# Patient Record
Sex: FEMALE | Race: WHITE | NOT HISPANIC OR LATINO | Employment: OTHER | URBAN - METROPOLITAN AREA
[De-identification: names, ages, dates, MRNs, and addresses within clinical notes are randomized per-mention and may not be internally consistent; named-entity substitution may affect disease eponyms.]

---

## 2017-01-09 ENCOUNTER — TELEPHONE (OUTPATIENT)
Dept: CARDIOLOGY | Facility: CLINIC | Age: 67
End: 2017-01-09

## 2017-01-09 NOTE — TELEPHONE ENCOUNTER
I called Mrs Whitehead to see what she was feeling when she pressed the button for symptoms on her loop recorder. She said she felt like she was in an irregular tachycardia. She also had a burning sensation in her chest, but had just eaten 2 sausage dogs. She took a 50 mg Metoprolol pill and laid down and her heart gradually slowed down. Yesterday was the 1 yr anniversary of her husbands death. She states when she has this it is when she if very stressed or very tired. The Metoprolol was an old RX she had around the house, she is not on it routinely anymore.

## 2017-01-09 NOTE — TELEPHONE ENCOUNTER
Dr Garcia was shown the strip of rapid Atrial Fib and informed that the patient had taken a Metoprolol 50 mg by  Mouth for this and it slowly subsided. He said she could try the Metoprolol prn in the future for increased heart rate. Also call us when she has this symptom again.

## 2017-01-10 RX ORDER — METOPROLOL TARTRATE 50 MG/1
50 TABLET ORAL
Qty: 180 TABLET | Refills: 11 | Status: SHIPPED | OUTPATIENT
Start: 2017-01-10 | End: 2017-11-07 | Stop reason: SDUPTHER

## 2017-01-17 ENCOUNTER — CLINICAL SUPPORT (OUTPATIENT)
Dept: CARDIOLOGY | Facility: CLINIC | Age: 67
End: 2017-01-17
Payer: MEDICARE

## 2017-01-17 DIAGNOSIS — R00.2 PALPITATIONS: ICD-10-CM

## 2017-01-17 DIAGNOSIS — Z95.818 STATUS POST PLACEMENT OF IMPLANTABLE LOOP RECORDER: ICD-10-CM

## 2017-01-17 PROCEDURE — 93297 REM INTERROG DEV EVAL ICPMS: CPT | Mod: ,,, | Performed by: INTERNAL MEDICINE

## 2017-01-17 PROCEDURE — 93299 LOOP RECORDER REMOTE: CPT | Mod: PBBFAC,PO | Performed by: INTERNAL MEDICINE

## 2017-03-03 ENCOUNTER — DOCUMENTATION ONLY (OUTPATIENT)
Dept: CARDIOLOGY | Facility: CLINIC | Age: 67
End: 2017-03-03

## 2017-03-03 NOTE — PROGRESS NOTES
"Received symptom, AF alert via [employer] Loop recorder home monitor.  AF episode detected on 3/2/17 19:17 with duration of 2 hours 16 mins.  Median V rate of 167 bpm.    Contacted patient, states she was out eating dinner with a friend and started feeling "funky", they took food to go, went home to lye down then started to feel better.  Pt also states she has been exhausted from the events of Sebastien Gras and has noticed the trend of the AF events occurring when she is in a state of exhaustion.  She did not take the PRN Metoprolol for this event.  Dr. Hernandez notified, shown EGM. No new orders.  Pt to keep appt with Dr. Garcia on 3/7/2017.  Pt verbalized understanding.  "

## 2017-03-07 ENCOUNTER — OFFICE VISIT (OUTPATIENT)
Dept: CARDIOLOGY | Facility: CLINIC | Age: 67
End: 2017-03-07
Payer: MEDICARE

## 2017-03-07 ENCOUNTER — CLINICAL SUPPORT (OUTPATIENT)
Dept: CARDIOLOGY | Facility: CLINIC | Age: 67
End: 2017-03-07
Payer: MEDICARE

## 2017-03-07 VITALS
HEART RATE: 50 BPM | DIASTOLIC BLOOD PRESSURE: 85 MMHG | HEIGHT: 65 IN | BODY MASS INDEX: 41.48 KG/M2 | WEIGHT: 249 LBS | SYSTOLIC BLOOD PRESSURE: 174 MMHG

## 2017-03-07 DIAGNOSIS — I48.91 ATRIAL FIBRILLATION, UNSPECIFIED TYPE: ICD-10-CM

## 2017-03-07 DIAGNOSIS — I48.0 PAF (PAROXYSMAL ATRIAL FIBRILLATION): Primary | Chronic | ICD-10-CM

## 2017-03-07 DIAGNOSIS — Z95.818 STATUS POST PLACEMENT OF IMPLANTABLE LOOP RECORDER: ICD-10-CM

## 2017-03-07 DIAGNOSIS — I48.3 TYPICAL ATRIAL FLUTTER: ICD-10-CM

## 2017-03-07 DIAGNOSIS — I48.0 PAF (PAROXYSMAL ATRIAL FIBRILLATION): ICD-10-CM

## 2017-03-07 DIAGNOSIS — R00.2 PALPITATIONS: Primary | ICD-10-CM

## 2017-03-07 DIAGNOSIS — R00.0 TACHYCARDIA: ICD-10-CM

## 2017-03-07 DIAGNOSIS — I45.10 RBBB: ICD-10-CM

## 2017-03-07 DIAGNOSIS — Q24.4 SUBAORTIC MEMBRANE: ICD-10-CM

## 2017-03-07 DIAGNOSIS — G47.33 OSA ON CPAP: Chronic | ICD-10-CM

## 2017-03-07 PROCEDURE — 99999 PR PBB SHADOW E&M-EST. PATIENT-LVL III: CPT | Mod: PBBFAC,,, | Performed by: INTERNAL MEDICINE

## 2017-03-07 PROCEDURE — 99213 OFFICE O/P EST LOW 20 MIN: CPT | Mod: PBBFAC,PO | Performed by: INTERNAL MEDICINE

## 2017-03-07 PROCEDURE — 93291 INTERROG DEV EVAL SCRMS IP: CPT | Mod: PBBFAC,PO | Performed by: INTERNAL MEDICINE

## 2017-03-07 PROCEDURE — 99213 OFFICE O/P EST LOW 20 MIN: CPT | Mod: S$PBB,,, | Performed by: INTERNAL MEDICINE

## 2017-03-07 RX ORDER — SOTALOL HYDROCHLORIDE 120 MG/1
120 TABLET ORAL 2 TIMES DAILY
Qty: 180 TABLET | Refills: 6 | Status: SHIPPED | OUTPATIENT
Start: 2017-03-07 | End: 2017-03-10 | Stop reason: SDUPTHER

## 2017-03-07 NOTE — MR AVS SNAPSHOT
[facility]  [address]  [facility] [address]  Phone: [phone]                  Yara Whitehead   3/7/2017 2:00 PM   Office Visit    Description:  Female : 1950   Provider:  Silvano Garcia MD   Department:  [facility]           Reason for Visit     Atrial Fibrillation     Hyperlipidemia     Hypertension           Diagnoses this Visit        Comments    PAF (paroxysmal atrial fibrillation)    -  Primary     Subaortic membrane         Typical atrial flutter         RBBB         FABIANA on CPAP                To Do List           Future Appointments        Provider Department Dept Phone    2017 8:40 AM [facility] [phone]      Goals (5 Years of Data)     None      Follow-Up and Disposition     Return in about 6 months (around 2017).       These Medications        Disp Refills Start End    rivaroxaban (XARELTO) 20 mg Tab 30 tablet 12 3/7/2017     Take 1 tablet (20 mg total) by mouth daily with dinner or evening meal. - Oral    Pharmacy: [facility] [address] [place], [place] [id number] [place] [address] AT [facility] [address] Ph #: [phone]       sotalol (BETAPACE) 120 MG Tab 180 tablet 6 3/7/2017 3/7/2018    Take 1 tablet (120 mg total) by mouth 2 (two) times daily. - Oral    Pharmacy: [facility] [id number] [place] [address] AT [facility] [address] Ph #: [phone]         [facility] On Call     [facility] On Call Nurse Care Line -  Assistance  Registered nurses in the [facility] provide clinical advisement, health education, appointment booking, and other advisory services.  Call for this free service at [phone].             Medications           Message regarding Medications     Verify the changes and/or additions to your medication regime listed below are the same as discussed with your clinician today.  If any of these changes or additions are  "incorrect, please notify your healthcare provider.             Verify that the below list of medications is an accurate representation of the medications you are currently taking.  If none reported, the list may be blank. If incorrect, please contact your healthcare provider. Carry this list with you in case of emergency.           Current Medications     albuterol 90 mcg/actuation inhaler Inhale 1-2 puffs into the lungs every 6 (six) hours as needed for Wheezing.    b complex vitamins tablet Take 1 tablet by mouth once daily.    cholecalciferol, vitamin D3, 10,000 unit Tab Take 1 tablet by mouth once daily.    cyanocobalamin 1,000 mcg/mL injection Inject 1 mL (1,000 mcg total) into the muscle every 30 days.    ferrous sulfate 325 (65 FE) MG EC tablet Take 325 mg by mouth 3 (three) times daily with meals.    furosemide (LASIX) 20 MG tablet Take 1 tablet (20 mg total) by mouth daily as needed (edema).    levothyroxine (SYNTHROID) 75 MCG tablet Take 1 tablet (75 mcg total) by mouth once daily.    lutein 20 mg Tab Take 20 mg by mouth once daily.    metoprolol tartrate (LOPRESSOR) 50 MG tablet Take 1 tablet (50 mg total) by mouth as needed.    MULTIVITAMIN-MINERALS NO.55 ORAL Take by mouth.    paroxetine (PAXIL) 20 MG tablet Take 2 tablets (40 mg total) by mouth every morning.    potassium chloride SA (K-DUR,KLOR-CON) 20 MEQ tablet Take 1 tablet (20 mEq total) by mouth daily as needed (edema).    rivaroxaban (XARELTO) 20 mg Tab Take 1 tablet (20 mg total) by mouth daily with dinner or evening meal.    sotalol (BETAPACE) 120 MG Tab Take 1 tablet (120 mg total) by mouth 2 (two) times daily.           Clinical Reference Information           Your Vitals Were     BP Pulse Height Weight Last Period BMI    174/85 (BP Location: Right arm, Patient Position: Sitting, BP Method: Automatic) 50 5' 5" (1.651 m) 112.9 kg (249 lb) (LMP Unknown) 41.44 kg/m2      Blood Pressure          Most Recent Value    BP  (!)  174/85    "   Allergies as of 3/7/2017     No Known Allergies      Immunizations Administered on Date of Encounter - 3/7/2017     None      Language Assistance Services     ATTENTION: Language assistance services are available, free of charge. Please call [phone].      ATENCIÓN: Si alejandra rabago, tiene a mendez disposición servicios gratuitos de asistencia lingüística. Llame al [phone].     CHÚ Ý: N?u b?n nói Ti?ng Vi?t, có các d?ch v? h? tr? ngôn ng? mi?n phí dành cho b?n. G?i s? [phone].         [facility] complies with applicable Federal civil rights laws and does not discriminate on the basis of race, color, national origin, age, disability, or sex.

## 2017-03-07 NOTE — PROGRESS NOTES
Subjective:    Patient ID:  Yara Whitehead is a 66 y.o. female who presents for follow-up of Atrial Fibrillation; Hyperlipidemia; and Hypertension      HPI   Here for f/u ILR/PAF-DCCV on sotalol. Was doing well but begin having PAF-RVR 3/1-3 admits to off diet and ETOH use due to Sebastien-Gras. No syncope.     Review of Systems   Constitution: Negative for malaise/fatigue.   Eyes: Negative for blurred vision.   Cardiovascular: Negative for chest pain, claudication, cyanosis, dyspnea on exertion, irregular heartbeat, leg swelling, near-syncope, orthopnea, palpitations, paroxysmal nocturnal dyspnea and syncope.   Respiratory: Negative for cough and shortness of breath.    Hematologic/Lymphatic: Does not bruise/bleed easily.   Musculoskeletal: Negative for back pain, falls, joint pain, muscle cramps, muscle weakness and myalgias.   Gastrointestinal: Negative for abdominal pain, change in bowel habit, nausea and vomiting.   Genitourinary: Negative for urgency.   Neurological: Negative for dizziness, focal weakness and light-headedness.        Objective:    Physical Exam   Constitutional: She is oriented to person, place, and time. She appears well-developed and well-nourished.   Neck: Normal range of motion. No JVD present.   Cardiovascular: Normal rate, regular rhythm, normal heart sounds and intact distal pulses.    Pulmonary/Chest: Effort normal and breath sounds normal.   Neurological: She is alert and oriented to person, place, and time.   Skin: Skin is warm and dry.   Psychiatric: She has a normal mood and affect.   Nursing note and vitals reviewed.            ..    Chemistry        Component Value Date/Time     11/01/2016 0855    K 4.7 11/01/2016 0855     11/01/2016 0855    CO2 26 11/01/2016 0855    BUN 16 11/01/2016 0855    CREATININE 0.8 11/01/2016 0855    GLU 99 11/01/2016 0855        Component Value Date/Time    CALCIUM 9.5 11/01/2016 0855    ALKPHOS 95 11/01/2016 0855    AST 28 11/01/2016  0855    ALT 22 11/01/2016 0855    BILITOT 0.6 11/01/2016 0855            ..  Lab Results   Component Value Date    CHOL 202 (H) 02/17/2016     Lab Results   Component Value Date    HDL 66 02/17/2016     Lab Results   Component Value Date    LDLCALC 115.4 02/17/2016     Lab Results   Component Value Date    TRIG 103 02/17/2016     Lab Results   Component Value Date    CHOLHDL 32.7 02/17/2016     ..  Lab Results   Component Value Date    WBC 7.11 08/24/2016    HGB 15.0 08/24/2016    HCT 45.6 08/24/2016    MCV 97 08/24/2016     08/24/2016       Test(s) Reviewed  I have reviewed the following in detail:  [] Stress test   [] Angiography   [x] Echocardiogram   [x] Labs   [x] Other:       Assessment:         ICD-10-CM ICD-9-CM   1. PAF (paroxysmal atrial fibrillation) I48.0 427.31   2. Subaortic membrane Q24.4 746.81   3. Typical atrial flutter I48.3 427.32   4. RBBB I45.10 426.4   5. FABIANA on CPAP G47.33 327.23     Problem List Items Addressed This Visit     FABIANA on CPAP (Chronic)    PAF (paroxysmal atrial fibrillation) - Primary (Chronic)    Overview     New dx 2/16/16         RBBB    Subaortic membrane    Typical atrial flutter           Plan:           Return to clinic 6 months   Aerobic exercise 5x's/wk. Heart healthy diet and risk factor modification.    See labs and med orders.  Refer back to EP for re-occurring PAF

## 2017-03-09 ENCOUNTER — DOCUMENTATION ONLY (OUTPATIENT)
Dept: ADMINISTRATIVE | Facility: HOSPITAL | Age: 67
End: 2017-03-09

## 2017-03-09 NOTE — PROGRESS NOTES
PRE-VISIT CHART REVIEW    Appointment Scheduled on 3/10/17    Department stratifications & guidelines reviewed:yes    Target Chronic Diagnosis: HTN, obesity    Chronic Diagnosis Intervention Due: yes    Goals Updated:No    Health Maintenance Due   Topic Date Due    Colonoscopy  05/31/2001    Pneumococcal (65+) (2 of 2 - PPSV23) 02/16/2017    Lipid Panel  02/17/2017    Mammogram  04/20/2017       Advanced Directives:   65 years of age or older?  Yes  Directive on file?  no                                      Pre-visit patient communication:  In Person     Studies or screenings scheduled pre-visit: no    Educate patient on HTN (174/85), obesity (41.44)

## 2017-03-10 ENCOUNTER — OFFICE VISIT (OUTPATIENT)
Dept: FAMILY MEDICINE | Facility: CLINIC | Age: 67
End: 2017-03-10
Payer: MEDICARE

## 2017-03-10 VITALS
RESPIRATION RATE: 18 BRPM | DIASTOLIC BLOOD PRESSURE: 76 MMHG | SYSTOLIC BLOOD PRESSURE: 142 MMHG | HEART RATE: 55 BPM | TEMPERATURE: 99 F | HEIGHT: 65 IN | BODY MASS INDEX: 39.67 KG/M2 | WEIGHT: 238.13 LBS | OXYGEN SATURATION: 98 %

## 2017-03-10 DIAGNOSIS — J45.901 RAD (REACTIVE AIRWAY DISEASE), UNSPECIFIED ASTHMA SEVERITY, WITH ACUTE EXACERBATION: Primary | ICD-10-CM

## 2017-03-10 DIAGNOSIS — J01.00 ACUTE MAXILLARY SINUSITIS, RECURRENCE NOT SPECIFIED: ICD-10-CM

## 2017-03-10 PROCEDURE — 99214 OFFICE O/P EST MOD 30 MIN: CPT | Mod: S$GLB,,, | Performed by: INTERNAL MEDICINE

## 2017-03-10 RX ORDER — ZOLPIDEM TARTRATE 12.5 MG/1
12.5 TABLET, FILM COATED, EXTENDED RELEASE ORAL NIGHTLY PRN
COMMUNITY
End: 2018-02-02

## 2017-03-10 RX ORDER — DEXTROMETHORPHAN POLISTIREX 30 MG/5ML
60 SUSPENSION ORAL NIGHTLY PRN
COMMUNITY
End: 2017-04-04

## 2017-03-10 RX ORDER — CETIRIZINE HYDROCHLORIDE 10 MG/1
10 TABLET ORAL DAILY PRN
COMMUNITY

## 2017-03-10 RX ORDER — METHYLPREDNISOLONE 4 MG/1
TABLET ORAL
Qty: 1 PACKAGE | Refills: 0 | Status: SHIPPED | OUTPATIENT
Start: 2017-03-10 | End: 2017-03-31

## 2017-03-10 RX ORDER — AMOXICILLIN AND CLAVULANATE POTASSIUM 875; 125 MG/1; MG/1
1 TABLET, FILM COATED ORAL 2 TIMES DAILY
Qty: 20 TABLET | Refills: 0 | Status: SHIPPED | OUTPATIENT
Start: 2017-03-10 | End: 2017-03-20

## 2017-03-10 NOTE — PROGRESS NOTES
Subjective:       Patient ID: Yara Whitehead is a 66 y.o. female.    Current Outpatient Prescriptions   Medication Sig Dispense Refill    albuterol 90 mcg/actuation inhaler Inhale 1-2 puffs into the lungs every 6 (six) hours as needed for Wheezing. 1 Inhaler 0    cetirizine (ZYRTEC) 10 MG tablet Take 10 mg by mouth once daily.      cyanocobalamin 1,000 mcg/mL injection Inject 1 mL (1,000 mcg total) into the muscle every 30 days. 1 mL 11    dextromethorphan (DELSYM) 30 mg/5 mL liquid Take 60 mg by mouth nightly as needed for Cough.      dextromethorphan-guaifenesin  mg (MUCINEX DM)  mg per 12 hr tablet Take 1 tablet by mouth 2 (two) times daily.      HYDROCODONE/ACETAMINOPHEN (LORTAB ORAL) Take 1 tablet by mouth.      levothyroxine (SYNTHROID) 75 MCG tablet Take 1 tablet (75 mcg total) by mouth once daily. 90 tablet 3    paroxetine (PAXIL) 20 MG tablet Take 2 tablets (40 mg total) by mouth every morning. 60 tablet 5    rivaroxaban (XARELTO) 20 mg Tab Take 1 tablet (20 mg total) by mouth daily with dinner or evening meal. 30 tablet 12    sotalol (BETAPACE) 120 MG Tab Take 1 tablet (120 mg total) by mouth 2 (two) times daily. 180 tablet 6    zolpidem (AMBIEN CR) 12.5 MG CR tablet Take 12.5 mg by mouth nightly as needed for Insomnia.      amoxicillin-clavulanate 875-125mg (AUGMENTIN) 875-125 mg per tablet Take 1 tablet by mouth 2 (two) times daily. 20 tablet 0    b complex vitamins tablet Take 1 tablet by mouth once daily.      cholecalciferol, vitamin D3, 10,000 unit Tab Take 1 tablet by mouth once daily.      ferrous sulfate 325 (65 FE) MG EC tablet Take 325 mg by mouth 3 (three) times daily with meals.      furosemide (LASIX) 20 MG tablet Take 1 tablet (20 mg total) by mouth daily as needed (edema). 90 tablet 3    lutein 20 mg Tab Take 20 mg by mouth once daily.      methylPREDNISolone (MEDROL DOSEPACK) 4 mg tablet use as directed 1 Package 0    metoprolol tartrate (LOPRESSOR) 50  "MG tablet Take 1 tablet (50 mg total) by mouth as needed. 180 tablet 11    MULTIVITAMIN-MINERALS NO.55 ORAL Take by mouth.      potassium chloride SA (K-DUR,KLOR-CON) 20 MEQ tablet Take 1 tablet (20 mEq total) by mouth daily as needed (edema). 90 tablet 6     No current facility-administered medications for this visit.      Chief Complaint: Sinus Problem; Fever (low grade); and Cough (yellowish/blood tinged sputum)  she presents with 10 days of worsening symptoms. She started with allergy like symptoms and sore throat which she treated with antihistamines.  She got worse on day 5 with increasing drainage that changed to yellow with occasional blood streaks. Sinus pressure and PND. She has a new fever for the last 2 days.  She has a mild cough with wheezing and is using albuterol tid to qid.  She has mild shortness of breath.  She is very fatigued with no energy. No diarrhea/vomiting/nausea. No abdominal pain or rashes.      Review of Systems   Constitutional: Positive for fever. Negative for chills.   HENT: Positive for congestion, ear pain, postnasal drip, rhinorrhea, sinus pressure and sore throat.    Respiratory: Positive for cough, shortness of breath and wheezing.    Cardiovascular: Negative for chest pain.   Musculoskeletal: Negative for myalgias.   Skin: Negative for rash.   Allergic/Immunologic: Positive for environmental allergies.   Neurological: Positive for headaches.       Objective:      Vitals:    03/10/17 0853   BP: (!) 142/76   Pulse: (!) 55   Resp: 18   Temp: 98.5 °F (36.9 °C)   SpO2: 98%   Weight: 108 kg (238 lb 1.6 oz)   Height: 5' 5" (1.651 m)     Body mass index is 39.62 kg/(m^2).  Physical Exam    General appearance: alert, no acute distress  Head: atraumatic  Eyes: PERRL, EMOI, normal conjunctiva, no drainage  Ears: tm normal with good visualization of landmarks, no erythema or pus, canals normal, external ear normal  Nose: boggy erythematous mucosa, no polyps or sores, no " rhinorrhea  Throat: no erythema, no exudates, tonsils appear normal  Mouth: no sores or lesion, moist mucous membranes  Neck: supple, FROM, no masses, no tenderness  Lymph: no posterior or cervical adenopathy  Lungs: no distress, no retractions, diffuse scattered wheezing in the bases, no rhonchi or rales  Heart:: Regular rate and rhythm, no murmur  Abdomen: soft, non-tender, no guarding, no rebound, no peritoneal signs, bowel sounds normal, no hepatosplenomegaly, no masses  Skin: no rashes or lesion  Perfusion: good capillary refill, normal pulses      Assessment:       1. RAD (reactive airway disease), unspecified asthma severity, with acute exacerbation    2. Acute maxillary sinusitis, recurrence not specified        Plan:       RAD (reactive airway disease), unspecified asthma severity, with acute exacerbation  Acute flare and will treat with a course of oral steroids.  Will continue albuterol tid x 2-3 days and then only as needed.  If no improved to return to clinic on Monday.   -     methylPREDNISolone (MEDROL DOSEPACK) 4 mg tablet; use as directed  Dispense: 1 Package; Refill: 0    Acute maxillary sinusitis, recurrence not specified  10 days of symptoms with a new fever for last 2 days and will treat with abx.   -     amoxicillin-clavulanate 875-125mg (AUGMENTIN) 875-125 mg per tablet; Take 1 tablet by mouth 2 (two) times daily.  Dispense: 20 tablet; Refill: 0    Return if symptoms worsen or fail to improve.

## 2017-03-10 NOTE — TELEPHONE ENCOUNTER
----- Message from Tona Lan sent at 3/9/2017  1:12 PM CST -----  Patient was seen by doctor yesterday. Office sent in her medications Xrelto and Sotolol into the pharmacy. But, office sent these in as a 30 day prescription for each. Patient asks if office can re-submit it as 90 day prescription, as you usually do. It would save patient a lot of money. Please send to:      [facility] [address] [place] [address] [id number] [place] [address] AT [facility] [address]  [id number] [place] [address]  Phone: [phone] Fax: [phone]    Please call patient when this is completed at [phone]

## 2017-03-10 NOTE — MR AVS SNAPSHOT
[facility]  [address]  [facility] [address]  Phone: [phone]  Fax: [phone]                  Yara Whitehead   3/10/2017 9:20 AM   Office Visit    Description:  Female : 1950   Provider:  Ann Richards DO   Department:  [facility]           Reason for Visit     Sinus Problem     Fever     Cough           Diagnoses this Visit        Comments    RAD (reactive airway disease), unspecified asthma severity, with acute exacerbation    -  Primary     Acute maxillary sinusitis, recurrence not specified                To Do List           Future Appointments        Provider Department Dept Phone    2017 8:20 AM Ann Richards DO [facility] [phone]    4/10/2017 10:00 AM Edmund Shah MD [place] [facility] [phone]      Goals (5 Years of Data)     None       These Medications        Disp Refills Start End    methylPREDNISolone (MEDROL DOSEPACK) 4 mg tablet 1 Package 0 3/10/2017 3/31/2017    use as directed    Pharmacy: [facility] [address] [place] [id number] [address] Ph #: [phone]       amoxicillin-clavulanate 875-125mg (AUGMENTIN) 875-125 mg per tablet 20 tablet 0 3/10/2017 3/20/2017    Take 1 tablet by mouth 2 (two) times daily. - Oral    Pharmacy: [facility] [id number] [place] [address] [address] Ph #: [phone]         [facility]     [facility] On Call Nurse Care Line -  Assistance  Registered nurses in the [facility] On Call Center provide clinical advisement, health education, appointment booking, and other advisory services.  Call for this free service at [phone].             Medications           Message regarding Medications     Verify the changes and/or additions to your medication regime listed below are the same as discussed with your clinician today.  If any of these changes or  additions are incorrect, please notify your healthcare provider.        START taking these NEW medications        Refills    methylPREDNISolone (MEDROL DOSEPACK) 4 mg tablet 0    Sig: use as directed    Class: Normal    amoxicillin-clavulanate 875-125mg (AUGMENTIN) 875-125 mg per tablet 0    Sig: Take 1 tablet by mouth 2 (two) times daily.    Class: Normal    Route: Oral           Verify that the below list of medications is an accurate representation of the medications you are currently taking.  If none reported, the list may be blank. If incorrect, please contact your healthcare provider. Carry this list with you in case of emergency.           Current Medications     albuterol 90 mcg/actuation inhaler Inhale 1-2 puffs into the lungs every 6 (six) hours as needed for Wheezing.    cetirizine (ZYRTEC) 10 MG tablet Take 10 mg by mouth once daily.    cyanocobalamin 1,000 mcg/mL injection Inject 1 mL (1,000 mcg total) into the muscle every 30 days.    dextromethorphan (DELSYM) 30 mg/5 mL liquid Take 60 mg by mouth nightly as needed for Cough.    dextromethorphan-guaifenesin  mg (MUCINEX DM)  mg per 12 hr tablet Take 1 tablet by mouth 2 (two) times daily.    HYDROCODONE/ACETAMINOPHEN (LORTAB ORAL) Take 1 tablet by mouth.    levothyroxine (SYNTHROID) 75 MCG tablet Take 1 tablet (75 mcg total) by mouth once daily.    paroxetine (PAXIL) 20 MG tablet Take 2 tablets (40 mg total) by mouth every morning.    rivaroxaban (XARELTO) 20 mg Tab Take 1 tablet (20 mg total) by mouth daily with dinner or evening meal.    sotalol (BETAPACE) 120 MG Tab Take 1 tablet (120 mg total) by mouth 2 (two) times daily.    zolpidem (AMBIEN CR) 12.5 MG CR tablet Take 12.5 mg by mouth nightly as needed for Insomnia.    amoxicillin-clavulanate 875-125mg (AUGMENTIN) 875-125 mg per tablet Take 1 tablet by mouth 2 (two) times daily.    b complex vitamins tablet Take 1 tablet by mouth once daily.    cholecalciferol, vitamin D3, 10,000 unit  "Tab Take 1 tablet by mouth once daily.    ferrous sulfate 325 (65 FE) MG EC tablet Take 325 mg by mouth 3 (three) times daily with meals.    furosemide (LASIX) 20 MG tablet Take 1 tablet (20 mg total) by mouth daily as needed (edema).    lutein 20 mg Tab Take 20 mg by mouth once daily.    methylPREDNISolone (MEDROL DOSEPACK) 4 mg tablet use as directed    metoprolol tartrate (LOPRESSOR) 50 MG tablet Take 1 tablet (50 mg total) by mouth as needed.    MULTIVITAMIN-MINERALS NO.55 ORAL Take by mouth.    potassium chloride SA (K-DUR,KLOR-CON) 20 MEQ tablet Take 1 tablet (20 mEq total) by mouth daily as needed (edema).           Clinical Reference Information           Your Vitals Were     BP Pulse Temp Resp Height Weight    142/76 55 98.5 °F (36.9 °C) 18 5' 5" (1.651 m) 108 kg (238 lb 1.6 oz)    Last Period SpO2 BMI          (LMP Unknown) 98% 39.62 kg/m2        Blood Pressure          Most Recent Value    BP  (!)  142/76      Allergies as of 3/10/2017     No Known Allergies      Immunizations Administered on Date of Encounter - 3/10/2017     None      Language Assistance Services     ATTENTION: Language assistance services are available, free of charge. Please call [phone].      ATENCIÓN: Si alejandra rabago, tiene a mendez disposición servicios gratuitos de asistencia lingüística. Llame al [phone].     JAMEEL Ý: N?u b?n nói Ti?ng Vi?t, có các d?ch v? h? tr? ngôn ng? mi?n phí dành cho b?n. G?i s? [phone].         [facility] complies with applicable Federal civil rights laws and does not discriminate on the basis of race, color, national origin, age, disability, or sex.        "

## 2017-03-14 RX ORDER — SOTALOL HYDROCHLORIDE 120 MG/1
120 TABLET ORAL 2 TIMES DAILY
Qty: 180 TABLET | Refills: 3 | Status: SHIPPED | OUTPATIENT
Start: 2017-03-14 | End: 2017-11-07 | Stop reason: SDUPTHER

## 2017-03-21 ENCOUNTER — PATIENT OUTREACH (OUTPATIENT)
Dept: ADMINISTRATIVE | Facility: HOSPITAL | Age: 67
End: 2017-03-21
Payer: MEDICARE

## 2017-03-21 DIAGNOSIS — R00.1 BRADYCARDIA: ICD-10-CM

## 2017-03-21 DIAGNOSIS — E03.9 ACQUIRED HYPOTHYROIDISM: ICD-10-CM

## 2017-03-21 DIAGNOSIS — F32.89 OTHER DEPRESSION: ICD-10-CM

## 2017-03-21 DIAGNOSIS — E66.01 MORBID OBESITY, UNSPECIFIED OBESITY TYPE: ICD-10-CM

## 2017-03-21 DIAGNOSIS — I10 ESSENTIAL HYPERTENSION: ICD-10-CM

## 2017-03-21 DIAGNOSIS — I48.0 PAF (PAROXYSMAL ATRIAL FIBRILLATION): Primary | ICD-10-CM

## 2017-03-21 DIAGNOSIS — M85.80 OSTEOPENIA: ICD-10-CM

## 2017-03-21 DIAGNOSIS — E78.00 PURE HYPERCHOLESTEROLEMIA: ICD-10-CM

## 2017-03-21 NOTE — LETTER
March 27, 2017    Yara Lin Whitehead  [address] [facility] [address]             [facility]  [address]  [facility] [address]  Phone: [phone] Dear Mrs. Whitehead:    We have tried to reach you by mychart unsuccessfully.    GREAT NEWS!  You are qualified for this entirely FREE service!     Should you decide you are interested in this free program; the Care Management Team will work with you and Dr. Richards to help develop a personalized care plan for your healthcare goals.  Please feel free to contact your Primary Care Physician to discuss this opportunity.  They will be happy to discuss any questions or concerns that you may have regarding this exciting program.     [facility] is committed to your overall health.  To help you get the most out of each of your visits, we will review your information to make sure you are up to date on all of your recommended tests and/or procedures.       Dr. Richards has found that you may be due for your fasting cholesterol labs, mammogram, colon cancer screening, and a pneumonia immunization.     If you have had any of the above done at another facility, please bring the records or information with you so that your record at [facility] will be complete.     If you are currently taking medication, please bring it with you to your appointment for review.     Also, if you have any type of Advanced Directives, please bring them with you to your office visit so we may scan them into your chart.     You can also contact us at [phone] to be connected to the Care Management Team or call me directly at [phone].       If you have any questions or concerns, please don't hesitate to call.    Thank you for letting us care for you,  Katarina Hensley LPN Clinical Care Coordinator  [facility]  [phone]

## 2017-03-21 NOTE — PROGRESS NOTES
PRE-VISIT CHART REVIEW    Appointment Scheduled on 4/4/17    Department stratifications & guidelines reviewed:yes    Target Chronic Diagnosis: HTN, obesity    Chronic Diagnosis Intervention Due: yes    Goals Updated:No    Health Maintenance Due   Topic Date Due    Colonoscopy  05/31/2001    Pneumococcal (65+) (2 of 2 - PPSV23) 02/16/2017    Lipid Panel  02/17/2017    Mammogram  04/20/2017       Advanced Directives:   65 years of age or older?  Yes  Directive on file?  no                                      Pre-visit patient communication: 03/21/2017 MyChart/Letter    Studies or screenings scheduled pre-visit: no    Educate patient on HTN (142/76), obesity (39.62)

## 2017-03-22 ENCOUNTER — TELEPHONE (OUTPATIENT)
Dept: ADMINISTRATIVE | Facility: HOSPITAL | Age: 67
End: 2017-03-22

## 2017-03-22 ENCOUNTER — OUTPATIENT CASE MANAGEMENT (OUTPATIENT)
Dept: ADMINISTRATIVE | Facility: OTHER | Age: 67
End: 2017-03-22

## 2017-03-22 NOTE — TELEPHONE ENCOUNTER
----- Message from Luci Harris sent at 3/22/2017 12:25 PM CDT -----  Please note the following patient has been assigned to Ama Mustafa RN with Outpatient Complex Care Mgmt for screening.    PAF (paroxysmal atrial fibrillation) [I48.0]  Osteopenia [M85.80]  Morbid obesity, unspecified obesity type [E66.01]  Acquired hypothyroidism [E03.9]  Pure hypercholesterolemia [E78.00]  Essential hypertension [I10]  Other depression [F32.89]  Bradycardia [R00.1]      Please contact [facility] at ext [phone] with questions.    Thank you    Luci Harris, SSC

## 2017-03-22 NOTE — PROGRESS NOTES
Please note the following patient has been assigned to Ama Mustafa RN with Outpatient Complex Care Mgmt for screening.    Please contact [facility] at ext [phone] with questions.    Thank you    Luci Harris, SSC

## 2017-03-24 ENCOUNTER — OUTPATIENT CASE MANAGEMENT (OUTPATIENT)
Dept: ADMINISTRATIVE | Facility: OTHER | Age: 67
End: 2017-03-24

## 2017-03-27 ENCOUNTER — OUTPATIENT CASE MANAGEMENT (OUTPATIENT)
Dept: ADMINISTRATIVE | Facility: OTHER | Age: 67
End: 2017-03-27

## 2017-03-29 ENCOUNTER — TELEPHONE (OUTPATIENT)
Dept: FAMILY MEDICINE | Facility: CLINIC | Age: 67
End: 2017-03-29

## 2017-03-29 NOTE — TELEPHONE ENCOUNTER
----- Message from Guillaume Zuleta sent at 3/29/2017 12:13 PM CDT -----  Contact: Yara Mata refill request for fever blister.    Please call back when it is sent [phone] (home)     [facility] [address] [place] [address] [id number] [place] [address] [place] [facility] [place] [address]  [id number] [place] [address]  Phone: [phone] Fax: [phone]  Thank you!

## 2017-03-30 ENCOUNTER — OUTPATIENT CASE MANAGEMENT (OUTPATIENT)
Dept: ADMINISTRATIVE | Facility: OTHER | Age: 67
End: 2017-03-30

## 2017-03-30 ENCOUNTER — TELEPHONE (OUTPATIENT)
Dept: FAMILY MEDICINE | Facility: CLINIC | Age: 67
End: 2017-03-30

## 2017-03-30 RX ORDER — VALACYCLOVIR HYDROCHLORIDE 1 G/1
1000 TABLET, FILM COATED ORAL 2 TIMES DAILY
Qty: 14 TABLET | Refills: 3 | Status: ON HOLD | OUTPATIENT
Start: 2017-03-30 | End: 2019-01-03 | Stop reason: HOSPADM

## 2017-03-30 NOTE — PROGRESS NOTES
03/30/17-Called and left messages for patient. 3'rd attempt to contact patient for Initial Screen. Letter sent thru [facility] portal to patient. Will dis enroll patient from [facility] and close case at this time.

## 2017-03-30 NOTE — LETTER
March 30, 2017    Yara Whitehead  [address] [facility] [address]             [facility] [address] [place] [address] Dear MS Whitehead:    I work with [facility]s outpatient case management department. We received a referral to call you to discuss your medical history. I attempted to reach you by telephone but I was unsuccessful. Please call our department that we may go over some questions with you regarding your health.     The outpatient case management department can be reached at [phone] from 8:00 am to 4:30 PM on Monday thru Friday. [facility] also has a program where a nurse is available 24/7 to answer questions or provide medical advice their number is [phone].    If you have any questions or concerns, please dont hesitate to call.     Sincerely,   Ama Mustafa RN   [facility]  Outpatient Complex Care Management  [phone]

## 2017-03-30 NOTE — TELEPHONE ENCOUNTER
----- Message from Nikia Polo sent at 3/30/2017  8:57 AM CDT -----  Contact: patient  Patient calling in regards to speaking with a Nurse about her medication refill. She can't remember the name of the medication. Please advise. Call to pod. Call connected to pod. Warm transferred.  Thanks!

## 2017-03-31 ENCOUNTER — OUTPATIENT CASE MANAGEMENT (OUTPATIENT)
Dept: ADMINISTRATIVE | Facility: OTHER | Age: 67
End: 2017-03-31

## 2017-04-04 ENCOUNTER — OFFICE VISIT (OUTPATIENT)
Dept: FAMILY MEDICINE | Facility: CLINIC | Age: 67
End: 2017-04-04
Payer: MEDICARE

## 2017-04-04 VITALS
BODY MASS INDEX: 39.52 KG/M2 | OXYGEN SATURATION: 97 % | TEMPERATURE: 99 F | DIASTOLIC BLOOD PRESSURE: 76 MMHG | SYSTOLIC BLOOD PRESSURE: 132 MMHG | WEIGHT: 237.19 LBS | RESPIRATION RATE: 18 BRPM | HEIGHT: 65 IN | HEART RATE: 53 BPM

## 2017-04-04 DIAGNOSIS — I48.0 PAF (PAROXYSMAL ATRIAL FIBRILLATION): Chronic | ICD-10-CM

## 2017-04-04 DIAGNOSIS — Z12.11 SCREEN FOR COLON CANCER: ICD-10-CM

## 2017-04-04 DIAGNOSIS — F51.01 PRIMARY INSOMNIA: ICD-10-CM

## 2017-04-04 DIAGNOSIS — I10 ESSENTIAL HYPERTENSION: Primary | Chronic | ICD-10-CM

## 2017-04-04 DIAGNOSIS — E55.9 VITAMIN D DEFICIENCY: ICD-10-CM

## 2017-04-04 DIAGNOSIS — Z12.31 ENCOUNTER FOR SCREENING MAMMOGRAM FOR MALIGNANT NEOPLASM OF BREAST: ICD-10-CM

## 2017-04-04 DIAGNOSIS — Z90.3 INTESTINAL MALABSORPTION FOLLOWING GASTRECTOMY: ICD-10-CM

## 2017-04-04 DIAGNOSIS — M25.561 ACUTE PAIN OF RIGHT KNEE: ICD-10-CM

## 2017-04-04 DIAGNOSIS — E53.8 VITAMIN B 12 DEFICIENCY: ICD-10-CM

## 2017-04-04 DIAGNOSIS — K91.2 INTESTINAL MALABSORPTION FOLLOWING GASTRECTOMY: ICD-10-CM

## 2017-04-04 DIAGNOSIS — F33.0 MAJOR DEPRESSIVE DISORDER, RECURRENT, MILD: ICD-10-CM

## 2017-04-04 DIAGNOSIS — Z23 NEED FOR PNEUMOCOCCAL VACCINATION: ICD-10-CM

## 2017-04-04 DIAGNOSIS — Z20.9 EXPOSURE TO COMMUNICABLE DISEASE: ICD-10-CM

## 2017-04-04 DIAGNOSIS — D50.9 IRON DEFICIENCY ANEMIA, UNSPECIFIED IRON DEFICIENCY ANEMIA TYPE: ICD-10-CM

## 2017-04-04 DIAGNOSIS — E03.9 ACQUIRED HYPOTHYROIDISM: ICD-10-CM

## 2017-04-04 DIAGNOSIS — G47.33 OSA ON CPAP: Chronic | ICD-10-CM

## 2017-04-04 DIAGNOSIS — E78.2 MIXED HYPERLIPIDEMIA: ICD-10-CM

## 2017-04-04 PROCEDURE — G0009 ADMIN PNEUMOCOCCAL VACCINE: HCPCS | Mod: S$GLB,,, | Performed by: INTERNAL MEDICINE

## 2017-04-04 PROCEDURE — 90732 PPSV23 VACC 2 YRS+ SUBQ/IM: CPT | Mod: S$GLB,,, | Performed by: INTERNAL MEDICINE

## 2017-04-04 PROCEDURE — 99214 OFFICE O/P EST MOD 30 MIN: CPT | Mod: S$GLB,,, | Performed by: INTERNAL MEDICINE

## 2017-04-04 RX ORDER — PAROXETINE HYDROCHLORIDE 40 MG/1
40 TABLET, FILM COATED ORAL EVERY MORNING
Qty: 90 TABLET | Refills: 2 | Status: SHIPPED | OUTPATIENT
Start: 2017-04-04 | End: 2018-01-30 | Stop reason: SDUPTHER

## 2017-04-04 NOTE — PROGRESS NOTES
Subjective:       Patient ID: Yara Whitehead is a 66 y.o. female.    Medication List with Changes/Refills   New Medications    PAROXETINE (PAXIL) 40 MG TABLET    Take 1 tablet (40 mg total) by mouth every morning.   Current Medications    ALBUTEROL 90 MCG/ACTUATION INHALER    Inhale 1-2 puffs into the lungs every 6 (six) hours as needed for Wheezing.    B COMPLEX VITAMINS TABLET    Take 1 tablet by mouth once daily.    CETIRIZINE (ZYRTEC) 10 MG TABLET    Take 10 mg by mouth once daily.    CYANOCOBALAMIN 1,000 MCG/ML INJECTION    Inject 1 mL (1,000 mcg total) into the muscle every 30 days.    FUROSEMIDE (LASIX) 20 MG TABLET    Take 1 tablet (20 mg total) by mouth daily as needed (edema).    LEVOTHYROXINE (SYNTHROID) 75 MCG TABLET    Take 1 tablet (75 mcg total) by mouth once daily.    LUTEIN 20 MG TAB    Take 20 mg by mouth once daily.    METOPROLOL TARTRATE (LOPRESSOR) 50 MG TABLET    Take 1 tablet (50 mg total) by mouth as needed.    POTASSIUM CHLORIDE SA (K-DUR,KLOR-CON) 20 MEQ TABLET    Take 1 tablet (20 mEq total) by mouth daily as needed (edema).    RIVAROXABAN (XARELTO) 20 MG TAB    Take 1 tablet (20 mg total) by mouth daily with dinner or evening meal.    SOTALOL (BETAPACE) 120 MG TAB    Take 1 tablet (120 mg total) by mouth 2 (two) times daily.    VALACYCLOVIR (VALTREX) 1000 MG TABLET    Take 1 tablet (1,000 mg total) by mouth 2 (two) times daily.    ZOLPIDEM (AMBIEN CR) 12.5 MG CR TABLET    Take 12.5 mg by mouth nightly as needed for Insomnia.   Discontinued Medications    CHOLECALCIFEROL, VITAMIN D3, 10,000 UNIT TAB    Take 1 tablet by mouth once daily.    DEXTROMETHORPHAN (DELSYM) 30 MG/5 ML LIQUID    Take 60 mg by mouth nightly as needed for Cough.    DEXTROMETHORPHAN-GUAIFENESIN  MG (MUCINEX DM)  MG PER 12 HR TABLET    Take 1 tablet by mouth 2 (two) times daily.    FERROUS SULFATE 325 (65 FE) MG EC TABLET    Take 325 mg by mouth 3 (three) times daily with meals.     HYDROCODONE/ACETAMINOPHEN (LORTAB ORAL)    Take 1 tablet by mouth.    MULTIVITAMIN-MINERALS NO.55 ORAL    Take by mouth.    PAROXETINE (PAXIL) 20 MG TABLET    Take 2 tablets (40 mg total) by mouth every morning.       Chief Complaint: Follow-up; Knee Pain, right knee; and Hepatitis C screening  She is here today to f/u on chronic medical issues.     She has afib and was  hospitalized on 8/2016 for Afib with RVR. She had cardioversion that was successful and started on sotalol twice daily (stopped metoprolol and stopped flecainide). She has continued on xarelto but her beta blocker was stopped because it dropped her BP too low. She is doing well on sotaolol. Her BP is good at home and her HR stays between 50-60s.  She had a stress test on 3/2016 that was negative for ischemia. She had an echocardiogram on 2/2016 that showed EF 55%, diastolic dysfunction, biatrial enlargement. She has a known subaortic membrane that is being followed by cardiology.  In the last 2 months she has had 3 episodes of Afib lasting from 2 hrs to all day.  These resolved spontaneously. During the episodes she is lightheaded but no chest pain or shortness of breath.   She has an appt with Dr. Shah in EP to discuss next week.      She has hypothyroid that is controlled on levothyroxine 75 mcg qday. Her last TSH was checked in 11/1/16 was normal.      She has hyperlipidemia but is diet controlled. Her last lipids were 202/103/66/115 on 2/2016. She is due to get checked.       She had reflux symptoms in the past and was taking pepcid. Since her Afib is rate controlled her reflux is gone.       She has malabsorption since bypass surgery and in the past was on vitamin B12. She is taking month vitamin B12 injections and last level was 422 on 11/1/16.  She does report that she is fatigued and feels she would do better on twice a month injections.  She would like to check her vitamins today. She has stopped all supplementation for last month because  she got sick of taking so many pills.       She has on going anxiety and depression. In 2015 she lost her  suddenly to a brain tumor. She had been doing well on paxil and staying very active. She feels the paxil is helping and has no complaints. She is sleeping well and has started taking passion flower prior to bedtime. She has ambien but does not use.      She has FABIANA and is using CPAP nightly.     She is dating a man who is positive for Hep C and is in active treatment. She would like to be screened today.     She dose complain of right knee pain after a fall 2 months ago. It is painful to touch and swollen. No problem with weight bearing.  She denies any redness or limited ROM.     Colonoscopy---2013 repeat in 5 years  Mammogram----4/2016 neg  DEXA-----4/2016 nl  Pap----- KEIRY  Tdap--11/2016  Influenza vaccine---11/2016  Pneumovax 23----yes but > 5 years ago  Prevnar 13---- 2/2016  Shingles vaccine-----yes per patient    Review of Systems   Constitutional: Positive for fatigue. Negative for appetite change, fever and unexpected weight change.   HENT: Negative for congestion, ear pain, hearing loss, sore throat and trouble swallowing.    Eyes: Negative for pain and visual disturbance.   Respiratory: Negative for cough, chest tightness, shortness of breath and wheezing.    Cardiovascular: Negative for chest pain, palpitations and leg swelling.   Gastrointestinal: Positive for diarrhea. Negative for abdominal pain, blood in stool, constipation, nausea and vomiting.   Endocrine: Negative for polyuria.   Genitourinary: Negative for dysuria and hematuria.   Musculoskeletal: Negative for arthralgias, back pain and myalgias.   Skin: Negative for rash.   Neurological: Negative for dizziness, weakness, numbness and headaches.   Hematological: Does not bruise/bleed easily.   Psychiatric/Behavioral: Negative for dysphoric mood, sleep disturbance and suicidal ideas. The patient is not nervous/anxious.        Objective:  "     Vitals:    04/04/17 0823   BP: 132/76   BP Location: Left arm   Patient Position: Sitting   BP Method: Manual   Pulse: (!) 53   Resp: 18   Temp: 99.1 °F (37.3 °C)   TempSrc: Oral   SpO2: 97%   Weight: 107.6 kg (237 lb 3.4 oz)   Height: 5' 5" (1.651 m)     Body mass index is 39.47 kg/(m^2).  Physical Exam    General appearance: No acute distress, cooperative  Eyes: PERRL, EOMI, conjunctiva clear  Ears: normal external ear and pinna, tm clear without drainage, canals clear  Nose: Normal mucosa without drainage  Throat: no exudates or erythema, tonsils not enlarged  Mouth: no sores or lesions, moist mucous membranes, good dentition  Neck: FROM, soft, supple, no thyromegaly, no bruits  Lymph: no anterior or posterior cervical adenopathy  Heart::  Regular rate and rhythm, no murmur  Lung: Clear to ascultation bilaterally, no wheezing, no rales, no rhonchi, no distress  Abdomen: Soft, nontender, no distention, no hepatosplenomegaly, bowel sounds normal, no guarding, no rebound, no peritoneal signs  Skin: no rashes, no lesions  Extremities: no edema, no cyanosis  Neuro: CN 2-12 intact, 5/5 muscle strength upper and lower extremity bilaterally, 2+ DTRs UE and LE bilaterally, normal gait, normal sensation  Peripheral pulses: 2+ pedal pulses bilaterally, good perfusion and color  Musculoskeletal: FROM, good strenth, no tenderness  Joint: normal appearance, mild swelling at medial right knee, FROM and tender to touch, no warmth, no deformity in all joints    Assessment:       1. Essential hypertension    2. PAF (paroxysmal atrial fibrillation)    3. Mixed hyperlipidemia    4. FABIANA on CPAP    5. Acquired hypothyroidism    6. Intestinal malabsorption following gastrectomy    7. Vitamin B 12 deficiency    8. Vitamin D deficiency    9. Iron deficiency anemia, unspecified iron deficiency anemia type    10. Major depressive disorder, recurrent, mild    11. Primary insomnia    12. Acute pain of right knee    13. Exposure to " communicable disease    14. Screen for colon cancer    15. Encounter for screening mammogram for malignant neoplasm of breast    16. Need for pneumococcal vaccination        Plan:       Essential hypertension  Good control on this regimen. She does not use metoprolol unless she is in Afib with RVR.      PAF (paroxysmal atrial fibrillation)  NSR today in the office but has had 3 symptomatic episodes in the last 2 months. She has appt next with EP specialist to discuss ablation.  Continue anticoagulation with xaralto.    Mixed hyperlipidemia  Uncontrolled and will recheck today. If LDL is still elevated will start statin. She is agreeable.   -     Lipid panel; Future; Expected date: 4/4/17    FABIANA on CPAP  Controlled and compliant with treatment.     Acquired hypothyroidism  Good control on this regimen.   -     TSH; Future; Expected date: 4/4/17    Intestinal malabsorption following gastrectomy with vitamin B12 deficiency and vitamin D deficiency and Iron deficiency anemia  Her last labs look good but she has stopped treatment and will recheck today.  She wants to change her vitamin B12 to twice a week because in the past she felt better on this dose but her vitamin B12 were supratherapeutic on this dose.  Will check labs today and call her to discuss.   -     Vitamin B12; Future; Expected date: 4/4/17  -     Vitamin D; Future; Expected date: 4/4/17  -     Iron and TIBC; Future; Expected date: 4/4/17    Major depressive disorder, recurrent, mild  Well controlled on paxil.  -     paroxetine (PAXIL) 40 MG tablet; Take 1 tablet (40 mg total) by mouth every morning.  Dispense: 90 tablet; Refill: 2    Primary insomnia  Controlled and not needing ambien.     Acute pain of right knee  Due to a fall. REassurance given. Okay to do conservative treatment. If still with pain or worsening over the next month will refer to orthopedics.      Exposure to communicable disease  Will recheck Hep C level today.    -     Hepatitis C  antibody; Future; Expected date: 4/4/17    Screen for colon cancer  -     Case request GI: COLONOSCOPY    Encounter for screening mammogram for malignant neoplasm of breast  -     Mammo Digital Screening Bilat with CAD; Future; Expected date: 4/4/17    Need for pneumococcal vaccination  -     Pneumococcal Polysaccharide Vaccine (23 Valent) (SQ/IM)    Return in about 4 months (around 8/4/2017) for chronic medical issues.

## 2017-04-05 ENCOUNTER — LAB VISIT (OUTPATIENT)
Dept: LAB | Facility: HOSPITAL | Age: 67
End: 2017-04-05
Attending: INTERNAL MEDICINE
Payer: MEDICARE

## 2017-04-05 DIAGNOSIS — E03.9 ACQUIRED HYPOTHYROIDISM: ICD-10-CM

## 2017-04-05 DIAGNOSIS — R00.2 PALPITATIONS: ICD-10-CM

## 2017-04-05 DIAGNOSIS — R00.1 BRADYCARDIA: ICD-10-CM

## 2017-04-05 DIAGNOSIS — Z90.3 INTESTINAL MALABSORPTION FOLLOWING GASTRECTOMY: ICD-10-CM

## 2017-04-05 DIAGNOSIS — E78.2 MIXED HYPERLIPIDEMIA: ICD-10-CM

## 2017-04-05 DIAGNOSIS — E55.9 VITAMIN D DEFICIENCY: ICD-10-CM

## 2017-04-05 DIAGNOSIS — Z95.818 STATUS POST PLACEMENT OF IMPLANTABLE LOOP RECORDER: ICD-10-CM

## 2017-04-05 DIAGNOSIS — D50.9 IRON DEFICIENCY ANEMIA, UNSPECIFIED IRON DEFICIENCY ANEMIA TYPE: ICD-10-CM

## 2017-04-05 DIAGNOSIS — K91.2 INTESTINAL MALABSORPTION FOLLOWING GASTRECTOMY: ICD-10-CM

## 2017-04-05 DIAGNOSIS — I45.10 RBBB: ICD-10-CM

## 2017-04-05 DIAGNOSIS — I48.0 PAF (PAROXYSMAL ATRIAL FIBRILLATION): Chronic | ICD-10-CM

## 2017-04-05 DIAGNOSIS — E53.8 VITAMIN B 12 DEFICIENCY: ICD-10-CM

## 2017-04-05 DIAGNOSIS — Z20.9 EXPOSURE TO COMMUNICABLE DISEASE: ICD-10-CM

## 2017-04-05 DIAGNOSIS — G47.33 OSA ON CPAP: Chronic | ICD-10-CM

## 2017-04-05 LAB
25(OH)D3+25(OH)D2 SERPL-MCNC: 35 NG/ML
BASOPHILS # BLD AUTO: 0.01 K/UL
BASOPHILS NFR BLD: 0.2 %
CHOLEST/HDLC SERPL: 2.8 {RATIO}
CHOLEST/HDLC SERPL: 2.8 {RATIO}
DIFFERENTIAL METHOD: ABNORMAL
EOSINOPHIL # BLD AUTO: 0.1 K/UL
EOSINOPHIL NFR BLD: 2 %
ERYTHROCYTE [DISTWIDTH] IN BLOOD BY AUTOMATED COUNT: 14.1 %
HCT VFR BLD AUTO: 43.6 %
HDL/CHOLESTEROL RATIO: 35.9 %
HDL/CHOLESTEROL RATIO: 35.9 %
HDLC SERPL-MCNC: 206 MG/DL
HDLC SERPL-MCNC: 206 MG/DL
HDLC SERPL-MCNC: 74 MG/DL
HDLC SERPL-MCNC: 74 MG/DL
HGB BLD-MCNC: 14.1 G/DL
IRON SERPL-MCNC: 123 UG/DL
LDLC SERPL CALC-MCNC: 119.6 MG/DL
LDLC SERPL CALC-MCNC: 119.6 MG/DL
LYMPHOCYTES # BLD AUTO: 1.2 K/UL
LYMPHOCYTES NFR BLD: 22.1 %
MCH RBC QN AUTO: 32.5 PG
MCHC RBC AUTO-ENTMCNC: 32.3 %
MCV RBC AUTO: 101 FL
MONOCYTES # BLD AUTO: 0.3 K/UL
MONOCYTES NFR BLD: 5.6 %
NEUTROPHILS # BLD AUTO: 3.9 K/UL
NEUTROPHILS NFR BLD: 69.9 %
NONHDLC SERPL-MCNC: 132 MG/DL
NONHDLC SERPL-MCNC: 132 MG/DL
PLATELET # BLD AUTO: 259 K/UL
PMV BLD AUTO: 11.1 FL
RBC # BLD AUTO: 4.34 M/UL
SATURATED IRON: 24 %
T4 FREE SERPL-MCNC: 0.96 NG/DL
TOTAL IRON BINDING CAPACITY: 505 UG/DL
TRANSFERRIN SERPL-MCNC: 341 MG/DL
TRIGL SERPL-MCNC: 62 MG/DL
TRIGL SERPL-MCNC: 62 MG/DL
TSH SERPL DL<=0.005 MIU/L-ACNC: 1.18 UIU/ML
TSH SERPL DL<=0.005 MIU/L-ACNC: 1.18 UIU/ML
VIT B12 SERPL-MCNC: 343 PG/ML
VIT B12 SERPL-MCNC: 343 PG/ML
WBC # BLD AUTO: 5.53 K/UL

## 2017-04-05 PROCEDURE — 84439 ASSAY OF FREE THYROXINE: CPT

## 2017-04-05 PROCEDURE — 80061 LIPID PANEL: CPT

## 2017-04-05 PROCEDURE — 84443 ASSAY THYROID STIM HORMONE: CPT

## 2017-04-05 PROCEDURE — 82306 VITAMIN D 25 HYDROXY: CPT

## 2017-04-05 PROCEDURE — 85025 COMPLETE CBC W/AUTO DIFF WBC: CPT

## 2017-04-05 PROCEDURE — 36415 COLL VENOUS BLD VENIPUNCTURE: CPT | Mod: PO

## 2017-04-05 PROCEDURE — 82607 VITAMIN B-12: CPT

## 2017-04-05 PROCEDURE — 86803 HEPATITIS C AB TEST: CPT

## 2017-04-05 PROCEDURE — 83540 ASSAY OF IRON: CPT

## 2017-04-06 LAB — HCV AB SERPL QL IA: NEGATIVE

## 2017-04-10 ENCOUNTER — INITIAL CONSULT (OUTPATIENT)
Dept: CARDIOLOGY | Facility: CLINIC | Age: 67
End: 2017-04-10
Payer: MEDICARE

## 2017-04-10 VITALS
SYSTOLIC BLOOD PRESSURE: 168 MMHG | BODY MASS INDEX: 40.4 KG/M2 | HEIGHT: 65 IN | WEIGHT: 242.5 LBS | DIASTOLIC BLOOD PRESSURE: 76 MMHG | HEART RATE: 55 BPM

## 2017-04-10 DIAGNOSIS — R00.1 BRADYCARDIA: ICD-10-CM

## 2017-04-10 DIAGNOSIS — I48.3 TYPICAL ATRIAL FLUTTER: ICD-10-CM

## 2017-04-10 DIAGNOSIS — E66.01 MORBID OBESITY DUE TO EXCESS CALORIES: ICD-10-CM

## 2017-04-10 DIAGNOSIS — I45.10 RBBB: ICD-10-CM

## 2017-04-10 DIAGNOSIS — R00.2 PALPITATIONS: ICD-10-CM

## 2017-04-10 DIAGNOSIS — I10 ESSENTIAL HYPERTENSION: Chronic | ICD-10-CM

## 2017-04-10 DIAGNOSIS — I48.0 PAF (PAROXYSMAL ATRIAL FIBRILLATION): Primary | Chronic | ICD-10-CM

## 2017-04-10 DIAGNOSIS — E03.4 HYPOTHYROIDISM DUE TO ACQUIRED ATROPHY OF THYROID: ICD-10-CM

## 2017-04-10 DIAGNOSIS — G47.33 OSA ON CPAP: Chronic | ICD-10-CM

## 2017-04-10 PROCEDURE — 99999 PR PBB SHADOW E&M-EST. PATIENT-LVL III: CPT | Mod: PBBFAC,,, | Performed by: INTERNAL MEDICINE

## 2017-04-10 PROCEDURE — 99213 OFFICE O/P EST LOW 20 MIN: CPT | Mod: PBBFAC,PO | Performed by: INTERNAL MEDICINE

## 2017-04-10 PROCEDURE — 93005 ELECTROCARDIOGRAM TRACING: CPT | Mod: PBBFAC,PO | Performed by: INTERNAL MEDICINE

## 2017-04-10 PROCEDURE — 93010 ELECTROCARDIOGRAM REPORT: CPT | Mod: S$PBB,,, | Performed by: INTERNAL MEDICINE

## 2017-04-10 PROCEDURE — 99214 OFFICE O/P EST MOD 30 MIN: CPT | Mod: S$PBB,,, | Performed by: INTERNAL MEDICINE

## 2017-04-10 RX ORDER — LISINOPRIL 10 MG/1
10 TABLET ORAL DAILY
Qty: 90 TABLET | Refills: 3 | Status: SHIPPED | OUTPATIENT
Start: 2017-04-10 | End: 2017-11-07 | Stop reason: SDUPTHER

## 2017-04-10 NOTE — MR AVS SNAPSHOT
[facility]  [address] [facility] [address]  [facility] [address]  Phone: [phone]                  Yara Whitehead   4/10/2017 10:00 AM   Initial consult    Description:  Female : 1950   Provider:  Edmund Shah MD   Department:  [facility]           Reason for Visit     Atrial Fibrillation           Diagnoses this Visit        Comments    PAF (paroxysmal atrial fibrillation)    -  Primary     RBBB         FABIANA on CPAP         Essential hypertension         Hypothyroidism due to acquired atrophy of thyroid         Morbid obesity due to excess calories         Palpitations         Bradycardia         Typical atrial flutter                To Do List           Future Appointments        Provider Department Dept Phone    2017 10:45 AM LAB, [place] [facility] [phone]    2017 11:00 AM [facility] MAMMO1 [facility] [phone]    2017 8:00 AM Ann Richards DO [facility] [phone]      Goals (5 Years of Data)     None      Follow-Up and Disposition     Return in about 6 months (around 10/10/2017).    Follow-up and Disposition History       These Medications        Disp Refills Start End    lisinopril 10 MG tablet 90 tablet 3 4/10/2017 4/10/2018    Take 1 tablet (10 mg total) by mouth once daily. - Oral    Pharmacy: [facility] [address] [place], [place] [id number] AT [facility] [address] Ph #: [phone]         [facility]     [facility] Nurse Care Line -  Assistance  Unless otherwise directed by your provider, please contact [facility], our nurse care line that is available for  assistance.     Registered nurses in the [facility] Center provide: appointment scheduling, clinical advisement, health education, and other advisory services.  Call: [phone] (toll free)               Medications           Message regarding Medications     Verify the changes  and/or additions to your medication regime listed below are the same as discussed with your clinician today.  If any of these changes or additions are incorrect, please notify your healthcare provider.        START taking these NEW medications        Refills    lisinopril 10 MG tablet 3    Sig: Take 1 tablet (10 mg total) by mouth once daily.    Class: Normal    Route: Oral           Verify that the below list of medications is an accurate representation of the medications you are currently taking.  If none reported, the list may be blank. If incorrect, please contact your healthcare provider. Carry this list with you in case of emergency.           Current Medications     albuterol 90 mcg/actuation inhaler Inhale 1-2 puffs into the lungs every 6 (six) hours as needed for Wheezing.    b complex vitamins tablet Take 1 tablet by mouth once daily.    cetirizine (ZYRTEC) 10 MG tablet Take 10 mg by mouth once daily.    cyanocobalamin 1,000 mcg/mL injection Inject 1 mL (1,000 mcg total) into the muscle every 30 days.    furosemide (LASIX) 20 MG tablet Take 1 tablet (20 mg total) by mouth daily as needed (edema).    levothyroxine (SYNTHROID) 75 MCG tablet Take 1 tablet (75 mcg total) by mouth once daily.    lutein 20 mg Tab Take 20 mg by mouth once daily.    metoprolol tartrate (LOPRESSOR) 50 MG tablet Take 1 tablet (50 mg total) by mouth as needed.    paroxetine (PAXIL) 40 MG tablet Take 1 tablet (40 mg total) by mouth every morning.    potassium chloride SA (K-DUR,KLOR-CON) 20 MEQ tablet Take 1 tablet (20 mEq total) by mouth daily as needed (edema).    rivaroxaban (XARELTO) 20 mg Tab Take 1 tablet (20 mg total) by mouth daily with dinner or evening meal.    sotalol (BETAPACE) 120 MG Tab Take 1 tablet (120 mg total) by mouth 2 (two) times daily.    valacyclovir (VALTREX) 1000 MG tablet Take 1 tablet (1,000 mg total) by mouth 2 (two) times daily.    zolpidem (AMBIEN CR) 12.5 MG CR tablet Take 12.5 mg by mouth nightly as  "needed for Insomnia.    lisinopril 10 MG tablet Take 1 tablet (10 mg total) by mouth once daily.           Clinical Reference Information           Your Vitals Were     BP Pulse Height Weight Last Period BMI    168/76 55 5' 5" (1.651 m) 110 kg (242 lb 8.1 oz) (LMP Unknown) 40.36 kg/m2      Blood Pressure          Most Recent Value    BP  (!)  168/76      Allergies as of 4/10/2017     No Known Allergies      Immunizations Administered on Date of Encounter - 4/10/2017     None      Orders Placed During Today's Visit     Future Labs/Procedures Expected by Expires    Basic metabolic panel  4/24/2017 (Approximate) 4/10/2018      Language Assistance Services     ATTENTION: Language assistance services are available, free of charge. Please call [phone].      ATENCIÓN: Si alejandra gem, tiene a mendez disposición servicios gratuitos de asistencia lingüística. Llame al [phone].     [facility] Ý: N?u b?n nói Ti?ng Vi?t, có các d?ch v? h? tr? ngôn ng? mi?n phí dành cho b?n. G?i s? [phone].         [facility] complies with applicable Federal civil rights laws and does not discriminate on the basis of race, color, national origin, age, disability, or sex.        "

## 2017-04-10 NOTE — LETTER
April 10, 2017      Silvano Garcia MD  [address] [facility] [address] [place] [address]           [facility]  [address] [facility] [address] [place] [address]  Phone: [phone]          Patient: Yara Whitehead   MR Number: [id number]   YOB: 1950   Date of Visit: 4/10/2017       Dear Dr. Silvano Garcia:    Thank you for referring Yara Whitehead to me for evaluation. Attached you will find relevant portions of my assessment and plan of care.    If you have questions, please do not hesitate to call me. I look forward to following Yara Whitehead along with you.    Sincerely,    Edmund Shah MD    Enclosure  CC:  No Recipients    If you would like to receive this communication electronically, please contact [email or web address] or [phone] to request more information on [employer] access.    For providers and/or their staff who would like to refer a patient to [facility], please contact us through our one-stop-shop provider referral line, [facility] [place], at [phone].    If you feel you have received this communication in error or would no longer like to receive these types of communications, please e-mail [email or web address]

## 2017-04-10 NOTE — PROGRESS NOTES
Subjective:    Patient ID:  Yara Whitehead is a 66 y.o. female who presents for evaluation of Atrial Fibrillation      HPI 67 yo female with Htn, morbid obesity, Sleep apnea (on CPAP), atrial fibrillation, RBBB, atrial flutter, subaortic membrane.  She has a son who works for [employer] in [place].  Has paroxysmal symptomatic atrial fibrillation, starting 1/16. Had been on Flecainide. Noted to have recurrence while in hospital with arm fracture. Flecainide increased to 150 mg BID. Underwent DCCV 7/1/16.  Continued to have breakthrough.  We scheduled her for PVI + RFA for atrial flutter, and in interim initiated Sotalol.  Cancelled procedure as she felt great on Sotalol.  ILR reveals paroxysmal AF, overall burden 5.7%.  Longest episode was 9 hours 12 minutes.  In the past burden was up to 20%.  2 episodes overall, Mardi Gras and mid-March.  Both events were associated with increasing amount of etoh intake.  ECG reveals nsr with RBBB and QTc 440 msec.  Monitors bp at home, generally running 130's/70's.  Just took her bp medications and drank extra caffeine this morning.      Review of Systems   Constitution: Negative. Negative for weakness and malaise/fatigue.   Cardiovascular: Positive for palpitations. Negative for chest pain, dyspnea on exertion, irregular heartbeat, leg swelling, near-syncope, orthopnea, paroxysmal nocturnal dyspnea and syncope.   Respiratory: Negative for cough and shortness of breath.    Neurological: Negative for dizziness and light-headedness.   All other systems reviewed and are negative.       Objective:    Physical Exam   Constitutional: She is oriented to person, place, and time. She appears well-developed and well-nourished.   Eyes: Conjunctivae are normal. No scleral icterus.   Neck: No JVD present. No tracheal deviation present.   Cardiovascular: Normal rate and regular rhythm.  PMI is not displaced.    Pulmonary/Chest: Effort normal and breath sounds normal. No respiratory distress.    Abdominal: Soft. There is no hepatosplenomegaly. There is no tenderness.   Musculoskeletal: She exhibits no edema or tenderness.   Neurological: She is alert and oriented to person, place, and time.   Skin: Skin is warm and dry. No rash noted.   Psychiatric: She has a normal mood and affect. Her behavior is normal.         Assessment:       1. PAF (paroxysmal atrial fibrillation)    2. RBBB    3. FABIANA on CPAP    4. Essential hypertension    5. Hypothyroidism due to acquired atrophy of thyroid    6. Morbid obesity due to excess calories    7. Palpitations    8. Bradycardia         Plan:       Doing well overall since initiation of Sotalol.  Had 2 episodes of AF, likely related to Etoh consumption.  BP elevated.  She indicates it generally is under control at home.  Add lisinopril 10 mg daily.  BMP in 2 weeks.  Counseled on weight reduction and reducing etoh intake.  F/u in 6 months.

## 2017-04-13 ENCOUNTER — TELEPHONE (OUTPATIENT)
Dept: FAMILY MEDICINE | Facility: CLINIC | Age: 67
End: 2017-04-13

## 2017-04-13 DIAGNOSIS — E53.8 VITAMIN B12 DEFICIENCY: ICD-10-CM

## 2017-04-13 DIAGNOSIS — E78.5 HYPERLIPIDEMIA, UNSPECIFIED HYPERLIPIDEMIA TYPE: Primary | ICD-10-CM

## 2017-04-13 NOTE — TELEPHONE ENCOUNTER
Pt phoned stating that Ann Richards phoned her Friday and she is wondering if it was about her labs. Please review and advise. Thank you. CLC

## 2017-04-17 DIAGNOSIS — I48.0 PAF (PAROXYSMAL ATRIAL FIBRILLATION): Primary | ICD-10-CM

## 2017-04-18 ENCOUNTER — TELEPHONE (OUTPATIENT)
Dept: GASTROENTEROLOGY | Facility: CLINIC | Age: 67
End: 2017-04-18

## 2017-04-21 NOTE — TELEPHONE ENCOUNTER
Just following up on this phone call. Is there something that I can do to help in this matter? Thank you. CLC

## 2017-04-25 ENCOUNTER — LAB VISIT (OUTPATIENT)
Dept: LAB | Facility: HOSPITAL | Age: 67
End: 2017-04-25
Attending: INTERNAL MEDICINE
Payer: MEDICARE

## 2017-04-25 DIAGNOSIS — I48.0 PAF (PAROXYSMAL ATRIAL FIBRILLATION): Chronic | ICD-10-CM

## 2017-04-25 LAB
ANION GAP SERPL CALC-SCNC: 9 MMOL/L
BUN SERPL-MCNC: 16 MG/DL
CALCIUM SERPL-MCNC: 9.4 MG/DL
CHLORIDE SERPL-SCNC: 108 MMOL/L
CO2 SERPL-SCNC: 24 MMOL/L
CREAT SERPL-MCNC: 0.7 MG/DL
EST. GFR  (AFRICAN AMERICAN): >60 ML/MIN/1.73 M^2
EST. GFR  (NON AFRICAN AMERICAN): >60 ML/MIN/1.73 M^2
GLUCOSE SERPL-MCNC: 94 MG/DL
POTASSIUM SERPL-SCNC: 4.5 MMOL/L
SODIUM SERPL-SCNC: 141 MMOL/L

## 2017-04-25 PROCEDURE — 80048 BASIC METABOLIC PNL TOTAL CA: CPT

## 2017-04-25 PROCEDURE — 36415 COLL VENOUS BLD VENIPUNCTURE: CPT | Mod: PO

## 2017-05-04 RX ORDER — ATORVASTATIN CALCIUM 10 MG/1
10 TABLET, FILM COATED ORAL DAILY
Qty: 90 TABLET | Refills: 3 | Status: SHIPPED | OUTPATIENT
Start: 2017-05-04 | End: 2017-11-07 | Stop reason: SDUPTHER

## 2017-05-08 ENCOUNTER — CLINICAL SUPPORT (OUTPATIENT)
Dept: CARDIOLOGY | Facility: CLINIC | Age: 67
End: 2017-05-08
Payer: MEDICARE

## 2017-05-08 DIAGNOSIS — R00.2 PALPITATIONS: ICD-10-CM

## 2017-05-08 DIAGNOSIS — Z95.818 STATUS POST PLACEMENT OF IMPLANTABLE LOOP RECORDER: ICD-10-CM

## 2017-05-08 PROCEDURE — 93297 REM INTERROG DEV EVAL ICPMS: CPT | Mod: ,,, | Performed by: INTERNAL MEDICINE

## 2017-05-08 PROCEDURE — 93299 LOOP RECORDER REMOTE: CPT | Mod: PBBFAC,PO | Performed by: INTERNAL MEDICINE

## 2017-06-05 ENCOUNTER — HOSPITAL ENCOUNTER (OUTPATIENT)
Dept: RADIOLOGY | Facility: HOSPITAL | Age: 67
Discharge: HOME | End: 2017-06-05
Attending: INTERNAL MEDICINE
Payer: MEDICARE

## 2017-06-05 DIAGNOSIS — Z12.31 ENCOUNTER FOR SCREENING MAMMOGRAM FOR MALIGNANT NEOPLASM OF BREAST: ICD-10-CM

## 2017-06-05 PROCEDURE — 77063 BREAST TOMOSYNTHESIS BI: CPT | Mod: 26,,, | Performed by: RADIOLOGY

## 2017-06-05 PROCEDURE — 77067 SCR MAMMO BI INCL CAD: CPT | Mod: TC

## 2017-06-05 PROCEDURE — 77067 SCR MAMMO BI INCL CAD: CPT | Mod: 26,,, | Performed by: RADIOLOGY

## 2017-06-06 ENCOUNTER — PATIENT MESSAGE (OUTPATIENT)
Dept: FAMILY MEDICINE | Facility: CLINIC | Age: 67
End: 2017-06-06

## 2017-06-09 ENCOUNTER — TELEPHONE (OUTPATIENT)
Dept: CARDIOLOGY | Facility: CLINIC | Age: 67
End: 2017-06-09

## 2017-06-09 NOTE — TELEPHONE ENCOUNTER
Placed call to pt re: PA from [facility] on Xarelto.  Pt's preferred pharmacy is [facility] (formerly [facility]).  Is pt wanting to use [facility] for mail order instead?  If so, is generic Xarelto Ok as not to need a PA. Please discuss with pt when she calls back.

## 2017-06-16 ENCOUNTER — TELEPHONE (OUTPATIENT)
Dept: CARDIOLOGY | Facility: CLINIC | Age: 67
End: 2017-06-16

## 2017-06-16 NOTE — TELEPHONE ENCOUNTER
----- Message from Cassandra Allen sent at 6/16/2017  9:47 AM CDT -----  Contact: Yara  Patient is calling again and asking to be called today regarding Prior Authorization, [phone], on Rx Xarelto as out of medication. Please call today [phone] and can leave message. Thanks!

## 2017-07-13 ENCOUNTER — CLINICAL SUPPORT (OUTPATIENT)
Dept: CARDIOLOGY | Facility: CLINIC | Age: 67
End: 2017-07-13
Payer: MEDICARE

## 2017-07-13 DIAGNOSIS — Z95.818 STATUS POST PLACEMENT OF IMPLANTABLE LOOP RECORDER: ICD-10-CM

## 2017-07-13 DIAGNOSIS — R00.2 PALPITATIONS: ICD-10-CM

## 2017-07-13 DIAGNOSIS — Z95.818 STATUS POST PLACEMENT OF IMPLANTABLE LOOP RECORDER: Primary | ICD-10-CM

## 2017-07-13 PROCEDURE — 93299 LOOP RECORDER REMOTE: CPT | Mod: PBBFAC,PO | Performed by: INTERNAL MEDICINE

## 2017-07-13 PROCEDURE — 93297 REM INTERROG DEV EVAL ICPMS: CPT | Mod: ,,, | Performed by: INTERNAL MEDICINE

## 2017-07-24 ENCOUNTER — PATIENT OUTREACH (OUTPATIENT)
Dept: ADMINISTRATIVE | Facility: HOSPITAL | Age: 67
End: 2017-07-24

## 2017-07-24 DIAGNOSIS — E03.9 HYPOTHYROIDISM: ICD-10-CM

## 2017-07-24 NOTE — LETTER
July 28, 2017    Yara Lin Whitehead  [address] [address]             [facility]  [address]  [facility] [address]  Phone: [phone] Dear Mrs. Whitehead:    We have tried to reach you by mychart unsuccessfully.    [facility] is committed to your overall health.  To help you get the most out of each of your visits, we will review your information to make sure you are up to date on all of your recommended tests and/or procedures.       Dr. Ann Richards has found that you may be due for colon cancer screening.     If you have had any of the above done at another facility, please bring the records or information with you so that your record at [facility] will be complete.  If you would like to schedule any of these, please contact me.     If you are currently taking medication, please bring it with you to your appointment for review.     Also, if you have any type of Advanced Directives, please bring them with you to your office visit so we may scan them into your chart.      If you have any questions or concerns, please don't hesitate to call.    Thank you for letting us care for you,  Katarina Hensley LPN Clinical Care Coordinator  [facility]  [phone]

## 2017-07-25 RX ORDER — LEVOTHYROXINE SODIUM 75 UG/1
TABLET ORAL
Qty: 90 TABLET | Refills: 3 | Status: SHIPPED | OUTPATIENT
Start: 2017-07-25 | End: 2018-07-31 | Stop reason: SDUPTHER

## 2017-09-22 ENCOUNTER — CLINICAL SUPPORT (OUTPATIENT)
Dept: CARDIOLOGY | Facility: CLINIC | Age: 67
End: 2017-09-22
Payer: MEDICARE

## 2017-09-22 DIAGNOSIS — R00.2 PALPITATIONS: ICD-10-CM

## 2017-09-22 DIAGNOSIS — Z95.818 STATUS POST PLACEMENT OF IMPLANTABLE LOOP RECORDER: ICD-10-CM

## 2017-09-22 PROCEDURE — 93297 REM INTERROG DEV EVAL ICPMS: CPT | Mod: ,,, | Performed by: INTERNAL MEDICINE

## 2017-09-22 PROCEDURE — 93299 LOOP RECORDER REMOTE: CPT | Mod: PBBFAC,PO | Performed by: INTERNAL MEDICINE

## 2017-11-02 ENCOUNTER — TELEPHONE (OUTPATIENT)
Dept: CARDIOLOGY | Facility: CLINIC | Age: 67
End: 2017-11-02

## 2017-11-02 NOTE — TELEPHONE ENCOUNTER
Spoke to patient, informed her Dr. Sawyer [facility] and she needs to keep upcoming appointment for a 90 day prescription. Patient verbalized understanding.

## 2017-11-02 NOTE — TELEPHONE ENCOUNTER
----- Message from Esther Mena sent at 11/2/2017  9:11 AM CDT -----  Contact: Patient  Patient is requesting a refill on sotalol (BETAPACE) 120 MG Tab, 90 day prescription.  Call Back#[phone]  Thanks      [facility] [address] [place] [address] [id number] [place] [address] AT [facility] [address]  [address] [place] [id number]  Phone: [phone] Fax: [phone]

## 2017-11-07 ENCOUNTER — OFFICE VISIT (OUTPATIENT)
Dept: CARDIOLOGY | Facility: CLINIC | Age: 67
End: 2017-11-07
Payer: MEDICARE

## 2017-11-07 VITALS
DIASTOLIC BLOOD PRESSURE: 73 MMHG | BODY MASS INDEX: 33.79 KG/M2 | HEART RATE: 49 BPM | SYSTOLIC BLOOD PRESSURE: 136 MMHG | HEIGHT: 65 IN | WEIGHT: 202.81 LBS

## 2017-11-07 DIAGNOSIS — Z95.818 STATUS POST PLACEMENT OF IMPLANTABLE LOOP RECORDER: ICD-10-CM

## 2017-11-07 DIAGNOSIS — I45.10 RBBB: ICD-10-CM

## 2017-11-07 DIAGNOSIS — R00.1 BRADYCARDIA: ICD-10-CM

## 2017-11-07 DIAGNOSIS — I48.3 TYPICAL ATRIAL FLUTTER: ICD-10-CM

## 2017-11-07 DIAGNOSIS — E78.5 HYPERLIPIDEMIA, UNSPECIFIED HYPERLIPIDEMIA TYPE: ICD-10-CM

## 2017-11-07 DIAGNOSIS — I48.0 PAF (PAROXYSMAL ATRIAL FIBRILLATION): Primary | Chronic | ICD-10-CM

## 2017-11-07 DIAGNOSIS — I10 ESSENTIAL HYPERTENSION: Chronic | ICD-10-CM

## 2017-11-07 PROCEDURE — 99213 OFFICE O/P EST LOW 20 MIN: CPT | Mod: PBBFAC,PO | Performed by: INTERNAL MEDICINE

## 2017-11-07 PROCEDURE — 99214 OFFICE O/P EST MOD 30 MIN: CPT | Mod: S$PBB,,, | Performed by: INTERNAL MEDICINE

## 2017-11-07 PROCEDURE — 99999 PR PBB SHADOW E&M-EST. PATIENT-LVL III: CPT | Mod: PBBFAC,,, | Performed by: INTERNAL MEDICINE

## 2017-11-07 RX ORDER — METOPROLOL TARTRATE 50 MG/1
50 TABLET ORAL
Qty: 90 TABLET | Refills: 5 | Status: ON HOLD | OUTPATIENT
Start: 2017-11-07 | End: 2019-01-03 | Stop reason: HOSPADM

## 2017-11-07 RX ORDER — FUROSEMIDE 20 MG/1
20 TABLET ORAL DAILY PRN
Qty: 90 TABLET | Refills: 3 | Status: SHIPPED | OUTPATIENT
Start: 2017-11-07 | End: 2018-07-27

## 2017-11-07 RX ORDER — ATORVASTATIN CALCIUM 10 MG/1
10 TABLET, FILM COATED ORAL DAILY
Qty: 90 TABLET | Refills: 3 | Status: SHIPPED | OUTPATIENT
Start: 2017-11-07 | End: 2018-11-12 | Stop reason: SDUPTHER

## 2017-11-07 RX ORDER — SOTALOL HYDROCHLORIDE 120 MG/1
120 TABLET ORAL 2 TIMES DAILY
Qty: 180 TABLET | Refills: 3 | Status: SHIPPED | OUTPATIENT
Start: 2017-11-07 | End: 2018-12-06 | Stop reason: SDUPTHER

## 2017-11-07 RX ORDER — LISINOPRIL 10 MG/1
10 TABLET ORAL DAILY
Qty: 90 TABLET | Refills: 3 | Status: SHIPPED | OUTPATIENT
Start: 2017-11-07 | End: 2018-07-27

## 2017-11-07 NOTE — PROGRESS NOTES
Subjective:    Patient ID:  Yara Whitehead is a 67 y.o. female who presents for follow-up of No chief complaint on file.      HPI  Here for f/u ILR/PAF-DCCV on sotalol. Most episodes are due to ETOH. Has lost 40 lbs. No recent AF. No CP. Patients states is doing well no chest pain, SOB or change in exertional tolerence. Patient is exercising regularly with out symptoms.    Review of Systems   Constitution: Negative for malaise/fatigue.   Eyes: Negative for blurred vision.   Cardiovascular: Negative for chest pain, claudication, cyanosis, dyspnea on exertion, irregular heartbeat, leg swelling, near-syncope, orthopnea, palpitations, paroxysmal nocturnal dyspnea and syncope.   Respiratory: Negative for cough and shortness of breath.    Hematologic/Lymphatic: Does not bruise/bleed easily.   Musculoskeletal: Negative for back pain, falls, joint pain, muscle cramps, muscle weakness and myalgias.   Gastrointestinal: Negative for abdominal pain, change in bowel habit, nausea and vomiting.   Genitourinary: Negative for urgency.   Neurological: Negative for dizziness, focal weakness and light-headedness.        Objective:    Physical Exam   Constitutional: She is oriented to person, place, and time. She appears well-developed and well-nourished.   HENT:   Head: Normocephalic.   Eyes: Conjunctivae are normal.   Neck: Normal range of motion. Neck supple. No JVD present.   Cardiovascular: Normal rate, regular rhythm, normal heart sounds and intact distal pulses.    Pulses:       Carotid pulses are 2+ on the right side, and 2+ on the left side.       Radial pulses are 2+ on the right side, and 2+ on the left side.        Dorsalis pedis pulses are 2+ on the right side, and 2+ on the left side.        Posterior tibial pulses are 2+ on the right side, and 2+ on the left side.   Pulmonary/Chest: Effort normal and breath sounds normal.   Abdominal: Soft. Bowel sounds are normal.   Musculoskeletal: She exhibits no edema or  tenderness.   Neurological: She is alert and oriented to person, place, and time. Gait normal.   Skin: Skin is warm, dry and intact. No cyanosis. Nails show no clubbing.   Psychiatric: She has a normal mood and affect. Her speech is normal and behavior is normal. Thought content normal.   Nursing note and vitals reviewed.            ..    Chemistry        Component Value Date/Time     04/25/2017 0916    K 4.5 04/25/2017 0916     04/25/2017 0916    CO2 24 04/25/2017 0916    BUN 16 04/25/2017 0916    CREATININE 0.7 04/25/2017 0916    GLU 94 04/25/2017 0916        Component Value Date/Time    CALCIUM 9.4 04/25/2017 0916    ALKPHOS 95 11/01/2016 0855    AST 28 11/01/2016 0855    ALT 22 11/01/2016 0855    BILITOT 0.6 11/01/2016 0855    ESTGFRAFRICA >60.0 04/25/2017 0916    EGFRNONAA >60.0 04/25/2017 0916            ..  Lab Results   Component Value Date    CHOL 206 (H) 04/05/2017    CHOL 206 (H) 04/05/2017    CHOL 202 (H) 02/17/2016     Lab Results   Component Value Date    HDL 74 04/05/2017    HDL 74 04/05/2017    HDL 66 02/17/2016     Lab Results   Component Value Date    LDLCALC 119.6 04/05/2017    LDLCALC 119.6 04/05/2017    LDLCALC 115.4 02/17/2016     Lab Results   Component Value Date    TRIG 62 04/05/2017    TRIG 62 04/05/2017    TRIG 103 02/17/2016     Lab Results   Component Value Date    CHOLHDL 35.9 04/05/2017    CHOLHDL 35.9 04/05/2017    CHOLHDL 32.7 02/17/2016     ..  Lab Results   Component Value Date    WBC 5.53 04/05/2017    HGB 14.1 04/05/2017    HCT 43.6 04/05/2017     (H) 04/05/2017     04/05/2017       Test(s) Reviewed  I have reviewed the following in detail:  [] Stress test   [] Angiography   [x] Echocardiogram   [] Labs   [] Other:       Assessment:         ICD-10-CM ICD-9-CM   1. PAF (paroxysmal atrial fibrillation) I48.0 427.31   2. Essential hypertension I10 401.9   3. Status post placement of implantable loop recorder Z95.818 V45.09   4. Typical atrial flutter I48.3  427.32   5. Bradycardia R00.1 427.89   6. RBBB I45.10 426.4     Problem List Items Addressed This Visit     Bradycardia    Essential hypertension (Chronic)    PAF (paroxysmal atrial fibrillation) - Primary (Chronic)    Overview     New dx 2/16/16         RBBB    Status post placement of implantable loop recorder    Typical atrial flutter           Plan:       Return to clinic 1 year   Aerobic exercise 5x's/wk. Heart healthy diet and risk factor modification.    See labs and med orders.  Discussed avoidence of caffiene, nicotine, MSG, nasal decongestants, ETOH, dark chocolate etc and stimulant use.

## 2017-11-27 ENCOUNTER — CLINICAL SUPPORT (OUTPATIENT)
Dept: CARDIOLOGY | Facility: CLINIC | Age: 67
End: 2017-11-27
Attending: INTERNAL MEDICINE
Payer: MEDICARE

## 2017-11-27 DIAGNOSIS — R00.2 PALPITATIONS: ICD-10-CM

## 2017-11-27 DIAGNOSIS — Z95.818 STATUS POST PLACEMENT OF IMPLANTABLE LOOP RECORDER: ICD-10-CM

## 2017-11-27 PROCEDURE — 93299 LOOP RECORDER REMOTE: CPT | Mod: PBBFAC,PO | Performed by: INTERNAL MEDICINE

## 2017-11-27 PROCEDURE — 93298 REM INTERROG DEV EVAL SCRMS: CPT | Mod: ,,, | Performed by: INTERNAL MEDICINE

## 2018-01-26 ENCOUNTER — LAB VISIT (OUTPATIENT)
Dept: LAB | Facility: HOSPITAL | Age: 68
End: 2018-01-26
Attending: INTERNAL MEDICINE
Payer: MEDICARE

## 2018-01-26 DIAGNOSIS — I48.0 PAF (PAROXYSMAL ATRIAL FIBRILLATION): Chronic | ICD-10-CM

## 2018-01-26 DIAGNOSIS — E78.5 HYPERLIPIDEMIA, UNSPECIFIED HYPERLIPIDEMIA TYPE: ICD-10-CM

## 2018-01-26 DIAGNOSIS — I10 ESSENTIAL HYPERTENSION: Chronic | ICD-10-CM

## 2018-01-26 LAB
ALBUMIN SERPL BCP-MCNC: 3.5 G/DL
ALP SERPL-CCNC: 84 U/L
ALT SERPL W/O P-5'-P-CCNC: 15 U/L
ANION GAP SERPL CALC-SCNC: 7 MMOL/L
AST SERPL-CCNC: 17 U/L
BASOPHILS # BLD AUTO: 0.01 K/UL
BASOPHILS NFR BLD: 0.2 %
BILIRUB SERPL-MCNC: 0.4 MG/DL
BUN SERPL-MCNC: 19 MG/DL
CALCIUM SERPL-MCNC: 9.3 MG/DL
CHLORIDE SERPL-SCNC: 105 MMOL/L
CHOLEST SERPL-MCNC: 145 MG/DL
CHOLEST/HDLC SERPL: 2.3 {RATIO}
CO2 SERPL-SCNC: 28 MMOL/L
CREAT SERPL-MCNC: 0.6 MG/DL
DIFFERENTIAL METHOD: ABNORMAL
EOSINOPHIL # BLD AUTO: 0.1 K/UL
EOSINOPHIL NFR BLD: 1.4 %
ERYTHROCYTE [DISTWIDTH] IN BLOOD BY AUTOMATED COUNT: 14.5 %
EST. GFR  (AFRICAN AMERICAN): >60 ML/MIN/1.73 M^2
EST. GFR  (NON AFRICAN AMERICAN): >60 ML/MIN/1.73 M^2
GLUCOSE SERPL-MCNC: 103 MG/DL
HCT VFR BLD AUTO: 39.2 %
HDLC SERPL-MCNC: 62 MG/DL
HDLC SERPL: 42.8 %
HGB BLD-MCNC: 13 G/DL
LDLC SERPL CALC-MCNC: 71.8 MG/DL
LYMPHOCYTES # BLD AUTO: 1.4 K/UL
LYMPHOCYTES NFR BLD: 27.3 %
MCH RBC QN AUTO: 32.6 PG
MCHC RBC AUTO-ENTMCNC: 33.2 G/DL
MCV RBC AUTO: 98 FL
MONOCYTES # BLD AUTO: 0.3 K/UL
MONOCYTES NFR BLD: 6.8 %
NEUTROPHILS # BLD AUTO: 3.2 K/UL
NEUTROPHILS NFR BLD: 64.3 %
NONHDLC SERPL-MCNC: 83 MG/DL
PLATELET # BLD AUTO: 266 K/UL
PMV BLD AUTO: 10.7 FL
POTASSIUM SERPL-SCNC: 4.6 MMOL/L
PROT SERPL-MCNC: 6.8 G/DL
RBC # BLD AUTO: 3.99 M/UL
SODIUM SERPL-SCNC: 140 MMOL/L
TRIGL SERPL-MCNC: 56 MG/DL
WBC # BLD AUTO: 4.99 K/UL

## 2018-01-26 PROCEDURE — 36415 COLL VENOUS BLD VENIPUNCTURE: CPT | Mod: PO

## 2018-01-26 PROCEDURE — 85025 COMPLETE CBC W/AUTO DIFF WBC: CPT | Mod: PO

## 2018-01-26 PROCEDURE — 80061 LIPID PANEL: CPT

## 2018-01-26 PROCEDURE — 80053 COMPREHEN METABOLIC PANEL: CPT | Mod: PO

## 2018-01-30 DIAGNOSIS — F33.0 MAJOR DEPRESSIVE DISORDER, RECURRENT, MILD: ICD-10-CM

## 2018-01-30 RX ORDER — PAROXETINE HYDROCHLORIDE 40 MG/1
TABLET, FILM COATED ORAL
Qty: 90 TABLET | Refills: 3 | Status: SHIPPED | OUTPATIENT
Start: 2018-01-30 | End: 2018-02-02 | Stop reason: SDUPTHER

## 2018-02-02 ENCOUNTER — LAB VISIT (OUTPATIENT)
Dept: LAB | Facility: HOSPITAL | Age: 68
End: 2018-02-02
Attending: INTERNAL MEDICINE
Payer: MEDICARE

## 2018-02-02 ENCOUNTER — OFFICE VISIT (OUTPATIENT)
Dept: FAMILY MEDICINE | Facility: CLINIC | Age: 68
End: 2018-02-02
Payer: MEDICARE

## 2018-02-02 VITALS
DIASTOLIC BLOOD PRESSURE: 72 MMHG | BODY MASS INDEX: 32.65 KG/M2 | HEART RATE: 57 BPM | OXYGEN SATURATION: 99 % | RESPIRATION RATE: 18 BRPM | SYSTOLIC BLOOD PRESSURE: 120 MMHG | TEMPERATURE: 98 F | HEIGHT: 65 IN | WEIGHT: 196 LBS

## 2018-02-02 DIAGNOSIS — Z78.0 ASYMPTOMATIC MENOPAUSAL STATE: ICD-10-CM

## 2018-02-02 DIAGNOSIS — E03.9 ACQUIRED HYPOTHYROIDISM: ICD-10-CM

## 2018-02-02 DIAGNOSIS — E55.9 VITAMIN D DEFICIENCY: ICD-10-CM

## 2018-02-02 DIAGNOSIS — I10 ESSENTIAL HYPERTENSION: ICD-10-CM

## 2018-02-02 DIAGNOSIS — I48.0 PAF (PAROXYSMAL ATRIAL FIBRILLATION): Primary | ICD-10-CM

## 2018-02-02 DIAGNOSIS — R01.1 MURMUR: ICD-10-CM

## 2018-02-02 DIAGNOSIS — K91.2 INTESTINAL MALABSORPTION FOLLOWING GASTRECTOMY: ICD-10-CM

## 2018-02-02 DIAGNOSIS — Z23 NEED FOR INFLUENZA VACCINATION: ICD-10-CM

## 2018-02-02 DIAGNOSIS — Z90.3 INTESTINAL MALABSORPTION FOLLOWING GASTRECTOMY: ICD-10-CM

## 2018-02-02 DIAGNOSIS — F33.0 MAJOR DEPRESSIVE DISORDER, RECURRENT, MILD: ICD-10-CM

## 2018-02-02 DIAGNOSIS — E53.8 VITAMIN B 12 DEFICIENCY: ICD-10-CM

## 2018-02-02 DIAGNOSIS — E66.09 CLASS 1 OBESITY DUE TO EXCESS CALORIES WITH SERIOUS COMORBIDITY AND BODY MASS INDEX (BMI) OF 32.0 TO 32.9 IN ADULT: ICD-10-CM

## 2018-02-02 DIAGNOSIS — J30.89 CHRONIC NON-SEASONAL ALLERGIC RHINITIS, UNSPECIFIED TRIGGER: ICD-10-CM

## 2018-02-02 DIAGNOSIS — G47.33 OSA ON CPAP: ICD-10-CM

## 2018-02-02 DIAGNOSIS — E78.2 MIXED HYPERLIPIDEMIA: ICD-10-CM

## 2018-02-02 DIAGNOSIS — Z12.11 SCREEN FOR COLON CANCER: ICD-10-CM

## 2018-02-02 LAB
25(OH)D3+25(OH)D2 SERPL-MCNC: 33 NG/ML
TSH SERPL DL<=0.005 MIU/L-ACNC: 1.17 UIU/ML
VIT B12 SERPL-MCNC: 204 PG/ML

## 2018-02-02 PROCEDURE — 1159F MED LIST DOCD IN RCRD: CPT | Mod: S$GLB,,, | Performed by: INTERNAL MEDICINE

## 2018-02-02 PROCEDURE — 1126F AMNT PAIN NOTED NONE PRSNT: CPT | Mod: S$GLB,,, | Performed by: INTERNAL MEDICINE

## 2018-02-02 PROCEDURE — 84443 ASSAY THYROID STIM HORMONE: CPT

## 2018-02-02 PROCEDURE — 3008F BODY MASS INDEX DOCD: CPT | Mod: S$GLB,,, | Performed by: INTERNAL MEDICINE

## 2018-02-02 PROCEDURE — 82607 VITAMIN B-12: CPT

## 2018-02-02 PROCEDURE — 99214 OFFICE O/P EST MOD 30 MIN: CPT | Mod: S$GLB,,, | Performed by: INTERNAL MEDICINE

## 2018-02-02 PROCEDURE — 36415 COLL VENOUS BLD VENIPUNCTURE: CPT | Mod: PO

## 2018-02-02 PROCEDURE — G0008 ADMIN INFLUENZA VIRUS VAC: HCPCS | Mod: S$GLB,,, | Performed by: INTERNAL MEDICINE

## 2018-02-02 PROCEDURE — 90662 IIV NO PRSV INCREASED AG IM: CPT | Mod: S$GLB,,, | Performed by: INTERNAL MEDICINE

## 2018-02-02 PROCEDURE — 82306 VITAMIN D 25 HYDROXY: CPT

## 2018-02-02 RX ORDER — PAROXETINE HYDROCHLORIDE 40 MG/1
40 TABLET, FILM COATED ORAL EVERY MORNING
Qty: 90 TABLET | Refills: 3 | Status: CANCELLED | OUTPATIENT
Start: 2018-02-02

## 2018-02-02 RX ORDER — FLUTICASONE PROPIONATE 50 MCG
2 SPRAY, SUSPENSION (ML) NASAL DAILY
Qty: 1 BOTTLE | Refills: 6 | Status: SHIPPED | OUTPATIENT
Start: 2018-02-02 | End: 2021-09-09 | Stop reason: DRUGHIGH

## 2018-02-02 RX ORDER — CYANOCOBALAMIN 1000 UG/ML
1000 INJECTION, SOLUTION INTRAMUSCULAR; SUBCUTANEOUS
Qty: 10 ML | Refills: 3 | Status: SHIPPED | OUTPATIENT
Start: 2018-02-02 | End: 2019-02-06 | Stop reason: SDUPTHER

## 2018-02-02 RX ORDER — CYANOCOBALAMIN 1000 UG/ML
1000 INJECTION, SOLUTION INTRAMUSCULAR; SUBCUTANEOUS
Qty: 1 ML | Refills: 11 | OUTPATIENT
Start: 2018-02-02

## 2018-02-02 RX ORDER — PAROXETINE HYDROCHLORIDE 40 MG/1
40 TABLET, FILM COATED ORAL EVERY MORNING
Qty: 90 TABLET | Refills: 3 | Status: SHIPPED | OUTPATIENT
Start: 2018-02-02 | End: 2019-02-07 | Stop reason: SDUPTHER

## 2018-02-02 NOTE — PROGRESS NOTES
Subjective:       Patient ID: Yara Whitehead is a 67 y.o. female.    Medication List with Changes/Refills   New Medications    CYANOCOBALAMIN 1,000 MCG/ML INJECTION    Inject 1 mL (1,000 mcg total) into the muscle every 14 (fourteen) days.    FLUTICASONE (FLONASE) 50 MCG/ACTUATION NASAL SPRAY    2 sprays (100 mcg total) by Each Nare route once daily.   Current Medications    ALBUTEROL 90 MCG/ACTUATION INHALER    Inhale 1-2 puffs into the lungs every 6 (six) hours as needed for Wheezing.    ATORVASTATIN (LIPITOR) 10 MG TABLET    Take 1 tablet (10 mg total) by mouth once daily.    B COMPLEX VITAMINS TABLET    Take 1 tablet by mouth once daily.    CETIRIZINE (ZYRTEC) 10 MG TABLET    Take 10 mg by mouth once daily.    CYANOCOBALAMIN 1,000 MCG/ML INJECTION    Inject 1 mL (1,000 mcg total) into the muscle every 30 days.    FUROSEMIDE (LASIX) 20 MG TABLET    Take 1 tablet (20 mg total) by mouth daily as needed (edema).    LEVOTHYROXINE (SYNTHROID) 75 MCG TABLET    TAKE 1 TABLET BY MOUTH EVERY DAY    LISINOPRIL 10 MG TABLET    Take 1 tablet (10 mg total) by mouth once daily.    LUTEIN 20 MG TAB    Take 20 mg by mouth once daily.    METOPROLOL TARTRATE (LOPRESSOR) 50 MG TABLET    Take 1 tablet (50 mg total) by mouth as needed.    RIVAROXABAN (XARELTO) 20 MG TAB    Take 1 tablet (20 mg total) by mouth daily with dinner or evening meal.    SOTALOL (BETAPACE) 120 MG TAB    Take 1 tablet (120 mg total) by mouth 2 (two) times daily.    VALACYCLOVIR (VALTREX) 1000 MG TABLET    Take 1 tablet (1,000 mg total) by mouth 2 (two) times daily.   Changed and/or Refilled Medications    Modified Medication Previous Medication    PAROXETINE (PAXIL) 40 MG TABLET paroxetine (PAXIL) 40 MG tablet       Take 1 tablet (40 mg total) by mouth every morning.    TAKE 1 TABLET BY MOUTH EVERY MORNING.   Discontinued Medications    ZOLPIDEM (AMBIEN CR) 12.5 MG CR TABLET    Take 12.5 mg by mouth nightly as needed for Insomnia.       Chief  Complaint: Medication Refill, B12, Paroxetine; Memory issues; and Allergies, nasal drainage  She is here today to discuss chronic medical issues.      She has afib and was  hospitalized on 8/2016 for Afib with RVR. She had cardioversion that was successful and started on sotalol twice daily (stopped metoprolol and stopped flecainide). She has continued on xarelto but her beta blocker was stopped because it dropped her BP too low. She is doing well on sotaolol. Her BP is good at home and her HR stays between 50-60s.  She had a stress test on 3/2016 that was negative for ischemia. She had an echocardiogram on 2/2016 that showed EF 55%, diastolic dysfunction, biatrial enlargement. She has a known subaortic membrane that is being followed by cardiology. In the last 6 months her loop recorder showed one episode that lasted 100 minutes and she was asymptomatic.  She continues on xarelto.      She has hypothyroid that is controlled on levothyroxine 75 mcg qday. Her last TSH was checked in 4/2017 was normal.     She has hypertension that is controlled on lisinopril 10 mg qday. She denies chest pain or shortness of breath. She has lasix at home which she uses for LE edema but has not used in many months. She has metoprolol at home to use if increased HR but again she has not used in months.      She has hyperlipidemia controlled on atorvastatin l10 mg qday.  Her last lipids were 145/56/62/71 on 1/2018.       She had reflux symptoms in the past and was taking pepcid. Since her Afib is rate controlled her reflux is gone.       She has malabsorption since bypass surgery and in the past was on vitamin B12. She was taking vitamin B12 monthly but has run out of medication for over 3 months.  She does report that she is fatigued and notices increased forgetfulness and difficulty concentrating. She would like to check her vitamins today.      She has on going anxiety and depression. In 2015 she lost her  suddenly to a brain  "tumor. She had been doing well on paxil 40 mg qday and staying very active. She feels the paxil is helping and has no complaints. She has run out of paxil for 3 days and really wants to restart.  She has ambien at home to use as needed but has not used in over 6 months.      She has FABIANA and is using CPAP nightly.     She has worked very hard to lose weight. She has lost over 50 lbs in one year with dietary changes.        Colonoscopy---2013 repeat in 5 years  Mammogram----6/2017 neg  DEXA-----4/2016 nl  Pap----- KEIRY  Tdap--11/2016  Influenza vaccine---11/2016  Pneumovax 23----4/2017  Prevnar 13---- 2/2016  Shingles vaccine-----yes per patient    Review of Systems   Constitutional: Negative for appetite change, fatigue, fever and unexpected weight change.   HENT: Negative for congestion, ear pain, hearing loss, sore throat and trouble swallowing.    Eyes: Negative for pain and visual disturbance.   Respiratory: Negative for cough, chest tightness, shortness of breath and wheezing.    Cardiovascular: Negative for chest pain, palpitations and leg swelling.   Gastrointestinal: Positive for diarrhea. Negative for abdominal pain, blood in stool, constipation, nausea and vomiting.   Endocrine: Negative for polyuria.   Genitourinary: Negative for dysuria and hematuria.   Musculoskeletal: Negative for arthralgias, back pain and myalgias.   Skin: Negative for rash.   Neurological: Negative for dizziness, weakness, numbness and headaches.   Hematological: Does not bruise/bleed easily.   Psychiatric/Behavioral: Negative for dysphoric mood, sleep disturbance and suicidal ideas. The patient is not nervous/anxious.        Objective:      Vitals:    02/02/18 1239   BP: 120/72   BP Location: Right arm   Patient Position: Sitting   BP Method: Large (Manual)   Pulse: (!) 57   Resp: 18   Temp: 98.4 °F (36.9 °C)   TempSrc: Oral   SpO2: 99%   Weight: 88.9 kg (196 lb)   Height: 5' 5" (1.651 m)     Body mass index is 32.62 kg/m².  Physical " Exam    General appearance: No acute distress, cooperative  Eyes: PERRL, EOMI, conjunctiva clear  Ears: normal external ear and pinna, tm clear without drainage, canals clear  Nose: Normal mucosa without drainage  Throat: no exudates or erythema, tonsils not enlarged  Mouth: no sores or lesions, moist mucous membranes, good dentition  Neck: FROM, soft, supple, no thyromegaly, no bruits  Lymph: no anterior or posterior cervical adenopathy  Heart::  Regular rate and rhythm, 3/6 systolic murmur radiates to her carotids  Lung: Clear to ascultation bilaterally, no wheezing, no rales, no rhonchi, no distress  Abdomen: Soft, nontender, no distention, no hepatosplenomegaly, bowel sounds normal, no guarding, no rebound, no peritoneal signs  Skin: no rashes, no lesions  Extremities: no edema, no cyanosis  Neuro: CN 2-12 intact, 5/5 muscle strength upper and lower extremity bilaterally, 2+ DTRs UE and LE bilaterally, normal gait, normal sensation  Peripheral pulses: 2+ pedal pulses bilaterally, good perfusion and color  Musculoskeletal: FROM, good strenth, no tenderness  Joint: normal appearance, no swelling, no warmth, no deformity in all joints    Assessment:       1. PAF (paroxysmal atrial fibrillation)    2. Essential hypertension    3. Mixed hyperlipidemia    4. Murmur    5. Major depressive disorder, recurrent, mild    6. Acquired hypothyroidism    7. Intestinal malabsorption following gastrectomy    8. Vitamin D deficiency    9. Vitamin B 12 deficiency    10. FABIANA on CPAP    11. Class 1 obesity due to excess calories with serious comorbidity and body mass index (BMI) of 32.0 to 32.9 in adult    12. Chronic non-seasonal allergic rhinitis, unspecified trigger    13. Asymptomatic menopausal state    14. Screen for colon cancer    15. Need for influenza vaccination        Plan:       PAF (paroxysmal atrial fibrillation)  NSR today one exam. She is doing well on sotalol and anticoagulated with xarelto.     Essential  hypertension  Good control on lisinopril.    Mixed hyperlipidemia  Good control.    Murmur  Her murmur is consistent with AS. Will check an echocardiogram.   -     2D echo with color flow doppler; Future    Major depressive disorder, recurrent, mild  Controlled on paxil and will refill today.   -     paroxetine (PAXIL) 40 MG tablet; Take 1 tablet (40 mg total) by mouth every morning.  Dispense: 90 tablet; Refill: 3    Acquired hypothyroidism  Good control on this dose of levothyroxine. Will recheck TSH.   -     TSH; Future; Expected date: 02/02/2018    Intestinal malabsorption following gastrectomy    Vitamin D deficiency  She is off supplementation and will check a level.   -     Vitamin D; Future; Expected date: 02/02/2018    Vitamin B 12 deficiency  She is off supplementation and having memory and concentration issues. I am concern due to low levels. Will check a level and start treatment depending on the results.   -     Vitamin B12; Future; Expected date: 02/02/2018  -     cyanocobalamin 1,000 mcg/mL injection; Inject 1 mL (1,000 mcg total) into the muscle every 14 (fourteen) days.  Dispense: 10 mL; Refill: 3    FABIANA on CPAP  She is compliant with CPAP.    -     CPAP/BIPAP SUPPLIES    Class 1 obesity due to excess calories with serious comorbidity and body mass index (BMI) of 32.0 to 32.9 in adult  She has done great with her weight loss. I encouraged healthy eating and daily exercise.     Chronic non-seasonal allergic rhinitis, unspecified trigger  Okay to add nasal steroid to her regimen.   -     fluticasone (FLONASE) 50 mcg/actuation nasal spray; 2 sprays (100 mcg total) by Each Nare route once daily.  Dispense: 1 Bottle; Refill: 6    Asymptomatic menopausal state  -     DXA Bone Density Spine And Hip; Future; Expected date: 02/02/2018    Screen for colon cancer  -     Case request GI: COLONOSCOPY    Need for influenza vaccination  -     Influenza - High Dose (65+) (PF) (IM)    Follow-up in about 6 months  (around 8/2/2018) for chronic medical issues.

## 2018-02-03 ENCOUNTER — PATIENT MESSAGE (OUTPATIENT)
Dept: FAMILY MEDICINE | Facility: CLINIC | Age: 68
End: 2018-02-03

## 2018-02-05 ENCOUNTER — TELEPHONE (OUTPATIENT)
Dept: FAMILY MEDICINE | Facility: CLINIC | Age: 68
End: 2018-02-05

## 2018-02-05 NOTE — TELEPHONE ENCOUNTER
Pt stated she is feeling worse than last week.  Complains it is going into her chest, bad productive cough, clear phlegm and no fever reported.    Using Mucinex, zyrtec and Flonase.  Pt thinking it could be allergies from the dogs, or mold in wet leaves in yard.  She said this feels worse than normal with coughing spells.  Tessalon pearls dont work for her.   Questioning if she needs to do something different?     Pt verbalized understanding of B12 result.   She questioned if another prescription should be called in to last her?

## 2018-02-05 NOTE — TELEPHONE ENCOUNTER
She should have enough vitamin B12 and I put refills on her vial. If not then I will send more then she is out.     There is not much over the counter medication that she can take. Okay to use guaifenesin or dextromethorphan for coughing.     Thanks

## 2018-02-06 ENCOUNTER — CLINICAL SUPPORT (OUTPATIENT)
Dept: CARDIOLOGY | Facility: CLINIC | Age: 68
End: 2018-02-06
Attending: INTERNAL MEDICINE
Payer: MEDICARE

## 2018-02-06 DIAGNOSIS — R00.2 PALPITATIONS: ICD-10-CM

## 2018-02-06 DIAGNOSIS — Z95.818 STATUS POST PLACEMENT OF IMPLANTABLE LOOP RECORDER: ICD-10-CM

## 2018-02-06 DIAGNOSIS — Z95.818 STATUS POST PLACEMENT OF IMPLANTABLE LOOP RECORDER: Primary | ICD-10-CM

## 2018-02-08 NOTE — TELEPHONE ENCOUNTER
----- Message from Esther Mena sent at 2/8/2018  3:30 PM CST -----  Contact: Patient  Patient is returning phone call to Jessy.  Call placed to pod, no answer.  Call Back#[phone]  Thanks

## 2018-02-08 NOTE — TELEPHONE ENCOUNTER
Spoke to pt and inform recommendations from Dr Richards.  She stated she started feeling better and will have company this weekend.

## 2018-04-12 ENCOUNTER — HOSPITAL ENCOUNTER (OUTPATIENT)
Dept: RADIOLOGY | Facility: HOSPITAL | Age: 68
Discharge: HOME | End: 2018-04-12
Attending: INTERNAL MEDICINE
Payer: MEDICARE

## 2018-04-12 DIAGNOSIS — Z78.0 ASYMPTOMATIC MENOPAUSAL STATE: ICD-10-CM

## 2018-04-12 PROCEDURE — 77080 DXA BONE DENSITY AXIAL: CPT | Mod: TC,PO

## 2018-04-12 PROCEDURE — 77080 DXA BONE DENSITY AXIAL: CPT | Mod: 26,,, | Performed by: RADIOLOGY

## 2018-04-13 ENCOUNTER — PATIENT MESSAGE (OUTPATIENT)
Dept: FAMILY MEDICINE | Facility: CLINIC | Age: 68
End: 2018-04-13

## 2018-04-30 ENCOUNTER — PATIENT MESSAGE (OUTPATIENT)
Dept: FAMILY MEDICINE | Facility: CLINIC | Age: 68
End: 2018-04-30

## 2018-04-30 DIAGNOSIS — R01.1 MURMUR: Primary | ICD-10-CM

## 2018-04-30 NOTE — TELEPHONE ENCOUNTER
1)She has had a hysterectomy. Dark discharge, Some new blood. Fells like her other yeast infections.     2) She does have a known murmur. Would you like the echo and she would like an explanation why(ie to monitor her valves)

## 2018-05-01 RX ORDER — FLUCONAZOLE 150 MG/1
150 TABLET ORAL DAILY
Qty: 3 TABLET | Refills: 0 | Status: SHIPPED | OUTPATIENT
Start: 2018-05-01 | End: 2018-05-02

## 2018-05-01 NOTE — TELEPHONE ENCOUNTER
Please schedule echo.     I have sent a rx for diflucan to take once a day for 3 days to pharm. If d/c continues then she needs to be seen.     thanks

## 2018-05-07 ENCOUNTER — CLINICAL SUPPORT (OUTPATIENT)
Dept: CARDIOLOGY | Facility: CLINIC | Age: 68
End: 2018-05-07
Attending: INTERNAL MEDICINE
Payer: MEDICARE

## 2018-05-07 DIAGNOSIS — R00.2 PALPITATIONS: ICD-10-CM

## 2018-05-07 DIAGNOSIS — Z95.818 STATUS POST PLACEMENT OF IMPLANTABLE LOOP RECORDER: ICD-10-CM

## 2018-05-07 PROCEDURE — 93299 LOOP RECORDER REMOTE: CPT | Mod: S$GLB,,, | Performed by: INTERNAL MEDICINE

## 2018-05-07 PROCEDURE — 93298 REM INTERROG DEV EVAL SCRMS: CPT | Mod: S$GLB,,, | Performed by: INTERNAL MEDICINE

## 2018-07-10 ENCOUNTER — CLINICAL SUPPORT (OUTPATIENT)
Dept: CARDIOLOGY | Facility: CLINIC | Age: 68
End: 2018-07-10
Attending: INTERNAL MEDICINE
Payer: MEDICARE

## 2018-07-10 DIAGNOSIS — I48.0 PAF (PAROXYSMAL ATRIAL FIBRILLATION): ICD-10-CM

## 2018-07-10 DIAGNOSIS — Z95.818 STATUS POST PLACEMENT OF IMPLANTABLE LOOP RECORDER: ICD-10-CM

## 2018-07-10 DIAGNOSIS — Z95.818 STATUS POST PLACEMENT OF IMPLANTABLE LOOP RECORDER: Primary | ICD-10-CM

## 2018-07-10 PROCEDURE — 93298 REM INTERROG DEV EVAL SCRMS: CPT | Mod: S$GLB,,, | Performed by: INTERNAL MEDICINE

## 2018-07-10 PROCEDURE — 93299 CARDIAC DEVICE CHECK CHECK - REMOTE: CPT | Mod: S$GLB,,, | Performed by: INTERNAL MEDICINE

## 2018-07-20 ENCOUNTER — PATIENT OUTREACH (OUTPATIENT)
Dept: ADMINISTRATIVE | Facility: HOSPITAL | Age: 68
End: 2018-07-20

## 2018-07-20 NOTE — PROGRESS NOTES
Health Maintenance Due   Topic Date Due    Colonoscopy  05/31/2001    Mammogram  06/06/2018

## 2018-07-27 ENCOUNTER — OFFICE VISIT (OUTPATIENT)
Dept: FAMILY MEDICINE | Facility: CLINIC | Age: 68
End: 2018-07-27
Payer: MEDICARE

## 2018-07-27 VITALS
WEIGHT: 178.63 LBS | HEART RATE: 48 BPM | SYSTOLIC BLOOD PRESSURE: 130 MMHG | OXYGEN SATURATION: 98 % | DIASTOLIC BLOOD PRESSURE: 74 MMHG | BODY MASS INDEX: 29.76 KG/M2 | HEIGHT: 65 IN | RESPIRATION RATE: 16 BRPM | TEMPERATURE: 99 F

## 2018-07-27 DIAGNOSIS — R41.3 MEMORY PROBLEM: ICD-10-CM

## 2018-07-27 DIAGNOSIS — E03.9 ACQUIRED HYPOTHYROIDISM: ICD-10-CM

## 2018-07-27 DIAGNOSIS — R39.9 SYMPTOMS OF URINARY TRACT INFECTION: Primary | ICD-10-CM

## 2018-07-27 DIAGNOSIS — E78.2 MIXED HYPERLIPIDEMIA: ICD-10-CM

## 2018-07-27 DIAGNOSIS — K91.2 INTESTINAL MALABSORPTION FOLLOWING GASTRECTOMY: ICD-10-CM

## 2018-07-27 DIAGNOSIS — I10 ESSENTIAL HYPERTENSION: ICD-10-CM

## 2018-07-27 DIAGNOSIS — E53.8 VITAMIN B 12 DEFICIENCY: ICD-10-CM

## 2018-07-27 DIAGNOSIS — I48.0 PAF (PAROXYSMAL ATRIAL FIBRILLATION): ICD-10-CM

## 2018-07-27 DIAGNOSIS — E55.9 VITAMIN D DEFICIENCY: ICD-10-CM

## 2018-07-27 DIAGNOSIS — Z90.3 INTESTINAL MALABSORPTION FOLLOWING GASTRECTOMY: ICD-10-CM

## 2018-07-27 DIAGNOSIS — F33.0 MAJOR DEPRESSIVE DISORDER, RECURRENT, MILD: ICD-10-CM

## 2018-07-27 DIAGNOSIS — Z12.31 ENCOUNTER FOR SCREENING MAMMOGRAM FOR MALIGNANT NEOPLASM OF BREAST: ICD-10-CM

## 2018-07-27 DIAGNOSIS — K52.9 CHRONIC DIARRHEA: ICD-10-CM

## 2018-07-27 DIAGNOSIS — Z12.11 SCREEN FOR COLON CANCER: ICD-10-CM

## 2018-07-27 DIAGNOSIS — Z23 NEED FOR SHINGLES VACCINE: ICD-10-CM

## 2018-07-27 LAB
BILIRUB SERPL-MCNC: ABNORMAL MG/DL
BLOOD URINE, POC: 250
COLOR, POC UA: ABNORMAL
CREAT SERPL-MCNC: 0.7 MG/DL
EST. GFR  (AFRICAN AMERICAN): >60 ML/MIN/1.73 M^2
EST. GFR  (NON AFRICAN AMERICAN): >60 ML/MIN/1.73 M^2
GLUCOSE UR QL STRIP: ABNORMAL
KETONES UR QL STRIP: ABNORMAL
LEUKOCYTE ESTERASE URINE, POC: ABNORMAL
NITRITE, POC UA: NEGATIVE
PH, POC UA: 5
PROTEIN, POC: NEGATIVE
SPECIFIC GRAVITY, POC UA: 1.02
UROBILINOGEN, POC UA: 4
VIT B12 SERPL-MCNC: 506 PG/ML

## 2018-07-27 PROCEDURE — 87086 URINE CULTURE/COLONY COUNT: CPT

## 2018-07-27 PROCEDURE — 82607 VITAMIN B-12: CPT

## 2018-07-27 PROCEDURE — 81002 URINALYSIS NONAUTO W/O SCOPE: CPT | Mod: S$GLB,,, | Performed by: INTERNAL MEDICINE

## 2018-07-27 PROCEDURE — 36415 COLL VENOUS BLD VENIPUNCTURE: CPT | Mod: S$GLB,,, | Performed by: INTERNAL MEDICINE

## 2018-07-27 PROCEDURE — 99214 OFFICE O/P EST MOD 30 MIN: CPT | Mod: 25,S$GLB,, | Performed by: INTERNAL MEDICINE

## 2018-07-27 PROCEDURE — 82565 ASSAY OF CREATININE: CPT

## 2018-07-27 PROCEDURE — 86592 SYPHILIS TEST NON-TREP QUAL: CPT

## 2018-07-27 RX ORDER — CHOLESTYRAMINE 4 G/9G
4 POWDER, FOR SUSPENSION ORAL DAILY
Qty: 90 PACKET | Refills: 3 | Status: SHIPPED | OUTPATIENT
Start: 2018-07-27 | End: 2019-01-07

## 2018-07-27 NOTE — PROGRESS NOTES
Subjective:       Patient ID: Yara Whitehead is a 68 y.o. female.    Medication List with Changes/Refills   New Medications    CHOLESTYRAMINE (QUESTRAN) 4 GRAM PACKET    Take 1 packet (4 g total) by mouth once daily.    VARICELLA-ZOSTER GE-AS01B, PF, (SHINGRIX, PF,) 50 MCG/0.5 ML INJECTION    Inject 0.5 mLs into the muscle once. for 1 dose   Current Medications    ALBUTEROL 90 MCG/ACTUATION INHALER    Inhale 1-2 puffs into the lungs every 6 (six) hours as needed for Wheezing.    ATORVASTATIN (LIPITOR) 10 MG TABLET    Take 1 tablet (10 mg total) by mouth once daily.    B COMPLEX VITAMINS TABLET    Take 1 tablet by mouth once daily.    CETIRIZINE (ZYRTEC) 10 MG TABLET    Take 10 mg by mouth once daily.    CYANOCOBALAMIN 1,000 MCG/ML INJECTION    Inject 1 mL (1,000 mcg total) into the muscle every 30 days.    CYANOCOBALAMIN 1,000 MCG/ML INJECTION    Inject 1 mL (1,000 mcg total) into the muscle every 14 (fourteen) days.    FLUTICASONE (FLONASE) 50 MCG/ACTUATION NASAL SPRAY    2 sprays (100 mcg total) by Each Nare route once daily.    LEVOTHYROXINE (SYNTHROID) 75 MCG TABLET    TAKE 1 TABLET BY MOUTH EVERY DAY    LUTEIN 20 MG TAB    Take 20 mg by mouth once daily.    METOPROLOL TARTRATE (LOPRESSOR) 50 MG TABLET    Take 1 tablet (50 mg total) by mouth as needed.    PAROXETINE (PAXIL) 40 MG TABLET    Take 1 tablet (40 mg total) by mouth every morning.    RIVAROXABAN (XARELTO) 20 MG TAB    Take 1 tablet (20 mg total) by mouth daily with dinner or evening meal.    SOTALOL (BETAPACE) 120 MG TAB    Take 1 tablet (120 mg total) by mouth 2 (two) times daily.    VALACYCLOVIR (VALTREX) 1000 MG TABLET    Take 1 tablet (1,000 mg total) by mouth 2 (two) times daily.   Discontinued Medications    FUROSEMIDE (LASIX) 20 MG TABLET    Take 1 tablet (20 mg total) by mouth daily as needed (edema).    LISINOPRIL 10 MG TABLET    Take 1 tablet (10 mg total) by mouth once daily.       Chief Complaint: Hypotension and Urinary Tract  Infection (Possible; Pt reports dark urine, leaking urine, blood in urine. x 1 month)  She is here today to discuss chronic medical issues.      She does complain today of changes to the color of her urine. No pain with urination. Increased incontinence.  She denies fevers or frequency.  She noticed blood in her urine..      She has afib and was hospitalized on 8/2016 for Afib with RVR. She had cardioversion that was successful and started on sotalol twice daily (stopped metoprolol and stopped flecainide). She has continued on xarelto but her beta blocker was stopped because it dropped her BP too low. She is doing well on sotaolol 120 mg bid. She had a stress test on 3/2016 that was negative for ischemia. She had an echocardiogram on 2/2016 that showed EF 55%, diastolic dysfunction, biatrial enlargement. She has a known subaortic membrane that is being followed by cardiology. In the last 6 months her loop recorder showed multiple episode of Afib with RVR and she is aware of these episodes. Triggered by lack of sleep and alcohol.      She has hypertension and was taking lisinopril 10 mg qday. She stopped taking one week ago because her BP dropped too low.  Off the medication her home readings run -130.  She has no known CAD. She denies chest pain or shortness of breath.     She has hyperlipidemia controlled on atorvastatin l10 mg qday.  Her last lipids were 145/56/62/71 on 1/2018.      She has hypothyroid that is controlled on levothyroxine 75 mcg qday. Her last TSH was checked in 2/2018 was normal.      She had reflux symptoms in the past and was taking pepcid. Since her Afib is rate controlled her reflux is gone.       She has malabsorption since bypass surgery and in the past was on vitamin B12. She was noted to have a low Vitamin B12 level on 2/2018 to 204 (when she stopped taking supplementation). She was loaded with IM qweek x 4 and now continues on IM every 2 weeks.  She continues to feel fatigued and  have significant memory issues.  She has trouble concentration. She is very forgetful and loses things. She feels this is getting worse.  She is due to take her injection tomorrow so would like her vitamin B12 level checked today. She also complains of chronic diarrhea since her gastric bypass surgery.      She has on going anxiety and depression. In 2015 she lost her  suddenly to a brain tumor. She had been doing well on paxil 40 mg qday and staying very active. She feels the paxil is helping and has no complaints.      She has FABIANA but stopped using CPAP.  She does not have the sleep issues like she did in the past when her weight was up.      She has worked very hard to lose weight. She has lost over 50 lbs in one year with dietary changes.  She has lost an additional 20 lbs in the last 3 months with dietary changes.  She does stay active.          Colonoscopy---2013 repeat in 5 years---refuses colonoscopy  Mammogram----6/2017 neg  DEXA-----4/2018 nl  Pap----- KEIRY  Tdap--11/2016  Influenza vaccine---2/2018  Pneumovax 23----4/2017  Prevnar 13---- 2/2016  Shingles vaccine-----yes per patient    Review of Systems   Constitutional: Positive for activity change. Negative for appetite change, fatigue, fever and unexpected weight change.   HENT: Negative for congestion, ear pain, hearing loss, rhinorrhea, sore throat and trouble swallowing.    Eyes: Negative for pain, discharge and visual disturbance.   Respiratory: Negative for cough, chest tightness, shortness of breath and wheezing.    Cardiovascular: Positive for palpitations. Negative for chest pain and leg swelling.   Gastrointestinal: Negative for abdominal pain, blood in stool, constipation, diarrhea, nausea and vomiting.   Endocrine: Negative for polydipsia and polyuria.   Genitourinary: Positive for hematuria. Negative for difficulty urinating, dysuria and menstrual problem.   Musculoskeletal: Negative for arthralgias, back pain, joint swelling, myalgias  "and neck pain.   Skin: Negative for rash.   Neurological: Positive for weakness. Negative for dizziness, numbness and headaches.   Hematological: Does not bruise/bleed easily.   Psychiatric/Behavioral: Positive for confusion and dysphoric mood. Negative for sleep disturbance and suicidal ideas. The patient is not nervous/anxious.        Objective:      Vitals:    07/27/18 1024   BP: 130/74   BP Location: Left arm   Patient Position: Sitting   BP Method: Medium (Manual)   Pulse: (!) 48   Resp: 16   Temp: 98.6 °F (37 °C)   TempSrc: Oral   SpO2: 98%   Weight: 81 kg (178 lb 9.6 oz)   Height: 5' 5" (1.651 m)     Body mass index is 29.72 kg/m².  Physical Exam    General appearance: No acute distress, cooperative  Eyes: PERRL, EOMI, conjunctiva clear  Ears: normal external ear and pinna, tm clear without drainage, canals clear  Nose: Normal mucosa without drainage  Throat: no exudates or erythema, tonsils not enlarged  Mouth: no sores or lesions, moist mucous membranes, good dentition  Neck: FROM, soft, supple, no thyromegaly, no bruits  Lymph: no anterior or posterior cervical adenopathy  Heart::  Regular rate and rhythm, no murmur  Lung: Clear to ascultation bilaterally, no wheezing, no rales, no rhonchi, no distress  Abdomen: Soft, nontender, no distention, no hepatosplenomegaly, bowel sounds normal, no guarding, no rebound, no peritoneal signs  Skin: no rashes, no lesions  Extremities: no edema, no cyanosis  Neuro: CN 2-12 intact, 5/5 muscle strength upper and lower extremity bilaterally, 2+ DTRs UE and LE bilaterally, normal gait, normal sensation  Peripheral pulses: 2+ pedal pulses bilaterally, good perfusion and color  Musculoskeletal: FROM, good strenth, no tenderness  Joint: normal appearance, no swelling, no warmth, no deformity in all joints    MMSE: 28/30    Assessment:       1. Symptoms of urinary tract infection    2. PAF (paroxysmal atrial fibrillation)    3. Essential hypertension    4. Mixed hyperlipidemia "    5. Acquired hypothyroidism    6. Memory problem    7. Major depressive disorder, recurrent, mild    8. Intestinal malabsorption following gastrectomy    9. Vitamin D deficiency    10. Vitamin B 12 deficiency    11. Chronic diarrhea    12. Screen for colon cancer    13. Encounter for screening mammogram for malignant neoplasm of breast    14. Need for shingles vaccine        Plan:       Symptoms of urinary tract infection  U/A with some WBC and blood. Will send for culture. Abx not given today. If urine culture neg then consider urology workup.   -     POCT URINE DIPSTICK WITHOUT MICROSCOPE  -     Urine culture    PAF (paroxysmal atrial fibrillation)  NSR today one exam. She continues on sotalol and xarelto. She only uses metoprolol for episodes of sustained Afib.      Essential hypertension  Good control off all medication. Okay to stay off lisinopril. I have asked her to check her BP daily for 2-3 weeks. If above goal (SBP > 135) then will restart lisinopril at a lower dose.     Mixed hyperlipidemia  Good control on atorvastatin.     Acquired hypothyroidism  Good control on this dose of levothyroxine.     Memory problem  Will check vitamin B12 level and MRI. Referral made to neurology for evaluation. I suspect due to MCI with vitamin B12 deficiency.   -     Vitamin B12; Future; Expected date: 07/27/2018  -     RPR; Future; Expected date: 07/27/2018  -     MRI Brain W WO Contrast; Future; Expected date: 07/27/2018  -     Ambulatory referral to Neurology  -     Creatinine, serum; Future; Expected date: 07/27/2018    Major depressive disorder, recurrent, mild  Controlled on paxil.     Intestinal malabsorption following gastrectomy     Vitamin D deficiency  Continue supplementation.     Vitamin B 12 deficiency  Concerning that she continues to be low. Will recheck vitamin B12 today (the day before her dose is due) to determine if we need to change the frequency of injections.      Chronic diarrhea  Due to  malabsorption from gastric bypass. Will start questran daily.   -     cholestyramine (QUESTRAN) 4 gram packet; Take 1 packet (4 g total) by mouth once daily.  Dispense: 90 packet; Refill: 3    Screen for colon cancer  -     Fecal Immunochemical Test (iFOBT); Future; Expected date: 07/27/2018    Encounter for screening mammogram for malignant neoplasm of breast  -     Mammo Digital Screening Bilat with CAD; Future; Expected date: 07/27/2018    Need for shingles vaccine  -     varicella-zoster gE-AS01B, PF, (SHINGRIX, PF,) 50 mcg/0.5 mL injection; Inject 0.5 mLs into the muscle once. for 1 dose  Dispense: 0.5 mL; Refill: 1    Follow-up in about 4 months (around 11/27/2018) for chronic medical issues.

## 2018-07-27 NOTE — PROGRESS NOTES
Venipuncture performed with 21 gauge butterfly, x's 2 attempt,  to R Basilic/R hand vein.  Specimens collected per orders.      Pressure dressing applied to site, instructed patient to remove dressing in 10-15 minutes, OK to re-adjust dressing if pressure causing any discomfort, to observe closely for numbness and/or discoloration to hand or fingers, and to notify provider if bleeding persists after applying constant pressure lasting 30 minutes.

## 2018-07-28 LAB — RPR SER QL: NORMAL

## 2018-07-29 LAB — BACTERIA UR CULT: NO GROWTH

## 2018-07-30 ENCOUNTER — PATIENT MESSAGE (OUTPATIENT)
Dept: FAMILY MEDICINE | Facility: CLINIC | Age: 68
End: 2018-07-30

## 2018-07-30 DIAGNOSIS — R31.9 HEMATURIA, UNSPECIFIED TYPE: Primary | ICD-10-CM

## 2018-07-30 NOTE — TELEPHONE ENCOUNTER
----- Message from Varsha Hennessy sent at 7/30/2018  9:43 AM CDT -----  Type: Needs Medical Advice    Who Called:  Patient   Best Call Back Number: [phone]   Additional Information: Patient stated that the colon kit she was given is defective/needs new kit/please call patient back to advise.

## 2018-07-30 NOTE — TELEPHONE ENCOUNTER
----- Message from Lay Bullock sent at 7/30/2018 11:44 AM CDT -----  Please call [phone] / returning call for Jessy

## 2018-07-31 DIAGNOSIS — E03.9 HYPOTHYROIDISM: ICD-10-CM

## 2018-07-31 RX ORDER — LEVOTHYROXINE SODIUM 75 UG/1
TABLET ORAL
Qty: 90 TABLET | Refills: 3 | Status: ON HOLD | OUTPATIENT
Start: 2018-07-31 | End: 2019-04-12 | Stop reason: HOSPADM

## 2018-08-10 ENCOUNTER — TELEPHONE (OUTPATIENT)
Dept: FAMILY MEDICINE | Facility: CLINIC | Age: 68
End: 2018-08-10

## 2018-08-10 NOTE — TELEPHONE ENCOUNTER
----- Message from Bonnie Mckinley sent at 8/10/2018  1:57 PM CDT -----  Contact: Patient  Yara, patient [phone] calling because insurance does not cover cholestyramine (QUESTRAN) 4 gram packet, she also does not know why it was prescribed to her. Also, she was given an at home FIT kit and the brush in there is broken so she would like to know if she can come  a new one. Please advise. Thanks.

## 2018-08-16 ENCOUNTER — HOSPITAL ENCOUNTER (OUTPATIENT)
Dept: RADIOLOGY | Facility: HOSPITAL | Age: 68
Discharge: HOME | End: 2018-08-16
Attending: INTERNAL MEDICINE
Payer: MEDICARE

## 2018-08-16 ENCOUNTER — OFFICE VISIT (OUTPATIENT)
Dept: UROLOGY | Facility: CLINIC | Age: 68
End: 2018-08-16
Payer: MEDICARE

## 2018-08-16 VITALS
DIASTOLIC BLOOD PRESSURE: 83 MMHG | WEIGHT: 171.63 LBS | HEIGHT: 65 IN | BODY MASS INDEX: 28.6 KG/M2 | RESPIRATION RATE: 18 BRPM | SYSTOLIC BLOOD PRESSURE: 146 MMHG | HEART RATE: 48 BPM

## 2018-08-16 DIAGNOSIS — Z12.31 ENCOUNTER FOR SCREENING MAMMOGRAM FOR MALIGNANT NEOPLASM OF BREAST: ICD-10-CM

## 2018-08-16 DIAGNOSIS — N32.81 OAB (OVERACTIVE BLADDER): ICD-10-CM

## 2018-08-16 DIAGNOSIS — R31.29 MICROSCOPIC HEMATURIA: Primary | ICD-10-CM

## 2018-08-16 DIAGNOSIS — N39.41 URGE INCONTINENCE: ICD-10-CM

## 2018-08-16 DIAGNOSIS — R41.3 MEMORY PROBLEM: ICD-10-CM

## 2018-08-16 LAB
BILIRUB SERPL-MCNC: NORMAL MG/DL
BLOOD URINE, POC: NORMAL
COLOR, POC UA: YELLOW
GLUCOSE UR QL STRIP: NEGATIVE
KETONES UR QL STRIP: 15
LEUKOCYTE ESTERASE URINE, POC: NEGATIVE
NITRITE, POC UA: NEGATIVE
PH, POC UA: 6
PROTEIN, POC: NEGATIVE
SPECIFIC GRAVITY, POC UA: 1.02
UROBILINOGEN, POC UA: 0.2

## 2018-08-16 PROCEDURE — 25500020 PHARM REV CODE 255: Mod: PO | Performed by: INTERNAL MEDICINE

## 2018-08-16 PROCEDURE — A9585 GADOBUTROL INJECTION: HCPCS | Mod: PO | Performed by: INTERNAL MEDICINE

## 2018-08-16 PROCEDURE — 99204 OFFICE O/P NEW MOD 45 MIN: CPT | Mod: 25,S$GLB,, | Performed by: UROLOGY

## 2018-08-16 PROCEDURE — 77067 SCR MAMMO BI INCL CAD: CPT | Mod: 26,,, | Performed by: RADIOLOGY

## 2018-08-16 PROCEDURE — 77063 BREAST TOMOSYNTHESIS BI: CPT | Mod: 26,,, | Performed by: RADIOLOGY

## 2018-08-16 PROCEDURE — 99999 PR PBB SHADOW E&M-EST. PATIENT-LVL III: CPT | Mod: PBBFAC,,, | Performed by: UROLOGY

## 2018-08-16 PROCEDURE — 70553 MRI BRAIN STEM W/O & W/DYE: CPT | Mod: 26,,, | Performed by: RADIOLOGY

## 2018-08-16 PROCEDURE — 70553 MRI BRAIN STEM W/O & W/DYE: CPT | Mod: TC,PO

## 2018-08-16 PROCEDURE — 81002 URINALYSIS NONAUTO W/O SCOPE: CPT | Mod: S$GLB,,, | Performed by: UROLOGY

## 2018-08-16 PROCEDURE — 77067 SCR MAMMO BI INCL CAD: CPT | Mod: TC,PO

## 2018-08-16 RX ORDER — OXYBUTYNIN CHLORIDE 10 MG/1
10 TABLET, EXTENDED RELEASE ORAL DAILY
Qty: 30 TABLET | Refills: 11 | Status: SHIPPED | OUTPATIENT
Start: 2018-08-16 | End: 2019-06-13 | Stop reason: SDUPTHER

## 2018-08-16 RX ORDER — GADOBUTROL 604.72 MG/ML
8 INJECTION INTRAVENOUS
Status: COMPLETED | OUTPATIENT
Start: 2018-08-16 | End: 2018-08-16

## 2018-08-16 RX ADMIN — GADOBUTROL 8 ML: 604.72 INJECTION INTRAVENOUS at 11:08

## 2018-08-16 NOTE — PROGRESS NOTES
UROLOGY Maz[facility]  8 16 18    Cc urinary incontinence    Age 68. Has been having some urgency and urge incontinence for a couple of years, is on no medication for bladder. Denies urinary frequency, has nocturia x 1, no nocturnal incontinence. Her incontinence occurs every day, but not on every voiding. The loss of urine is a small amount, wears a pad (same pad all day on a good day, and three pads in one day on a bad one). Denies loss of urine without sensory awareness.     No pains or burning when voiding. Does not need to strain to void.     Lately she looked in the toilet and one time she had tree drops of blood in the toilet bowl on the side. No recurrences. Not mixed with the urine, may have bled from the outside (scratches very hard sometimes, she says, from itching)    PMH    Surgical:  has a past surgical history that includes Hysterectomy (2013); Gallbladder surgery (1995); Gastric bypass (1997); Hernia repair (1998); Colonoscopy (2012); Tympanoplasty (Left); abdomenalplasty; Fracture surgery (Left); CARDIOVERSION (N/A, 8/28/2016); CARDIOVERSION (N/A, 7/1/2016); OPEN REDUCTION INTERNAL FIXATION-  PROXIMAL HUMERUS (Left, 5/30/2016); and loop recording (N/A, 3/18/2016).    Medical:  has a past medical history of Allergy, GERD (gastroesophageal reflux disease), Hypertension, Hypothyroidism, ALESSANDRA (iron deficiency anemia), Insomnia, FABIANA on CPAP, PAF (paroxysmal atrial fibrillation), Subaortic membrane, Vitamin B 12 deficiency, and Vitamin D deficiency.    Familial: mother had uterine cancer, father had diabetes    Social:  x 3 years, artist, lives in [place], [place]. Lives many years in [facility]    Meds:   Current Outpatient Medications on File Prior to Visit   Medication Sig Dispense Refill    albuterol 90 mcg/actuation inhaler Inhale 1-2 puffs into the l 1 Inhaler 0    atorvastatin (LIPITOR) 10 MG tablet Take 1 tablet (10 mg total) by  90 tablet 3    cetirizine (ZYRTEC) 10 MG tablet Take 10 mg by mouth  once daily.      cholestyramine (QUESTRAN) 4 gram packet Take 1 packet (4 g total 90 packet 3    cyanocobalamin 1,000 mcg/mL injection Inject 1 mL (1,000 mcg total) 10 mL 3    fluticasone (FLONASE) 50 mcg/actuation nasal spray 2 sprays (100 mcg total. 1 Bottle 6    levothyroxine (SYNTHROID) 75 MCG tablet TAKE 1 TABLET BY MOUTH EVERY DAY 90 tablet 3    lutein 20 mg Tab Take 20 mg       metoprolol tartrate (LOPRESSOR) 50 MG tablet Take 1 tablet (50 mg total) by m 90 tablet 5    paroxetine (PAXIL) 40 MG tablet Take 1 tablet (4 90 tablet 3    rivaroxaban (XARELTO) 20 mg Tab Take 1 tablet (20 mg tot 90 tablet 3    sotalol (BETAPACE) 120 MG Tab Take 1 tablet (120 mg total) by  180 tablet 3    valacyclovir (VALTREX) 1000 MG tablet Take 1 tablet (1,000 mg total) by mo 14 tablet 3     REVIEW OF SYSTEMS  GENERAL. No headaches or dizzy spells.   HEENT: vision Wears glasses. Sinuses: has complaints.   CARDIOPULMONARY: No swelling of the legs; no chest pain. No shortness of breath, no wheezing.   GASTROINTESTINAL: No heartburn. Denies diarrhea; denies constipation, no blood or mucus in stools.   GENITOURINARY: has urgency, no dysuria, bleeding or incontinence.   MUSCULOSKELETAL: has arthritic complaints such as pain or stiffness.   PSYCHIATRIC: No history of depression or anxiety.   ENDOCRINOLOGIC: No reports of sweating, cold or heat intolerance. No polyuria or polydipsia.   NEUROLOGICAL: No dizziness, syncope, paralysis  LYMPHATICS: No enlarged nodes. No history of splenectomy.  ==    Pt alert, oriented, no distress  HEENT: wnl.  Neck: supple, no JVD, no lymphadenopathy  Chest: CV NSR  Lungs: normal chest expansion, no labored breathing  Abdomen flat, nontender, no organomegaly, no masses.  Extremities: no edema, peripheral pulses nl  Neuro: preserved      IMP    Overactive bladder, incomplete syndrome, with urinary urgency and urge incontinence only (no urinary frequency or nocturia). Will give her ditropan 10 xl  daily    The drops of blood are currently not being interpreted as hematuria. If there is a recurrence or more clearly she appears to have had hematuria, will do cystoscopy, urine cytology, bladder scan and upper tract imaging

## 2018-08-16 NOTE — LETTER
August 16, 2018      Ann Richards DO  [address] [address]  [address]  [facility] [address]           [facility]  [address] [facility] [address] [place] [address]  Phone: [phone]          Patient: Yara Whitehead   MR Number: [id number]   YOB: 1950   Date of Visit: 8/16/2018       Dear Dr. Ann Richards:    Thank you for referring Yara Whitehead to me for evaluation. Attached you will find relevant portions of my assessment and plan of care.    If you have questions, please do not hesitate to call me. I look forward to following Yara Whitehead along with you.    Sincerely,    Karina Waite, CARMELITA    Enclosure  CC:  No Recipients    If you would like to receive this communication electronically, please contact [email or web address] or [phone] to request more information on [employer] Link access.    For providers and/or their staff who would like to refer a patient to [facility], please contact us through our one-stop-shop provider referral line, Corin[facility] Bender, at [phone].    If you feel you have received this communication in error or would no longer like to receive these types of communications, please e-mail [email or web address]

## 2018-08-21 ENCOUNTER — PATIENT MESSAGE (OUTPATIENT)
Dept: FAMILY MEDICINE | Facility: CLINIC | Age: 68
End: 2018-08-21

## 2018-08-23 DIAGNOSIS — E78.5 HYPERLIPIDEMIA, UNSPECIFIED HYPERLIPIDEMIA TYPE: ICD-10-CM

## 2018-08-24 RX ORDER — ATORVASTATIN CALCIUM 10 MG/1
TABLET, FILM COATED ORAL
Qty: 90 TABLET | Refills: 3 | Status: SHIPPED | OUTPATIENT
Start: 2018-08-24 | End: 2018-11-13

## 2018-08-27 ENCOUNTER — OFFICE VISIT (OUTPATIENT)
Dept: NEUROLOGY | Facility: CLINIC | Age: 68
End: 2018-08-27
Payer: MEDICARE

## 2018-08-27 VITALS
RESPIRATION RATE: 18 BRPM | HEART RATE: 49 BPM | SYSTOLIC BLOOD PRESSURE: 134 MMHG | DIASTOLIC BLOOD PRESSURE: 74 MMHG | HEIGHT: 65 IN | WEIGHT: 171.88 LBS | BODY MASS INDEX: 28.64 KG/M2

## 2018-08-27 DIAGNOSIS — E53.8 VITAMIN B 12 DEFICIENCY: ICD-10-CM

## 2018-08-27 DIAGNOSIS — F32.A DEPRESSION, UNSPECIFIED DEPRESSION TYPE: ICD-10-CM

## 2018-08-27 DIAGNOSIS — G31.84 MCI (MILD COGNITIVE IMPAIRMENT): Primary | ICD-10-CM

## 2018-08-27 PROCEDURE — 99999 PR PBB SHADOW E&M-EST. PATIENT-LVL III: CPT | Mod: PBBFAC,,, | Performed by: NURSE PRACTITIONER

## 2018-08-27 PROCEDURE — 99204 OFFICE O/P NEW MOD 45 MIN: CPT | Mod: S$GLB,,, | Performed by: NURSE PRACTITIONER

## 2018-08-27 RX ORDER — VITAMIN B COMPLEX
1 CAPSULE ORAL DAILY
COMMUNITY
End: 2019-07-16

## 2018-08-27 NOTE — PROGRESS NOTES
Subjective:       Patient ID: Yara Whitehead is a 68 y.o. female.    Chief Complaint:  Consult (memory)    History of Present Illness  Mrs. 68 year old female with complaints of memory and word finding difficulty.  She has had these complaints for 10 years.  She had complete evaluation for cognitive impairment 10 years ago. She was found to be B12 deficient  She was started on supplementation. Her cognitive function improved. Had gastric bypass surgery with significant weight loss several years ago. She noticed a decline in cognitive function when she stopped her B12 supplementation in the last year. Vitamin B12 level was low.  Dr Richards (PCP) started Vitamin B12 injections once a week for a month.  She now takes an injection every 2 weeks.  She feels her memory has improved.    4 years ago from brain cancer.  She lives alone.  She manages her medications and finances without difficulty.  Drives without incident.  Has no problems with driving long distances on her own.  She is busy very family, friends and Religious.  She does not give any concrete examples of memory loss.        Review of Systems  Review of Systems   Constitutional: Negative for activity change and appetite change.   HENT: Negative for hearing loss.    Eyes: Negative for visual disturbance.   Respiratory: Negative for cough and shortness of breath.         Has sleep apnea but does not use CPAP   Cardiovascular: Positive for palpitations. Negative for chest pain.        Has loop recorder for arrhythmias    Genitourinary: Negative for frequency.   Musculoskeletal: Positive for joint swelling.   Neurological: Negative for dizziness and tremors.        Memory loss, word finding difficulty   Psychiatric/Behavioral: Positive for dysphoric mood.       Objective:      Neurologic Exam     Mental Status   Oriented to country, city and area.   Oriented to year, month, date, day and season.   Registration of memory: recalls 5 of 5 words. Recall  of objects at 5 minutes: recalls 5 of 5 words.   Attention: normal. Concentration: normal.   Level of consciousness: alert  Knowledge: good and consistent with education. Able to perform simple calculations.   Able to name object. Able to read. Able to repeat. Normal comprehension.   MOCA was administered-  Overall Score was 30/30  Normal>30  (see scanned test)    Mrs. Cronin was able to participate in sensible fluent conversation.  She answered all questions appropriately.  She was able to discuss her medical history and medications in detail.  She was able to discuss several current events in great detail.     Cranial Nerves   Cranial nerves II through XII intact.     Motor Exam   Muscle bulk: normal  Overall muscle tone: normal    Sensory Exam   Light touch normal.     Gait, Coordination, and Reflexes     Gait  Gait: normal    Coordination   Romberg: negative  Finger to nose coordination: normal  Heel to shin coordination: normal    Tremor   Resting tremor: absent  Intention tremor: absent      Physical Exam   Neurological: She has a normal Finger-Nose-Finger Test, a normal Heel to Quinones Test and a normal Romberg Test. Gait normal.       MELANIE brain 7/20/18  1.  There is no acute abnormality.  There is no hemorrhage, mass/mass effect, acute infarction.  There is no pathologic enhancement.  Brain volume is normal for age.  There is minimal nonspecific white matter change.    Labs  07/27/18 RPR NR Vitamin B12  506  Vitamin D 33  TSH 1/171  Assessment:     Memory Loss  Depression  Plan:         -Reviewed results of cognitive screen and answered questions.  -Discussed MCI vs normal aging Mild cognitive impairment is defined as the transitional state between the cognitive changes of normal aging and the fully developed features of dementia. There are several types of MCI. Some subtypes may have no or little disease progression and some other subtypes may show a progressive decline in memory and cognition that results in  the diagnosis of degenerative type of dementia. The clinical outcome of individuals with MCI remains unclear and unpredictable. .  -Diagnostic work up- Reviewed MRI films with patient. Reviewed labs- Vitamin B12 level, TSH, Vitamin D -All WNL  -Discussed safety issues related to MCI- medication management, cooking, managing finances, driving No safety issues at this time.  -Discussed medications for cognitive enhancement- AChEIs and Namenda CR  Will consider if progression  -Discussed NP testing  Defer  -Depression - stable on PRozac  -Follow up prn

## 2018-08-27 NOTE — PATIENT INSTRUCTIONS
Mild cognitive impairment is defined as the transitional state between the cognitive changes of normal aging and the fully developed features of dementia. There are several types of MCI. Some subtypes may have no or little disease progression and some other subtypes may show a progressive decline in memory and cognition that results in the diagnosis of degenerative type of dementia. The clinical outcome of individuals with MCI remains unclear and unpredictable. .

## 2018-08-27 NOTE — LETTER
August 27, 2018      Ann Richards DO  [address] [address]  [address]  [facility] [address]           [facility]  [address] [facility] [address] [place] [address]  Phone: [phone]  Fax: [phone]          Patient: Yara Whitehead   MR Number: [id number]   YOB: 1950   Date of Visit: 8/27/2018       Dear Dr. Ann Richards:    Thank you for referring Yara Whitehead to me for evaluation. Attached you will find relevant portions of my assessment and plan of care.    If you have questions, please do not hesitate to call me. I look forward to following Yara Whitehead along with you.    Sincerely,    Bella Mondragon, NP    Enclosure  CC:  No Recipients    If you would like to receive this communication electronically, please contact [email or web address] or [phone] to request more information on [email or web address] Link access.    For providers and/or their staff who would like to refer a patient to [facility], please contact us through our one-stop-shop provider referral line, [employer] [facility], at [phone].    If you feel you have received this communication in error or would no longer like to receive these types of communications, please e-mail [email or web address]

## 2018-09-11 DIAGNOSIS — I48.0 PAF (PAROXYSMAL ATRIAL FIBRILLATION): ICD-10-CM

## 2018-09-11 DIAGNOSIS — Z95.818 STATUS POST PLACEMENT OF IMPLANTABLE LOOP RECORDER: Primary | ICD-10-CM

## 2018-11-12 DIAGNOSIS — E78.5 HYPERLIPIDEMIA, UNSPECIFIED HYPERLIPIDEMIA TYPE: ICD-10-CM

## 2018-11-13 RX ORDER — ATORVASTATIN CALCIUM 10 MG/1
TABLET, FILM COATED ORAL
Qty: 90 TABLET | Refills: 4 | Status: SHIPPED | OUTPATIENT
Start: 2018-11-13 | End: 2020-02-07

## 2018-12-04 ENCOUNTER — TELEPHONE (OUTPATIENT)
Dept: FAMILY MEDICINE | Facility: CLINIC | Age: 68
End: 2018-12-04

## 2018-12-04 ENCOUNTER — OFFICE VISIT (OUTPATIENT)
Dept: FAMILY MEDICINE | Facility: CLINIC | Age: 68
End: 2018-12-04
Payer: MEDICARE

## 2018-12-04 VITALS
SYSTOLIC BLOOD PRESSURE: 150 MMHG | WEIGHT: 162 LBS | HEART RATE: 46 BPM | RESPIRATION RATE: 18 BRPM | BODY MASS INDEX: 26.99 KG/M2 | DIASTOLIC BLOOD PRESSURE: 70 MMHG | TEMPERATURE: 99 F | HEIGHT: 65 IN

## 2018-12-04 DIAGNOSIS — E53.8 VITAMIN B12 DEFICIENCY: ICD-10-CM

## 2018-12-04 DIAGNOSIS — E78.2 MIXED HYPERLIPIDEMIA: ICD-10-CM

## 2018-12-04 DIAGNOSIS — K91.2 INTESTINAL MALABSORPTION FOLLOWING GASTRECTOMY: ICD-10-CM

## 2018-12-04 DIAGNOSIS — E03.9 HYPOTHYROIDISM, UNSPECIFIED TYPE: ICD-10-CM

## 2018-12-04 DIAGNOSIS — N39.41 URGE INCONTINENCE OF URINE: ICD-10-CM

## 2018-12-04 DIAGNOSIS — Z23 NEED FOR IMMUNIZATION AGAINST INFLUENZA: ICD-10-CM

## 2018-12-04 DIAGNOSIS — N95.0 POSTMENOPAUSAL BLEEDING: Primary | ICD-10-CM

## 2018-12-04 DIAGNOSIS — I48.0 PAF (PAROXYSMAL ATRIAL FIBRILLATION): ICD-10-CM

## 2018-12-04 DIAGNOSIS — Z12.11 SCREEN FOR COLON CANCER: ICD-10-CM

## 2018-12-04 DIAGNOSIS — Z90.3 INTESTINAL MALABSORPTION FOLLOWING GASTRECTOMY: ICD-10-CM

## 2018-12-04 DIAGNOSIS — I10 ESSENTIAL HYPERTENSION: ICD-10-CM

## 2018-12-04 DIAGNOSIS — F33.0 MAJOR DEPRESSIVE DISORDER, RECURRENT, MILD: ICD-10-CM

## 2018-12-04 DIAGNOSIS — Z23 NEED FOR SHINGLES VACCINE: ICD-10-CM

## 2018-12-04 PROCEDURE — 99214 OFFICE O/P EST MOD 30 MIN: CPT | Mod: 25,S$GLB,, | Performed by: INTERNAL MEDICINE

## 2018-12-04 PROCEDURE — G0008 ADMIN INFLUENZA VIRUS VAC: HCPCS | Mod: S$GLB,,, | Performed by: INTERNAL MEDICINE

## 2018-12-04 PROCEDURE — 90662 IIV NO PRSV INCREASED AG IM: CPT | Mod: S$GLB,,, | Performed by: INTERNAL MEDICINE

## 2018-12-04 RX ORDER — LISINOPRIL 10 MG/1
10 TABLET ORAL DAILY
Qty: 90 TABLET | Refills: 3
Start: 2018-12-04 | End: 2018-12-18 | Stop reason: DRUGHIGH

## 2018-12-04 NOTE — PROGRESS NOTES
Subjective:       Patient ID: Yara Whitehead is a 68 y.o. female.       Medication List           Accurate as of 12/4/18  2:16 PM. If you have any questions, ask your nurse or doctor.               START taking these medications    lisinopril 10 MG tablet  Take 1 tablet (10 mg total) by mouth once daily.  Started by:  Ann Richards DO     varicella-zoster gE-AS01B (PF) 50 mcg/0.5 mL injection  Commonly known as:  SHINGRIX (PF)  Inject 0.5 mLs into the muscle once. for 1 dose  Started by:  Ann Richards DO        CONTINUE taking these medications    albuterol 90 mcg/actuation inhaler  Commonly known as:  PROVENTIL/VENTOLIN HFA  Inhale 1-2 puffs into the lungs every 6 (six) hours as needed for Wheezing.     atorvastatin 10 MG tablet  Commonly known as:  LIPITOR  TAKE 1 TABLET(10 MG) BY MOUTH EVERY DAY     b complex vitamins capsule     cetirizine 10 MG tablet  Commonly known as:  ZYRTEC     cholestyramine 4 gram packet  Commonly known as:  QUESTRAN  Take 1 packet (4 g total) by mouth once daily.     cyanocobalamin 1,000 mcg/mL injection  Inject 1 mL (1,000 mcg total) into the muscle every 14 (fourteen) days.     fluticasone 50 mcg/actuation nasal spray  Commonly known as:  FLONASE  2 sprays (100 mcg total) by Each Nare route once daily.     levothyroxine 75 MCG tablet  Commonly known as:  SYNTHROID  TAKE 1 TABLET BY MOUTH EVERY DAY     metoprolol tartrate 50 MG tablet  Commonly known as:  LOPRESSOR  Take 1 tablet (50 mg total) by mouth as needed.     multivitamin with minerals tablet     oxybutynin 10 MG 24 hr tablet  Commonly known as:  DITROPAN-XL  Take 1 tablet (10 mg total) by mouth once daily.     paroxetine 40 MG tablet  Commonly known as:  PAXIL  Take 1 tablet (40 mg total) by mouth every morning.     rivaroxaban 20 mg Tab  Commonly known as:  XARELTO  Take 1 tablet (20 mg total) by mouth daily with dinner or evening meal.     sotalol 120 MG Tab  Commonly known as:  BETAPACE  Take 1 tablet (120 mg  total) by mouth 2 (two) times daily.     valACYclovir 1000 MG tablet  Commonly known as:  VALTREX  Take 1 tablet (1,000 mg total) by mouth 2 (two) times daily.           Where to Get Your Medications      You can get these medications from any pharmacy    Bring a paper prescription for each of these medications  · varicella-zoster gE-AS01B (PF) 50 mcg/0.5 mL injection     Information about where to get these medications is not yet available    Ask your nurse or doctor about these medications  · lisinopril 10 MG tablet         Chief Complaint: Hypertension (4 month follow up: Flu shot today)  She is here today to discuss chronic medical issues.       She has afib and was hospitalized on 8/2016 for Afib with RVR. She had cardioversion that was successful and started on sotalol twice daily (stopped metoprolol and stopped flecainide). She has continued on xarelto but her beta blocker was stopped because it dropped her BP too low. She is doing well on sotaolol 120 mg bid. She had a stress test on 3/2016 that was negative for ischemia. She had an echocardiogram on 2/2016 that showed EF 55%, diastolic dysfunction, biatrial enlargement. She has a known subaortic membrane that is being followed by cardiology. In the last 6 months her loop recorder showed multiple episode of Afib with RVR and she is aware of these episodes. Triggered by lack of sleep and alcohol.  Her last episode was one week ago and was mild.      She has hypertension and was taking lisinopril 10 mg qday. She stopped taking because she felt her BP dropped too low.  Off the medication her home readings run -160.  She has no known CAD. She denies chest pain or shortness of breath.      She has hyperlipidemia controlled on atorvastatin l10 mg qday.  Her last lipids were 145/56/62/71 on 2/2018.       She has hypothyroid that is controlled on levothyroxine 75 mcg qday. Her last TSH was checked in 2/2018 was normal.      She had reflux symptoms in the past  and was taking pepcid. Since her Afib is rate controlled her reflux is gone.       She has malabsorption since bypass surgery and in the past was on vitamin B12. She is taking vitamin B12 1000 mcg IM every 2 weeks. She also complains of chronic diarrhea after eating large carbohydrate meals since her gastric bypass surgery.      She has on going anxiety and depression. In 2015 she lost her  suddenly to a brain tumor. She had been doing well on paxil 40 mg qday and staying very active. She feels the paxil is helping and has no complaints. She recently loss her boyfriend of over 2 years.      She has incontinence of urine and is taking oxybutynin 10 mg once a day. She does feel this helps her symptoms.     She has a KEIRY with BSO but complains that she is having intermittent spotting on her underwear. She put in a tampon and says her bleeding in vaginal.  She denies any pain.  No discharge.      She has FABIANA but stopped using CPAP.  She does not have the sleep issues like she did in the past when her weight was up.      She has worked very hard to lose weight. She has lost over 90 lbs in one year with dietary changes.  She does not exercise but stays active.       Colonoscopy---2013 repeat in 5 years---refuses colonoscopy  Mammogram----8/2018 neg  DEXA-----4/2018 nl  Pap----- KEIRY  Tdap--11/2016  Influenza vaccine---due  Pneumovax 23----4/2017  Prevnar 13---- 2/2016  Shingles vaccine-----yes per patient    Review of Systems   Constitutional: Negative for appetite change, fatigue, fever and unexpected weight change.   HENT: Negative for congestion, ear pain, hearing loss, sore throat and trouble swallowing.    Eyes: Negative for pain and visual disturbance.   Respiratory: Negative for cough, chest tightness, shortness of breath and wheezing.    Cardiovascular: Negative for chest pain, palpitations and leg swelling.   Gastrointestinal: Negative for abdominal pain, blood in stool, constipation, diarrhea, nausea and  "vomiting.   Endocrine: Negative for polyuria.   Genitourinary: Positive for vaginal bleeding. Negative for dysuria and hematuria.   Musculoskeletal: Negative for arthralgias, back pain and myalgias.   Skin: Negative for rash.   Neurological: Negative for dizziness, weakness, numbness and headaches.   Hematological: Does not bruise/bleed easily.   Psychiatric/Behavioral: Positive for dysphoric mood. Negative for sleep disturbance and suicidal ideas. The patient is not nervous/anxious.        Objective:      Vitals:    12/04/18 1108   BP: (!) 150/70   Pulse: (!) 46   Resp: 18   Temp: 99.1 °F (37.3 °C)   TempSrc: Oral   Weight: 73.5 kg (162 lb)   Height: 5' 5" (1.651 m)     Body mass index is 26.96 kg/m².  Physical Exam    General appearance: No acute distress, cooperative  Eyes: PERRL, EOMI, conjunctiva clear  Ears: normal external ear and pinna, tm clear without drainage, canals clear  Nose: Normal mucosa without drainage  Throat: no exudates or erythema, tonsils not enlarged  Mouth: no sores or lesions, moist mucous membranes  Neck: FROM, soft, supple, no thyromegaly, no bruits  Lymph: no anterior or posterior cervical adenopathy  Heart::  Regular rate and rhythm, no murmur  Lung: Clear to ascultation bilaterally, no wheezing, no rales, no rhonchi, no distress  Abdomen: Soft, nontender, no distention, no hepatosplenomegaly, bowel sounds normal, no guarding, no rebound, no peritoneal signs  Skin: no rashes, no lesions  Extremities: no edema, no cyanosis  Neuro: CN 2-12 intact, 5/5 muscle strength upper and lower extremity bilaterally, 2+ DTRs UE and LE bilaterally, normal gait, normal sensation  Peripheral pulses: 2+ pedal pulses bilaterally, good perfusion and color  Musculoskeletal: FROM, good strenth, no tenderness  Joint: normal appearance, no swelling, no warmth, no deformity in all joints    Assessment:       1. Postmenopausal bleeding    2. PAF (paroxysmal atrial fibrillation)    3. Essential hypertension  "   4. Mixed hyperlipidemia    5. Hypothyroidism, unspecified type    6. Intestinal malabsorption following gastrectomy    7. Vitamin B12 deficiency    8. Major depressive disorder, recurrent, mild    9. Urge incontinence of urine    10. Screen for colon cancer    11. Need for shingles vaccine    12. Need for immunization against influenza        Plan:       Postmenopausal bleeding  Concerning bleeding and will refer to gyn for evaluation.   -     Ambulatory referral to Obstetrics / Gynecology  -     CBC auto differential; Future; Expected date: 12/04/2018    PAF (paroxysmal atrial fibrillation)  NSR today on exam. She frequently goes into Afib with triggers. Continue sotalol and xarelto.     Essential hypertension  Uncontrolled. Her HR too low for beta blocker but will start lisinopril at 10 mg qday. She will recheck renal function and potasium in one week and recheck BP in 2 weeks with nurse. If still elevated then will increase lisinopril.   -     lisinopril 10 MG tablet; Take 1 tablet (10 mg total) by mouth once daily.  Dispense: 90 tablet; Refill: 3  -     Basic metabolic panel    Mixed hyperlipidemia  Good control on this dose of atorvastatin.     Hypothyroidism, unspecified type  Good control and will recheck TSH after her weight loss.   -     TSH; Future; Expected date: 12/04/2018    Intestinal malabsorption following gastrectomy  Advised to be mindful of her dietary choices and limit carbohydrates and large meals.     Vitamin B12 deficiency  She is due to check levels.   -     Vitamin B12; Future; Expected date: 12/04/2018    Major depressive disorder, recurrent, mild  Controlled on paxil. She is doing well despite the death of her boyfriend.     Urge incontinence of urine  Improved on oxybutynin.     Screen for colon cancer  -     Fecal Immunochemical Test (iFOBT); Future; Expected date: 12/04/2018    Need for shingles vaccine  -     varicella-zoster gE-AS01B, PF, (SHINGRIX, PF,) 50 mcg/0.5 mL injection;  Inject 0.5 mLs into the muscle once. for 1 dose  Dispense: 0.5 mL; Refill: 1    Need for immunization against influenza  -     Influenza - High Dose (65+) (PF) (IM)    Follow-up in about 4 months (around 4/4/2019) for chronic medical issues and 2 weeks for BP check.

## 2018-12-04 NOTE — TELEPHONE ENCOUNTER
----- Message from Mayte Fernandez sent at 12/4/2018 10:44 AM CST -----    Pt  Will  Be  10  mins  Late // info only

## 2018-12-06 NOTE — TELEPHONE ENCOUNTER
----- Message from Nikia Cuellar sent at 12/6/2018  9:13 AM CST -----  Contact: Patient  Type:  RX Refill Request    Who Called:  Patient  Refill or New Rx:  Refill  RX Name and Strength:  sotalol (BETAPACE) 120 MG Tab and rivaroxaban (XARELTO) 20 mg Tab  How is the patient currently taking it? (ex. 1XDay):  Sotalol-Take 1 tablet (120 mg total) by mouth 2 (two) times daily. - Oral  Xarelto- Take 1 tablet (20 mg total) by mouth daily with dinner or evening meal. - Oral  Is this a 30 day or 90 day RX:  Sotalol- 180 tablets------ Xarelto- 90 tablets  Preferred Pharmacy with phone number:    [facility] [id number] [facility] [id number] [place] [address] AT [facility]  [address] [place] [address]  Phone: [phone] Fax: [phone]  Local or Mail Order:  Local  Ordering Provider:  Dr Silvano Carter Call Back Number:  [phone]  Additional Information:  Calling to request an emergency refill. Please advise.

## 2018-12-07 RX ORDER — RIVAROXABAN 20 MG/1
TABLET, FILM COATED ORAL
Qty: 90 TABLET | Refills: 4 | Status: ON HOLD | OUTPATIENT
Start: 2018-12-07 | End: 2019-01-03 | Stop reason: HOSPADM

## 2018-12-07 RX ORDER — SOTALOL HYDROCHLORIDE 120 MG/1
TABLET ORAL
Qty: 180 TABLET | Refills: 4 | Status: ON HOLD | OUTPATIENT
Start: 2018-12-07 | End: 2019-04-12 | Stop reason: HOSPADM

## 2018-12-11 ENCOUNTER — LAB VISIT (OUTPATIENT)
Dept: LAB | Facility: HOSPITAL | Age: 68
End: 2018-12-11
Attending: INTERNAL MEDICINE
Payer: MEDICARE

## 2018-12-11 DIAGNOSIS — E53.8 VITAMIN B12 DEFICIENCY: ICD-10-CM

## 2018-12-11 DIAGNOSIS — N95.0 POSTMENOPAUSAL BLEEDING: ICD-10-CM

## 2018-12-11 DIAGNOSIS — E03.9 HYPOTHYROIDISM, UNSPECIFIED TYPE: ICD-10-CM

## 2018-12-11 LAB
BASOPHILS # BLD AUTO: 0.02 K/UL
BASOPHILS NFR BLD: 0.4 %
DIFFERENTIAL METHOD: ABNORMAL
EOSINOPHIL # BLD AUTO: 0.1 K/UL
EOSINOPHIL NFR BLD: 1.2 %
ERYTHROCYTE [DISTWIDTH] IN BLOOD BY AUTOMATED COUNT: 14.7 %
HCT VFR BLD AUTO: 43.9 %
HGB BLD-MCNC: 14 G/DL
IMM GRANULOCYTES # BLD AUTO: 0.01 K/UL
IMM GRANULOCYTES NFR BLD AUTO: 0.2 %
LYMPHOCYTES # BLD AUTO: 1.9 K/UL
LYMPHOCYTES NFR BLD: 34.4 %
MCH RBC QN AUTO: 32.4 PG
MCHC RBC AUTO-ENTMCNC: 31.9 G/DL
MCV RBC AUTO: 102 FL
MONOCYTES # BLD AUTO: 0.4 K/UL
MONOCYTES NFR BLD: 6.7 %
NEUTROPHILS # BLD AUTO: 3.2 K/UL
NEUTROPHILS NFR BLD: 57.1 %
NRBC BLD-RTO: 0 /100 WBC
PLATELET # BLD AUTO: 223 K/UL
PMV BLD AUTO: 11.6 FL
RBC # BLD AUTO: 4.32 M/UL
TSH SERPL DL<=0.005 MIU/L-ACNC: 0.82 UIU/ML
VIT B12 SERPL-MCNC: 499 PG/ML
WBC # BLD AUTO: 5.64 K/UL

## 2018-12-11 PROCEDURE — 85025 COMPLETE CBC W/AUTO DIFF WBC: CPT

## 2018-12-11 PROCEDURE — 84443 ASSAY THYROID STIM HORMONE: CPT

## 2018-12-11 PROCEDURE — 36415 COLL VENOUS BLD VENIPUNCTURE: CPT | Mod: PO

## 2018-12-11 PROCEDURE — 82607 VITAMIN B-12: CPT

## 2018-12-12 ENCOUNTER — PATIENT MESSAGE (OUTPATIENT)
Dept: FAMILY MEDICINE | Facility: CLINIC | Age: 68
End: 2018-12-12

## 2018-12-13 ENCOUNTER — LAB VISIT (OUTPATIENT)
Dept: LAB | Facility: HOSPITAL | Age: 68
End: 2018-12-13
Attending: INTERNAL MEDICINE
Payer: MEDICARE

## 2018-12-13 DIAGNOSIS — Z12.11 SCREEN FOR COLON CANCER: ICD-10-CM

## 2018-12-13 LAB — HEMOCCULT STL QL IA: NEGATIVE

## 2018-12-13 PROCEDURE — 82274 ASSAY TEST FOR BLOOD FECAL: CPT

## 2018-12-18 ENCOUNTER — CLINICAL SUPPORT (OUTPATIENT)
Dept: FAMILY MEDICINE | Facility: CLINIC | Age: 68
End: 2018-12-18

## 2018-12-18 VITALS
HEART RATE: 51 BPM | DIASTOLIC BLOOD PRESSURE: 76 MMHG | RESPIRATION RATE: 18 BRPM | SYSTOLIC BLOOD PRESSURE: 122 MMHG | OXYGEN SATURATION: 99 %

## 2018-12-18 DIAGNOSIS — I10 HYPERTENSION, UNSPECIFIED TYPE: Primary | ICD-10-CM

## 2018-12-18 RX ORDER — LISINOPRIL 20 MG/1
20 TABLET ORAL DAILY
Qty: 90 TABLET | Refills: 1 | Status: SHIPPED | OUTPATIENT
Start: 2018-12-18 | End: 2019-04-04

## 2018-12-18 RX ORDER — LISINOPRIL 20 MG/1
20 TABLET ORAL DAILY
COMMUNITY
End: 2018-12-18 | Stop reason: SDUPTHER

## 2018-12-18 NOTE — PROGRESS NOTES
Yara Whitehead 68 y.o. female is here today for Blood Pressure check.   History of HTN yes.    Review of patient's allergies indicates:  No Known Allergies  Creatinine   Date Value Ref Range Status   07/27/2018 0.7 0.5 - 1.4 mg/dL Final     Sodium   Date Value Ref Range Status   01/26/2018 140 136 - 145 mmol/L Final     Potassium   Date Value Ref Range Status   01/26/2018 4.6 3.5 - 5.1 mmol/L Final   ]  Patient verifies taking blood pressure medications on a regular basis at the same time of the day.     Current Outpatient Medications:     albuterol 90 mcg/actuation inhaler, Inhale 1-2 puffs into the lungs every 6 (six) hours as needed for Wheezing., Disp: 1 Inhaler, Rfl: 0    atorvastatin (LIPITOR) 10 MG tablet, TAKE 1 TABLET(10 MG) BY MOUTH EVERY DAY, Disp: 90 tablet, Rfl: 4    b complex vitamins capsule, Take 1 capsule by mouth once daily., Disp: , Rfl:     cetirizine (ZYRTEC) 10 MG tablet, Take 10 mg by mouth once daily., Disp: , Rfl:     cyanocobalamin 1,000 mcg/mL injection, Inject 1 mL (1,000 mcg total) into the muscle every 14 (fourteen) days., Disp: 10 mL, Rfl: 3    fluticasone (FLONASE) 50 mcg/actuation nasal spray, 2 sprays (100 mcg total) by Each Nare route once daily., Disp: 1 Bottle, Rfl: 6    levothyroxine (SYNTHROID) 75 MCG tablet, TAKE 1 TABLET BY MOUTH EVERY DAY, Disp: 90 tablet, Rfl: 3    lisinopril (PRINIVIL,ZESTRIL) 20 MG tablet, Take 20 mg by mouth once daily., Disp: , Rfl:     multivitamin with minerals tablet, Take 1 tablet by mouth once daily., Disp: , Rfl:     oxybutynin (DITROPAN-XL) 10 MG 24 hr tablet, Take 1 tablet (10 mg total) by mouth once daily., Disp: 30 tablet, Rfl: 11    paroxetine (PAXIL) 40 MG tablet, Take 1 tablet (40 mg total) by mouth every morning., Disp: 90 tablet, Rfl: 3    sotalol (BETAPACE) 120 MG Tab, TAKE 1 TABLET(120 MG) BY MOUTH TWICE DAILY, Disp: 180 tablet, Rfl: 4    valacyclovir (VALTREX) 1000 MG tablet, Take 1 tablet (1,000 mg total) by mouth  2 (two) times daily., Disp: 14 tablet, Rfl: 3    XARELTO 20 mg Tab, TAKE 1 TABLET(20 MG) BY MOUTH DAILY WITH EVENING MEAL OR DINNER, Disp: 90 tablet, Rfl: 4    cholestyramine (QUESTRAN) 4 gram packet, Take 1 packet (4 g total) by mouth once daily., Disp: 90 packet, Rfl: 3    metoprolol tartrate (LOPRESSOR) 50 MG tablet, Take 1 tablet (50 mg total) by mouth as needed. (Patient taking differently: Take 50 mg by mouth as needed. ), Disp: 90 tablet, Rfl: 5  Does patient have record of home blood pressure readings yes. Readings have been averaging in the 140-150s systolic.  Patient has only checked a few readings.    Last dose of blood pressure medication was taken at 0800.  Patient is asymptomatic.   Complains of nothing.  Advised that she is feeling better.    BP: 122/76 , Pulse: (!) 51 .    Patient advised to continue taking medications as prescribed.

## 2018-12-29 ENCOUNTER — PES CALL (OUTPATIENT)
Dept: ADMINISTRATIVE | Facility: CLINIC | Age: 68
End: 2018-12-29

## 2019-01-01 PROBLEM — S06.5XAA SUBDURAL HEMATOMA: Status: ACTIVE | Noted: 2019-01-01

## 2019-01-01 PROBLEM — S06.6XAA SUBARACHNOID HEMATOMA: Status: ACTIVE | Noted: 2019-01-01

## 2019-01-02 ENCOUNTER — TELEPHONE (OUTPATIENT)
Dept: NEUROSURGERY | Facility: CLINIC | Age: 69
End: 2019-01-02

## 2019-01-02 DIAGNOSIS — S06.5XAA SUBDURAL HEMATOMA: Primary | ICD-10-CM

## 2019-01-02 NOTE — TELEPHONE ENCOUNTER
Appts scheduled. Pt is currently IP.    ----- Message from Ana Laura Feldman PA-C sent at 1/2/2019 10:41 AM CST -----  Please schedule a follow up with me in 4 weeks with head CT    Thank you!

## 2019-01-07 ENCOUNTER — TELEPHONE (OUTPATIENT)
Dept: NEUROSURGERY | Facility: CLINIC | Age: 69
End: 2019-01-07

## 2019-01-07 RX ORDER — BUTALBITAL, ACETAMINOPHEN AND CAFFEINE 50; 325; 40 MG/1; MG/1; MG/1
1 TABLET ORAL EVERY 6 HOURS PRN
Qty: 30 TABLET | Refills: 0 | Status: SHIPPED | OUTPATIENT
Start: 2019-01-07 | End: 2019-01-24

## 2019-01-07 NOTE — TELEPHONE ENCOUNTER
Patient reports that Dr Cast offered her pain medication in the hospital. Patient reports at the time, tylenol was helping. She reports that is no longer working. Informed the patient that our office does not typically prescribe pain medication. Please advise.

## 2019-01-23 ENCOUNTER — TELEPHONE (OUTPATIENT)
Dept: FAMILY MEDICINE | Facility: CLINIC | Age: 69
End: 2019-01-23

## 2019-01-23 NOTE — TELEPHONE ENCOUNTER
----- Message from Jocelin Ross sent at 1/22/2019  4:01 PM CST -----  Contact: [facility] is requesting to speak with a nurse to get a PA on pt's (blood sugar diagnostic Strp)  Please call pharmacy to advise  Call back   Thanks      ..  [facility] [address] [place] [address] [id number] [place] [address] AT [facility] [address]  [id number] [place] [address]  Phone: [phone] Fax: [phone]

## 2019-01-24 ENCOUNTER — OFFICE VISIT (OUTPATIENT)
Dept: CARDIOLOGY | Facility: CLINIC | Age: 69
End: 2019-01-24
Payer: MEDICARE

## 2019-01-24 ENCOUNTER — CLINICAL SUPPORT (OUTPATIENT)
Dept: CARDIOLOGY | Facility: CLINIC | Age: 69
End: 2019-01-24
Attending: INTERNAL MEDICINE
Payer: MEDICARE

## 2019-01-24 ENCOUNTER — TELEPHONE (OUTPATIENT)
Dept: FAMILY MEDICINE | Facility: CLINIC | Age: 69
End: 2019-01-24

## 2019-01-24 VITALS
SYSTOLIC BLOOD PRESSURE: 124 MMHG | BODY MASS INDEX: 27.07 KG/M2 | HEIGHT: 65 IN | HEART RATE: 43 BPM | WEIGHT: 162.5 LBS | DIASTOLIC BLOOD PRESSURE: 67 MMHG

## 2019-01-24 DIAGNOSIS — I48.0 PAF (PAROXYSMAL ATRIAL FIBRILLATION): ICD-10-CM

## 2019-01-24 DIAGNOSIS — Q24.4 SUBAORTIC MEMBRANE: ICD-10-CM

## 2019-01-24 DIAGNOSIS — I45.10 RBBB: ICD-10-CM

## 2019-01-24 DIAGNOSIS — Z95.818 STATUS POST PLACEMENT OF IMPLANTABLE LOOP RECORDER: ICD-10-CM

## 2019-01-24 DIAGNOSIS — I10 ESSENTIAL HYPERTENSION: Chronic | ICD-10-CM

## 2019-01-24 DIAGNOSIS — I48.0 PAF (PAROXYSMAL ATRIAL FIBRILLATION): Primary | Chronic | ICD-10-CM

## 2019-01-24 DIAGNOSIS — G47.33 OSA ON CPAP: Chronic | ICD-10-CM

## 2019-01-24 DIAGNOSIS — I48.3 TYPICAL ATRIAL FLUTTER: ICD-10-CM

## 2019-01-24 PROCEDURE — 99214 OFFICE O/P EST MOD 30 MIN: CPT | Mod: S$GLB,,, | Performed by: INTERNAL MEDICINE

## 2019-01-24 PROCEDURE — 1101F PR PT FALLS ASSESS DOC 0-1 FALLS W/OUT INJ PAST YR: ICD-10-PCS | Mod: CPTII,S$GLB,, | Performed by: INTERNAL MEDICINE

## 2019-01-24 PROCEDURE — 99214 PR OFFICE/OUTPT VISIT, EST, LEVL IV, 30-39 MIN: ICD-10-PCS | Mod: S$GLB,,, | Performed by: INTERNAL MEDICINE

## 2019-01-24 PROCEDURE — 99499 RISK ADDL DX/OHS AUDIT: ICD-10-PCS | Mod: S$GLB,,, | Performed by: INTERNAL MEDICINE

## 2019-01-24 PROCEDURE — 99499 UNLISTED E&M SERVICE: CPT | Mod: S$GLB,,, | Performed by: INTERNAL MEDICINE

## 2019-01-24 PROCEDURE — 99999 PR PBB SHADOW E&M-EST. PATIENT-LVL III: ICD-10-PCS | Mod: PBBFAC,,, | Performed by: INTERNAL MEDICINE

## 2019-01-24 PROCEDURE — 99999 PR PBB SHADOW E&M-EST. PATIENT-LVL III: CPT | Mod: PBBFAC,,, | Performed by: INTERNAL MEDICINE

## 2019-01-24 PROCEDURE — 3074F PR MOST RECENT SYSTOLIC BLOOD PRESSURE < 130 MM HG: ICD-10-PCS | Mod: CPTII,S$GLB,, | Performed by: INTERNAL MEDICINE

## 2019-01-24 PROCEDURE — 3074F SYST BP LT 130 MM HG: CPT | Mod: CPTII,S$GLB,, | Performed by: INTERNAL MEDICINE

## 2019-01-24 PROCEDURE — 1101F PT FALLS ASSESS-DOCD LE1/YR: CPT | Mod: CPTII,S$GLB,, | Performed by: INTERNAL MEDICINE

## 2019-01-24 PROCEDURE — 93291 INTERROG DEV EVAL SCRMS IP: CPT | Mod: S$GLB,,, | Performed by: INTERNAL MEDICINE

## 2019-01-24 PROCEDURE — 3078F PR MOST RECENT DIASTOLIC BLOOD PRESSURE < 80 MM HG: ICD-10-PCS | Mod: CPTII,S$GLB,, | Performed by: INTERNAL MEDICINE

## 2019-01-24 PROCEDURE — 99999 PR PBB SHADOW E&M-EST. PATIENT-LVL I: CPT | Mod: PBBFAC,,,

## 2019-01-24 PROCEDURE — 3078F DIAST BP <80 MM HG: CPT | Mod: CPTII,S$GLB,, | Performed by: INTERNAL MEDICINE

## 2019-01-24 PROCEDURE — 99999 PR PBB SHADOW E&M-EST. PATIENT-LVL I: ICD-10-PCS | Mod: PBBFAC,,,

## 2019-01-24 PROCEDURE — 93291 CARDIAC DEVICE CHECK - IN CLINIC: ICD-10-PCS | Mod: S$GLB,,, | Performed by: INTERNAL MEDICINE

## 2019-01-24 NOTE — PROGRESS NOTES
Subjective:    Patient ID:  Yara Whitehead is a 68 y.o. female who presents for follow-up of Atrial Fibrillation (Annual f/u - LOV 11/2017); Hypertension; and Hyperlipidemia      HPI  Here for f/u ILR/PAF-DCCV on sotalol.Several episodes of PAF mainly oct-nov, plus freq katy episodes. Has fall 1/1/19 with head trauma/SDH.  Feeling well since then.        Review of Systems   Constitution: Negative for malaise/fatigue.   Eyes: Negative for blurred vision.   Cardiovascular: Negative for chest pain, claudication, cyanosis, dyspnea on exertion, irregular heartbeat, leg swelling, near-syncope, orthopnea, palpitations, paroxysmal nocturnal dyspnea and syncope.   Respiratory: Negative for cough and shortness of breath.    Hematologic/Lymphatic: Does not bruise/bleed easily.   Musculoskeletal: Negative for back pain, falls, joint pain, muscle cramps, muscle weakness and myalgias.   Gastrointestinal: Negative for abdominal pain, change in bowel habit, nausea and vomiting.   Genitourinary: Negative for urgency.   Neurological: Negative for dizziness, focal weakness and light-headedness.        Objective:    Physical Exam   Constitutional: She is oriented to person, place, and time. She appears well-developed and well-nourished.   HENT:   Head: Normocephalic.   Eyes: Conjunctivae are normal.   Neck: Normal range of motion. Neck supple. No JVD present.   Cardiovascular: Normal rate, regular rhythm, normal heart sounds and intact distal pulses.   Pulses:       Carotid pulses are 2+ on the right side, and 2+ on the left side.       Radial pulses are 2+ on the right side, and 2+ on the left side.        Dorsalis pedis pulses are 2+ on the right side, and 2+ on the left side.        Posterior tibial pulses are 2+ on the right side, and 2+ on the left side.   Pulmonary/Chest: Effort normal and breath sounds normal.   Abdominal: Soft. Bowel sounds are normal.   Musculoskeletal: She exhibits no edema or tenderness.    Neurological: She is alert and oriented to person, place, and time. Gait normal.   Skin: Skin is warm, dry and intact. No cyanosis. Nails show no clubbing.   Psychiatric: She has a normal mood and affect. Her speech is normal and behavior is normal. Thought content normal.   Nursing note and vitals reviewed.              ..    Chemistry        Component Value Date/Time     (L) 01/02/2019 0607    K 4.4 01/02/2019 0607    CL 98 01/02/2019 0607    CO2 27 01/02/2019 0607    BUN 17 01/02/2019 0607    CREATININE 0.43 (L) 01/02/2019 0607    GLU 77 01/02/2019 0607        Component Value Date/Time    CALCIUM 8.5 01/02/2019 0607    ALKPHOS 61 01/01/2019 1909    AST 25 01/01/2019 1909    ALT 24 01/01/2019 1909    BILITOT 0.4 01/01/2019 1909    ESTGFRAFRICA >60 01/02/2019 0607    EGFRNONAA >60 01/02/2019 0607            ..  Lab Results   Component Value Date    CHOL 145 01/26/2018    CHOL 206 (H) 04/05/2017    CHOL 206 (H) 04/05/2017     Lab Results   Component Value Date    HDL 62 01/26/2018    HDL 74 04/05/2017    HDL 74 04/05/2017     Lab Results   Component Value Date    LDLCALC 71.8 01/26/2018    LDLCALC 119.6 04/05/2017    LDLCALC 119.6 04/05/2017     Lab Results   Component Value Date    TRIG 56 01/26/2018    TRIG 62 04/05/2017    TRIG 62 04/05/2017     Lab Results   Component Value Date    CHOLHDL 42.8 01/26/2018    CHOLHDL 35.9 04/05/2017    CHOLHDL 35.9 04/05/2017     ..  Lab Results   Component Value Date    WBC 4.54 01/03/2019    HGB 12.2 01/03/2019    HCT 35.6 (L) 01/03/2019    MCV 97 01/03/2019     01/03/2019       Test(s) Reviewed  I have reviewed the following in detail:  [] Stress test   [] Angiography   [x] Echocardiogram   [x] Labs   [] Other:       Assessment:         ICD-10-CM ICD-9-CM   1. PAF (paroxysmal atrial fibrillation) I48.0 427.31   2. Subaortic membrane Q24.4 746.81   3. Essential hypertension I10 401.9   4. Status post placement of implantable loop recorder Z95.818 V45.09   5. FABIANA  on CPAP G47.33 327.23    Z99.89 V46.8     Problem List Items Addressed This Visit     Subaortic membrane    Status post placement of implantable loop recorder    PAF (paroxysmal atrial fibrillation) - Primary (Chronic)    Overview     New dx 2/16/16         FABIANA on CPAP (Chronic)    Essential hypertension (Chronic)           Plan:           Return to clinic 3 months   Low level/low impact aerobic exercise 5x's/wk. Heart healthy diet and risk factor modification.    See labs and med orders.  Refer for PPM for SSS and PAF. Remove loop at time ppm  Has head ct scheduled follow SDH   Resume NOAC when ok with NS

## 2019-01-24 NOTE — TELEPHONE ENCOUNTER
----- Message from RT Soumya sent at 1/24/2019  9:05 AM CST -----  Contact: pt    pt , requesting a referral to see a cardiologist, thanks.

## 2019-01-24 NOTE — PATIENT INSTRUCTIONS
Pacemaker     Arrive for procedure at: [facility] on Monday February 11 th 2019. Procedure will start at 9 a.m.     You will receive a phone call from [facility] Pre-Op Department with arrival time and further instructions prior to your scheduled procedure.    Notify the nurse if you are ALLERGIC TO IODINE.    FASTING: You MAY NOT have anything to eat or drink AFTER MIDNIGHT the day before your procedure.    MEDICATIONS: You may take your regular morning medications with water. If there are any medications that you should not take, you will be instructed to hold them for that morning.    ? CARDIOLOGY PRE-PROCEDURE MEDICATION ORDERS:  ** Please hold any medications that are checked below:    HOLD   # OF DAYS TO HOLD  ? Coumadin  Consult with Coumadin Clinic   ? Xarelto    DAY BEFORE & DAY OF  ? Pradaxa   DAY BEFORE & DAY OF   ? Eliquis    DAY BEFORE & DAY OF   ? Short acting insulin   Morning of procedure    CONTINUE the Following Medications     ? Aspirin  ? Plavix  ? Effient  ? Metformin    WHAT TO EXPECT:    How long will the procedure take?  The procedure will take an average of 1 - 2 hours to perform.  After the procedure, you will need to lay flat for around 4 - 6 hours to minimize bleeding from the puncture site. If the wrist is accessed you will need to keep your arm still as instructed by the nurse.    When can I go home?  You may be able to be discharged home that same afternoon if there were no complications.  If you have one of the following: balloon; stent; pacemaker or defibrillator procedures, you may spend one night for observation.  Your doctor will determine your discharge based upon your progress.  The results of your procedure will be discussed with you before you are discharged.  Any further testing or procedures will be scheduled for you either before you leave or you will be instructed to call for a future appointment.      TRANSPORTATION:  PLEASE ARRANGE TO HAVE SOMEONE DRIVE  YOU HOME FOLLOWING YOUR PROCEDURE, YOU WILL NOT BE ALLOWED TO DRIVE.

## 2019-01-26 NOTE — TELEPHONE ENCOUNTER
Spoke to pt. She saw cardiologist yesterday. Informed her that a referral was put in for her. Verbalized understanding. Denied any additional questions or concerns at this time.

## 2019-01-31 ENCOUNTER — OFFICE VISIT (OUTPATIENT)
Dept: NEUROSURGERY | Facility: CLINIC | Age: 69
End: 2019-01-31
Payer: MEDICARE

## 2019-01-31 ENCOUNTER — HOSPITAL ENCOUNTER (OUTPATIENT)
Dept: RADIOLOGY | Facility: HOSPITAL | Age: 69
Discharge: HOME | End: 2019-01-31
Attending: PHYSICIAN ASSISTANT
Payer: MEDICARE

## 2019-01-31 VITALS
BODY MASS INDEX: 27.1 KG/M2 | SYSTOLIC BLOOD PRESSURE: 131 MMHG | HEIGHT: 65 IN | TEMPERATURE: 98 F | DIASTOLIC BLOOD PRESSURE: 69 MMHG | WEIGHT: 162.69 LBS | HEART RATE: 51 BPM

## 2019-01-31 DIAGNOSIS — I60.9 SAH (SUBARACHNOID HEMORRHAGE): Primary | ICD-10-CM

## 2019-01-31 DIAGNOSIS — S02.91XD: ICD-10-CM

## 2019-01-31 DIAGNOSIS — S06.5XAA SUBDURAL HEMATOMA: ICD-10-CM

## 2019-01-31 DIAGNOSIS — S06.5XAA SDH (SUBDURAL HEMATOMA): ICD-10-CM

## 2019-01-31 PROCEDURE — 3078F PR MOST RECENT DIASTOLIC BLOOD PRESSURE < 80 MM HG: ICD-10-PCS | Mod: CPTII,S$GLB,, | Performed by: PHYSICIAN ASSISTANT

## 2019-01-31 PROCEDURE — 3078F DIAST BP <80 MM HG: CPT | Mod: CPTII,S$GLB,, | Performed by: PHYSICIAN ASSISTANT

## 2019-01-31 PROCEDURE — 1100F PR PT FALLS ASSESS DOC 2+ FALLS/FALL W/INJURY/YR: ICD-10-PCS | Mod: CPTII,S$GLB,, | Performed by: PHYSICIAN ASSISTANT

## 2019-01-31 PROCEDURE — 3288F PR FALLS RISK ASSESSMENT DOCUMENTED: ICD-10-PCS | Mod: CPTII,S$GLB,, | Performed by: PHYSICIAN ASSISTANT

## 2019-01-31 PROCEDURE — 99999 PR PBB SHADOW E&M-EST. PATIENT-LVL IV: CPT | Mod: PBBFAC,,, | Performed by: PHYSICIAN ASSISTANT

## 2019-01-31 PROCEDURE — 3288F FALL RISK ASSESSMENT DOCD: CPT | Mod: CPTII,S$GLB,, | Performed by: PHYSICIAN ASSISTANT

## 2019-01-31 PROCEDURE — 70450 CT HEAD/BRAIN W/O DYE: CPT | Mod: TC,PO

## 2019-01-31 PROCEDURE — 99999 PR PBB SHADOW E&M-EST. PATIENT-LVL IV: ICD-10-PCS | Mod: PBBFAC,,, | Performed by: PHYSICIAN ASSISTANT

## 2019-01-31 PROCEDURE — 3075F SYST BP GE 130 - 139MM HG: CPT | Mod: CPTII,S$GLB,, | Performed by: PHYSICIAN ASSISTANT

## 2019-01-31 PROCEDURE — 99213 PR OFFICE/OUTPT VISIT, EST, LEVL III, 20-29 MIN: ICD-10-PCS | Mod: S$GLB,,, | Performed by: PHYSICIAN ASSISTANT

## 2019-01-31 PROCEDURE — 3075F PR MOST RECENT SYSTOLIC BLOOD PRESS GE 130-139MM HG: ICD-10-PCS | Mod: CPTII,S$GLB,, | Performed by: PHYSICIAN ASSISTANT

## 2019-01-31 PROCEDURE — 70450 CT HEAD WITHOUT CONTRAST: ICD-10-PCS | Mod: 26,,, | Performed by: RADIOLOGY

## 2019-01-31 PROCEDURE — 99213 OFFICE O/P EST LOW 20 MIN: CPT | Mod: S$GLB,,, | Performed by: PHYSICIAN ASSISTANT

## 2019-01-31 PROCEDURE — 1100F PTFALLS ASSESS-DOCD GE2>/YR: CPT | Mod: CPTII,S$GLB,, | Performed by: PHYSICIAN ASSISTANT

## 2019-01-31 PROCEDURE — 70450 CT HEAD/BRAIN W/O DYE: CPT | Mod: 26,,, | Performed by: RADIOLOGY

## 2019-01-31 NOTE — LETTER
January 31, 2019      Ann Richards DO  [address] [address]  [address]  [place] [address]           [facility]  [address] [facility] [address] [place] [address]  Phone: [phone]  Fax: [phone]          Patient: Yara Whitehead   MR Number: [id number]   YOB: 1950   Date of Visit: 1/31/2019       Dear Dr. Ann Richards:    Thank you for referring Yara Whitehead to me for evaluation. Attached you will find relevant portions of my assessment and plan of care.    If you have questions, please do not hesitate to call me. I look forward to following Yara Whitehead along with you.    Sincerely,    Ana Laura Feldman PA-C    Enclosure  CC:  No Recipients    If you would like to receive this communication electronically, please contact [email or web address] or [phone] to request more information on [employer] access.    For providers and/or their staff who would like to refer a patient to [facility], please contact us through our one-stop-shop provider referral line, [facility] , at [phone].    If you feel you have received this communication in error or would no longer like to receive these types of communications, please e-mail [email or web address]

## 2019-01-31 NOTE — PROGRESS NOTES
[facility] Neurosurgery  Clinic Progress Note    PCP: Ann Richards DO    SUBJECTIVE:     Chief Complaint:   Chief Complaint   Patient presents with    Follow-up     patient reports waking up in the middle of the night to use the bathroom; does not remember how she hit her head; patient reports occassional dizziness; reports unable to smell, trouble with memory and vision changes; pain 0/10       History of Present Illness:  Yara Whitehead is a 68 y.o. female who presents for follow up. Patient was admitted to [facility] on 1/1 after head CT revealed a nondisplaced left occipital skull fracture, left high parietal subarachnoid hemorrhage, right frontal subarachnoid hemorrhage, right subdural hematoma frontal temporal fossa, and large left occipital scalp hematoma. Patient reported a ground level fall 2 days prior to admission with unknown loss of consciousness. She has a history of afib and was taking Xarelato at the time of her fall. She presents today for follow up with repeat head CT. She has been off the Xarelto since her hospitalization and will undergo a cardiac procedure next month requiring her to restart her Xarelto. She reports occasional headaches. She states intermittent changes in her vision that requires her to use reading glasses at times. Overall, this is improving. She reports decreased smell and states this is improving. She states the scalp hematoma has significantly improved over the last few weeks. Denies seizure activity.         Past Medical History:   Diagnosis Date    Allergy     GERD (gastroesophageal reflux disease)     Hypertension     Hypothyroidism     ALESSANDRA (iron deficiency anemia)     Insomnia     Nephrolithiasis     had [facility]    FABIANA on CPAP     PAF (paroxysmal atrial fibrillation)     Subaortic membrane     Vitamin B 12 deficiency     Vitamin D deficiency      Past Surgical History:   Procedure Laterality Date    abdomenalplasty      CARDIOVERSION N/A  2016    [facility]formed [facility] at [facility]    CARDIOVERSION N/A 2016    Performed by [facility] at [facility]    COLONOSCOPY      normal, repeat in 5 years    EXTRACORPOREAL SHOCK WAVE LITHOTRIPSY      [place]    FRACTURE SURGERY Left     has plates and screws in place.    GALLBLADDER SURGERY      GASTRIC BYPASS      HERNIA REPAIR      HYSTERECTOMY      due to vaginal prolpase with BSO - also bladder suspension at the same time - [facility]    loop recording N/A 3/18/2016    Performed by [facility]ano Garcia [facility] at [facility] CATH    OPEN REDUCTION INTERNAL FIXATION-  PROXIMAL HUMERUS Left 2016    Performed by Viet Romo MD at [facility] OR    TYMPANOPLASTY Left     replaced and then repair x2      Family History   Problem Relation Age of Onset    Aneurysm Mother     Hypertension Mother     Cancer Mother         uterine     Alzheimer's disease Father     Diabetes Father     Mental illness Sister     No Known Problems Daughter     No Known Problems Son     Stroke Maternal Grandfather     COPD Paternal Grandmother     Cancer Paternal Grandmother         colon    Alzheimer's disease Paternal Grandfather     Heart disease Paternal Grandfather     Nephrolithiasis Neg Hx      Social History     Tobacco Use    Smoking status: Former Smoker     Packs/day: 2.00     Years: 20.00     Pack years: 40.00     Last attempt to quit: 1995     Years since quittin.2    Smokeless tobacco: Never Used   Substance Use Topics    Alcohol use: Yes     Alcohol/week: 1.8 oz     Types: 3 Glasses of wine per week    Drug use: No      Review of patient's allergies indicates:  No Known Allergies    Current Outpatient Medications:     albuterol 90 mcg/actuation inhaler, Inhale 1-2 puffs into the lungs every 6 (six) hours as needed for Wheezing., Disp: 1 Inhaler, Rfl: 0    atorvastatin (LIPITOR) 10 MG tablet, TAKE 1 TABLET(10 MG) BY MOUTH EVERY DAY, Disp:  90 tablet, Rfl: 4    b complex vitamins capsule, Take 1 capsule by mouth once daily., Disp: , Rfl:     blood sugar diagnostic Strp, Check blood sugar before meals, Disp: 100 each, Rfl: 2    blood-glucose meter kit, Use as instructed, Disp: 1 each, Rfl: 0    cetirizine (ZYRTEC) 10 MG tablet, Take 10 mg by mouth once daily., Disp: , Rfl:     cyanocobalamin 1,000 mcg/mL injection, Inject 1 mL (1,000 mcg total) into the muscle every 14 (fourteen) days., Disp: 10 mL, Rfl: 3    fluticasone (FLONASE) 50 mcg/actuation nasal spray, 2 sprays (100 mcg total) by Each Nare route once daily., Disp: 1 Bottle, Rfl: 6    lancets Misc, Use with blood sugar checks, Disp: 100 each, Rfl: 2    levothyroxine (SYNTHROID) 75 MCG tablet, TAKE 1 TABLET BY MOUTH EVERY DAY, Disp: 90 tablet, Rfl: 3    lisinopril (PRINIVIL,ZESTRIL) 20 MG tablet, Take 1 tablet (20 mg total) by mouth once daily., Disp: 90 tablet, Rfl: 1    multivitamin with minerals tablet, Take 1 tablet by mouth once daily., Disp: , Rfl:     oxybutynin (DITROPAN-XL) 10 MG 24 hr tablet, Take 1 tablet (10 mg total) by mouth once daily., Disp: 30 tablet, Rfl: 11    paroxetine (PAXIL) 40 MG tablet, Take 1 tablet (40 mg total) by mouth every morning., Disp: 90 tablet, Rfl: 3    sotalol (BETAPACE) 120 MG Tab, TAKE 1 TABLET(120 MG) BY MOUTH TWICE DAILY, Disp: 180 tablet, Rfl: 4    Review of Systems:   Constitutional: no fever, chills or night sweats. No changes in weight   Eyes: requires reading glasses  ENT: no nasal congestion or sore throat +decreased smell   Respiratory: no cough or shortness of breath   Cardiovascular: no chest pain or palpitations   Gastrointestinal: no nausea or vomiting   Genitourinary: no hematuria or dysuria   Integument/Breast: no rash or pruritis +wound  Hematologic/Lymphatic: no easy bruising or lymphadenopathy   Musculoskeletal: no arthralgias or myalgias.   Neurological: no seizures or tremors +headaches  Behavioral/Psych: no auditory or  visual hallucinations   Endocrine: no heat or cold intolerance         OBJECTIVE:     Vital Signs (Most Recent):  Temp: 98 °F (36.7 °C) (01/31/19 1005)  Pulse: (!) 51 (01/31/19 1005)  BP: 131/69 (01/31/19 1005)    Physical Exam:   General: well developed, well nourished, no distress.   Neurologic: Alert and oriented. Thought content appropriate. GCS 15.   Language: No aphasia  Speech: No dysarthria  Cranial nerves: face symmetric, tongue midline, CN II-XII grossly intact.   Head: normocephalic, healing occipital wound without signs of infection, no drainage   Eyes: pupils equal, round, reactive to light with accomodation, EOMI.  Neck: trachea midline, no JVD   Cardiovascular: no LE edema  Pulmonary: normal respirations, no signs of respiratory distress  Abdomen: non-distended  Sensory: intact to light touch throughout  Skin: Skin is warm, dry and intact.  Motor Strength: Moves all extremities spontaneously with good tone. No abnormal movements seen.   Gait: normal        Diagnostic Results:  I have independently reviewed the following imaging:  CT Head: previously noted nondisplaced left occipital skull fracture stable, resolution of previously noted left high parietal subarachnoid hemorrhage, resolution of previously noted right frontal subarachnoid hemorrhage, resolution of previously noted right subdural hematoma frontal temporal fossa.       ASSESSMENT/PLAN:     SAH (subarachnoid hemorrhage)  Comments:  resolved     Skull fracture, linear, with routine healing, subsequent encounter    SDH (subdural hematoma)  Comments:  resolved        Yara Whitehead is a 68 y.o. female with history of Afib and recent traumatic SAH/SDH and skull fracture after ground level fall. Patient reports improvement in her headaches, vision changes, and decreased smell. Repeat head CT reveals complete resolution of previous left high parietal subarachnoid hemorrhage, right frontal subarachnoid hemorrhage, and right subdural  hematoma frontal temporal fossa. On exam, the patient is neurologically intact.     No follow up needed since bleed has resolved. Patient can restart Xarelto    Patient verbalized understanding of plan. Encouraged to call with any questions or concerns.

## 2019-02-05 DIAGNOSIS — R73.09 ELEVATED GLUCOSE LEVEL: Primary | ICD-10-CM

## 2019-02-05 DIAGNOSIS — E53.8 VITAMIN B 12 DEFICIENCY: ICD-10-CM

## 2019-02-05 RX ORDER — LANCETS
EACH MISCELLANEOUS
Qty: 100 EACH | Refills: 3 | Status: SHIPPED | OUTPATIENT
Start: 2019-02-05 | End: 2023-04-05 | Stop reason: ALTCHOICE

## 2019-02-05 RX ORDER — INSULIN PUMP SYRINGE, 3 ML
EACH MISCELLANEOUS
Qty: 1 EACH | Refills: 0 | Status: SHIPPED | OUTPATIENT
Start: 2019-02-05 | End: 2020-02-05

## 2019-02-06 ENCOUNTER — TELEPHONE (OUTPATIENT)
Dept: CARDIOLOGY | Facility: CLINIC | Age: 69
End: 2019-02-06

## 2019-02-06 RX ORDER — CYANOCOBALAMIN 1000 UG/ML
1000 INJECTION, SOLUTION INTRAMUSCULAR; SUBCUTANEOUS
Qty: 6 ML | Refills: 3 | Status: SHIPPED | OUTPATIENT
Start: 2019-02-06 | End: 2020-03-06

## 2019-02-06 NOTE — TELEPHONE ENCOUNTER
Spoke to pt. Informed her the device that will be used is the single lead medtronic. Pt. Verbalized understanding.

## 2019-02-06 NOTE — TELEPHONE ENCOUNTER
Pt took 1 dose of xarelto last night, not on med list. Do you want pt on this medication? If so can you send #90 day supply

## 2019-02-06 NOTE — TELEPHONE ENCOUNTER
Spoke with pt son and he  wants to know if you were going to use a Micra Single Lead Pacemaker Device? If not he would like to know what kind of device would use. Please advise

## 2019-02-06 NOTE — TELEPHONE ENCOUNTER
----- Message from Shawn Edwards sent at 2/6/2019 10:41 AM CST -----  Type:  Pharmacy Calling to Clarify an RX    Name of Caller:  Yara  Pharmacy Name:  [facility]  Prescription Name:  B12 injections  What do they need to clarify?:  90 day prescription  Best Call Back Number:  [phone]

## 2019-02-06 NOTE — TELEPHONE ENCOUNTER
----- Message from Vilma Hanna sent at 2/6/2019  9:40 AM CST -----  Contact: Self  Patients son sells medical equipment and asked her to ask for a certain pacemaker brand name.  She is having hers put in on 2/11 and wants to know what name brand do we use?  Call back to advise at [phone] (home).  Thanks

## 2019-02-06 NOTE — TELEPHONE ENCOUNTER
----- Message from Mackenzie Angulo sent at 2/6/2019  3:09 PM CST -----  Type:  RX Refill Request    Who Called: Shahla  Refill or New Rx:  New Rx  RX Name and Strength:  Xarelto 20mg  How is the patient currently taking it? (ex. 1XDay):  Ba  Is this a 30 day or 90 day RX:  90  Preferred Pharmacy with phone number:    [facility] [address] [place] [address] [id number] [place] [address] AT [facility] [address]  [id number] [place] [id number]  Phone: [phone] Fax: [phone]  Local or Mail Order:  local  Ordering Provider:  Radha Carter Call Back Number:  [phone] (home)     Additional Information:  Stating need a new prescription sent to the pharmacy for the patient, 90 day supply

## 2019-02-06 NOTE — TELEPHONE ENCOUNTER
Spoke with the patient she has been information regarding her procedure. She verbalized understanding. Pacemaker has been asked to call the patient to discuss the procedure as well. She stated they were calling while we were on the phone. She clicked over to finish up with them.

## 2019-02-06 NOTE — TELEPHONE ENCOUNTER
----- Message from Lay Bullock sent at 2/6/2019 10:32 AM CST -----   2 nd message / sent one earlier   pt states that xarelto 30 day prescription / needs to be changed to a 90 day supply  / call [phone]   [facility] [address] [place] [id number] [facility] [address] AT [facility] [address]  [id number] [place] [id number]  Phone: [phone] Fax: [phone]

## 2019-02-06 NOTE — TELEPHONE ENCOUNTER
----- Message from Robert Villavicencio sent at 2/6/2019  3:41 PM CST -----  Type:  Patient Call Back    Who Called:pt     Does the patient know what this is regarding?: pt says its urgent  Best Call Back Number: [phone]   Additional Information:  Please call pt and leave a detailed message if there is no answer.

## 2019-02-07 ENCOUNTER — DOCUMENTATION ONLY (OUTPATIENT)
Dept: CARDIOLOGY | Facility: CLINIC | Age: 69
End: 2019-02-07

## 2019-02-07 ENCOUNTER — PATIENT MESSAGE (OUTPATIENT)
Dept: CARDIOLOGY | Facility: CLINIC | Age: 69
End: 2019-02-07

## 2019-02-07 DIAGNOSIS — F33.0 MAJOR DEPRESSIVE DISORDER, RECURRENT, MILD: ICD-10-CM

## 2019-02-07 RX ORDER — PAROXETINE HYDROCHLORIDE 40 MG/1
TABLET, FILM COATED ORAL
Qty: 90 TABLET | Refills: 3 | Status: SHIPPED | OUTPATIENT
Start: 2019-02-07 | End: 2019-02-26

## 2019-02-07 NOTE — PROGRESS NOTES
Spoke with Ms. Whitehead yesterday regarding her upcoming pacemaker implant. She has questions about what type of pacemaker will be implanted, and is particularly interested in the MDT Micra.  She would like to know if it would be an option for her and if not, why.    I told her I will speak with Dr. Hernandez, the implanting Cardiologist, and get back with her today.    I spoke with Dr. Hernandez, he feels it would be in the best interest of the patient to cancel her procedure for Monday and see Dr. Shah (Electrophysiologist) regarding her options.  She has seen him in the past and he would be able to answer her questions.    I spoke with Ms. Whitehead, she agrees that this would be the best option and will be waiting to hear back from us as to when she can see Dr. Shah.

## 2019-02-12 ENCOUNTER — DOCUMENTATION ONLY (OUTPATIENT)
Dept: CARDIOLOGY | Facility: CLINIC | Age: 69
End: 2019-02-12

## 2019-02-12 NOTE — PROGRESS NOTES
Patient called regarding restarting her Xarelto.    Spoke with Dr. Garcia, Neuro has cleared patient to restart her NOAC.  Dr. Garcia would like Ms. Whitehead to restart her Xarelto 20mg each evening.    Called patient, no answer, left message that patient may restart her Xarelto and that I will call in the new prescription to [facility] in [place].  She may call back if she has any questions.    Script called to Kalyn [phone].

## 2019-02-18 ENCOUNTER — OFFICE VISIT (OUTPATIENT)
Dept: CARDIOLOGY | Facility: CLINIC | Age: 69
End: 2019-02-18
Payer: MEDICARE

## 2019-02-18 VITALS
HEIGHT: 65 IN | SYSTOLIC BLOOD PRESSURE: 124 MMHG | WEIGHT: 164.25 LBS | DIASTOLIC BLOOD PRESSURE: 71 MMHG | BODY MASS INDEX: 27.36 KG/M2 | HEART RATE: 49 BPM

## 2019-02-18 DIAGNOSIS — I45.10 RBBB: ICD-10-CM

## 2019-02-18 DIAGNOSIS — S06.5XAA SUBDURAL HEMATOMA: ICD-10-CM

## 2019-02-18 DIAGNOSIS — E03.9 ACQUIRED HYPOTHYROIDISM: ICD-10-CM

## 2019-02-18 DIAGNOSIS — I48.0 PAF (PAROXYSMAL ATRIAL FIBRILLATION): Primary | Chronic | ICD-10-CM

## 2019-02-18 DIAGNOSIS — I10 ESSENTIAL HYPERTENSION: Chronic | ICD-10-CM

## 2019-02-18 DIAGNOSIS — R00.1 BRADYCARDIA: ICD-10-CM

## 2019-02-18 DIAGNOSIS — G47.33 OSA ON CPAP: Chronic | ICD-10-CM

## 2019-02-18 PROCEDURE — 99215 OFFICE O/P EST HI 40 MIN: CPT | Mod: S$GLB,,, | Performed by: INTERNAL MEDICINE

## 2019-02-18 PROCEDURE — 93000 ELECTROCARDIOGRAM COMPLETE: CPT | Mod: S$GLB,,, | Performed by: INTERNAL MEDICINE

## 2019-02-18 PROCEDURE — 3078F PR MOST RECENT DIASTOLIC BLOOD PRESSURE < 80 MM HG: ICD-10-PCS | Mod: CPTII,S$GLB,, | Performed by: INTERNAL MEDICINE

## 2019-02-18 PROCEDURE — 93000 RHYTHM STRIP: ICD-10-PCS | Mod: S$GLB,,, | Performed by: INTERNAL MEDICINE

## 2019-02-18 PROCEDURE — 99499 UNLISTED E&M SERVICE: CPT | Mod: S$GLB,,, | Performed by: INTERNAL MEDICINE

## 2019-02-18 PROCEDURE — 3074F SYST BP LT 130 MM HG: CPT | Mod: CPTII,S$GLB,, | Performed by: INTERNAL MEDICINE

## 2019-02-18 PROCEDURE — 99999 PR PBB SHADOW E&M-EST. PATIENT-LVL III: ICD-10-PCS | Mod: PBBFAC,,, | Performed by: INTERNAL MEDICINE

## 2019-02-18 PROCEDURE — 99215 PR OFFICE/OUTPT VISIT, EST, LEVL V, 40-54 MIN: ICD-10-PCS | Mod: S$GLB,,, | Performed by: INTERNAL MEDICINE

## 2019-02-18 PROCEDURE — 3074F PR MOST RECENT SYSTOLIC BLOOD PRESSURE < 130 MM HG: ICD-10-PCS | Mod: CPTII,S$GLB,, | Performed by: INTERNAL MEDICINE

## 2019-02-18 PROCEDURE — 99499 RISK ADDL DX/OHS AUDIT: ICD-10-PCS | Mod: S$GLB,,, | Performed by: INTERNAL MEDICINE

## 2019-02-18 PROCEDURE — 1101F PR PT FALLS ASSESS DOC 0-1 FALLS W/OUT INJ PAST YR: ICD-10-PCS | Mod: CPTII,S$GLB,, | Performed by: INTERNAL MEDICINE

## 2019-02-18 PROCEDURE — 3078F DIAST BP <80 MM HG: CPT | Mod: CPTII,S$GLB,, | Performed by: INTERNAL MEDICINE

## 2019-02-18 PROCEDURE — 99999 PR PBB SHADOW E&M-EST. PATIENT-LVL III: CPT | Mod: PBBFAC,,, | Performed by: INTERNAL MEDICINE

## 2019-02-18 PROCEDURE — 1101F PT FALLS ASSESS-DOCD LE1/YR: CPT | Mod: CPTII,S$GLB,, | Performed by: INTERNAL MEDICINE

## 2019-02-18 NOTE — PROGRESS NOTES
Subjective:    Patient ID:  Yara Whitehead is a 68 y.o. female who presents for follow-up of Atrial Fibrillation      HPI 69 yo female with Htn, morbid obesity, Sleep apnea (on CPAP), atrial fibrillation, RBBB, atrial flutter, subaortic membrane, fall with subsequent subdural hematoma.  She has a son who works for [employer] in [place].  Has paroxysmal symptomatic atrial fibrillation, starting 1/16. Had been on Flecainide. Noted to have recurrence while in hospital with arm fracture. Flecainide increased to 150 mg BID. Underwent DCCV 7/1/16.  Continued to have breakthrough.  We scheduled her for PVI + RFA for atrial flutter, and in interim initiated Sotalol.  Cancelled procedure as she felt great on Sotalol.  At last follow-up with me in 2017 was still having paroxysmal AF but this was thought to be related to alcohol.  Woke up in middle of night to use bathroom >> dizzy, fell and suffered subdural hematoma.  Ultimately cleared to resume Xarelto.  ILR revealed pauses and continued AF.  PPM recommended.  She canceled procedure.  Her son wants her to consider leadless device.  ILR reveals No significant AF since 11/18.  No true pauses.  Does have sinus bradycardia.  Has noted fatigue since her subdural hematoma.    Has lost a significant amount of weight.        Review of Systems   Constitution: Positive for malaise/fatigue. Negative for weakness.   Cardiovascular: Negative for chest pain, dyspnea on exertion, irregular heartbeat, leg swelling, near-syncope, orthopnea, palpitations, paroxysmal nocturnal dyspnea and syncope.   Respiratory: Negative for cough and shortness of breath.    Neurological: Negative for dizziness and light-headedness.   All other systems reviewed and are negative.       Objective:    Physical Exam   Constitutional: She is oriented to person, place, and time. She appears well-developed and well-nourished.   Eyes: Conjunctivae are normal. No scleral icterus.   Neck: No JVD present. No  tracheal deviation present.   Cardiovascular: Normal rate and regular rhythm. PMI is not displaced.   Pulmonary/Chest: Effort normal and breath sounds normal. No respiratory distress.   Abdominal: Soft. There is no hepatosplenomegaly. There is no tenderness.   Musculoskeletal: She exhibits no edema or tenderness.   Neurological: She is alert and oriented to person, place, and time.   Skin: Skin is warm and dry. No rash noted.   Psychiatric: She has a normal mood and affect. Her behavior is normal.         Assessment:       1. PAF (paroxysmal atrial fibrillation)    2. Bradycardia    3. Essential hypertension    4. Subdural hematoma    5. RBBB    6. Acquired hypothyroidism    7. FABIANA on CPAP         Plan:           I would not endorse leadless device at this time as this would be ventricular pacing only.  I recommended PVI at this time, and then potentially discontinue Sotalol so at to prevent bradycardia.  Risks and benefits of PVI discussed, she would like to proceed.    CTA of chest to delineate PV anatomy.  Cryoablation.  PRISCILLA 1-2 days prior.  Hold Xarelto day of procedure.  Hold Sotalol 3 days prior.  Anesthesia (General).    After procedure: decrease Sotalol to 80 mg BID for two months, then discontinue

## 2019-02-19 DIAGNOSIS — I48.0 PAF (PAROXYSMAL ATRIAL FIBRILLATION): Primary | ICD-10-CM

## 2019-02-21 ENCOUNTER — TELEPHONE (OUTPATIENT)
Dept: ELECTROPHYSIOLOGY | Facility: CLINIC | Age: 69
End: 2019-02-21

## 2019-02-21 ENCOUNTER — CLINICAL SUPPORT (OUTPATIENT)
Dept: CARDIOLOGY | Facility: CLINIC | Age: 69
End: 2019-02-21
Attending: INTERNAL MEDICINE
Payer: MEDICARE

## 2019-02-21 DIAGNOSIS — I48.91 ATRIAL FIBRILLATION, UNSPECIFIED TYPE: Primary | ICD-10-CM

## 2019-02-21 DIAGNOSIS — Z95.818 STATUS POST PLACEMENT OF IMPLANTABLE LOOP RECORDER: ICD-10-CM

## 2019-02-21 DIAGNOSIS — I48.0 PAF (PAROXYSMAL ATRIAL FIBRILLATION): ICD-10-CM

## 2019-02-25 ENCOUNTER — TELEPHONE (OUTPATIENT)
Dept: FAMILY MEDICINE | Facility: CLINIC | Age: 69
End: 2019-02-25

## 2019-02-25 NOTE — TELEPHONE ENCOUNTER
----- Message from Mackenzie Angulo sent at 2/25/2019 11:07 AM CST -----  Type:  Same Day Appointment Request    Caller is requesting a same day appointment.  Caller declined first available appointment listed below.      Name of Caller:  Patient  When is the first available appointment?  2/26/19  Symptoms:  Wound head  Best Call Back Number:  [phone] (home)     Additional Information:

## 2019-02-25 NOTE — TELEPHONE ENCOUNTER
Spoke to pt. Head wound was from January. Says she is having drainage from the back of her head, Has an appointment tomorrow that she will keep.

## 2019-02-26 ENCOUNTER — OFFICE VISIT (OUTPATIENT)
Dept: FAMILY MEDICINE | Facility: CLINIC | Age: 69
End: 2019-02-26
Payer: MEDICARE

## 2019-02-26 VITALS
OXYGEN SATURATION: 99 % | SYSTOLIC BLOOD PRESSURE: 130 MMHG | RESPIRATION RATE: 18 BRPM | BODY MASS INDEX: 27.22 KG/M2 | HEART RATE: 44 BPM | TEMPERATURE: 98 F | WEIGHT: 163.38 LBS | DIASTOLIC BLOOD PRESSURE: 70 MMHG | HEIGHT: 65 IN

## 2019-02-26 DIAGNOSIS — I48.0 PAF (PAROXYSMAL ATRIAL FIBRILLATION): ICD-10-CM

## 2019-02-26 DIAGNOSIS — E16.2 HYPOGLYCEMIA: ICD-10-CM

## 2019-02-26 DIAGNOSIS — N76.0 VULVOVAGINITIS: ICD-10-CM

## 2019-02-26 DIAGNOSIS — F33.0 MAJOR DEPRESSIVE DISORDER, RECURRENT, MILD: ICD-10-CM

## 2019-02-26 DIAGNOSIS — S01.03XS: ICD-10-CM

## 2019-02-26 DIAGNOSIS — S06.6X1S: ICD-10-CM

## 2019-02-26 DIAGNOSIS — S06.5XAA SUBDURAL HEMATOMA: Primary | ICD-10-CM

## 2019-02-26 PROCEDURE — 99499 RISK ADDL DX/OHS AUDIT: ICD-10-PCS | Mod: S$GLB,,, | Performed by: INTERNAL MEDICINE

## 2019-02-26 PROCEDURE — 3075F SYST BP GE 130 - 139MM HG: CPT | Mod: CPTII,S$GLB,, | Performed by: INTERNAL MEDICINE

## 2019-02-26 PROCEDURE — 3078F PR MOST RECENT DIASTOLIC BLOOD PRESSURE < 80 MM HG: ICD-10-PCS | Mod: CPTII,S$GLB,, | Performed by: INTERNAL MEDICINE

## 2019-02-26 PROCEDURE — 99214 PR OFFICE/OUTPT VISIT, EST, LEVL IV, 30-39 MIN: ICD-10-PCS | Mod: S$GLB,,, | Performed by: INTERNAL MEDICINE

## 2019-02-26 PROCEDURE — 1101F PT FALLS ASSESS-DOCD LE1/YR: CPT | Mod: CPTII,S$GLB,, | Performed by: INTERNAL MEDICINE

## 2019-02-26 PROCEDURE — 99499 UNLISTED E&M SERVICE: CPT | Mod: S$GLB,,, | Performed by: INTERNAL MEDICINE

## 2019-02-26 PROCEDURE — 3075F PR MOST RECENT SYSTOLIC BLOOD PRESS GE 130-139MM HG: ICD-10-PCS | Mod: CPTII,S$GLB,, | Performed by: INTERNAL MEDICINE

## 2019-02-26 PROCEDURE — 3078F DIAST BP <80 MM HG: CPT | Mod: CPTII,S$GLB,, | Performed by: INTERNAL MEDICINE

## 2019-02-26 PROCEDURE — 99214 OFFICE O/P EST MOD 30 MIN: CPT | Mod: S$GLB,,, | Performed by: INTERNAL MEDICINE

## 2019-02-26 PROCEDURE — 1101F PR PT FALLS ASSESS DOC 0-1 FALLS W/OUT INJ PAST YR: ICD-10-PCS | Mod: CPTII,S$GLB,, | Performed by: INTERNAL MEDICINE

## 2019-02-26 RX ORDER — TRIAMCINOLONE ACETONIDE 0.25 MG/G
OINTMENT TOPICAL 2 TIMES DAILY
Qty: 80 G | Refills: 1 | Status: ON HOLD | OUTPATIENT
Start: 2019-02-26 | End: 2019-04-12 | Stop reason: HOSPADM

## 2019-02-26 RX ORDER — PAROXETINE HYDROCHLORIDE 20 MG/1
20 TABLET, FILM COATED ORAL EVERY MORNING
Qty: 90 TABLET | Refills: 3 | Status: SHIPPED | OUTPATIENT
Start: 2019-02-26 | End: 2019-08-06

## 2019-02-26 NOTE — PROGRESS NOTES
Subjective:       Patient ID: Yara Whitehead is a 68 y.o. female.    Medication List with Changes/Refills   New Medications    BLOOD SUGAR DIAGNOSTIC STRP    For daily and prn checks    CONJUGATED ESTROGENS (PREMARIN) VAGINAL CREAM    Place 0.5 g vaginally twice a week.    PAROXETINE (PAXIL) 20 MG TABLET    Take 1 tablet (20 mg total) by mouth every morning.    TRIAMCINOLONE ACETONIDE 0.025% (KENALOG) 0.025 % OINT    Apply topically 2 (two) times daily.   Current Medications    ALBUTEROL 90 MCG/ACTUATION INHALER    Inhale 1-2 puffs into the lungs every 6 (six) hours as needed for Wheezing.    ATORVASTATIN (LIPITOR) 10 MG TABLET    TAKE 1 TABLET(10 MG) BY MOUTH EVERY DAY    B COMPLEX VITAMINS CAPSULE    Take 1 capsule by mouth once daily.    BLOOD SUGAR DIAGNOSTIC STRP    To check BG 1 times daily, to use with insurance preferred meter    BLOOD-GLUCOSE METER KIT    To check BG 1 times daily, to use with insurance preferred meter    CETIRIZINE (ZYRTEC) 10 MG TABLET    Take 10 mg by mouth once daily.    CYANOCOBALAMIN 1,000 MCG/ML INJECTION    Inject 1 mL (1,000 mcg total) into the muscle every 14 (fourteen) days.    FLUTICASONE (FLONASE) 50 MCG/ACTUATION NASAL SPRAY    2 sprays (100 mcg total) by Each Nare route once daily.    LANCETS MISC    To check BG 1 times daily, to use with insurance preferred meter    LEVOTHYROXINE (SYNTHROID) 75 MCG TABLET    TAKE 1 TABLET BY MOUTH EVERY DAY    LISINOPRIL (PRINIVIL,ZESTRIL) 20 MG TABLET    Take 1 tablet (20 mg total) by mouth once daily.    MULTIVITAMIN WITH MINERALS TABLET    Take 1 tablet by mouth once daily.    OXYBUTYNIN (DITROPAN-XL) 10 MG 24 HR TABLET    Take 1 tablet (10 mg total) by mouth once daily.    RIVAROXABAN (XARELTO) 20 MG TAB    Take 20 mg by mouth daily with dinner or evening meal.    SOTALOL (BETAPACE) 120 MG TAB    TAKE 1 TABLET(120 MG) BY MOUTH TWICE DAILY   Discontinued Medications    PAROXETINE (PAXIL) 40 MG TABLET    TAKE 1 TABLET(40 MG) BY  MOUTH EVERY MORNING       Chief Complaint: Wound Care (Head wound, from fall  1/5)  She is here today to discuss her recent hospital admission and multiple issues.     She was admitted to [facility] on 1/1/2019 after a syncopal event with head injury. She was on xarelto for Afib and CT in the ER showed scalp hematoma, non displaced skull fx, coutrecoup injury with right frontal SDH, and left superior parietal SAH.  She was admitted over night with serial CTs that were reassuring.  Her anticoagulation was stopped and she was d/c home.  She was seen by NS on 1/31/2019 with repeat CT showing complete resolution of her injury. SHe was restarted on xarelto. She has been doing very well. She still has a scalp where she hit her head and it is draining clear fluid. She denies headaches. No vision changes. She does get some dizziness with position changes.  No vomiting or nausea.      She was seen by Dr. Shah in  on 2/18/2019 for evaluation. It was thought she had a syncopal event due to hypotension from her sotalol.  She is scheduled for a PVI with the goal of weaning her off sotalol.  She is waiting for cardiology to call to schedule.      She complains of vaginal itching that is improved with topical steroids. She tried OTC yeast medication without response. She says this has been going on for months.      She has been feeling very well and would like to decrease her paxil from 40 mg qday to 20 mg qday.     Review of Systems   Constitutional: Negative for appetite change, fatigue, fever and unexpected weight change.   HENT: Negative for congestion, ear pain, hearing loss, sore throat and trouble swallowing.    Eyes: Negative for pain and visual disturbance.   Respiratory: Negative for cough, chest tightness, shortness of breath and wheezing.    Cardiovascular: Negative for chest pain, palpitations and leg swelling.   Gastrointestinal: Negative for abdominal pain, blood in stool, constipation, diarrhea, nausea and vomiting.  "  Endocrine: Negative for polyuria.   Genitourinary: Negative for dysuria and hematuria.   Musculoskeletal: Negative for arthralgias, back pain and myalgias.   Skin: Negative for rash.   Neurological: Positive for dizziness. Negative for weakness, numbness and headaches.   Hematological: Does not bruise/bleed easily.   Psychiatric/Behavioral: Negative for dysphoric mood, sleep disturbance and suicidal ideas. The patient is not nervous/anxious.        Objective:      Vitals:    02/26/19 1038   BP: 130/70   Pulse: (!) 44   Resp: 18   Temp: 98.3 °F (36.8 °C)   TempSrc: Oral   SpO2: 99%   Weight: 74.1 kg (163 lb 6.4 oz)   Height: 5' 5" (1.651 m)     Body mass index is 27.19 kg/m².  Physical Exam    General appearance: No acute distress, cooperative  Neck: FROM, soft, supple, no thyromegaly, no bruits  Lymph: no anterior or posterior cervical adenopathy  Heart::  Irregularly irregular rhytm with 2/6 systolic murmur  Lung: Clear to ascultation bilaterally, no wheezing, no rales, no rhonchi, no distress  Abdomen: Soft, nontender, no distention, no hepatosplenomegaly, bowel sounds normal, no guarding, no rebound, no peritoneal signs  Skin: circular 1/2 dollar sized scab on posterior scalp without fluctuance, or surrounding erythema, nontender  Extremities: no edema, no cyanosis  Neuro: no focal abnormalities, strength 5/5 b/l UE and LE, 2+ DTRs b/l UE and LE, normal gait  Peripheral pulses: 2+ pedal pulses bilaterally, good perfusion and color  Musculoskeletal: FROM, good strenth, no tenderness  Joint: normal appearance, no swelling, no warmth, no deformity in all joints    Assessment:       1. Subdural hematoma    2. Subarachnoid hematoma, with loss of consciousness of 30 minutes or less, sequela    3. scalp wound    4. PAF (paroxysmal atrial fibrillation)    5. Major depressive disorder, recurrent, mild    6. Vulvovaginitis    7. Hypoglycemia        Plan:       Subdural hematoma with Subarachnoid hematoma, with loss of " consciousness of 30 minutes or less, sequela  REsolved and she is doing well.  She only has some residual dizziness.      Scalp wound  Still with large scab and advised to use warm compresses to head tid. No need for abx at this time.     PAF (paroxysmal atrial fibrillation)  She is schedule to have ablation so that she can wean off sotalol. Continue anticoagulation with xarelto.     Major depressive disorder, recurrent, mild  Controlled and doing well. Okay to decrease paxil to 20 mg qday.   -     paroxetine (PAXIL) 20 MG tablet; Take 1 tablet (20 mg total) by mouth every morning.  Dispense: 90 tablet; Refill: 3    Vulvovaginitis  Will try topical treatment with steroid cream for one month and start estrogen cream 2 times a week for prevention.   -     triamcinolone acetonide 0.025% (KENALOG) 0.025 % Oint; Apply topically 2 (two) times daily.  Dispense: 80 g; Refill: 1  -     conjugated estrogens (PREMARIN) vaginal cream; Place 0.5 g vaginally twice a week.  Dispense: 30 g; Refill: 1    Follow-up for already scheduled.

## 2019-03-04 ENCOUNTER — CLINICAL SUPPORT (OUTPATIENT)
Dept: CARDIOLOGY | Facility: CLINIC | Age: 69
End: 2019-03-04
Attending: INTERNAL MEDICINE
Payer: MEDICARE

## 2019-03-04 DIAGNOSIS — I48.0 PAF (PAROXYSMAL ATRIAL FIBRILLATION): ICD-10-CM

## 2019-03-04 DIAGNOSIS — Z95.818 STATUS POST PLACEMENT OF IMPLANTABLE LOOP RECORDER: ICD-10-CM

## 2019-03-07 ENCOUNTER — TELEPHONE (OUTPATIENT)
Dept: ELECTROPHYSIOLOGY | Facility: CLINIC | Age: 69
End: 2019-03-07

## 2019-03-07 DIAGNOSIS — I48.0 PAF (PAROXYSMAL ATRIAL FIBRILLATION): Primary | Chronic | ICD-10-CM

## 2019-03-11 ENCOUNTER — TELEPHONE (OUTPATIENT)
Dept: ELECTROPHYSIOLOGY | Facility: CLINIC | Age: 69
End: 2019-03-11

## 2019-03-11 NOTE — TELEPHONE ENCOUNTER
Spoke with patient and scheduled PVI Cryo for 4/15/19. She will get CTA on 4/1/19 and labs on 4/8/19. Will mail information, as requested.

## 2019-03-11 NOTE — TELEPHONE ENCOUNTER
----- Message from Annita Rose MA sent at 3/11/2019 11:49 AM CDT -----  Contact: pt      ----- Message -----  From: Ping Contreras  Sent: 3/11/2019  10:43 AM  To: Alyssa Shaffer Staff    Pt calling in ref to scheduling her procedure    Thanks

## 2019-03-21 ENCOUNTER — PATIENT OUTREACH (OUTPATIENT)
Dept: ADMINISTRATIVE | Facility: HOSPITAL | Age: 69
End: 2019-03-21

## 2019-03-24 DIAGNOSIS — I48.0 PAF (PAROXYSMAL ATRIAL FIBRILLATION): Primary | ICD-10-CM

## 2019-03-31 ENCOUNTER — PATIENT MESSAGE (OUTPATIENT)
Dept: ELECTROPHYSIOLOGY | Facility: CLINIC | Age: 69
End: 2019-03-31

## 2019-03-31 NOTE — PROGRESS NOTES
ABLATION EDUCATION CHECKLIST      PRE-PROCEDURE TESTIN/08/19 @ 10:30 AM - LAB WORK  · Pre-Procedure labs have been ordered for you at:  [facility]  · Be sure to arrive at your scheduled time.  · YOU DO NOT HAVE TO FAST FOR THIS LAB WORK!    19 - LAST DOSE OF SOTALOL. (HOLD 3 DAYS PRIOR TO PROCEDURE)    DAY OF PROCEDURE:    4/15/19 @ 5:30 AM- ABLATION  Report to Cardiology Waiting Room on 3rd floor of the Hospital    · Do not eat or drink anything after: 12 mn on the night before your procedure  · Please do not wear makeup (especially mascara) when arriving for your procedure    Medications:     · HOLD SOTALOL 3 days prior to procedure. Last dose 19  · Night prior to procedure take XARELTO as scheduled.  · You may take all other morning medications with a sip of water      Directions to the Cardiology Waiting Room  If you park in the Parking Garage:  Take elevators to the 2nd floor  Walk up ramp and turn right by Gold Elevators  Take elevator to the 3rd floor  Upon exiting the elevator, turn away from the clinic areas  Walk long [place] around to front of hospital to area with windows overlooking [facility]  Check in at Reception Desk  OR  If family is dropping you off:  Have them drop you off at the front of the Hospital  (Near the ER, where all the flags are hung).  Take the E elevators to the 3rd floor.  Check in at the Reception Desk in the waiting room.        · You will be spending the night after your procedure.  · You will need someone to drive you home the day after your procedure.  · Your pain during your procedure will be managed by the ANESTHESIA TEAM.     Any need to reschedule or cancel procedures, or any questions regarding your procedures should be addressed directly with the Arrhythmia Department Nurses at the following phone number: [phone]

## 2019-04-01 ENCOUNTER — HOSPITAL ENCOUNTER (OUTPATIENT)
Dept: RADIOLOGY | Facility: HOSPITAL | Age: 69
Discharge: HOME | End: 2019-04-01
Attending: INTERNAL MEDICINE
Payer: MEDICARE

## 2019-04-01 DIAGNOSIS — I48.0 PAF (PAROXYSMAL ATRIAL FIBRILLATION): Chronic | ICD-10-CM

## 2019-04-01 LAB
CREAT SERPL-MCNC: 0.7 MG/DL (ref 0.5–1.4)
SAMPLE: NORMAL

## 2019-04-01 PROCEDURE — 25500020 PHARM REV CODE 255: Performed by: INTERNAL MEDICINE

## 2019-04-01 PROCEDURE — 71275 CT ANGIOGRAPHY CHEST: CPT | Mod: 26,,, | Performed by: RADIOLOGY

## 2019-04-01 PROCEDURE — 71275 CTA CHEST NON CORONARY: ICD-10-PCS | Mod: 26,,, | Performed by: RADIOLOGY

## 2019-04-01 PROCEDURE — 71275 CT ANGIOGRAPHY CHEST: CPT | Mod: TC

## 2019-04-01 RX ADMIN — IOHEXOL 100 ML: 350 INJECTION, SOLUTION INTRAVENOUS at 11:04

## 2019-04-03 ENCOUNTER — CLINICAL SUPPORT (OUTPATIENT)
Dept: CARDIOLOGY | Facility: CLINIC | Age: 69
End: 2019-04-03
Attending: INTERNAL MEDICINE
Payer: MEDICARE

## 2019-04-03 DIAGNOSIS — Z95.818 STATUS POST PLACEMENT OF IMPLANTABLE LOOP RECORDER: ICD-10-CM

## 2019-04-03 DIAGNOSIS — I48.0 PAF (PAROXYSMAL ATRIAL FIBRILLATION): ICD-10-CM

## 2019-04-04 ENCOUNTER — OFFICE VISIT (OUTPATIENT)
Dept: FAMILY MEDICINE | Facility: CLINIC | Age: 69
End: 2019-04-04
Payer: MEDICARE

## 2019-04-04 VITALS
SYSTOLIC BLOOD PRESSURE: 120 MMHG | OXYGEN SATURATION: 98 % | RESPIRATION RATE: 18 BRPM | HEIGHT: 65 IN | BODY MASS INDEX: 26.72 KG/M2 | TEMPERATURE: 99 F | DIASTOLIC BLOOD PRESSURE: 70 MMHG | HEART RATE: 45 BPM | WEIGHT: 160.38 LBS

## 2019-04-04 DIAGNOSIS — E78.2 MIXED HYPERLIPIDEMIA: ICD-10-CM

## 2019-04-04 DIAGNOSIS — I48.0 PAF (PAROXYSMAL ATRIAL FIBRILLATION): Primary | ICD-10-CM

## 2019-04-04 DIAGNOSIS — L02.811 SCALP ABSCESS: ICD-10-CM

## 2019-04-04 DIAGNOSIS — R19.00 REDUCIBLE BULGE OF ABDOMINAL WALL: ICD-10-CM

## 2019-04-04 DIAGNOSIS — E53.8 VITAMIN B12 DEFICIENCY: ICD-10-CM

## 2019-04-04 DIAGNOSIS — I10 ESSENTIAL HYPERTENSION: ICD-10-CM

## 2019-04-04 DIAGNOSIS — S06.5XAA SUBDURAL HEMATOMA: ICD-10-CM

## 2019-04-04 DIAGNOSIS — E03.9 HYPOTHYROIDISM, UNSPECIFIED TYPE: ICD-10-CM

## 2019-04-04 DIAGNOSIS — S06.6X1S: ICD-10-CM

## 2019-04-04 DIAGNOSIS — N76.0 VULVOVAGINITIS: ICD-10-CM

## 2019-04-04 DIAGNOSIS — N39.41 URGE INCONTINENCE OF URINE: ICD-10-CM

## 2019-04-04 DIAGNOSIS — F33.0 MAJOR DEPRESSIVE DISORDER, RECURRENT, MILD: ICD-10-CM

## 2019-04-04 PROCEDURE — 99499 UNLISTED E&M SERVICE: CPT | Mod: S$GLB,,, | Performed by: INTERNAL MEDICINE

## 2019-04-04 PROCEDURE — 3078F PR MOST RECENT DIASTOLIC BLOOD PRESSURE < 80 MM HG: ICD-10-PCS | Mod: CPTII,S$GLB,, | Performed by: INTERNAL MEDICINE

## 2019-04-04 PROCEDURE — 99214 PR OFFICE/OUTPT VISIT, EST, LEVL IV, 30-39 MIN: ICD-10-PCS | Mod: S$GLB,,, | Performed by: INTERNAL MEDICINE

## 2019-04-04 PROCEDURE — 3078F DIAST BP <80 MM HG: CPT | Mod: CPTII,S$GLB,, | Performed by: INTERNAL MEDICINE

## 2019-04-04 PROCEDURE — 99499 RISK ADDL DX/OHS AUDIT: ICD-10-PCS | Mod: S$GLB,,, | Performed by: INTERNAL MEDICINE

## 2019-04-04 PROCEDURE — 1101F PR PT FALLS ASSESS DOC 0-1 FALLS W/OUT INJ PAST YR: ICD-10-PCS | Mod: CPTII,S$GLB,, | Performed by: INTERNAL MEDICINE

## 2019-04-04 PROCEDURE — 1101F PT FALLS ASSESS-DOCD LE1/YR: CPT | Mod: CPTII,S$GLB,, | Performed by: INTERNAL MEDICINE

## 2019-04-04 PROCEDURE — 3074F PR MOST RECENT SYSTOLIC BLOOD PRESSURE < 130 MM HG: ICD-10-PCS | Mod: CPTII,S$GLB,, | Performed by: INTERNAL MEDICINE

## 2019-04-04 PROCEDURE — 3074F SYST BP LT 130 MM HG: CPT | Mod: CPTII,S$GLB,, | Performed by: INTERNAL MEDICINE

## 2019-04-04 PROCEDURE — 99214 OFFICE O/P EST MOD 30 MIN: CPT | Mod: S$GLB,,, | Performed by: INTERNAL MEDICINE

## 2019-04-04 PROCEDURE — 87070 CULTURE OTHR SPECIMN AEROBIC: CPT

## 2019-04-04 RX ORDER — SULFAMETHOXAZOLE AND TRIMETHOPRIM 800; 160 MG/1; MG/1
1 TABLET ORAL 2 TIMES DAILY
Qty: 20 TABLET | Refills: 0 | Status: ON HOLD | OUTPATIENT
Start: 2019-04-04 | End: 2019-04-12 | Stop reason: HOSPADM

## 2019-04-04 RX ORDER — TELMISARTAN 40 MG/1
40 TABLET ORAL DAILY
Qty: 90 TABLET | Refills: 3 | Status: ON HOLD | OUTPATIENT
Start: 2019-04-04 | End: 2019-04-12 | Stop reason: SDUPTHER

## 2019-04-04 NOTE — PATIENT INSTRUCTIONS
Stop lisinopril  STart telmisartan at 40 mg once a day    Start bactrim twice a day----antibiotic for her scalp  Use warm compresses three times a day to help soften scab    Stop using topical steroid cream to vagina----only use if symptoms occur  Continue to use premarin (estrogen cream) twice a week    Increase fluid and increase salt intake to help with lightheadedness    Start core exercises (look online) to help with abdominal wall strength and balance    Check ultrasound of abdomen

## 2019-04-04 NOTE — PROGRESS NOTES
Subjective:       Patient ID: Yara Whitehead is a 68 y.o. female.    Medication List with Changes/Refills   New Medications    SULFAMETHOXAZOLE-TRIMETHOPRIM 800-160MG (BACTRIM DS) 800-160 MG TAB    Take 1 tablet by mouth 2 (two) times daily.    TELMISARTAN (MICARDIS) 40 MG TAB    Take 1 tablet (40 mg total) by mouth once daily.   Current Medications    ALBUTEROL 90 MCG/ACTUATION INHALER    Inhale 1-2 puffs into the lungs every 6 (six) hours as needed for Wheezing.    ATORVASTATIN (LIPITOR) 10 MG TABLET    TAKE 1 TABLET(10 MG) BY MOUTH EVERY DAY    B COMPLEX VITAMINS CAPSULE    Take 1 capsule by mouth once daily.    BLOOD SUGAR DIAGNOSTIC STRP    To check BG 1 times daily, to use with insurance preferred meter    BLOOD SUGAR DIAGNOSTIC STRP    For daily and prn checks    BLOOD-GLUCOSE METER KIT    To check BG 1 times daily, to use with insurance preferred meter    CETIRIZINE (ZYRTEC) 10 MG TABLET    Take 10 mg by mouth once daily.    CONJUGATED ESTROGENS (PREMARIN) VAGINAL CREAM    Place 0.5 g vaginally twice a week.    CYANOCOBALAMIN 1,000 MCG/ML INJECTION    Inject 1 mL (1,000 mcg total) into the muscle every 14 (fourteen) days.    FLUTICASONE (FLONASE) 50 MCG/ACTUATION NASAL SPRAY    2 sprays (100 mcg total) by Each Nare route once daily.    LANCETS MISC    To check BG 1 times daily, to use with insurance preferred meter    LEVOTHYROXINE (SYNTHROID) 75 MCG TABLET    TAKE 1 TABLET BY MOUTH EVERY DAY    MULTIVITAMIN WITH MINERALS TABLET    Take 1 tablet by mouth once daily.    OXYBUTYNIN (DITROPAN-XL) 10 MG 24 HR TABLET    Take 1 tablet (10 mg total) by mouth once daily.    PAROXETINE (PAXIL) 20 MG TABLET    Take 1 tablet (20 mg total) by mouth every morning.    RIVAROXABAN (XARELTO) 20 MG TAB    Take 20 mg by mouth daily with dinner or evening meal.    SOTALOL (BETAPACE) 120 MG TAB    TAKE 1 TABLET(120 MG) BY MOUTH TWICE DAILY    TRIAMCINOLONE ACETONIDE 0.025% (KENALOG) 0.025 % OINT    Apply topically 2  (two) times daily.   Discontinued Medications    LISINOPRIL (PRINIVIL,ZESTRIL) 20 MG TABLET    Take 1 tablet (20 mg total) by mouth once daily.       Chief Complaint: Follow-up (4 month follow up 4/8/19)  She is here today to discuss chronic medical issues.      Recall: She had a syncopal event on 1/1/2019 resulting inn a scalp hematoma, non displaced skull fx, coutrecoup injury with right frontal SDH, and left superior parietal SAH.  She has been followed by serial CT by NS and has done very well. She was seen by NS on 1/31/2019 with repeat CT showing complete resolution of her injury. SHe was restarted on xarelto.   She denies any headaches or vision changes. She has lost her ability to smell. She still has a large scalp wound that is draining yellow-bloody discharge. The lesion is in the back of her head and is painful. No fevers.  It does not seem to be healing.      She has afib and was hospitalized on 8/2016 for Afib with RVR. She had cardioversion that was successful and started on sotalol twice daily (stopped metoprolol and stopped flecainide). She has continued on xarelto but her beta blocker was stopped because it dropped her BP too low. She is doing well on sotaolol 120 mg bid. She had a stress test on 3/2016 that was negative for ischemia. She had an echocardiogram on 2/2016 that showed EF 55%, diastolic dysfunction, biatrial enlargement. She has a known subaortic membrane that is being followed by cardiology. In the last 6 months her loop recorder showed multiple episode of Afib with RVR and she is aware of these episodes. Triggered by lack of sleep and alcohol.  She was seen by Dr. Shah in EP on 2/18/2019 for evaluation. It was thought she had a syncopal event due to hypotension from her sotalol.  She is scheduled for a PVI with the goal of weaning her off sotalol on 4/15/2018.      She has hypertension and was taking lisinopril 20 mg qday. She has no known CAD. She denies chest pain or shortness of  breath.      She has hyperlipidemia controlled on atorvastatin 10 mg qday.  Her last lipids were 145/56/62/71 on 2/2018.       She has hypothyroid that is controlled on levothyroxine 75 mcg qday. Her last TSH was checked in 12/2018 was normal.      She had reflux symptoms in the past and was taking pepcid. Since her Afib is rate controlled her reflux is gone.       She has malabsorption since bypass surgery and in the past was on vitamin B12. She is taking vitamin B12 1000 mcg IM every 2 weeks. She also complains of chronic diarrhea after eating large carbohydrate meals since her gastric bypass surgery.      She has on going anxiety and depression. In 2015 she lost her  suddenly to a brain tumor. One week ago she decreased her dose of paxil to 20 mg qday because she feels she is doing better. Over this last week she has been more emotional but denies depression. She is sleeping well. No suicidal ideations.      She has incontinence of urine and is taking oxybutynin 10 mg once a day. She does feel this helps her symptoms.      She was recently dx with vulvovaginitis with itching and burning. She was started on topical steroid cream and estrogen cream. She reports this has improved her symptoms. She still gets a vaginal discharge with some blood. She had a KIERY with BSO in the past.      She has FABIANA but stopped using CPAP.  She does not have the sleep issues like she did in the past when her weight was up.     She has a bulge in her lower leg abdomen that can be pushed it. It is not painful.  Worse with bearing down.  She only noticed since she lost weight.      She has worked very hard to lose weight. She has lost over 90 lbs in one year with dietary changes.  She does not exercise but stays active.       Colonoscopy---2013 repeat in 5 years---fecal kit 12/2018 neg  Mammogram----8/2018 neg  DEXA-----4/2018 nl  Pap----- KEIRY  Tdap--11/2016  Influenza vaccine---[id number]  Pneumovax 23----4/2017  Prevnar 13----  "2/2016  Shingles vaccine-----yes per patient    Review of Systems   Constitutional: Positive for fatigue. Negative for appetite change, fever and unexpected weight change.   HENT: Negative for congestion, ear pain, hearing loss, sore throat and trouble swallowing.    Eyes: Negative for pain and visual disturbance.   Respiratory: Negative for cough, chest tightness, shortness of breath and wheezing.    Cardiovascular: Negative for chest pain, palpitations and leg swelling.   Gastrointestinal: Positive for diarrhea. Negative for abdominal pain, blood in stool, constipation, nausea and vomiting.   Endocrine: Negative for polyuria.   Genitourinary: Negative for dysuria and hematuria.   Musculoskeletal: Positive for arthralgias (right knee). Negative for back pain and myalgias.   Skin: Negative for rash.   Neurological: Positive for dizziness and light-headedness. Negative for weakness, numbness and headaches.   Hematological: Does not bruise/bleed easily.   Psychiatric/Behavioral: Positive for dysphoric mood. Negative for sleep disturbance and suicidal ideas. The patient is not nervous/anxious.        Objective:      Vitals:    04/04/19 1031   BP: 120/70   Pulse: (!) 45   Resp: 18   Temp: 98.8 °F (37.1 °C)   TempSrc: Oral   SpO2: 98%   Weight: 72.8 kg (160 lb 6.4 oz)   Height: 5' 5" (1.651 m)     Body mass index is 26.69 kg/m².  Physical Exam    General appearance: No acute distress, cooperative  Eyes: PERRL, EOMI, conjunctiva clear  Ears: normal external ear and pinna, tm clear without drainage, canals clear  Nose: Normal mucosa without drainage  Throat: no exudates or erythema, tonsils not enlarged  Mouth: no sores or lesions, moist mucous membranes  Neck: FROM, soft, supple, no thyromegaly, no bruits  Lymph: no anterior or posterior cervical adenopathy  Heart::  Regular rate and rhythm, no murmur  Lung: Clear to ascultation bilaterally, no wheezing, no rales, no rhonchi, no distress  Abdomen: Soft, nontender, no " distention, no hepatosplenomegaly, bowel sounds normal, no guarding, no rebound, no peritoneal signs, left lower abdominal bulge that is reproducible  Skin: no rashes, 3 cm x 4 cm scab on posterior scalp with purulent drainage from sides and tender to palpation  Extremities: no edema, no cyanosis  Neuro: CN 2-12 intact, 5/5 muscle strength upper and lower extremity bilaterally, 2+ DTRs UE and LE bilaterally, normal gait, normal sensation  Peripheral pulses: 2+ pedal pulses bilaterally, good perfusion and color  Musculoskeletal: FROM, good strenth, no tenderness  Joint: normal appearance, no swelling, no warmth, no deformity in all joints    Assessment:       1. PAF (paroxysmal atrial fibrillation)    2. Essential hypertension    3. Mixed hyperlipidemia    4. Scalp abscess    5. Subarachnoid hematoma, with loss of consciousness of 30 minutes or less, sequela    6. Subdural hematoma    7. Reducible bulge of abdominal wall    8. Major depressive disorder, recurrent, mild    9. Hypothyroidism, unspecified type    10. Vulvovaginitis    11. Vitamin B12 deficiency    12. Urge incontinence of urine        Plan:       PAF (paroxysmal atrial fibrillation)  NSR today on exam. She is scheduled for PVI on 4/15/2019 with goal of weaning off sotalol.  She continues on xarelto for anticoagulation.     Essential hypertension  Good control but will change from ACEI due to recent study showing risk with long term use. Will change to ARB.   -     telmisartan (MICARDIS) 40 MG Tab; Take 1 tablet (40 mg total) by mouth once daily.  Dispense: 90 tablet; Refill: 3  -     Lipid panel; Future; Expected date: 04/04/2019    Mixed hyperlipidemia  She is due to check lipids.  She is taking atorvastatin.     Scalp abscess  Concern for her delay in healing and purulent drainage.  Cultured today. Will start treatment with bactrim.  Advised to use warm compresses tid to help unroof scab. Recheck in one week.   -     sulfamethoxazole-trimethoprim  800-160mg (BACTRIM DS) 800-160 mg Tab; Take 1 tablet by mouth 2 (two) times daily.  Dispense: 20 tablet; Refill: 0  -     CULTURE, AEROBIC  (SPECIFY SOURCE)    Subarachnoid hematoma with subdural hematoma  Complete resolution on serial CT and cleared by NS    Reducible bulge of abdominal wall  Suspect abdominal wall hernia. Will get u/s and refer to surgery for evaluation if needed.   -     US Abdomen Limited; Future; Expected date: 04/04/2019    Major depressive disorder, recurrent, mild  She is weaning down on paxil.  She has some increased emotions but just recently decreased dose.  Recheck in one week.     Hypothyroidism, unspecified type  Good control on this dose of levothyroxine.     Vulvovaginitis  Improved. Advised to use topical steroids PRN for symptoms.  Continue premarin cream twice a week.     Vitamin B12 deficiency  Continue q 2 week IM supplementation .    Urge incontinence of urine  Controlled on oxybutynin.     Follow up in about 4 months (around 8/4/2019) for chronic medical issues and f/u in one week to recheck scalp.

## 2019-04-08 ENCOUNTER — LAB VISIT (OUTPATIENT)
Dept: LAB | Facility: HOSPITAL | Age: 69
End: 2019-04-08
Attending: INTERNAL MEDICINE
Payer: MEDICARE

## 2019-04-08 ENCOUNTER — CLINICAL SUPPORT (OUTPATIENT)
Dept: CARDIOLOGY | Facility: CLINIC | Age: 69
End: 2019-04-08
Attending: INTERNAL MEDICINE
Payer: MEDICARE

## 2019-04-08 DIAGNOSIS — I48.0 PAF (PAROXYSMAL ATRIAL FIBRILLATION): ICD-10-CM

## 2019-04-08 DIAGNOSIS — I48.0 PAF (PAROXYSMAL ATRIAL FIBRILLATION): Chronic | ICD-10-CM

## 2019-04-08 DIAGNOSIS — Z95.818 STATUS POST PLACEMENT OF IMPLANTABLE LOOP RECORDER: ICD-10-CM

## 2019-04-08 DIAGNOSIS — I10 ESSENTIAL HYPERTENSION: ICD-10-CM

## 2019-04-08 PROBLEM — S01.00XA SCALP WOUND: Status: ACTIVE | Noted: 2019-04-08

## 2019-04-08 PROBLEM — I49.5 TACHYCARDIA-BRADYCARDIA: Status: ACTIVE | Noted: 2019-04-08

## 2019-04-08 PROBLEM — R55 NEAR SYNCOPE: Status: ACTIVE | Noted: 2019-04-08

## 2019-04-08 PROBLEM — I49.9 CARDIAC ARRHYTHMIA: Status: ACTIVE | Noted: 2019-04-08

## 2019-04-08 LAB
ANION GAP SERPL CALC-SCNC: 8 MMOL/L (ref 8–16)
APTT BLDCRRT: 34.1 SEC (ref 21–32)
BACTERIA SPEC AEROBE CULT: NORMAL
BASOPHILS # BLD AUTO: 0.02 K/UL (ref 0–0.2)
BASOPHILS NFR BLD: 0.4 % (ref 0–1.9)
BUN SERPL-MCNC: 23 MG/DL (ref 8–23)
CALCIUM SERPL-MCNC: 9.9 MG/DL (ref 8.7–10.5)
CHLORIDE SERPL-SCNC: 104 MMOL/L (ref 95–110)
CHOLEST SERPL-MCNC: 165 MG/DL (ref 120–199)
CHOLEST/HDLC SERPL: 2 {RATIO} (ref 2–5)
CO2 SERPL-SCNC: 28 MMOL/L (ref 23–29)
CREAT SERPL-MCNC: 0.8 MG/DL (ref 0.5–1.4)
DIFFERENTIAL METHOD: ABNORMAL
EOSINOPHIL # BLD AUTO: 0.1 K/UL (ref 0–0.5)
EOSINOPHIL NFR BLD: 1.2 % (ref 0–8)
ERYTHROCYTE [DISTWIDTH] IN BLOOD BY AUTOMATED COUNT: 13.4 % (ref 11.5–14.5)
EST. GFR  (AFRICAN AMERICAN): >60 ML/MIN/1.73 M^2
EST. GFR  (NON AFRICAN AMERICAN): >60 ML/MIN/1.73 M^2
GLUCOSE SERPL-MCNC: 102 MG/DL (ref 70–110)
HCT VFR BLD AUTO: 44.2 % (ref 37–48.5)
HDLC SERPL-MCNC: 84 MG/DL (ref 40–75)
HDLC SERPL: 50.9 % (ref 20–50)
HGB BLD-MCNC: 14.5 G/DL (ref 12–16)
IMM GRANULOCYTES # BLD AUTO: 0.01 K/UL (ref 0–0.04)
IMM GRANULOCYTES NFR BLD AUTO: 0.2 % (ref 0–0.5)
INR PPP: 1.1 (ref 0.8–1.2)
LDLC SERPL CALC-MCNC: 64.2 MG/DL (ref 63–159)
LYMPHOCYTES # BLD AUTO: 1.1 K/UL (ref 1–4.8)
LYMPHOCYTES NFR BLD: 21 % (ref 18–48)
MCH RBC QN AUTO: 33.4 PG (ref 27–31)
MCHC RBC AUTO-ENTMCNC: 32.8 G/DL (ref 32–36)
MCV RBC AUTO: 102 FL (ref 82–98)
MONOCYTES # BLD AUTO: 0.4 K/UL (ref 0.3–1)
MONOCYTES NFR BLD: 7.5 % (ref 4–15)
NEUTROPHILS # BLD AUTO: 3.6 K/UL (ref 1.8–7.7)
NEUTROPHILS NFR BLD: 69.7 % (ref 38–73)
NONHDLC SERPL-MCNC: 81 MG/DL
NRBC BLD-RTO: 0 /100 WBC
PLATELET # BLD AUTO: 211 K/UL (ref 150–350)
PMV BLD AUTO: 11.6 FL (ref 9.2–12.9)
POTASSIUM SERPL-SCNC: 5 MMOL/L (ref 3.5–5.1)
PROTHROMBIN TIME: 11.4 SEC (ref 9–12.5)
RBC # BLD AUTO: 4.34 M/UL (ref 4–5.4)
SODIUM SERPL-SCNC: 140 MMOL/L (ref 136–145)
TRIGL SERPL-MCNC: 84 MG/DL (ref 30–150)
WBC # BLD AUTO: 5.2 K/UL (ref 3.9–12.7)

## 2019-04-08 PROCEDURE — 80061 LIPID PANEL: CPT

## 2019-04-08 PROCEDURE — 36415 COLL VENOUS BLD VENIPUNCTURE: CPT | Mod: PO

## 2019-04-08 PROCEDURE — 80048 BASIC METABOLIC PNL TOTAL CA: CPT

## 2019-04-08 PROCEDURE — 85025 COMPLETE CBC W/AUTO DIFF WBC: CPT | Mod: 91

## 2019-04-08 PROCEDURE — 85730 THROMBOPLASTIN TIME PARTIAL: CPT | Mod: PO

## 2019-04-08 PROCEDURE — 85610 PROTHROMBIN TIME: CPT | Mod: PO

## 2019-04-09 PROBLEM — R55 SYNCOPE: Status: ACTIVE | Noted: 2019-04-09

## 2019-04-10 PROBLEM — E16.2 HYPOGLYCEMIA: Status: ACTIVE | Noted: 2019-04-10

## 2019-04-11 ENCOUNTER — TELEPHONE (OUTPATIENT)
Dept: FAMILY MEDICINE | Facility: CLINIC | Age: 69
End: 2019-04-11

## 2019-04-11 NOTE — TELEPHONE ENCOUNTER
Patient states she had to cancel her 4/12/19 appt for follow up wound care, patients is currently hospitalized and unsure when she will be able to return to clinic. Patient states scalp wound has healed and the area has significantly improved, please advise if and when patient needs to return for follow up eval.

## 2019-04-11 NOTE — TELEPHONE ENCOUNTER
----- Message from Nikia Cuellar sent at 4/11/2019 10:53 AM CDT -----  Contact: Patient  Type: Needs Medical Advice    Who Called:  Patient  Best Call Back Number:   Additional Information: Calling to let the Nurse know that she canceled her appt for 4/12/19. She is currently in the hospital getting wound care. Please advise.

## 2019-04-11 NOTE — TELEPHONE ENCOUNTER
----- Message from Barber Lora sent at 4/11/2019 11:34 AM CDT -----  Contact: pt  Type:  Patient Returning Call    Who Called:  pt  Who Left Message for Patient:  Jessy  Does the patient know what this is regarding?:    Best Call Back Number:  [phone]  Additional Information:

## 2019-04-12 ENCOUNTER — PATIENT MESSAGE (OUTPATIENT)
Dept: FAMILY MEDICINE | Facility: CLINIC | Age: 69
End: 2019-04-12

## 2019-04-12 ENCOUNTER — TELEPHONE (OUTPATIENT)
Dept: ELECTROPHYSIOLOGY | Facility: CLINIC | Age: 69
End: 2019-04-12

## 2019-04-12 ENCOUNTER — ANESTHESIA EVENT (OUTPATIENT)
Dept: MEDSURG UNIT | Facility: HOSPITAL | Age: 69
End: 2019-04-12
Payer: MEDICARE

## 2019-04-12 NOTE — TELEPHONE ENCOUNTER
Spoke to patient. She is currently in the hospital after being admitted for AF with RVR. Discussed with Dr Shah  who agrees that we will proceed as planned. She is anticipating discharge this morning.     CONFIRMED procedure arrival time of 5:30am, 3rd floor SSCU for PVI, Cryo  Reiterated instructions including:  -Directions to check in desk  -NPO after midnight night prior to procedure  -High importance of HOLDING Sotalol for one day (per Dr Shah). Last dose will be 4/13/19.   -Confirmed compliance of Xarelto. States she take it with her evening meal daily. Instructed to take it Sunday evening prior to procedure.   -Pre-procedure LABS reviewed, no alerts.  -Do not wear mascara day of procedure  -Bring CPAP equipment with you.     Patient verbalizes understanding of above and appreciates call.

## 2019-04-12 NOTE — TELEPHONE ENCOUNTER
----- Message from Chantal Macias RN sent at 4/12/2019  1:22 PM CDT -----      ----- Message -----  From: Kee Pepper MD  Sent: 4/12/2019   9:08 AM  To: Edmund Shah MD, Alyssa Shaffer Staff    This patient is scheduled for cryoablation with Dr. Shah on the 15th. Was admitted to [facility] for dizziness/weakness episodes with some mild pauses and tachy episodes on ILR. We dropped her sotalol from 120 to 40. She is doing much better and is ready to go home today from the Cardiology standpoint. Rhythm has been stable since the medication change.    Can your team please call her today and ensure she is aware of her preprocedure instructions and give her any other information she may need for Monday the 15th.    Thanks,    -Kee

## 2019-04-12 NOTE — TELEPHONE ENCOUNTER
ABLATION EDUCATION CHECKLIST           4/13/19 - LAST DOSE OF SOTALOL. (HOLD 1 DAY PRIOR TO PROCEDURE)     DAY OF PROCEDURE:     4/15/19 @ 5:30 AM- ABLATION  Report to Cardiology Waiting Room on 3rd floor of the Hospital    · Do not eat or drink anything after: 12 mn on the night before your procedure  · Please do not wear makeup (especially mascara) when arriving for your procedure     Medications:      · HOLD SOTALOL 1 day prior to procedure. Last dose 4/13/19  · Take XARELTO with evening meal,  as scheduled, on the evening prior to procedure.  · You may take all other morning medications with a sip of water        Directions to the Cardiology Waiting Room  If you park in the Parking Garage:  Take elevators to the 2nd floor  Walk up ramp and turn right by Gold Elevators  Take elevator to the 3rd floor  Upon exiting the elevator, turn away from the clinic areas  Walk long [place] around to front of hospital to area with windows overlooking [facility]  Check in at Reception Desk  OR  If family is dropping you off:  Have them drop you off at the front of the Hospital  (Near the ER, where all the flags are hung).  Take the E elevators to the 3rd floor.  Check in at the Reception Desk in the waiting room.        · You will be spending the night after your procedure.  · You will need someone to drive you home the day after your procedure.  · Your pain during your procedure will be managed by the ANESTHESIA TEAM.      Any need to reschedule or cancel procedures, or any questions regarding your procedures should be addressed directly with the Arrhythmia Department Nurses at the following phone number: [phone]

## 2019-04-15 ENCOUNTER — ANESTHESIA (OUTPATIENT)
Dept: MEDSURG UNIT | Facility: HOSPITAL | Age: 69
End: 2019-04-15
Payer: MEDICARE

## 2019-04-15 ENCOUNTER — HOSPITAL ENCOUNTER (OUTPATIENT)
Facility: HOSPITAL | Age: 69
Discharge: HOME | End: 2019-04-16
Attending: INTERNAL MEDICINE | Admitting: INTERNAL MEDICINE
Payer: MEDICARE

## 2019-04-15 ENCOUNTER — HOSPITAL ENCOUNTER (OUTPATIENT)
Dept: CARDIOLOGY | Facility: CLINIC | Age: 69
Discharge: HOME | End: 2019-04-15
Payer: MEDICARE

## 2019-04-15 DIAGNOSIS — I48.0 PAF (PAROXYSMAL ATRIAL FIBRILLATION): Primary | ICD-10-CM

## 2019-04-15 DIAGNOSIS — I48.91 ATRIAL FIBRILLATION: ICD-10-CM

## 2019-04-15 LAB
ABO + RH BLD: NORMAL
ASCENDING AORTA: 2.7 CM
BLD GP AB SCN CELLS X3 SERPL QL: NORMAL
BSA FOR ECHO PROCEDURE: 1.79 M2
PISA TR MAX VEL: 2.4 M/S
POCT GLUCOSE: 83 MG/DL (ref 70–110)
POCT GLUCOSE: 84 MG/DL (ref 70–110)
POCT GLUCOSE: 95 MG/DL (ref 70–110)
SINUS: 2.7 CM
STJ: 2.6 CM
TR MAX PG: 23.04 MMHG

## 2019-04-15 PROCEDURE — 25000003 PHARM REV CODE 250: Performed by: NURSE PRACTITIONER

## 2019-04-15 PROCEDURE — 82962 GLUCOSE BLOOD TEST: CPT

## 2019-04-15 PROCEDURE — 93656 PR ELECTROPHYS EVAL, COMPREHEN, W/ABLATION OF ATRIAL FIBR: ICD-10-PCS | Mod: ,,, | Performed by: INTERNAL MEDICINE

## 2019-04-15 PROCEDURE — 93320 TRANSESOPHAGEAL ECHO (TEE) (CUPID ONLY): ICD-10-PCS | Mod: S$GLB,,, | Performed by: INTERNAL MEDICINE

## 2019-04-15 PROCEDURE — 93662 INTRACARDIAC ECG (ICE): CPT | Mod: 26,,, | Performed by: INTERNAL MEDICINE

## 2019-04-15 PROCEDURE — 93312 TRANSESOPHAGEAL ECHO (TEE) (CUPID ONLY): ICD-10-PCS | Mod: S$GLB,,, | Performed by: INTERNAL MEDICINE

## 2019-04-15 PROCEDURE — 25000003 PHARM REV CODE 250: Performed by: INTERNAL MEDICINE

## 2019-04-15 PROCEDURE — 93656 COMPRE EP EVAL ABLTJ ATR FIB: CPT | Performed by: INTERNAL MEDICINE

## 2019-04-15 PROCEDURE — 93325 TRANSESOPHAGEAL ECHO (TEE) (CUPID ONLY): ICD-10-PCS | Mod: S$GLB,,, | Performed by: INTERNAL MEDICINE

## 2019-04-15 PROCEDURE — 27201423 OPTIME MED/SURG SUP & DEVICES STERILE SUPPLY: Performed by: INTERNAL MEDICINE

## 2019-04-15 PROCEDURE — 93005 ELECTROCARDIOGRAM TRACING: CPT

## 2019-04-15 PROCEDURE — 93312 ECHO TRANSESOPHAGEAL: CPT | Mod: S$GLB,,, | Performed by: INTERNAL MEDICINE

## 2019-04-15 PROCEDURE — 63600175 PHARM REV CODE 636 W HCPCS: Performed by: NURSE ANESTHETIST, CERTIFIED REGISTERED

## 2019-04-15 PROCEDURE — C1769 GUIDE WIRE: HCPCS | Performed by: INTERNAL MEDICINE

## 2019-04-15 PROCEDURE — 93662 PR INTRACARD ECHO, THER/DX INTERVENT: ICD-10-PCS | Mod: 26,,, | Performed by: INTERNAL MEDICINE

## 2019-04-15 PROCEDURE — 93662 INTRACARDIAC ECG (ICE): CPT | Performed by: INTERNAL MEDICINE

## 2019-04-15 PROCEDURE — 37000008 HC ANESTHESIA 1ST 15 MINUTES: Performed by: INTERNAL MEDICINE

## 2019-04-15 PROCEDURE — D9220A PRA ANESTHESIA: ICD-10-PCS | Mod: CRNA,,, | Performed by: NURSE ANESTHETIST, CERTIFIED REGISTERED

## 2019-04-15 PROCEDURE — 37000009 HC ANESTHESIA EA ADD 15 MINS: Performed by: INTERNAL MEDICINE

## 2019-04-15 PROCEDURE — 93325 DOPPLER ECHO COLOR FLOW MAPG: CPT | Mod: S$GLB,,, | Performed by: INTERNAL MEDICINE

## 2019-04-15 PROCEDURE — 27201037 HC PRESSURE MONITORING SET UP

## 2019-04-15 PROCEDURE — 93613 INTRACARDIAC EPHYS 3D MAPG: CPT | Mod: ,,, | Performed by: INTERNAL MEDICINE

## 2019-04-15 PROCEDURE — C1733 CATH, EP, OTHR THAN COOL-TIP: HCPCS | Performed by: INTERNAL MEDICINE

## 2019-04-15 PROCEDURE — 93656 COMPRE EP EVAL ABLTJ ATR FIB: CPT | Mod: ,,, | Performed by: INTERNAL MEDICINE

## 2019-04-15 PROCEDURE — 93613 INTRACARDIAC EPHYS 3D MAPG: CPT | Performed by: INTERNAL MEDICINE

## 2019-04-15 PROCEDURE — D9220A PRA ANESTHESIA: ICD-10-PCS | Mod: ANES,,, | Performed by: ANESTHESIOLOGY

## 2019-04-15 PROCEDURE — C1766 INTRO/SHEATH,STRBLE,NON-PEEL: HCPCS | Performed by: INTERNAL MEDICINE

## 2019-04-15 PROCEDURE — 86850 RBC ANTIBODY SCREEN: CPT

## 2019-04-15 PROCEDURE — C1753 CATH, INTRAVAS ULTRASOUND: HCPCS | Performed by: INTERNAL MEDICINE

## 2019-04-15 PROCEDURE — 25000003 PHARM REV CODE 250: Performed by: NURSE ANESTHETIST, CERTIFIED REGISTERED

## 2019-04-15 PROCEDURE — 93613 PR INTRACARD ELECTROPHYS 3-DIMENS MAPPING: ICD-10-PCS | Mod: ,,, | Performed by: INTERNAL MEDICINE

## 2019-04-15 PROCEDURE — D9220A PRA ANESTHESIA: Mod: ANES,,, | Performed by: ANESTHESIOLOGY

## 2019-04-15 PROCEDURE — 93320 DOPPLER ECHO COMPLETE: CPT | Mod: S$GLB,,, | Performed by: INTERNAL MEDICINE

## 2019-04-15 PROCEDURE — C1730 CATH, EP, 19 OR FEW ELECT: HCPCS | Performed by: INTERNAL MEDICINE

## 2019-04-15 PROCEDURE — C1894 INTRO/SHEATH, NON-LASER: HCPCS | Performed by: INTERNAL MEDICINE

## 2019-04-15 PROCEDURE — D9220A PRA ANESTHESIA: Mod: CRNA,,, | Performed by: NURSE ANESTHETIST, CERTIFIED REGISTERED

## 2019-04-15 RX ORDER — IBUPROFEN 200 MG
24 TABLET ORAL
Status: DISCONTINUED | OUTPATIENT
Start: 2019-04-15 | End: 2019-04-16 | Stop reason: HOSPADM

## 2019-04-15 RX ORDER — MIDAZOLAM HYDROCHLORIDE 1 MG/ML
INJECTION, SOLUTION INTRAMUSCULAR; INTRAVENOUS
Status: DISCONTINUED | OUTPATIENT
Start: 2019-04-15 | End: 2019-04-15

## 2019-04-15 RX ORDER — PANTOPRAZOLE SODIUM 40 MG/1
40 TABLET, DELAYED RELEASE ORAL DAILY
Status: DISCONTINUED | OUTPATIENT
Start: 2019-04-15 | End: 2019-04-16 | Stop reason: HOSPADM

## 2019-04-15 RX ORDER — PAROXETINE 10 MG/1
20 TABLET, FILM COATED ORAL EVERY MORNING
Status: DISCONTINUED | OUTPATIENT
Start: 2019-04-15 | End: 2019-04-16 | Stop reason: HOSPADM

## 2019-04-15 RX ORDER — FUROSEMIDE 10 MG/ML
INJECTION INTRAMUSCULAR; INTRAVENOUS
Status: DISCONTINUED | OUTPATIENT
Start: 2019-04-15 | End: 2019-04-15

## 2019-04-15 RX ORDER — GLUCAGON 1 MG
1 KIT INJECTION
Status: DISCONTINUED | OUTPATIENT
Start: 2019-04-15 | End: 2019-04-16 | Stop reason: HOSPADM

## 2019-04-15 RX ORDER — OXYBUTYNIN CHLORIDE 5 MG/1
10 TABLET, EXTENDED RELEASE ORAL DAILY
Status: DISCONTINUED | OUTPATIENT
Start: 2019-04-15 | End: 2019-04-16 | Stop reason: HOSPADM

## 2019-04-15 RX ORDER — PHENYLEPHRINE HYDROCHLORIDE 10 MG/ML
INJECTION INTRAVENOUS
Status: DISCONTINUED | OUTPATIENT
Start: 2019-04-15 | End: 2019-04-15

## 2019-04-15 RX ORDER — ACETAMINOPHEN 325 MG/1
650 TABLET ORAL EVERY 6 HOURS PRN
Status: DISCONTINUED | OUTPATIENT
Start: 2019-04-15 | End: 2019-04-16 | Stop reason: HOSPADM

## 2019-04-15 RX ORDER — PROPOFOL 10 MG/ML
VIAL (ML) INTRAVENOUS
Status: DISCONTINUED | OUTPATIENT
Start: 2019-04-15 | End: 2019-04-15

## 2019-04-15 RX ORDER — SODIUM CHLORIDE 9 MG/ML
INJECTION, SOLUTION INTRAVENOUS CONTINUOUS
Status: DISCONTINUED | OUTPATIENT
Start: 2019-04-15 | End: 2019-04-15

## 2019-04-15 RX ORDER — LEVOTHYROXINE SODIUM 88 UG/1
88 TABLET ORAL
Status: DISCONTINUED | OUTPATIENT
Start: 2019-04-16 | End: 2019-04-16 | Stop reason: HOSPADM

## 2019-04-15 RX ORDER — SOTALOL HYDROCHLORIDE 80 MG/1
40 TABLET ORAL EVERY 12 HOURS
Status: DISCONTINUED | OUTPATIENT
Start: 2019-04-15 | End: 2019-04-16 | Stop reason: HOSPADM

## 2019-04-15 RX ORDER — HEPARIN SODIUM 1000 [USP'U]/ML
INJECTION, SOLUTION INTRAVENOUS; SUBCUTANEOUS CONTINUOUS PRN
Status: DISCONTINUED | OUTPATIENT
Start: 2019-04-15 | End: 2019-04-15

## 2019-04-15 RX ORDER — HEPARIN SODIUM 1000 [USP'U]/ML
INJECTION, SOLUTION INTRAVENOUS; SUBCUTANEOUS
Status: DISCONTINUED | OUTPATIENT
Start: 2019-04-15 | End: 2019-04-15

## 2019-04-15 RX ORDER — HEPARIN SOD,PORCINE/0.9 % NACL 250/250 ML
INTRAVENOUS SOLUTION INTRAVENOUS CONTINUOUS PRN
Status: DISCONTINUED | OUTPATIENT
Start: 2019-04-15 | End: 2019-04-15

## 2019-04-15 RX ORDER — SODIUM CHLORIDE 0.9 % (FLUSH) 0.9 %
10 SYRINGE (ML) INJECTION
Status: DISCONTINUED | OUTPATIENT
Start: 2019-04-15 | End: 2019-04-15

## 2019-04-15 RX ORDER — ALBUTEROL SULFATE 90 UG/1
1 AEROSOL, METERED RESPIRATORY (INHALATION) EVERY 6 HOURS PRN
Status: DISCONTINUED | OUTPATIENT
Start: 2019-04-15 | End: 2019-04-16 | Stop reason: HOSPADM

## 2019-04-15 RX ORDER — PROTAMINE SULFATE 10 MG/ML
INJECTION, SOLUTION INTRAVENOUS
Status: DISCONTINUED | OUTPATIENT
Start: 2019-04-15 | End: 2019-04-15

## 2019-04-15 RX ORDER — IBUPROFEN 200 MG
16 TABLET ORAL
Status: DISCONTINUED | OUTPATIENT
Start: 2019-04-15 | End: 2019-04-16 | Stop reason: HOSPADM

## 2019-04-15 RX ORDER — FENTANYL CITRATE 50 UG/ML
25 INJECTION, SOLUTION INTRAMUSCULAR; INTRAVENOUS EVERY 5 MIN PRN
Status: DISCONTINUED | OUTPATIENT
Start: 2019-04-15 | End: 2019-04-15

## 2019-04-15 RX ORDER — LIDOCAINE HCL/PF 100 MG/5ML
SYRINGE (ML) INTRAVENOUS
Status: DISCONTINUED | OUTPATIENT
Start: 2019-04-15 | End: 2019-04-15

## 2019-04-15 RX ORDER — ATORVASTATIN CALCIUM 10 MG/1
10 TABLET, FILM COATED ORAL DAILY
Status: DISCONTINUED | OUTPATIENT
Start: 2019-04-15 | End: 2019-04-16 | Stop reason: HOSPADM

## 2019-04-15 RX ORDER — SUCCINYLCHOLINE CHLORIDE 20 MG/ML
INJECTION INTRAMUSCULAR; INTRAVENOUS
Status: DISCONTINUED | OUTPATIENT
Start: 2019-04-15 | End: 2019-04-15

## 2019-04-15 RX ORDER — ONDANSETRON 2 MG/ML
INJECTION INTRAMUSCULAR; INTRAVENOUS
Status: DISCONTINUED | OUTPATIENT
Start: 2019-04-15 | End: 2019-04-15

## 2019-04-15 RX ORDER — SODIUM CHLORIDE 0.9 % (FLUSH) 0.9 %
5 SYRINGE (ML) INJECTION
Status: DISCONTINUED | OUTPATIENT
Start: 2019-04-15 | End: 2019-04-15 | Stop reason: HOSPADM

## 2019-04-15 RX ORDER — ONDANSETRON 2 MG/ML
4 INJECTION INTRAMUSCULAR; INTRAVENOUS DAILY PRN
Status: DISCONTINUED | OUTPATIENT
Start: 2019-04-15 | End: 2019-04-15

## 2019-04-15 RX ORDER — ROCURONIUM BROMIDE 10 MG/ML
INJECTION, SOLUTION INTRAVENOUS
Status: DISCONTINUED | OUTPATIENT
Start: 2019-04-15 | End: 2019-04-15

## 2019-04-15 RX ORDER — LIDOCAINE HYDROCHLORIDE 20 MG/ML
INJECTION, SOLUTION INFILTRATION; PERINEURAL
Status: DISCONTINUED | OUTPATIENT
Start: 2019-04-15 | End: 2019-04-15

## 2019-04-15 RX ORDER — FENTANYL CITRATE 50 UG/ML
INJECTION, SOLUTION INTRAMUSCULAR; INTRAVENOUS
Status: DISCONTINUED | OUTPATIENT
Start: 2019-04-15 | End: 2019-04-15

## 2019-04-15 RX ADMIN — PHENYLEPHRINE HYDROCHLORIDE 100 MCG: 10 INJECTION INTRAVENOUS at 09:04

## 2019-04-15 RX ADMIN — FENTANYL CITRATE 50 MCG: 50 INJECTION, SOLUTION INTRAMUSCULAR; INTRAVENOUS at 07:04

## 2019-04-15 RX ADMIN — PROPOFOL 40 MG: 10 INJECTION, EMULSION INTRAVENOUS at 07:04

## 2019-04-15 RX ADMIN — SOTALOL HYDROCHLORIDE 40 MG: 80 TABLET ORAL at 03:04

## 2019-04-15 RX ADMIN — HEPARIN SODIUM 12000 UNITS: 1000 INJECTION INTRAVENOUS; SUBCUTANEOUS at 08:04

## 2019-04-15 RX ADMIN — PHENYLEPHRINE HYDROCHLORIDE 100 MCG: 10 INJECTION INTRAVENOUS at 08:04

## 2019-04-15 RX ADMIN — RIVAROXABAN 20 MG: 20 TABLET, FILM COATED ORAL at 05:04

## 2019-04-15 RX ADMIN — PHENYLEPHRINE HYDROCHLORIDE 200 MCG: 10 INJECTION INTRAVENOUS at 09:04

## 2019-04-15 RX ADMIN — PROTAMINE SULFATE 85 MG: 10 INJECTION, SOLUTION INTRAVENOUS at 10:04

## 2019-04-15 RX ADMIN — PAROXETINE 20 MG: 10 TABLET, FILM COATED ORAL at 09:04

## 2019-04-15 RX ADMIN — PHENYLEPHRINE HYDROCHLORIDE 100 MCG: 10 INJECTION INTRAVENOUS at 07:04

## 2019-04-15 RX ADMIN — SUCCINYLCHOLINE CHLORIDE 120 MG: 20 INJECTION, SOLUTION INTRAMUSCULAR; INTRAVENOUS at 07:04

## 2019-04-15 RX ADMIN — ONDANSETRON 4 MG: 2 INJECTION INTRAMUSCULAR; INTRAVENOUS at 10:04

## 2019-04-15 RX ADMIN — MIDAZOLAM 2 MG: 1 INJECTION INTRAMUSCULAR; INTRAVENOUS at 07:04

## 2019-04-15 RX ADMIN — OXYBUTYNIN CHLORIDE 10 MG: 5 TABLET, EXTENDED RELEASE ORAL at 03:04

## 2019-04-15 RX ADMIN — LIDOCAINE HYDROCHLORIDE 100 MG: 20 INJECTION, SOLUTION INTRAVENOUS at 07:04

## 2019-04-15 RX ADMIN — FUROSEMIDE 20 MG: 10 INJECTION, SOLUTION INTRAMUSCULAR; INTRAVENOUS at 10:04

## 2019-04-15 RX ADMIN — ROCURONIUM BROMIDE 20 MG: 10 INJECTION, SOLUTION INTRAVENOUS at 07:04

## 2019-04-15 RX ADMIN — PROTAMINE SULFATE 5 MG: 10 INJECTION, SOLUTION INTRAVENOUS at 10:04

## 2019-04-15 RX ADMIN — ROCURONIUM BROMIDE 5 MG: 10 INJECTION, SOLUTION INTRAVENOUS at 07:04

## 2019-04-15 RX ADMIN — ATORVASTATIN CALCIUM 10 MG: 10 TABLET, FILM COATED ORAL at 09:04

## 2019-04-15 RX ADMIN — HEPARIN SODIUM 5000 UNITS/HR: 1000 INJECTION, SOLUTION INTRAVENOUS; SUBCUTANEOUS at 08:04

## 2019-04-15 RX ADMIN — SODIUM CHLORIDE: 0.9 INJECTION, SOLUTION INTRAVENOUS at 06:04

## 2019-04-15 RX ADMIN — SODIUM CHLORIDE, SODIUM GLUCONATE, SODIUM ACETATE, POTASSIUM CHLORIDE, MAGNESIUM CHLORIDE, SODIUM PHOSPHATE, DIBASIC, AND POTASSIUM PHOSPHATE: .53; .5; .37; .037; .03; .012; .00082 INJECTION, SOLUTION INTRAVENOUS at 09:04

## 2019-04-15 RX ADMIN — HEPARIN SODIUM 5000 UNITS: 1000 INJECTION INTRAVENOUS; SUBCUTANEOUS at 09:04

## 2019-04-15 RX ADMIN — PANTOPRAZOLE SODIUM 40 MG: 40 TABLET, DELAYED RELEASE ORAL at 01:04

## 2019-04-15 RX ADMIN — SOTALOL HYDROCHLORIDE 40 MG: 80 TABLET ORAL at 09:04

## 2019-04-15 RX ADMIN — PROPOFOL 110 MG: 10 INJECTION, EMULSION INTRAVENOUS at 07:04

## 2019-04-15 NOTE — ASSESSMENT & PLAN NOTE
Assessment & Plan:     PLAN:  1. PRISCILLA for evaluation of LA/LA for thrombus prior to PVI    -The risks, benefits & alternatives of the procedure were explained to the patient.    -The risks of transesophageal echo include but are not limited to:  Dental trauma, esophageal trauma/perforation, bleeding, laryngospasm/brochospasm, aspiration, sore throat/hoarseness, & dislodgement of the endotracheal tube/nasogastric tube (where applicable).    -The risks of moderate sedation include hypotension, respiratory depression, arrhythmias, bronchospasm, & death.    -Informed consent was obtained & the patient is agreeable to proceed with the procedure.    I will discuss with the attending physician. Attending addendum is to follow.     Further recommendations per attending addendum

## 2019-04-15 NOTE — SUBJECTIVE & OBJECTIVE
Past Medical History:   Diagnosis Date    Allergy     GERD (gastroesophageal reflux disease)     Hypertension     Hypothyroidism     ALESSANDRA (iron deficiency anemia)     Insomnia     Nephrolithiasis     [place] eswl - [place]    FABIANA on CPAP     PAF (paroxysmal atrial fibrillation)     Subaortic membrane     Vitamin B 12 deficiency     Vitamin D deficiency        Past Surgical History:   Procedure Laterality Date    abdomenalplasty      CARDIOVERSION N/A 8/28/2016    [facility] at [facility]    CARDIOVERSION N/A 7/1/2016    [facility] at [facility]    COLONOSCOPY  2012    normal, repeat in 5 years    EXTRACORPOREAL SHOCK WAVE LITHOTRIPSY      [place]    FRACTURE SURGERY Left     has plates and screws in place.    GALLBLADDER SURGERY  1995    GASTRIC BYPASS  1997    HERNIA REPAIR  1998    HYSTERECTOMY  2013    due to vaginal prolpase with BSO - also bladder suspension at the [facility] - [facility]    loop recording N/A 3/18/2016    Performed by Silvano Garcia MD at [facility] CATH    OPEN REDUCTION INTERNAL FIXATION-  PROXIMAL HUMERUS Left 5/30/2016    Performed by Viet Romo MD at [facility] OR    TYMPANOPLASTY Left     replaced and then repair x2        Review of patient's allergies indicates:  No Known Allergies    No current facility-administered medications on file prior to encounter.      Current Outpatient Medications on File Prior to Encounter   Medication Sig    atorvastatin (LIPITOR) 10 MG tablet TAKE 1 TABLET(10 MG) BY MOUTH EVERY DAY    b complex vitamins capsule Take 1 capsule by mouth once daily.    cetirizine (ZYRTEC) 10 MG tablet Take 10 mg by mouth once daily.    cyanocobalamin 1,000 mcg/mL injection Inject 1 mL (1,000 mcg total) into the muscle every 14 (fourteen) days.    fluticasone (FLONASE) 50 mcg/actuation nasal spray 2 sprays (100 mcg total) by Each Nare route once daily.    oxybutynin (DITROPAN-XL) 10 MG 24 hr tablet  Take 1 tablet (10 mg total) by mouth once daily.    paroxetine (PAXIL) 20 MG tablet Take 1 tablet (20 mg total) by mouth every morning.    rivaroxaban (XARELTO) 20 mg Tab Take 20 mg by mouth daily with dinner or evening meal.    albuterol 90 mcg/actuation inhaler Inhale 1-2 puffs into the lungs every 6 (six) hours as needed for Wheezing.    blood sugar diagnostic Strp To check BG 1 times daily, to use with insurance preferred meter    blood sugar diagnostic Strp For daily and prn checks    blood-glucose meter kit To check BG 1 times daily, to use with insurance preferred meter    conjugated estrogens (PREMARIN) vaginal cream Place 0.5 g vaginally twice a week.    lancets Misc To check BG 1 times daily, to use with insurance preferred meter    multivitamin with minerals tablet Take 1 tablet by mouth once daily.     Family History     Problem Relation (Age of Onset)    Alzheimer's disease Father, Paternal Grandfather    Aneurysm Mother    COPD Paternal Grandmother    Cancer Mother, Paternal Grandmother    Diabetes Father    Heart disease Paternal Grandfather    Hypertension Mother    Mental illness Sister    No Known Problems Daughter, Son    Stroke Maternal Grandfather        Tobacco Use    Smoking status: Former Smoker     Packs/day: 2.00     Years: 20.00     Pack years: 40.00     Last attempt to quit: 1995     Years since quittin.4    Smokeless tobacco: Never Used   Substance and Sexual Activity    Alcohol use: Yes     Alcohol/week: 1.8 oz     Types: 3 Glasses of wine per week    Drug use: No    Sexual activity: Never     Review of Systems   Constitution: Negative for chills, fever and malaise/fatigue.   HENT: Negative for congestion and nosebleeds.         Small scalp wound-covered with dressing   Eyes: Negative for blurred vision.   Cardiovascular: Negative for chest pain, dyspnea on exertion, irregular heartbeat, leg swelling, near-syncope, orthopnea, palpitations, paroxysmal nocturnal  dyspnea and syncope.        ILR implant   Respiratory: Negative for cough, hemoptysis, shortness of breath, sleep disturbances due to breathing, sputum production and wheezing.    Endocrine: Negative for polyphagia.   Hematologic/Lymphatic: Negative for bleeding problem. Does not bruise/bleed easily.   Skin: Negative for itching and rash.   Musculoskeletal: Negative for back pain, joint swelling, muscle cramps and muscle weakness.   Gastrointestinal: Negative for bloating, abdominal pain, hematemesis, hematochezia, nausea and vomiting.   Genitourinary: Negative for dysuria and hematuria.   Neurological: Negative for dizziness, focal weakness, headaches, light-headedness, loss of balance, numbness and weakness.   Psychiatric/Behavioral: Negative for altered mental status.     Objective:     Vital Signs (Most Recent):  Temp: 97.2 °F (36.2 °C) (04/15/19 0600)  Pulse: (!) 56 (04/15/19 0600)  Resp: 20 (04/15/19 0600)  BP: 132/67 (04/15/19 0610)  SpO2: 98 % (04/15/19 0600) Vital Signs (24h Range):  Temp:  [97.2 °F (36.2 °C)] 97.2 °F (36.2 °C)  Pulse:  [56] 56  Resp:  [20] 20  SpO2:  [98 %] 98 %  BP: (127-132)/(66-67) 132/67       Weight: 69.9 kg (154 lb)  Body mass index is 25.63 kg/m².    SpO2: 98 %  O2 Device (Oxygen Therapy): room air    Physical Exam   Constitutional: She is oriented to person, place, and time. She appears well-developed and well-nourished. No distress.   HENT:   Head: Normocephalic and atraumatic.       Mouth/Throat: No oropharyngeal exudate.   Eyes: Pupils are equal, round, and reactive to light. Conjunctivae are normal. Right eye exhibits no discharge. Left eye exhibits no discharge.   Neck: Normal range of motion. Neck supple.   Cardiovascular: Normal rate, regular rhythm, S1 normal, S2 normal, normal heart sounds, intact distal pulses and normal pulses.  No extrasystoles are present. PMI is not displaced. Exam reveals no gallop and no friction rub.   No murmur heard.  Pulses:       Radial pulses  are 2+ on the right side, and 2+ on the left side.        Dorsalis pedis pulses are 2+ on the right side, and 2+ on the left side.   Pulmonary/Chest: Effort normal and breath sounds normal. No accessory muscle usage. No respiratory distress. She has no decreased breath sounds. She has no wheezes. She has no rhonchi. She has no rales. She exhibits no tenderness.   ILR implant to left chest wall in good condition.   Abdominal: Soft. Bowel sounds are normal. She exhibits no distension and no mass. There is no tenderness. There is no rebound and no guarding.   Musculoskeletal: Normal range of motion. She exhibits no edema.   Neurological: She is alert and oriented to person, place, and time. She has normal strength. She is not disoriented. No cranial nerve deficit or sensory deficit. She exhibits normal muscle tone. Coordination and gait normal.   Skin: Skin is warm and dry. No rash noted. She is not diaphoretic. No erythema.   Psychiatric: She has a normal mood and affect. Her behavior is normal. Judgment and thought content normal.   Nursing note and vitals reviewed.    Significant Labs: Labs from 4/9/19 through today reviewed.   Significant Imaging: Echocardiogram:   2D echo with color flow doppler:   Results for orders placed or performed during the hospital encounter of 02/16/16   2D echo with color flow doppler   Result Value Ref Range    QEF 55 55 - 65    Diastolic Dysfunction Yes (A)     Aortic Valve Regurgitation MILD     Est. PA Systolic Pressure 22     Mitral Valve Mobility NORMAL     Tricuspid Valve Regurgitation TRIVIAL TO MILD     and Transthoracic echo (TTE) complete (Cupid Only):   Results for orders placed or performed during the hospital encounter of 04/08/19   Transthoracic echo (TTE) 2D with Color Flow   Result Value Ref Range    LVIDS 2.86 2.1 - 4.0 cm    STJ 2.71 cm    AV mean gradient 11 mmHg    Ao peak aroldo 2.39 m/s    Ao VTI 53.9 cm    IVS 0.806 0.6 - 1.1 cm    LA size 3.1 cm    LVIDD 4.71 3.5 -  6.0 cm    LVOT diameter 1.9 cm    LVOT peak VTI 38.6 cm    PW 0.710 0.6 - 1.1 cm    E wave decelartion time 289 msec    RA Major Axis 5.29 cm    RA Width 4.28 cm    TR Max Jonathon 2.33 m/s    LA WIDTH 3.79 cm    PV PEAK VELOCITY 113 cm/s    BSA 1.75 m2    FS 39 28 - 44 %    LA volume 66.40 cm3    LV mass 114.46 g    Left Ventricle Relative Wall Thickness 0.30 cm    AV valve area 2.03 cm2    AV Velocity Ratio 0.67     AV index (prosthetic) 0.72     LVOT area 2.83 cm2    LVOT peak jonathon 1.6 m/s    LVOT stroke volume 109.39 cm3    AV peak gradient 22.85 mmHg    MV Peak E Jonathon 1.14 m/s    LA Volume Index 38.2 mL/m2    LV Mass Index 65.8 g/m2    Left Atrium Minor Axis 7.25 cm    Left Atrium Major Axis 6.14 cm    Triscuspid Valve Regurgitation Peak Gradient 21.72 mmHg

## 2019-04-15 NOTE — CONSULTS
[facility]  Cardiology  Consult Note    Patient Name: Yara Whitehead  MRN: [id number]  Admission Date: 4/15/2019  Hospital Length of Stay: 0 days  Code Status: Prior   Attending Provider: Edmund Shah MD   Consulting Provider: Fausto Oliveira MD  Primary Care Physician: Ann Richards DO  Principal Problem:<principal problem not specified>    Patient information was obtained from patient, past medical records and ER records.       Subjective:     Chief Complaint:  Outpatietn PRISCILLA+PVI     HPI:   67 yo female with Htn, morbid obesity, Sleep apnea (on CPAP), atrial fibrillation, RBBB, atrial flutter, subaortic membrane here for outpatient PVI with Dr. Shah. PRISCILLA requested prior to PVI to r/o thrombus in the VENKAT and LA. She has not had a prior PRISCILLA before. She is in NSR but missed Xarelto two weeks ago.     2D echo with color flow:  · Normal left ventricular systolic function. The estimated ejection fraction is 70%  · Mild left atrial enlargement.  · Mild aortic regurgitation.  · Severe mitral sclerosis, MAC, no stenosis.  · Trace to Mild mitral regurgitation.  Mild tricuspid regurgitation.     Dysphagia or odynophagia:  No  Liver Disease, esophageal disease, or known varices:  No  Upper GI Bleeding: No  Snoring:  Yes  Sleep Apnea:  No  Prior neck surgery or radiation:  No  History of anesthetic difficulties:  No  Family history of anesthetic difficulties:  No  Last oral intake:  12 hours ago  Able to move neck in all directions:  Yes    Mallampati: 1   ASA: 3                  Past Medical History:   Diagnosis Date    Allergy     GERD (gastroesophageal reflux disease)     Hypertension     Hypothyroidism     ALSESANDRA (iron deficiency anemia)     Insomnia     Nephrolithiasis     [place] eswl - [place]    FABIANA on CPAP     PAF (paroxysmal atrial fibrillation)     Subaortic membrane     Vitamin B 12 deficiency     Vitamin D deficiency        Past Surgical History:   Procedure Laterality Date     abdomenalplasty      CARDIOVERSION N/A 8/28/2016    [facility]    CARDIOVERSION N/A 7/1/2016    [facility]    COLONOSCOPY  2012    normal, repeat in 5 years    EXTRACORPOREAL SHOCK WAVE LITHOTRIPSY      [place]    FRACTURE SURGERY Left     has plates and screws in place.    GALLBLADDER SURGERY  1995    GASTRIC BYPASS  1997    HERNIA REPAIR  1998    HYSTERECTOMY  2013    due to vaginal prolpase with BSO - also bladder suspension at the same time - [facility]    loop recording N/A 3/18/2016    Performed by [facility] at [facility] CATH    OPEN REDUCTION INTERNAL FIXATION-  PROXIMAL HUMERUS Left 5/30/2016    Performed by Viet Romo MD at [facility] OR    TYMPANOPLASTY Left     replaced and then repair x2        Review of patient's allergies indicates:  No Known Allergies    No current facility-administered medications on file prior to encounter.      Current Outpatient Medications on File Prior to Encounter   Medication Sig    atorvastatin (LIPITOR) 10 MG tablet TAKE 1 TABLET(10 MG) BY MOUTH EVERY DAY    b complex vitamins capsule Take 1 capsule by mouth once daily.    cetirizine (ZYRTEC) 10 MG tablet Take 10 mg by mouth once daily.    cyanocobalamin 1,000 mcg/mL injection Inject 1 mL (1,000 mcg total) into the muscle every 14 (fourteen) days.    fluticasone (FLONASE) 50 mcg/actuation nasal spray 2 sprays (100 mcg total) by Each Nare route once daily.    oxybutynin (DITROPAN-XL) 10 MG 24 hr tablet Take 1 tablet (10 mg total) by mouth once daily.    paroxetine (PAXIL) 20 MG tablet Take 1 tablet (20 mg total) by mouth every morning.    rivaroxaban (XARELTO) 20 mg Tab Take 20 mg by mouth daily with dinner or evening meal.    albuterol 90 mcg/actuation inhaler Inhale 1-2 puffs into the lungs every 6 (six) hours as needed for Wheezing.    blood sugar diagnostic Strp To check BG 1 times daily, to use with insurance  preferred meter    blood sugar diagnostic Strp For daily and prn checks    blood-glucose meter kit To check BG 1 times daily, to use with insurance preferred meter    conjugated estrogens (PREMARIN) vaginal cream Place 0.5 g vaginally twice a week.    lancets Misc To check BG 1 times daily, to use with insurance preferred meter    multivitamin with minerals tablet Take 1 tablet by mouth once daily.     Family History     Problem Relation (Age of Onset)    Alzheimer's disease Father, Paternal Grandfather    Aneurysm Mother    COPD Paternal Grandmother    Cancer Mother, Paternal Grandmother    Diabetes Father    Heart disease Paternal Grandfather    Hypertension Mother    Mental illness Sister    No Known Problems Daughter, Son    Stroke Maternal Grandfather        Tobacco Use    Smoking status: Former Smoker     Packs/day: 2.00     Years: 20.00     Pack years: 40.00     Last attempt to quit: 1995     Years since quittin.4    Smokeless tobacco: Never Used   Substance and Sexual Activity    Alcohol use: Yes     Alcohol/week: 1.8 oz     Types: 3 Glasses of wine per week    Drug use: No    Sexual activity: Never     Review of Systems   Constitution: Negative for chills and fever.   Cardiovascular: Negative for chest pain, dyspnea on exertion, near-syncope and palpitations.   Musculoskeletal: Negative for back pain.   Gastrointestinal: Negative for diarrhea, nausea and vomiting.   Genitourinary: Negative for frequency.   Neurological: Negative for headaches.     Objective:     Vital Signs (Most Recent):  Temp: 97.2 °F (36.2 °C) (04/15/19 0600)  Pulse: (!) 56 (04/15/19 0600)  Resp: 20 (04/15/19 0600)  BP: 132/67 (04/15/19 0610)  SpO2: 98 % (04/15/19 06) Vital Signs (24h Range):  Temp:  [97.2 °F (36.2 °C)] 97.2 °F (36.2 °C)  Pulse:  [56] 56  Resp:  [20] 20  SpO2:  [98 %] 98 %  BP: (127-132)/(66-67) 132/67     Weight: 69.9 kg (154 lb)  Body mass index is 25.63 kg/m².    SpO2: 98 %  O2 Device (Oxygen  Therapy): room air    No intake or output data in the 24 hours ending 04/15/19 0658    Lines/Drains/Airways     Peripheral Intravenous Line                 Peripheral IV - Single Lumen 04/15/19 0624 18 G Right Forearm less than 1 day         Peripheral IV - Single Lumen 04/15/19 0625 20 G Left Hand less than 1 day                Physical Exam   Constitutional: She is oriented to person, place, and time. She appears well-developed and well-nourished.   HENT:   Head: Normocephalic and atraumatic.   Eyes: Pupils are equal, round, and reactive to light. EOM are normal.   Neck: Normal range of motion. Neck supple. No JVD present.   Cardiovascular: Normal rate and regular rhythm.   Pulmonary/Chest: Effort normal and breath sounds normal.   Abdominal: Soft. Bowel sounds are normal.   Musculoskeletal: Normal range of motion. She exhibits no edema.   Neurological: She is alert and oriented to person, place, and time.       Significant Labs: CMP No results for input(s): NA, K, CL, CO2, GLU, BUN, CREATININE, CALCIUM, PROT, ALBUMIN, BILITOT, ALKPHOS, AST, ALT, ANIONGAP, ESTGFRAFRICA, EGFRNONAA in the last 48 hours., CBC No results for input(s): WBC, HGB, HCT, PLT in the last 48 hours. and All pertinent lab results from the last 24 hours have been reviewed.    Significant Imaging: Echocardiogram:   2D echo with color flow doppler:   Results for orders placed or performed during the hospital encounter of 02/16/16   2D echo with color flow doppler   Result Value Ref Range    QEF 55 55 - 65    Diastolic Dysfunction Yes (A)     Aortic Valve Regurgitation MILD     Est. PA Systolic Pressure 22     Mitral Valve Mobility NORMAL     Tricuspid Valve Regurgitation TRIVIAL TO MILD     and Transthoracic echo (TTE) complete (Cupid Only):   Results for orders placed or performed during the hospital encounter of 04/08/19   Transthoracic echo (TTE) 2D with Color Flow   Result Value Ref Range    LVIDS 2.86 2.1 - 4.0 cm    STJ 2.71 cm    AV mean  gradient 11 mmHg    Ao peak jonathon 2.39 m/s    Ao VTI 53.9 cm    IVS 0.806 0.6 - 1.1 cm    LA size 3.1 cm    LVIDD 4.71 3.5 - 6.0 cm    LVOT diameter 1.9 cm    LVOT peak VTI 38.6 cm    PW 0.710 0.6 - 1.1 cm    E wave decelartion time 289 msec    RA Major Axis 5.29 cm    RA Width 4.28 cm    TR Max Jonathon 2.33 m/s    LA WIDTH 3.79 cm    PV PEAK VELOCITY 113 cm/s    BSA 1.75 m2    FS 39 28 - 44 %    LA volume 66.40 cm3    LV mass 114.46 g    Left Ventricle Relative Wall Thickness 0.30 cm    AV valve area 2.03 cm2    AV Velocity Ratio 0.67     AV index (prosthetic) 0.72     LVOT area 2.83 cm2    LVOT peak jonathon 1.6 m/s    LVOT stroke volume 109.39 cm3    AV peak gradient 22.85 mmHg    MV Peak E Jonathon 1.14 m/s    LA Volume Index 38.2 mL/m2    LV Mass Index 65.8 g/m2    Left Atrium Minor Axis 7.25 cm    Left Atrium Major Axis 6.14 cm    Triscuspid Valve Regurgitation Peak Gradient 21.72 mmHg    and X-Ray: CXR: X-Ray Chest 1 View (CXR): No results found for this visit on 04/15/19.    Assessment and Plan:     PAF (paroxysmal atrial fibrillation)    Assessment & Plan:     PLAN:  1. PRISCILLA for evaluation of LA/LA for thrombus prior to PVI    -The risks, benefits & alternatives of the procedure were explained to the patient.    -The risks of transesophageal echo include but are not limited to:  Dental trauma, esophageal trauma/perforation, bleeding, laryngospasm/brochospasm, aspiration, sore throat/hoarseness, & dislodgement of the endotracheal tube/nasogastric tube (where applicable).    -The risks of moderate sedation include hypotension, respiratory depression, arrhythmias, bronchospasm, & death.    -Informed consent was obtained & the patient is agreeable to proceed with the procedure.    I will discuss with the attending physician. Attending addendum is to follow.     Further recommendations per attending addendum            VTE Risk Mitigation (From admission, onward)    None          Fausto Oliveira MD  Cardiology   [facility]  [place]

## 2019-04-15 NOTE — ANESTHESIA POSTPROCEDURE EVALUATION
Anesthesia Post Evaluation    Patient: Yara Whitehead    Procedure(s) Performed: Procedure(s) (LRB):  Ablation atrial fibrillation (N/A)  Transesophageal echo (PRISCILLA) intra-procedure log documentation (N/A)    Final Anesthesia Type: general  Patient location during evaluation: PACU  Patient participation: Yes- Able to Participate  Level of consciousness: awake and alert  Post-procedure vital signs: reviewed and stable  Pain management: adequate  Airway patency: patent  PONV status at discharge: No PONV  Anesthetic complications: no      Cardiovascular status: hemodynamically stable  Respiratory status: unassisted  Hydration status: euvolemic  Follow-up not needed.          Vitals Value Taken Time   /63 4/15/2019  1:47 PM   Temp 36.7 °C (98.1 °F) 4/15/2019  1:00 PM   Pulse 72 4/15/2019  1:56 PM   Resp 18 4/15/2019  1:55 PM   SpO2 100 % 4/15/2019  1:56 PM   Vitals shown include unvalidated device data.      No case tracking events are documented in the log.      Pain/Fernando Score: Pain Rating Prior to Med Admin: 0 (4/15/2019 12:15 PM)  Fernando Score: 10 (4/15/2019 12:15 PM)

## 2019-04-15 NOTE — NURSING TRANSFER
Nursing Transfer Note      4/15/2019     Transfer To: ep pacu 1 to sscu 315  Transfer via stretcher    Transfer with cardiac monitoring. Tele box on  Transported by ernesto daniel    Medicines sent: none    Chart send with patient: Yes    Notified: sister    Patient reassessed at: 4/15/19 1330. Due 1400    Upon arrival to floor: cardiac monitor applied, patient oriented to room, call bell in reach and bed in lowest position

## 2019-04-15 NOTE — PROGRESS NOTES
Pt complains of need to void.  purewick in place has drained already about 50 ml, states unable to void more.

## 2019-04-15 NOTE — PLAN OF CARE
Problem: Adult Inpatient Plan of Care  Goal: Plan of Care Review  Outcome: Ongoing (interventions implemented as appropriate)  Received report from TIMI Sierra. Patient s/p RFA/PVI, AAOx4. VSS, no c/o pain or discomfort at this time, resp even and unlabored. Bilateral groins sutures intact and opened to air. Pulses 2+.  No active bleeding. No hematoma noted. Post procedure protocol reviewed with patient and patient's family. Understanding verbalized. Family members at bedside. Nurse call bell within reach. Will continue to monitor per post procedure protocol.

## 2019-04-15 NOTE — H&P
[facility]  Cardiac Electrophysiology  History and Physical     Admission Date: 4/15/2019  Code Status: Prior   Attending Provider: Edmund Shah MD   Principal Problem:PAF (paroxysmal atrial fibrillation)    Subjective:     Chief Complaint:  PAF     HPI: Ms. Whitehead is a 68 year old female with a PMHx of HTN, morbid obesity, Sleep apnea (on CPAP), atrial fibrillation, RBBB, atrial flutter, subaortic membrane, fall with subsequent subdural hematoma.  She has a son who works for [employer] in [place].  Has paroxysmal symptomatic atrial fibrillation, starting 1/2016. Had been on Flecainide. Noted to have recurrence while in hospital with arm fracture. Flecainide increased to 150 mg BID. Underwent DCCV 7/1/16.  Continued to have breakthrough.  We scheduled her for PVI + RFA for atrial flutter, and in interim initiated Sotalol.  Cancelled procedure as she felt great on Sotalol.  At last follow-up with me in 2017 was still having paroxysmal AF but this was thought to be related to alcohol.  Woke up in middle of night to use bathroom >> dizzy, fell and suffered subdural hematoma.  Ultimately cleared to resume Xarelto.  ILR revealed pauses and continued AF.  PPM recommended.  She canceled procedure.  Her son wants her to consider leadless device.  ILR reveals No significant AF since 11/18.  No true pauses.  Does have sinus bradycardia.  Has noted fatigue since her subdural hematoma.     Has lost a significant amount of weight.  During her last visit with Dr. Shah 2/18/19, he would not endorse leadless device at this time as this would be ventricular pacing only.  PVI recommended at this time, and then potentially discontinue Sotalol to prevent bradycardia.     CTA of chest to delineate PV anatomy completed on 4/1/19. Reviewed per Dr. Shah.  Plan for Cryoablation.  PRISCILLA prior. Hold Xarelto day of procedure.      Ms. Whitehead presented to the ED on 4/8/19 for palpations and BOWSER. Patient was admitted TTE showed  EF 70 %. Patient sent home on sotalol 40 mg BID and continue xarelto. Plan to continue with PVI with Dr. Shah.    She presents today to [facility] for scheduled EP study with  AF PVI with Dr. Shah. She denies any chest pain, SOB, dizziness, light headedness, weakness, syncope, or near syncopal episodes. He does state he has BOWSER, palpitations, and fatigue when out of [facility]. She denies any bleeding, infections, rashes, fevers, or surgeries in the past 30 days. She is currently taking sotalol 40 mg BID (last dose was 4/14/19 AM) and xarelto 20 mg BID (last dose yesterday PM). PRISCILLA prior to procedure as patient missed a dose of her xarelto two weeks ago. She denies Hx of CVA/TIA or VENKAT thrombus.    I have personally reviewed the patient's EKG today, which shows sinus bradycardia with RBBB aty 54 bpm. QTc is 474 ms. Reviewed with Dr. Shah.    Past Medical History:   Diagnosis Date    Allergy     GERD (gastroesophageal reflux disease)     Hypertension     Hypothyroidism     ALESSANDRA (iron deficiency anemia)     Insomnia     Nephrolithiasis     [place] eswl - [place]    FABIANA on CPAP     PAF (paroxysmal atrial fibrillation)     Subaortic membrane     Vitamin B 12 deficiency     Vitamin D deficiency        Past Surgical History:   Procedure Laterality Date    abdomenalplasty      CARDIOVERSION N/A 8/28/2016    [facility]    CARDIOVERSION N/A 7/1/2016    [facility]    COLONOSCOPY  2012    normal, repeat in 5 years    EXTRACORPOREAL SHOCK WAVE LITHOTRIPSY      [place]    FRACTURE SURGERY Left     has plates and screws in place.    GALLBLADDER SURGERY  1995    GASTRIC BYPASS  1997    HERNIA REPAIR  1998    HYSTERECTOMY  2013    due to vaginal prolpase with BSO - also bladder suspension at the same time - [facility]    loop recording N/A 3/18/2016    [facility]    OPEN REDUCTION INTERNAL FIXATION-   PROXIMAL HUMERUS Left 5/30/2016    [facility] OR    TYMPANOPLASTY Left     replaced and then repair x2        Review of patient's allergies indicates:  No Known Allergies    No current facility-administered medications on file prior to encounter.      Current Outpatient Medications on File Prior to Encounter   Medication Sig    atorvastatin (LIPITOR) 10 MG tablet TAKE 1 TABLET(10 MG) BY MOUTH EVERY DAY    b complex vitamins capsule Take 1 capsule by mouth once daily.    cetirizine (ZYRTEC) 10 MG tablet Take 10 mg by mouth once daily.    cyanocobalamin 1,000 mcg/mL injection Inject 1 mL (1,000 mcg total) into the muscle every 14 (fourteen) days.    fluticasone (FLONASE) 50 mcg/actuation nasal spray 2 sprays (100 mcg total) by Each Nare route once daily.    oxybutynin (DITROPAN-XL) 10 MG 24 hr tablet Take 1 tablet (10 mg total) by mouth once daily.    paroxetine (PAXIL) 20 MG tablet Take 1 tablet (20 mg total) by mouth every morning.    rivaroxaban (XARELTO) 20 mg Tab Take 20 mg by mouth daily with dinner or evening meal.    albuterol 90 mcg/actuation inhaler Inhale 1-2 puffs into the lungs every 6 (six) hours as needed for Wheezing.    blood sugar diagnostic Strp To check BG 1 times daily, to use with insurance preferred meter    blood sugar diagnostic Strp For daily and prn checks    blood-glucose meter kit To check BG 1 times daily, to use with insurance preferred meter    conjugated estrogens (PREMARIN) vaginal cream Place 0.5 g vaginally twice a week.    lancets Misc To check BG 1 times daily, to use with insurance preferred meter    multivitamin with minerals tablet Take 1 tablet by mouth once daily.     Family History     Problem Relation (Age of Onset)    Alzheimer's disease Father, Paternal Grandfather    Aneurysm Mother    COPD Paternal Grandmother    Cancer Mother, Paternal Grandmother    Diabetes Father    Heart disease Paternal Grandfather    Hypertension  Mother    Mental illness Sister    No Known Problems Daughter, Son    Stroke Maternal Grandfather        Tobacco Use    Smoking status: Former Smoker     Packs/day: 2.00     Years: 20.00     Pack years: 40.00     Last attempt to quit: 1995     Years since quittin.4    Smokeless tobacco: Never Used   Substance and Sexual Activity    Alcohol use: Yes     Alcohol/week: 1.8 oz     Types: 3 Glasses of wine per week    Drug use: No    Sexual activity: Never     Review of Systems   Constitution: Negative for chills, fever and malaise/fatigue.   HENT: Negative for congestion and nosebleeds.         Small scalp wound-covered with dressing   Eyes: Negative for blurred vision.   Cardiovascular: Negative for chest pain, dyspnea on exertion, irregular heartbeat, leg swelling, near-syncope, orthopnea, palpitations, paroxysmal nocturnal dyspnea and syncope.        ILR implant   Respiratory: Negative for cough, hemoptysis, shortness of breath, sleep disturbances due to breathing, sputum production and wheezing.    Endocrine: Negative for polyphagia.   Hematologic/Lymphatic: Negative for bleeding problem. Does not bruise/bleed easily.   Skin: Negative for itching and rash.   Musculoskeletal: Negative for back pain, joint swelling, muscle cramps and muscle weakness.   Gastrointestinal: Negative for bloating, abdominal pain, hematemesis, hematochezia, nausea and vomiting.   Genitourinary: Negative for dysuria and hematuria.   Neurological: Negative for dizziness, focal weakness, headaches, light-headedness, loss of balance, numbness and weakness.   Psychiatric/Behavioral: Negative for altered mental status.     Objective:     Vital Signs (Most Recent):  Temp: 97.2 °F (36.2 °C) (04/15/19 0600)  Pulse: (!) 56 (04/15/19 0600)  Resp: 20 (04/15/19 0600)  BP: 132/67 (04/15/19 0610)  SpO2: 98 % (04/15/19 06) Vital Signs (24h Range):  Temp:  [97.2 °F (36.2 °C)] 97.2 °F (36.2 °C)  Pulse:  [56] 56  Resp:  [20] 20  SpO2:  [98  %] 98 %  BP: (127-132)/(66-67) 132/67       Weight: 69.9 kg (154 lb)  Body mass index is 25.63 kg/m².    SpO2: 98 %  O2 Device (Oxygen Therapy): room air    Physical Exam   Constitutional: She is oriented to person, place, and time. She appears well-developed and well-nourished. No distress.   HENT:   Head: Normocephalic and atraumatic.       Mouth/Throat: No oropharyngeal exudate.   Eyes: Pupils are equal, round, and reactive to light. Conjunctivae are normal. Right eye exhibits no discharge. Left eye exhibits no discharge.   Neck: Normal range of motion. Neck supple.   Cardiovascular: Normal rate, regular rhythm, S1 normal, S2 normal, normal heart sounds, intact distal pulses and normal pulses.  No extrasystoles are present. PMI is not displaced. Exam reveals no gallop and no friction rub.   No murmur heard.  Pulses:       Radial pulses are 2+ on the right side, and 2+ on the left side.        Dorsalis pedis pulses are 2+ on the right side, and 2+ on the left side.   Pulmonary/Chest: Effort normal and breath sounds normal. No accessory muscle usage. No respiratory distress. She has no decreased breath sounds. She has no wheezes. She has no rhonchi. She has no rales. She exhibits no tenderness.   ILR implant to left chest wall in good condition.   Abdominal: Soft. Bowel sounds are normal. She exhibits no distension and no mass. There is no tenderness. There is no rebound and no guarding.   Musculoskeletal: Normal range of motion. She exhibits no edema.   Neurological: She is alert and oriented to person, place, and time. She has normal strength. She is not disoriented. No cranial nerve deficit or sensory deficit. She exhibits normal muscle tone. Coordination and gait normal.   Skin: Skin is warm and dry. No rash noted. She is not diaphoretic. No erythema.   Psychiatric: She has a normal mood and affect. Her behavior is normal. Judgment and thought content normal.   Nursing note and vitals reviewed.    Significant  Labs: Labs from 4/9/19 through today reviewed.   Significant Imaging: Echocardiogram:   2D echo with color flow doppler:   Results for orders placed or performed during the hospital encounter of 02/16/16   2D echo with color flow doppler   Result Value Ref Range    QEF 55 55 - 65    Diastolic Dysfunction Yes (A)     Aortic Valve Regurgitation MILD     Est. PA Systolic Pressure 22     Mitral Valve Mobility NORMAL     Tricuspid Valve Regurgitation TRIVIAL TO MILD     and Transthoracic echo (TTE) complete (Cupid Only):   Results for orders placed or performed during the hospital encounter of 04/08/19   Transthoracic echo (TTE) 2D with Color Flow   Result Value Ref Range    LVIDS 2.86 2.1 - 4.0 cm    STJ 2.71 cm    AV mean gradient 11 mmHg    Ao peak jonathon 2.39 m/s    Ao VTI 53.9 cm    IVS 0.806 0.6 - 1.1 cm    LA size 3.1 cm    LVIDD 4.71 3.5 - 6.0 cm    LVOT diameter 1.9 cm    LVOT peak VTI 38.6 cm    PW 0.710 0.6 - 1.1 cm    E wave decelartion time 289 msec    RA Major Axis 5.29 cm    RA Width 4.28 cm    TR Max Jonathon 2.33 m/s    LA WIDTH 3.79 cm    PV PEAK VELOCITY 113 cm/s    BSA 1.75 m2    FS 39 28 - 44 %    LA volume 66.40 cm3    LV mass 114.46 g    Left Ventricle Relative Wall Thickness 0.30 cm    AV valve area 2.03 cm2    AV Velocity Ratio 0.67     AV index (prosthetic) 0.72     LVOT area 2.83 cm2    LVOT peak jonathon 1.6 m/s    LVOT stroke volume 109.39 cm3    AV peak gradient 22.85 mmHg    MV Peak E Jonathon 1.14 m/s    LA Volume Index 38.2 mL/m2    LV Mass Index 65.8 g/m2    Left Atrium Minor Axis 7.25 cm    Left Atrium Major Axis 6.14 cm    Triscuspid Valve Regurgitation Peak Gradient 21.72 mmHg     Assessment and Plan:     * PAF (paroxysmal atrial fibrillation)  -PRISCILLA  -AF PVI  -Anesthesia for sedation      Prior to procedure, extensive discussion with patient regarding risks and benefits of PVI, and patient would like to proceed. Dr. Shah at bedside speaking with patient.  Risks of procedure include but are not  limited to the risk of infection, bleeding, stroke, paralysis, cardiac tamponade, pulmonary vein stenosis, atrioesophageal fistula, phrenic nerve damage, MI, embolism, perforation requiring emergency draining or surgery, AV block, possibility for need for  pacemaker implantation, and death.  The patient voices understanding and all questions have been answered. No further questions/concerns voiced at this time.      Iris Pena NP  Cardiac Electrophysiology  [facility]    Attending: Edmund Shah MD

## 2019-04-15 NOTE — TRANSFER OF CARE
"Anesthesia Transfer of Care Note    Patient: Yara Whitehead    Procedure(s) Performed: Procedure(s) (LRB):  Ablation atrial fibrillation (N/A)  Transesophageal echo (PRISCILLA) intra-procedure log documentation (N/A)    Patient location: Cath Lab    Anesthesia Type: general    Transport from OR: Transported from OR on 6-10 L/min O2 by face mask with adequate spontaneous ventilation    Post pain: adequate analgesia    Post assessment: no apparent anesthetic complications    Post vital signs: stable    Level of consciousness: awake, alert and oriented    Nausea/Vomiting: no nausea/vomiting    Complications: none    Transfer of care protocol was followed      Last vitals:   Visit Vitals  /67 (BP Location: Left arm, Patient Position: Lying)   Pulse (!) 56   Temp 36.2 °C (97.2 °F) (Oral)   Resp 20   Ht 5' 5" (1.651 m)   Wt 69.9 kg (154 lb)   LMP  (LMP Unknown)   SpO2 98%   Breastfeeding? No   BMI 25.63 kg/m²     "

## 2019-04-15 NOTE — Clinical Note
Heels and knees floated. A warming blanket was applied to the patient's lower half. A safety strap was put over the patient's knees for safety.

## 2019-04-15 NOTE — HPI
Ms. Whitehead is a 68 year old female with a PMHx of HTN, morbid obesity, Sleep apnea (on CPAP), atrial fibrillation, RBBB, atrial flutter, subaortic membrane, fall with subsequent subdural hematoma.  She has a son who works for [employer] in [place].  Has paroxysmal symptomatic atrial fibrillation, starting 1/2016. Had been on Flecainide. Noted to have recurrence while in hospital with arm fracture. Flecainide increased to 150 mg BID. Underwent DCCV 7/1/16.  Continued to have breakthrough.  We scheduled her for PVI + RFA for atrial flutter, and in interim initiated Sotalol.  Cancelled procedure as she felt great on Sotalol.  At last follow-up with me in 2017 was still having paroxysmal AF but this was thought to be related to alcohol.  Woke up in middle of night to use bathroom >> dizzy, fell and suffered subdural hematoma.  Ultimately cleared to resume Xarelto.  ILR revealed pauses and continued AF.  PPM recommended.  She canceled procedure.  Her son wants her to consider leadless device.  ILR reveals No significant AF since 11/18.  No true pauses.  Does have sinus bradycardia.  Has noted fatigue since her subdural hematoma.     Has lost a significant amount of weight.  During her last visit with Dr. Shah 2/18/19, he would not endorse leadless device at this time as this would be ventricular pacing only.  PVI recommended at this time, and then potentially discontinue Sotalol to prevent bradycardia.     CTA of chest to delineate PV anatomy completed on 4/1/19. Reviewed per Dr. Shah.  Plan for Cryoablation.  PRISCILLA prior. Hold Xarelto day of procedure. Hold Sotalol 3 days prior.     Ms. Whitehead presented to the ED on     She presents today to [facility]SCU for scheduled EP study with AF PVI with Dr. Shah. She denies any chest pain, SOB, dizziness, light headedness, weakness, syncope, or near syncopal episodes. He does state he has BOWSER, palpitations, and fatigue when out of [facility]. She denies any bleeding, infections, rashes, fevers,  or surgeries in the past 30 days. She is currently taking sotalol 40 mg BID (last dose was 4/14/19 AM) and xarelto 20 mg BID (last dose yesterday PM). PRISCILLA prior to procedure as patient missed a dose of her xarelto two weeks ago. She denies Hx of CVA/TIA or VENKAT thrombus.    I have personally reviewed the patient's EKG today, which shows sinus bradycardia with RBBB aty 54 bpm. QTc is 474 ms. Reviewed with Dr. Shah.

## 2019-04-15 NOTE — ANESTHESIA PREPROCEDURE EVALUATION
04/15/2019  Yara Whitehead is a 68 y.o., female presenting for a fib ablation.    Past Medical History:   Diagnosis Date    Allergy     GERD (gastroesophageal reflux disease)     Hypertension     Hypothyroidism     ALESSANDRA (iron deficiency anemia)     Insomnia     Nephrolithiasis     [place] eswl - [place]    FABIANA on CPAP     PAF (paroxysmal atrial fibrillation)     Subaortic membrane     Vitamin B 12 deficiency     Vitamin D deficiency      Past Surgical History:   Procedure Laterality Date    abdomenalplasty      CARDIOVERSION N/A 8/28/2016    [facility]    CARDIOVERSION N/A 7/1/2016    [facility]    COLONOSCOPY  2012    normal, repeat in 5 years    EXTRACORPOREAL SHOCK WAVE LITHOTRIPSY      [place]    FRACTURE SURGERY Left     has plates and screws in place.    GALLBLADDER SURGERY  1995    GASTRIC BYPASS  1997    HERNIA REPAIR  1998    HYSTERECTOMY  2013    due to vaginal prolpase with BSO - also bladder suspension at the same time - [facility]    loop recording N/A 3/18/2016    [facility] CATH    OPEN REDUCTION INTERNAL FIXATION-  PROXIMAL HUMERUS Left 5/30/2016    [facility] OR    TYMPANOPLASTY Left     replaced and then repair x2      Review of patient's allergies indicates:  No Known Allergies  No current facility-administered medications on file prior to encounter.      Current Outpatient Medications on File Prior to Encounter   Medication Sig Dispense Refill    atorvastatin (LIPITOR) 10 MG tablet TAKE 1 TABLET(10 MG) BY MOUTH EVERY DAY 90 tablet 4    b complex vitamins capsule Take 1 capsule by mouth once daily.      cetirizine (ZYRTEC) 10 MG tablet Take 10 mg by mouth once daily.      cyanocobalamin 1,000 mcg/mL injection Inject 1  mL (1,000 mcg total) into the muscle every 14 (fourteen) days. 6 mL 3    fluticasone (FLONASE) 50 mcg/actuation nasal spray 2 sprays (100 mcg total) by Each Nare route once daily. 1 Bottle 6    oxybutynin (DITROPAN-XL) 10 MG 24 hr tablet Take 1 tablet (10 mg total) by mouth once daily. 30 tablet 11    paroxetine (PAXIL) 20 MG tablet Take 1 tablet (20 mg total) by mouth every morning. 90 tablet 3    rivaroxaban (XARELTO) 20 mg Tab Take 20 mg by mouth daily with dinner or evening meal.      albuterol 90 mcg/actuation inhaler Inhale 1-2 puffs into the lungs every 6 (six) hours as needed for Wheezing. 1 Inhaler 0    blood sugar diagnostic Strp To check BG 1 times daily, to use with insurance preferred meter 100 strip 3    blood sugar diagnostic Strp For daily and prn checks 50 strip prn    blood-glucose meter kit To check BG 1 times daily, to use with insurance preferred meter 1 each 0    conjugated estrogens (PREMARIN) vaginal cream Place 0.5 g vaginally twice a week. 30 g 1    lancets Misc To check BG 1 times daily, to use with insurance preferred meter 100 each 3    multivitamin with minerals tablet Take 1 tablet by mouth once daily.       Lab Results   Component Value Date    WBC 4.29 04/09/2019    HGB 13.1 04/09/2019    HCT 38.5 04/09/2019    MCV 99 (H) 04/09/2019     04/09/2019     BMP  Lab Results   Component Value Date     04/11/2019    K 4.0 04/11/2019     04/11/2019    CO2 27 04/11/2019    BUN 21 (H) 04/11/2019    CREATININE 0.48 (L) 04/11/2019    CALCIUM 9.7 04/11/2019    ANIONGAP 11 04/11/2019    ESTGFRAFRICA >60 04/11/2019    EGFRNONAA >60 04/11/2019     TTE 04/09/2019  · Normal left ventricular systolic function. The estimated ejection fraction is 70%  · Mild left atrial enlargement.  · Mild aortic regurgitation.  · Severe mitral sclerosis, MAC, no stenosis.  · Trace to Mild mitral regurgitation.  · Mild tricuspid regurgitation.    Anesthesia Evaluation    I have reviewed the  Patient Summary Reports.     I have reviewed the Medications.     Review of Systems  Anesthesia Hx:  No problems with previous Anesthesia   Denies Personal Hx of Anesthesia complications.   Cardiovascular:   Exercise tolerance: poor Hypertension Dysrhythmias atrial fibrillation ECG has been reviewed.    Pulmonary:   Sleep Apnea No longer requires CPAP   Renal/:   renal calculi    Hepatic/GI:   GERD, well controlled Hx of gastric bypass, lost ~90 lbs   Neurological:   Denies CVA. Headaches Denies Seizures. SDH in January       Physical Exam  General:  Well nourished    Airway/Jaw/Neck:  Airway Findings: Mouth Opening: Normal Tongue: Normal  General Airway Assessment: Adult  Mallampati: II  TM Distance: Normal, at least 6 cm  Jaw/Neck Findings:  Neck ROM: Extension Decreased, Mild      Dental:  Dental Findings:         Mental Status:  Mental Status Findings:  Cooperative, Alert and Oriented         Anesthesia Plan  Type of Anesthesia, risks & benefits discussed:  Anesthesia Type:  general  Patient's Preference:   Intra-op Monitoring Plan: arterial line and standard ASA monitors  Intra-op Monitoring Plan Comments:   Post Op Pain Control Plan: per primary service following discharge from PACU and IV/PO Opioids PRN  Post Op Pain Control Plan Comments:   Induction:   IV  Beta Blocker:  Patient is not currently on a Beta-Blocker (No further documentation required).       Informed Consent: Patient understands risks and agrees with Anesthesia plan.  Questions answered. Anesthesia consent signed with patient.  ASA Score: 3     Day of Surgery Review of History & Physical:    H&P update referred to the surgeon.     Anesthesia Plan Notes: A line, 2 PIVs, GETA, video laryngoscope available.        Ready For Surgery From Anesthesia Perspective.

## 2019-04-15 NOTE — HPI
67 yo female with Htn, morbid obesity, Sleep apnea (on CPAP), atrial fibrillation, RBBB, atrial flutter, subaortic membrane here for outpatient PVI with Dr. Shah. PRISCILLA requested prior to PVI to r/o thrombus in the VENKAT and LA. She has not had a prior PRISCILLA before. She is in NSR but missed Xarelto two weeks ago.     2D echo with color flow:  · Normal left ventricular systolic function. The estimated ejection fraction is 70%  · Mild left atrial enlargement.  · Mild aortic regurgitation.  · Severe mitral sclerosis, MAC, no stenosis.  · Trace to Mild mitral regurgitation.  Mild tricuspid regurgitation.     Dysphagia or odynophagia:  No  Liver Disease, esophageal disease, or known varices:  No  Upper GI Bleeding: No  Snoring:  Yes  Sleep Apnea:  No  Prior neck surgery or radiation:  No  History of anesthetic difficulties:  No  Family history of anesthetic difficulties:  No  Last oral intake:  12 hours ago  Able to move neck in all directions:  Yes    Mallampati: 1   ASA: 3

## 2019-04-15 NOTE — PROGRESS NOTES
Bladder scanner obtained and done >999.   Dr. Shah notified through Kenna CAPELLAN In and Out cath done.

## 2019-04-15 NOTE — PLAN OF CARE
"Vss. sats 99% on room air. Pt tolerated in and out catheter per md order in ep pacu. Removed at 1230, 1315 ml of clear yellow urine noted.  Pt states "I don't have any bladder pressure anymore."  sscu rn notified. Pt's sister re updated over phone by ep pacu rn. Verbalizes understanding. Notified of new room number. sscu rn to assist family to new room from 3rd floor waiting room. anthony groin sutures to be removed at 1420. Palpable pulses noted. Pt tolerating po intake.  See flowsheet for full assessment. Pt denies pain or sob.   "

## 2019-04-15 NOTE — SUBJECTIVE & OBJECTIVE
Past Medical History:   Diagnosis Date    Allergy     GERD (gastroesophageal reflux disease)     Hypertension     Hypothyroidism     ALESSANDRA (iron deficiency anemia)     Insomnia     Nephrolithiasis     had eswl - [place]    FABIANA on CPAP     PAF (paroxysmal atrial fibrillation)     Subaortic membrane     Vitamin B 12 deficiency     Vitamin D deficiency        Past Surgical History:   Procedure Laterality Date    abdomenalplasty      CARDIOVERSION N/A 8/28/2016    Performed by [facility]ali [facility] at [facility]    CARDIOVERSION N/A 7/1/2016    [facility]formed by [facility] at [facility]    COLONOSCOPY  2012    normal, repeat in 5 years    EXTRACORPOREAL SHOCK WAVE LITHOTRIPSY      [place]    FRACTURE SURGERY Left     has plates and screws in place.    GALLBLADDER SURGERY  1995    GASTRIC BYPASS  1997    HERNIA REPAIR  1998    HYSTERECTOMY  2013    due to vaginal prolpase with BSO - also bladder suspension at the same time - [facility]    loop recording N/A 3/18/2016    Performed by [facility]ano Garcia [facility] at [facility] CATH    OPEN REDUCTION INTERNAL FIXATION-  PROXIMAL HUMERUS Left 5/30/2016    Performed by Viet Romo [facility] at [facility] OR    TYMPANOPLASTY Left     replaced and then repair x2        Review of patient's allergies indicates:  No Known Allergies    No current facility-administered medications on file prior to encounter.      Current Outpatient Medications on File Prior to Encounter   Medication Sig    atorvastatin (LIPITOR) 10 MG tablet TAKE 1 TABLET(10 MG) BY MOUTH EVERY DAY    b complex vitamins capsule Take 1 capsule by mouth once daily.    cetirizine (ZYRTEC) 10 MG tablet Take 10 mg by mouth once daily.    cyanocobalamin 1,000 mcg/mL injection Inject 1 mL (1,000 mcg total) into the muscle every 14 (fourteen) days.    fluticasone (FLONASE) 50 mcg/actuation nasal spray 2 sprays (100 mcg total) by Each Nare route once daily.    oxybutynin (DITROPAN-XL) 10 MG 24 hr tablet  Take 1 tablet (10 mg total) by mouth once daily.    paroxetine (PAXIL) 20 MG tablet Take 1 tablet (20 mg total) by mouth every morning.    rivaroxaban (XARELTO) 20 mg Tab Take 20 mg by mouth daily with dinner or evening meal.    albuterol 90 mcg/actuation inhaler Inhale 1-2 puffs into the lungs every 6 (six) hours as needed for Wheezing.    blood sugar diagnostic Strp To check BG 1 times daily, to use with insurance preferred meter    blood sugar diagnostic Strp For daily and prn checks    blood-glucose meter kit To check BG 1 times daily, to use with insurance preferred meter    conjugated estrogens (PREMARIN) vaginal cream Place 0.5 g vaginally twice a week.    lancets Misc To check BG 1 times daily, to use with insurance preferred meter    multivitamin with minerals tablet Take 1 tablet by mouth once daily.     Family History     Problem Relation (Age of Onset)    Alzheimer's disease Father, Paternal Grandfather    Aneurysm Mother    COPD Paternal Grandmother    Cancer Mother, Paternal Grandmother    Diabetes Father    Heart disease Paternal Grandfather    Hypertension Mother    Mental illness Sister    No Known Problems Daughter, Son    Stroke Maternal Grandfather        Tobacco Use    Smoking status: Former Smoker     Packs/day: 2.00     Years: 20.00     Pack years: 40.00     Last attempt to quit: 1995     Years since quittin.4    Smokeless tobacco: Never Used   Substance and Sexual Activity    Alcohol use: Yes     Alcohol/week: 1.8 oz     Types: 3 Glasses of wine per week    Drug use: No    Sexual activity: Never     Review of Systems   Constitution: Negative for chills and fever.   Cardiovascular: Negative for chest pain, dyspnea on exertion, near-syncope and palpitations.   Musculoskeletal: Negative for back pain.   Gastrointestinal: Negative for diarrhea, nausea and vomiting.   Genitourinary: Negative for frequency.   Neurological: Negative for headaches.     Objective:     Vital  Signs (Most Recent):  Temp: 97.2 °F (36.2 °C) (04/15/19 0600)  Pulse: (!) 56 (04/15/19 0600)  Resp: 20 (04/15/19 0600)  BP: 132/67 (04/15/19 0610)  SpO2: 98 % (04/15/19 0600) Vital Signs (24h Range):  Temp:  [97.2 °F (36.2 °C)] 97.2 °F (36.2 °C)  Pulse:  [56] 56  Resp:  [20] 20  SpO2:  [98 %] 98 %  BP: (127-132)/(66-67) 132/67     Weight: 69.9 kg (154 lb)  Body mass index is 25.63 kg/m².    SpO2: 98 %  O2 Device (Oxygen Therapy): room air    No intake or output data in the 24 hours ending 04/15/19 0658    Lines/Drains/Airways     Peripheral Intravenous Line                 Peripheral IV - Single Lumen 04/15/19 0624 18 G Right Forearm less than 1 day         Peripheral IV - Single Lumen 04/15/19 0625 20 G Left Hand less than 1 day                Physical Exam   Constitutional: She is oriented to person, place, and time. She appears well-developed and well-nourished.   HENT:   Head: Normocephalic and atraumatic.   Eyes: Pupils are equal, round, and reactive to light. EOM are normal.   Neck: Normal range of motion. Neck supple. No JVD present.   Cardiovascular: Normal rate and regular rhythm.   Pulmonary/Chest: Effort normal and breath sounds normal.   Abdominal: Soft. Bowel sounds are normal.   Musculoskeletal: Normal range of motion. She exhibits no edema.   Neurological: She is alert and oriented to person, place, and time.       Significant Labs: CMP No results for input(s): NA, K, CL, CO2, GLU, BUN, CREATININE, CALCIUM, PROT, ALBUMIN, BILITOT, ALKPHOS, AST, ALT, ANIONGAP, ESTGFRAFRICA, EGFRNONAA in the last 48 hours., CBC No results for input(s): WBC, HGB, HCT, PLT in the last 48 hours. and All pertinent lab results from the last 24 hours have been reviewed.    Significant Imaging: Echocardiogram:   2D echo with color flow doppler:   Results for orders placed or performed during the hospital encounter of 02/16/16   2D echo with color flow doppler   Result Value Ref Range    QEF 55 55 - 65    Diastolic Dysfunction  Yes (A)     Aortic Valve Regurgitation MILD     Est. PA Systolic Pressure 22     Mitral Valve Mobility NORMAL     Tricuspid Valve Regurgitation TRIVIAL TO MILD     and Transthoracic echo (TTE) complete (Cupid Only):   Results for orders placed or performed during the hospital encounter of 04/08/19   Transthoracic echo (TTE) 2D with Color Flow   Result Value Ref Range    LVIDS 2.86 2.1 - 4.0 cm    STJ 2.71 cm    AV mean gradient 11 mmHg    Ao peak jonathon 2.39 m/s    Ao VTI 53.9 cm    IVS 0.806 0.6 - 1.1 cm    LA size 3.1 cm    LVIDD 4.71 3.5 - 6.0 cm    LVOT diameter 1.9 cm    LVOT peak VTI 38.6 cm    PW 0.710 0.6 - 1.1 cm    E wave decelartion time 289 msec    RA Major Axis 5.29 cm    RA Width 4.28 cm    TR Max Jonathon 2.33 m/s    LA WIDTH 3.79 cm    PV PEAK VELOCITY 113 cm/s    BSA 1.75 m2    FS 39 28 - 44 %    LA volume 66.40 cm3    LV mass 114.46 g    Left Ventricle Relative Wall Thickness 0.30 cm    AV valve area 2.03 cm2    AV Velocity Ratio 0.67     AV index (prosthetic) 0.72     LVOT area 2.83 cm2    LVOT peak jonathon 1.6 m/s    LVOT stroke volume 109.39 cm3    AV peak gradient 22.85 mmHg    MV Peak E Jonathon 1.14 m/s    LA Volume Index 38.2 mL/m2    LV Mass Index 65.8 g/m2    Left Atrium Minor Axis 7.25 cm    Left Atrium Major Axis 6.14 cm    Triscuspid Valve Regurgitation Peak Gradient 21.72 mmHg    and X-Ray: CXR: X-Ray Chest 1 View (CXR): No results found for this visit on 04/15/19.

## 2019-04-16 VITALS
TEMPERATURE: 98 F | HEIGHT: 65 IN | RESPIRATION RATE: 18 BRPM | WEIGHT: 154 LBS | OXYGEN SATURATION: 98 % | DIASTOLIC BLOOD PRESSURE: 67 MMHG | SYSTOLIC BLOOD PRESSURE: 135 MMHG | BODY MASS INDEX: 25.66 KG/M2 | HEART RATE: 61 BPM

## 2019-04-16 LAB
POC ACTIVATED CLOTTING TIME K: 290 SEC (ref 74–137)
POC ACTIVATED CLOTTING TIME K: 329 SEC (ref 74–137)
POC ACTIVATED CLOTTING TIME K: 428 SEC (ref 74–137)
POC ACTIVATED CLOTTING TIME K: 433 SEC (ref 74–137)
POCT GLUCOSE: 117 MG/DL (ref 70–110)
POCT GLUCOSE: 118 MG/DL (ref 70–110)
POCT GLUCOSE: 62 MG/DL (ref 70–110)
SAMPLE: ABNORMAL

## 2019-04-16 PROCEDURE — 25000003 PHARM REV CODE 250: Performed by: NURSE PRACTITIONER

## 2019-04-16 PROCEDURE — 82962 GLUCOSE BLOOD TEST: CPT

## 2019-04-16 RX ORDER — PANTOPRAZOLE SODIUM 40 MG/1
40 TABLET, DELAYED RELEASE ORAL DAILY
Qty: 30 TABLET | Refills: 0 | Status: SHIPPED | OUTPATIENT
Start: 2019-04-16 | End: 2019-07-16

## 2019-04-16 RX ORDER — PANTOPRAZOLE SODIUM 40 MG/1
TABLET, DELAYED RELEASE ORAL
Qty: 90 TABLET | Refills: 0 | OUTPATIENT
Start: 2019-04-16

## 2019-04-16 RX ADMIN — OXYBUTYNIN CHLORIDE 10 MG: 5 TABLET, EXTENDED RELEASE ORAL at 08:04

## 2019-04-16 RX ADMIN — ATORVASTATIN CALCIUM 10 MG: 10 TABLET, FILM COATED ORAL at 08:04

## 2019-04-16 RX ADMIN — LEVOTHYROXINE SODIUM 88 MCG: 88 TABLET ORAL at 05:04

## 2019-04-16 RX ADMIN — PAROXETINE 20 MG: 10 TABLET, FILM COATED ORAL at 06:04

## 2019-04-16 RX ADMIN — PANTOPRAZOLE SODIUM 40 MG: 40 TABLET, DELAYED RELEASE ORAL at 08:04

## 2019-04-16 RX ADMIN — SOTALOL HYDROCHLORIDE 40 MG: 80 TABLET ORAL at 08:04

## 2019-04-16 NOTE — PLAN OF CARE
Problem: Fall Injury Risk  Goal: Absence of Fall and Fall-Related Injury    Intervention: Promote Injury-Free Environment  Pt in bed. No falls noted at this time. Fall precaution maintained. Instructed pt to call for any assistance. jordan gomes.

## 2019-04-16 NOTE — PROGRESS NOTES
Nurse called into room by patient. Pt had saturated right groin dressing after ambulating from bathroom. Pt seemed to have a pocket of blood under skin. Nurse held pressure for 10mins. Hemostasis achieved at 1905. New gauze and tegaderm applied to site. Dressing is CDI with no evidence of hematoma or bleeding. Will continue to monitor.

## 2019-04-17 NOTE — DISCHARGE SUMMARY
[facility]  Cardiac Electrophysiology  Discharge Summary      Patient Name: Yara Whitehead  MRN: [id number]  Admission Date: 4/15/2019  Hospital Length of Stay: 0 days  Discharge Date and Time: 4/16/2019  1:09 PM  Attending Physician: Edmund Shah MD   Discharging Provider: Iris Pena NP  Primary Care Physician: Ann Richards DO    HPI: Ms. Whitehead is a 68 year old female with a PMHx of HTN, morbid obesity, Sleep apnea (on CPAP), atrial fibrillation, RBBB, atrial flutter, subaortic membrane, fall with subsequent subdural hematoma.  She has a son who works for [employer] in [place].  Has paroxysmal symptomatic atrial fibrillation, starting 1/2016. Had been on Flecainide. Noted to have recurrence while in hospital with arm fracture. Flecainide increased to 150 mg BID. Underwent DCCV 7/1/16.  Continued to have breakthrough.  We scheduled her for PVI + RFA for atrial flutter, and in interim initiated Sotalol.  Cancelled procedure as she felt great on Sotalol.  At last follow-up with me in 2017 was still having paroxysmal AF but this was thought to be related to alcohol.  Woke up in middle of night to use bathroom >> dizzy, fell and suffered subdural hematoma.  Ultimately cleared to resume Xarelto.  ILR revealed pauses and continued AF.  PPM recommended.  She canceled procedure.  Her son wants her to consider leadless device.  ILR reveals No significant AF since 11/18.  No true pauses.  Does have sinus bradycardia.  Has noted fatigue since her subdural hematoma.     Has lost a significant amount of weight.  During her last visit with Dr. Shah 2/18/19, he would not endorse leadless device at this time as this would be ventricular pacing only.  PVI recommended at this time, and then potentially discontinue Sotalol to prevent bradycardia.     CTA of chest to delineate PV anatomy completed on 4/1/19. Reviewed per Dr. Shah.  Plan for Cryoablation.  PRISCILLA prior. Hold Xarelto day of procedure.       Ms. Whitehead presented to the ED on 4/8/19 for palpations and BOWSER. Patient was admitted TTE showed EF 70 %. Patient sent home on sotalol 40 mg BID and continue xarelto. Plan to continue with PVI with Dr. Shah.     She presents today to [facility] for scheduled EP study with  AF PVI with Dr. Shah. She denies any chest pain, SOB, dizziness, light headedness, weakness, syncope, or near syncopal episodes. He does state he has BOWSER, palpitations, and fatigue when out of [facility]. She denies any bleeding, infections, rashes, fevers, or surgeries in the past 30 days. She is currently taking sotalol 40 mg BID (last dose was 4/14/19 AM) and xarelto 20 mg BID (last dose yesterday PM). PRISCILLA prior to procedure as patient missed a dose of her xarelto two weeks ago. She denies Hx of CVA/TIA or VENKAT thrombus.     I have personally reviewed the patient's EKG today, which shows sinus bradycardia with RBBB aty 54 bpm. QTc is 474 ms. Reviewed with Dr. Shah.    Procedure(s) (LRB):  Ablation atrial fibrillation (N/A)  Transesophageal echo (PRISCILLA) intra-procedure log documentation (N/A)     Indwelling Lines/Drains at time of discharge:  Lines/Drains/Airways          None        Hospital Course: Hospital Course: Patient underwent PVI ablation (see procedure note for details), tolerated procedure well with no acute complications. Post EKG normal sinus rhythm with pre-existing RBBB at 69 bpm QTc 499 ms. Monitored overnight with no events. Bilateral groins with dressings intact, sites C/D/I. Dressings removed bilaterally, no bleeding, hematoma, or bruit noted. Patient ambulating, tolerating PO intake, and voiding with no issues. She denies any chest pain or SOB.  Scalp wound dressing changed per patient and sister. Discharge plans discussed with Dr. Shah. Patient to continue all home medications including sotalol 40 mg BID and xarelto. Start pantoprazole 40 mg daily x 30 days. Follow up with Dr. hSah in 6 weeks. Discharge plans/instructions  discussed with patient and sister who verbalized understanding and agreement of plans of care. No further questions or concerns voiced at this time. VSS. NSR HR 60s on telemetry.    Physical Exam   Constitutional: She is oriented to person, place, and time. She appears well-developed and well-nourished. No distress.   HENT:   Head: Normocephalic and atraumatic.       Mouth/Throat: No oropharyngeal exudate.   Eyes: Pupils are equal, round, and reactive to light. Conjunctivae are normal. Right eye exhibits no discharge. Left eye exhibits no discharge.   Neck: Normal range of motion. Neck supple.   Cardiovascular: Normal rate, regular rhythm, S1 normal, S2 normal, normal heart sounds, intact distal pulses and normal pulses.  No extrasystoles are present. PMI is not displaced. Exam reveals no gallop and no friction rub.   No murmur heard.  Pulses:       Radial pulses are 2+ on the right side, and 2+ on the left side.        Dorsalis pedis pulses are 2+ on the right side, and 2+ on the left side.   Pulmonary/Chest: Effort normal and breath sounds normal. No accessory muscle usage. No respiratory distress. She has no decreased breath sounds. She has no wheezes. She has no rhonchi. She has no rales. She exhibits no tenderness.   ILR implant to left chest wall in good condition.   Abdominal: Soft. Bowel sounds are normal. She exhibits no distension and no mass. There is no tenderness. There is no rebound and no guarding.   Musculoskeletal: Normal range of motion. She exhibits no edema. Bilateral groin sites C/D/I, no bleeding, hematoma, or bruit noted.   Neurological: She is alert and oriented to person, place, and time. She has normal strength. She is not disoriented. No cranial nerve deficit or sensory deficit. She exhibits normal muscle tone. Coordination and gait normal.   Skin: Skin is warm and dry. No rash noted. She is not diaphoretic. No erythema.   Psychiatric: She has a normal mood and affect. Her behavior is  normal. Judgment and thought content normal.   Nursing note and vitals reviewed.    Consults:   Anesthesia.    Significant Diagnostic Studies: Labs from 4/8/19 through today reviewed.  INR   Lab Results   Component Value Date    INR 1.1 04/08/2019    INR 1.1 01/01/2019    INR 1.0 05/29/2016     Cardiac Graphics: Echocardiogram:   2D echo with color flow doppler:   Results for orders placed or performed during the hospital encounter of 02/16/16   2D echo with color flow doppler   Result Value Ref Range    QEF 55 55 - 65    Diastolic Dysfunction Yes (A)     Aortic Valve Regurgitation MILD     Est. PA Systolic Pressure 22     Mitral Valve Mobility NORMAL     Tricuspid Valve Regurgitation TRIVIAL TO MILD     and Transthoracic echo (TTE) complete (Cupid Only):   Results for orders placed or performed during the hospital encounter of 04/08/19   Transthoracic echo (TTE) 2D with Color Flow   Result Value Ref Range    LVIDS 2.86 2.1 - 4.0 cm    STJ 2.71 cm    AV mean gradient 11 mmHg    Ao peak jonathon 2.39 m/s    Ao VTI 53.9 cm    IVS 0.806 0.6 - 1.1 cm    LA size 3.1 cm    LVIDD 4.71 3.5 - 6.0 cm    LVOT diameter 1.9 cm    LVOT peak VTI 38.6 cm    PW 0.710 0.6 - 1.1 cm    E wave decelartion time 289 msec    RA Major Axis 5.29 cm    RA Width 4.28 cm    TR Max Jonathon 2.33 m/s    LA WIDTH 3.79 cm    PV PEAK VELOCITY 113 cm/s    BSA 1.75 m2    FS 39 28 - 44 %    LA volume 66.40 cm3    LV mass 114.46 g    Left Ventricle Relative Wall Thickness 0.30 cm    AV valve area 2.03 cm2    AV Velocity Ratio 0.67     AV index (prosthetic) 0.72     LVOT area 2.83 cm2    LVOT peak jonathon 1.6 m/s    LVOT stroke volume 109.39 cm3    AV peak gradient 22.85 mmHg    MV Peak E Jonathon 1.14 m/s    LA Volume Index 38.2 mL/m2    LV Mass Index 65.8 g/m2    Left Atrium Minor Axis 7.25 cm    Left Atrium Major Axis 6.14 cm    Triscuspid Valve Regurgitation Peak Gradient 21.72 mmHg     Final Active Diagnoses:    Diagnosis Date Noted POA    PRINCIPAL PROBLEM:  PAF  (paroxysmal atrial fibrillation) [I48.0] 02/17/2016 Yes     Chronic      Problems Resolved During this Admission:       Discharged Condition: stable    Disposition: Home or Self Care    Follow Up:  Follow-up Information     Edmund Shah MD In 6 weeks.    Specialties:  Electrophysiology, Cardiology  Why:  s/p PVI  Contact information:  [address]  [facility] [address]  [phone]                 Patient Instructions:      Diet Cardiac     No driving until:   Order Comments: No driving or operating heavy machinery for 24-48 hours after your procedure because you received sedation.     Other restrictions (specify):   Order Comments: 1. Do not strain or lift anything greater than 5 lb for 1 week. No bending or strenuous activity for 1 week.  2. Do not drive or operate any dangerous machinery for 24-48 hours.   3. After 24 hours, a shower is allowed. No dressings needed to your groin sites.    5. Clean the area with mild soap and water.   6. Once the skin has healed (in 1 week), bathing in a tub or swimming is allowed.   7. Inspect the groin site daily and report to the physician any signs of infection at the site: redness, pain, fever >100.4, unusual pain at the access site or affected extremity, unusual swelling at the access site, or any yellow, white or green drainage.  Call 911 if you have:   Bleeding from the puncture site that you cannot stop by doing the following:   Relax and lie down right away. Keep your leg flat and apply firm pressure to the site using your fingers and a gauze pad. Keep the pressure on for 10 minutes. Continue this until the bleeding stops. This may take awhile. When bleeding stops, cover the site with a sterile bandage and keep your leg still as much as possible.  8. Follow up with Dr. Shah in 6 weeks.  9. New medication: Pantoprazole 40 mg once daily for 30 days after your procedure, THEN discontinue.   10. Continue all of your home medications.     Lifting restrictions    Order Comments: Nothing greater than 5 pounds for 1 week.     Notify your health care provider if you experience any of the following:   Order Comments: For any concerning medical symptoms.     Notify your health care provider if you experience any of the following:  increased confusion or weakness     Notify your health care provider if you experience any of the following:  persistent dizziness, light-headedness, or visual disturbances     Notify your health care provider if you experience any of the following:  worsening rash     Notify your health care provider if you experience any of the following:  severe persistent headache     Notify your health care provider if you experience any of the following:  difficulty breathing or increased cough     Notify your health care provider if you experience any of the following:  redness, tenderness, or signs of infection (pain, swelling, redness, odor or green/yellow discharge around incision site)     Notify your health care provider if you experience any of the following:  severe uncontrolled pain     Notify your health care provider if you experience any of the following:  persistent nausea and vomiting or diarrhea     Notify your health care provider if you experience any of the following:  temperature >100.4     No dressing needed     Shower on day dressing removed (No bath)     Medications:  Reconciled Home Medications:      Medication List      START taking these medications    pantoprazole 40 MG tablet  Commonly known as:  PROTONIX  Take 1 tablet (40 mg total) by mouth once daily.        CONTINUE taking these medications    albuterol 90 mcg/actuation inhaler  Commonly known as:  PROVENTIL/VENTOLIN HFA  Inhale 1-2 puffs into the lungs every 6 (six) hours as needed for Wheezing.     atorvastatin 10 MG tablet  Commonly known as:  LIPITOR  TAKE 1 TABLET(10 MG) BY MOUTH EVERY DAY     b complex vitamins capsule  Take 1 capsule by mouth once daily.     * blood sugar  diagnostic Strp  To check BG 1 times daily, to use with insurance preferred meter     * blood sugar diagnostic Strp  For daily and prn checks     blood-glucose meter kit  To check BG 1 times daily, to use with insurance preferred meter     cetirizine 10 MG tablet  Commonly known as:  ZYRTEC  Take 10 mg by mouth once daily.     conjugated estrogens vaginal cream  Commonly known as:  PREMARIN  Place 0.5 g vaginally twice a week.     cyanocobalamin 1,000 mcg/mL injection  Inject 1 mL (1,000 mcg total) into the muscle every 14 (fourteen) days.     fluticasone 50 mcg/actuation nasal spray  Commonly known as:  FLONASE  2 sprays (100 mcg total) by Each Nare route once daily.     glucose 4 GM chewable tablet  Take 4 tablets when glucose from 51-70  Take 6 tablets when glucose is less than 50     lancets Misc  To check BG 1 times daily, to use with insurance preferred meter     levothyroxine 88 MCG tablet  Commonly known as:  SYNTHROID  Take 1 tablet (88 mcg total) by mouth before breakfast.     multivitamin with minerals tablet  Take 1 tablet by mouth once daily.     oxybutynin 10 MG 24 hr tablet  Commonly known as:  DITROPAN-XL  Take 1 tablet (10 mg total) by mouth once daily.     paroxetine 20 MG tablet  Commonly known as:  PAXIL  Take 1 tablet (20 mg total) by mouth every morning.     sotalol 80 MG tablet  Commonly known as:  BETAPACE  Take 0.5 tablets (40 mg total) by mouth every 12 (twelve) hours.     telmisartan 40 MG Tab  Commonly known as:  MICARDIS  Take 1 tablet (40 mg total) by mouth once daily. Only to start if SBP consistently greater than 130     XARELTO 20 mg Tab  Generic drug:  rivaroxaban  Take 20 mg by mouth daily with dinner or evening meal.         * This list has 2 medication(s) that are the same as other medications prescribed for you. Read the directions carefully, and ask your doctor or other care provider to review them with you.              Plan:  -Continue all home medications including sotalol  40 mg BID and Xarelto.  -pantoprazole 40 mg daily x 30 days.  -Follow up with Dr. Shah in 6 weeks.    Time spent on the discharge of patient: 23 minutes    Iris Pena NP  Cardiac Electrophysiology  [facility]    Attending: Edmund Shah MD

## 2019-04-18 ENCOUNTER — PATIENT MESSAGE (OUTPATIENT)
Dept: FAMILY MEDICINE | Facility: CLINIC | Age: 69
End: 2019-04-18

## 2019-04-18 ENCOUNTER — TELEPHONE (OUTPATIENT)
Dept: ELECTROPHYSIOLOGY | Facility: CLINIC | Age: 69
End: 2019-04-18

## 2019-04-18 NOTE — TELEPHONE ENCOUNTER
Returned call to pt., no answer. Left message with number for pt to return call. Advised I would only be here a couple more minutes if she needed to speak with someone there is an EP doctor on call.         ----- Message from Carlyn Chanel sent at 4/18/2019  3:45 PM CDT -----  Contact: Patient      ----- Message -----  From: Elodia Beyer  Sent: 4/18/2019   3:30 PM  To: Alyssa Shaffer Staff    The Pt is calling to speak with someone regarding her symptoms  from her procedure. Please call her back @ [phone]. Thanks, Elodia

## 2019-04-26 ENCOUNTER — NURSE TRIAGE (OUTPATIENT)
Dept: ADMINISTRATIVE | Facility: CLINIC | Age: 69
End: 2019-04-26

## 2019-04-27 NOTE — TELEPHONE ENCOUNTER
Patient called to report the following:     -hx of ablation a week ago  -irregular heart about an hour ago   -bp 130/73 HR 66  -denies cp difficulty breathing, dizziness,   -denies symptoms at the time of call  -advised on home care and when to call back     Reason for Disposition   [1] Skipped or extra beat(s) AND [2] occurs < 4 times / minute    Protocols used: HEART RATE AND HEARTBEAT [id number]

## 2019-04-29 ENCOUNTER — TELEPHONE (OUTPATIENT)
Dept: ELECTROPHYSIOLOGY | Facility: CLINIC | Age: 69
End: 2019-04-29

## 2019-04-29 NOTE — TELEPHONE ENCOUNTER
----- Message from Anita Arely sent at 4/29/2019 10:49 AM CDT -----  GM yes I will be sending you these episodes via [employer] to review.  Tachy episode on 4/28/19  shows a narrow complex PAT breaking into sinus, lasting 9 seconds, with max V rates of 200 bpm.  On 4/26/19 ECG shows an irregular, narrow complex tachycardia.with max v rates of 286 lasting 1 hour and 26 minutes.        ----- Message -----  From: Chantal Macias RN  Sent: 4/29/2019   8:57 AM  To: [facility] Clinical Staff    Good Morning! I was just on the phone with Yara Whitehead [phone] for  20 minutes! I think she has sent you several transmissions over the weekend. She is s/p PVI Cryo on 4/15/19. Have you seen anything on her?

## 2019-04-29 NOTE — TELEPHONE ENCOUNTER
Spoke with patient for over 17 min. She called EMS last night because her BP monitor showed her HR at 151. When they got there, her HR was 80. She has a device and sent transmissions to [facility] on the [facility]. Attempted to reassure her that it is not uncommon to have some arrhythmia in the first 90 days after ablation. Will reach out to [facility] to see what the transmissions show, discuss with Dr Shah, and call her back with recommendations.

## 2019-04-29 NOTE — TELEPHONE ENCOUNTER
Rec'd transmissions from [facility] arrhythmia. Reviewed with Dr Shah. He is recommending that we send rx for Lopressor 25mg to be taken prn elevated HR. Message left for patient to return call to discuss recommendations. Call back # left.

## 2019-04-30 RX ORDER — METOPROLOL TARTRATE 25 MG/1
25 TABLET, FILM COATED ORAL 3 TIMES DAILY
Qty: 90 TABLET | Refills: 11 | Status: SHIPPED | OUTPATIENT
Start: 2019-04-30 | End: 2020-07-28 | Stop reason: SDUPTHER

## 2019-04-30 NOTE — TELEPHONE ENCOUNTER
----- Message from Carlyn Chanel sent at 4/29/2019  5:15 PM CDT -----  Contact: Patient      ----- Message -----  From: Elodia Beyer  Sent: 4/29/2019   4:48 PM  To: Alyssa Martinez    [facility] Pt is returning a call. Please call her back @ [phone]. Thanks, Elodia

## 2019-04-30 NOTE — TELEPHONE ENCOUNTER
Spoke with patient and advised that I reviewed strips with Dr Shah. He is recommending that she try Lopressor 25mg tid as needed for elevated HR during this recovery period. She verbalizes understanding and will keep me posted if she is having to take them often.

## 2019-05-06 ENCOUNTER — TELEPHONE (OUTPATIENT)
Dept: FAMILY MEDICINE | Facility: CLINIC | Age: 69
End: 2019-05-06

## 2019-05-06 NOTE — TELEPHONE ENCOUNTER
Called pt to reschedule her appt with Isabelle that is scheduled for 5/10/19 and pt said she will have to call back to reschedule.

## 2019-05-07 DIAGNOSIS — R00.1 BRADYCARDIA: ICD-10-CM

## 2019-05-07 DIAGNOSIS — Z95.818 STATUS POST PLACEMENT OF IMPLANTABLE LOOP RECORDER: Primary | ICD-10-CM

## 2019-05-09 ENCOUNTER — OFFICE VISIT (OUTPATIENT)
Dept: CARDIOLOGY | Facility: CLINIC | Age: 69
End: 2019-05-09
Payer: MEDICARE

## 2019-05-09 VITALS
BODY MASS INDEX: 26.38 KG/M2 | WEIGHT: 158.31 LBS | HEART RATE: 63 BPM | SYSTOLIC BLOOD PRESSURE: 116 MMHG | HEIGHT: 65 IN | DIASTOLIC BLOOD PRESSURE: 69 MMHG

## 2019-05-09 DIAGNOSIS — I48.0 PAF (PAROXYSMAL ATRIAL FIBRILLATION): Primary | Chronic | ICD-10-CM

## 2019-05-09 DIAGNOSIS — Z95.818 STATUS POST PLACEMENT OF IMPLANTABLE LOOP RECORDER: ICD-10-CM

## 2019-05-09 DIAGNOSIS — S06.5XAA SUBDURAL HEMATOMA: ICD-10-CM

## 2019-05-09 DIAGNOSIS — I49.5 TACHYCARDIA-BRADYCARDIA: ICD-10-CM

## 2019-05-09 DIAGNOSIS — I10 ESSENTIAL HYPERTENSION: Chronic | ICD-10-CM

## 2019-05-09 PROCEDURE — 99999 PR PBB SHADOW E&M-EST. PATIENT-LVL III: ICD-10-PCS | Mod: PBBFAC,,, | Performed by: INTERNAL MEDICINE

## 2019-05-09 PROCEDURE — 1101F PR PT FALLS ASSESS DOC 0-1 FALLS W/OUT INJ PAST YR: ICD-10-PCS | Mod: CPTII,S$GLB,, | Performed by: INTERNAL MEDICINE

## 2019-05-09 PROCEDURE — 1101F PT FALLS ASSESS-DOCD LE1/YR: CPT | Mod: CPTII,S$GLB,, | Performed by: INTERNAL MEDICINE

## 2019-05-09 PROCEDURE — 3078F DIAST BP <80 MM HG: CPT | Mod: CPTII,S$GLB,, | Performed by: INTERNAL MEDICINE

## 2019-05-09 PROCEDURE — 99499 UNLISTED E&M SERVICE: CPT | Mod: S$GLB,,, | Performed by: INTERNAL MEDICINE

## 2019-05-09 PROCEDURE — 3074F SYST BP LT 130 MM HG: CPT | Mod: CPTII,S$GLB,, | Performed by: INTERNAL MEDICINE

## 2019-05-09 PROCEDURE — 3074F PR MOST RECENT SYSTOLIC BLOOD PRESSURE < 130 MM HG: ICD-10-PCS | Mod: CPTII,S$GLB,, | Performed by: INTERNAL MEDICINE

## 2019-05-09 PROCEDURE — 99999 PR PBB SHADOW E&M-EST. PATIENT-LVL III: CPT | Mod: PBBFAC,,, | Performed by: INTERNAL MEDICINE

## 2019-05-09 PROCEDURE — 99214 PR OFFICE/OUTPT VISIT, EST, LEVL IV, 30-39 MIN: ICD-10-PCS | Mod: S$GLB,,, | Performed by: INTERNAL MEDICINE

## 2019-05-09 PROCEDURE — 3078F PR MOST RECENT DIASTOLIC BLOOD PRESSURE < 80 MM HG: ICD-10-PCS | Mod: CPTII,S$GLB,, | Performed by: INTERNAL MEDICINE

## 2019-05-09 PROCEDURE — 99214 OFFICE O/P EST MOD 30 MIN: CPT | Mod: S$GLB,,, | Performed by: INTERNAL MEDICINE

## 2019-05-09 PROCEDURE — 99499 RISK ADDL DX/OHS AUDIT: ICD-10-PCS | Mod: S$GLB,,, | Performed by: INTERNAL MEDICINE

## 2019-05-09 NOTE — PROGRESS NOTES
Subjective:    Patient ID:  Yara Whitehead is a 68 y.o. female who presents for follow-up of PAF f/u      HPI  Here for f/u ILR/PAF-RFA 4/2019/SSS/SDH 1/19 after trauma. Doing well short burst of AF since RFA. No angina.      ROS     Objective:    Physical Exam   Constitutional: She is oriented to person, place, and time. She appears well-developed and well-nourished.   Eyes: Conjunctivae are normal. No scleral icterus.   Neck: No JVD present. No tracheal deviation present.   Cardiovascular: Normal rate and regular rhythm. PMI is not displaced.   Pulmonary/Chest: Effort normal and breath sounds normal. No respiratory distress.   Abdominal: Soft. There is no hepatosplenomegaly. There is no tenderness.   Musculoskeletal: She exhibits no edema or tenderness.   Neurological: She is alert and oriented to person, place, and time.   Skin: Skin is warm and dry. No rash noted.   Psychiatric: She has a normal mood and affect. Her behavior is normal.               ..    Chemistry        Component Value Date/Time     04/11/2019 0906    K 4.0 04/11/2019 0906     04/11/2019 0906    CO2 27 04/11/2019 0906    BUN 21 (H) 04/11/2019 0906    CREATININE 0.48 (L) 04/11/2019 0906     04/11/2019 0906        Component Value Date/Time    CALCIUM 9.7 04/11/2019 0906    ALKPHOS 78 04/11/2019 0906    AST 32 04/11/2019 0906    ALT 18 04/11/2019 0906    BILITOT 0.6 04/11/2019 0906    ESTGFRAFRICA >60 04/11/2019 0906    EGFRNONAA >60 04/11/2019 0906            ..  Lab Results   Component Value Date    CHOL 165 04/08/2019    CHOL 145 01/26/2018    CHOL 206 (H) 04/05/2017    CHOL 206 (H) 04/05/2017     Lab Results   Component Value Date    HDL 84 (H) 04/08/2019    HDL 62 01/26/2018    HDL 74 04/05/2017    HDL 74 04/05/2017     Lab Results   Component Value Date    LDLCALC 64.2 04/08/2019    LDLCALC 71.8 01/26/2018    LDLCALC 119.6 04/05/2017    LDLCALC 119.6 04/05/2017     Lab Results   Component Value Date    TRIG 84  04/08/2019    TRIG 56 01/26/2018    TRIG 62 04/05/2017    TRIG 62 04/05/2017     Lab Results   Component Value Date    CHOLHDL 50.9 (H) 04/08/2019    CHOLHDL 42.8 01/26/2018    CHOLHDL 35.9 04/05/2017    CHOLHDL 35.9 04/05/2017     ..  Lab Results   Component Value Date    WBC 4.29 04/09/2019    HGB 13.1 04/09/2019    HCT 38.5 04/09/2019    MCV 99 (H) 04/09/2019     04/09/2019       Test(s) Reviewed  I have reviewed the following in detail:  [] Stress test   [] Angiography   [x] Echocardiogram   [x] Labs   [x] Other:       Assessment:         ICD-10-CM ICD-9-CM   1. PAF (paroxysmal atrial fibrillation) I48.0 427.31   2. Essential hypertension I10 401.9   3. Subdural hematoma S06.5X9A 432.1   4. Status post placement of implantable loop recorder Z95.818 V45.09   5. Tachycardia-bradycardia I49.5 427.81          Plan:           Return to clinic 6   Low level/low impact aerobic exercise 5x's/wk. Heart healthy diet and risk factor modification.    See labs and med orders.  Follow ILR/sx's.     Portions of this note may have been created with voice recognition software.  Grammatical, syntax and spelling errors may be inevitable.

## 2019-05-13 ENCOUNTER — OFFICE VISIT (OUTPATIENT)
Dept: FAMILY MEDICINE | Facility: CLINIC | Age: 69
End: 2019-05-13
Payer: MEDICARE

## 2019-05-13 VITALS
WEIGHT: 158.63 LBS | HEIGHT: 65 IN | TEMPERATURE: 99 F | DIASTOLIC BLOOD PRESSURE: 60 MMHG | HEART RATE: 67 BPM | OXYGEN SATURATION: 96 % | BODY MASS INDEX: 26.43 KG/M2 | SYSTOLIC BLOOD PRESSURE: 104 MMHG | RESPIRATION RATE: 18 BRPM

## 2019-05-13 DIAGNOSIS — E03.4 HYPOTHYROIDISM DUE TO ACQUIRED ATROPHY OF THYROID: ICD-10-CM

## 2019-05-13 DIAGNOSIS — K59.00 CONSTIPATION, UNSPECIFIED CONSTIPATION TYPE: ICD-10-CM

## 2019-05-13 DIAGNOSIS — S01.00XS OPEN WOUND OF SCALP, UNSPECIFIED OPEN WOUND TYPE, SEQUELA: ICD-10-CM

## 2019-05-13 DIAGNOSIS — S06.5XAA SUBDURAL HEMATOMA: ICD-10-CM

## 2019-05-13 DIAGNOSIS — F32.A DEPRESSION, UNSPECIFIED DEPRESSION TYPE: ICD-10-CM

## 2019-05-13 DIAGNOSIS — I48.0 PAF (PAROXYSMAL ATRIAL FIBRILLATION): Primary | Chronic | ICD-10-CM

## 2019-05-13 DIAGNOSIS — S06.6X0A SUBARACHNOID HEMATOMA, WITHOUT LOSS OF CONSCIOUSNESS, INITIAL ENCOUNTER: ICD-10-CM

## 2019-05-13 PROCEDURE — 3074F SYST BP LT 130 MM HG: CPT | Mod: CPTII,S$GLB,, | Performed by: NURSE PRACTITIONER

## 2019-05-13 PROCEDURE — 99214 OFFICE O/P EST MOD 30 MIN: CPT | Mod: S$GLB,,, | Performed by: NURSE PRACTITIONER

## 2019-05-13 PROCEDURE — 99214 PR OFFICE/OUTPT VISIT, EST, LEVL IV, 30-39 MIN: ICD-10-PCS | Mod: S$GLB,,, | Performed by: NURSE PRACTITIONER

## 2019-05-13 PROCEDURE — 3078F PR MOST RECENT DIASTOLIC BLOOD PRESSURE < 80 MM HG: ICD-10-PCS | Mod: CPTII,S$GLB,, | Performed by: NURSE PRACTITIONER

## 2019-05-13 PROCEDURE — 99499 RISK ADDL DX/OHS AUDIT: ICD-10-PCS | Mod: S$GLB,,, | Performed by: NURSE PRACTITIONER

## 2019-05-13 PROCEDURE — 1101F PT FALLS ASSESS-DOCD LE1/YR: CPT | Mod: CPTII,S$GLB,, | Performed by: NURSE PRACTITIONER

## 2019-05-13 PROCEDURE — 1101F PR PT FALLS ASSESS DOC 0-1 FALLS W/OUT INJ PAST YR: ICD-10-PCS | Mod: CPTII,S$GLB,, | Performed by: NURSE PRACTITIONER

## 2019-05-13 PROCEDURE — 99499 UNLISTED E&M SERVICE: CPT | Mod: S$GLB,,, | Performed by: NURSE PRACTITIONER

## 2019-05-13 PROCEDURE — 3074F PR MOST RECENT SYSTOLIC BLOOD PRESSURE < 130 MM HG: ICD-10-PCS | Mod: CPTII,S$GLB,, | Performed by: NURSE PRACTITIONER

## 2019-05-13 PROCEDURE — 3078F DIAST BP <80 MM HG: CPT | Mod: CPTII,S$GLB,, | Performed by: NURSE PRACTITIONER

## 2019-05-13 RX ORDER — GLUCOSAM/CHON-MSM1/C/MANG/BOSW 500-416.6
TABLET ORAL
Refills: 0 | COMMUNITY
Start: 2019-04-14 | End: 2022-10-20

## 2019-05-13 NOTE — PROGRESS NOTES
Subjective:       Patient ID: Yara Whitehead is a 68 y.o. female.    Chief Complaint: Hospital Follow Up ([facility] Arrythmia, [place]) and Wound Check (Back of head)    HPI new patient to me. Here for hospital follow up.  Feeling better since the ablation. Heart rate is staying up around 66 so having more energy. She recently saw Dr. Garcia in Cardiology. Dr. Shah did her ablation. She was seen at [facility] on 4/15/19. She has a significant past cardiac history: Per her discharge note:  Ms. Whitehead is a 68 year old female with a PMHx of HTN, morbid obesity, Sleep apnea (on CPAP), atrial fibrillation, RBBB, atrial flutter, subaortic membrane, fall with subsequent subdural hematoma.  She has a son who works for [employer] in [place].  Has paroxysmal symptomatic atrial fibrillation, starting 1/2016. Had been on Flecainide. Noted to have recurrence while in hospital with arm fracture. Flecainide increased to 150 mg BID. Underwent DCCV 7/1/16.  Continued to have breakthrough.  We scheduled her for PVI + RFA for atrial flutter, and in interim initiated Sotalol.  Cancelled procedure as she felt great on Sotalol.  At last follow-up with me in 2017 was still having paroxysmal AF but this was thought to be related to alcohol.  Woke up in middle of night to use bathroom >> dizzy, fell and suffered subdural hematoma.  Ultimately cleared to resume Xarelto.  ILR revealed pauses and continued AF.  PPM recommended.  She canceled procedure.  Her son wants her to consider leadless device.  ILR reveals No significant AF since 11/18.  No true pauses.  Does have sinus bradycardia.  Has noted fatigue since her subdural hematoma.     Has lost a significant amount of weight.  During her last visit with Dr. Shah 2/18/19, he would not endorse leadless device at this time as this would be ventricular pacing only.  PVI recommended at this time, and then potentially discontinue Sotalol to prevent bradycardia.     CTA of chest to delineate PV  anatomy completed on 4/1/19. Reviewed per Dr. Shah.  Plan for Cryoablation.  This was done on 4/16/19.    She states that she has more energy. She is now able to actually smell things, like flowers. This sense was greatly diminished over the past several years. She does still have a little bit of lightheadedness if she leans her head back to look up. She is careful not to fall since being on the Xarelto.    She does not take the BP medication on a daily basis. She checks her BP daily and if elevated she will take the Micardis.  She only takes the Lopressor if she is having any     She has tried to decrease her Paxil to 20mg. She found that she was having more crying spells, more anxious so she went back to the 40mg. She is feeling more stable since restarting the higher dose. She will remain at the 40mg.     Checks BP daily and only takes the micradis if needed. Lopressor as needed for tachycardia. She does not take these on a daily basis.     Constipation: she has changed her diet and major decrease of her alcohol intake. Was 3 glasses, now only one of wine a day. States this has caused some constipation issues. We discussed high fiber diet. Can take stool softeners. Do not take laxatives on a daily basis. Hydrate well.     Laceration to back of her scalp has healed. She fell and sustained a laceration, subdural hematoma to the back of her scalp. This as in January. She had a nondisplaced left occipital fracture. Subdural and Subarachnoid hemorrhage. She fell when she got up during the night to go to the restroom. She is cautioned about the dangers of falls while on the blood thinners.  She had some healing delays to the laceration to the back of her scalp. She was seeing the wound care doctor. The area has healed.     The following portion of the patients history was reviewed and updated as appropriate: allergies, current medications, past medical and surgical history. Past social history and problem list  "reviewed. Family PMH and Past social history reviewed. Tobacco, Illicit drug use reviewed.     Review of Systems   Constitutional: Negative for appetite change, fatigue and fever.   HENT: Negative for congestion and voice change.    Eyes: Negative for visual disturbance.   Respiratory: Negative for cough, shortness of breath and wheezing.    Cardiovascular: Negative for chest pain, palpitations and leg swelling.   Gastrointestinal: Positive for constipation. Negative for abdominal pain, blood in stool, diarrhea, nausea and vomiting.   Genitourinary: Negative for difficulty urinating and dysuria.   Musculoskeletal: Negative for arthralgias, back pain and gait problem.   Skin: Negative for rash and wound.   Neurological: Negative for dizziness, weakness and headaches.   Hematological: Negative for adenopathy. Bruises/bleeds easily (on Xarelto).   Psychiatric/Behavioral: Negative for decreased concentration, dysphoric mood and sleep disturbance. The patient is not nervous/anxious.        Objective:     /60   Pulse 67   Temp 98.7 °F (37.1 °C) (Oral)   Resp 18   Ht 5' 5" (1.651 m)   Wt 71.9 kg (158 lb 9.6 oz)   LMP  (LMP Unknown) Comment: total  SpO2 96%   BMI 26.39 kg/m²      Physical Exam   Constitutional: She is oriented to person, place, and time. She appears well-developed and well-nourished. No distress.   HENT:   Head: Normocephalic.   Eyes: Pupils are equal, round, and reactive to light. Conjunctivae and EOM are normal.   Neck: Trachea normal and normal range of motion. Neck supple. No JVD present. No tracheal tenderness present. Carotid bruit is not present. No tracheal deviation and no edema present. No thyromegaly present.   Cardiovascular: Normal rate and regular rhythm. Exam reveals no gallop.   Murmur heard.   Systolic murmur is present with a grade of 2/6.  Pulmonary/Chest: Breath sounds normal. No stridor. No respiratory distress. She has no wheezes. She has no rhonchi. She has no rales. "   Abdominal: Soft. Normal appearance and bowel sounds are normal. She exhibits no mass. There is no splenomegaly. There is no tenderness. There is no rebound.   Musculoskeletal: Normal range of motion.   Gait and coordination normal.  strong, equal 5/5 upper and lower extremity strength   Neurological: She is alert and oriented to person, place, and time. She has normal strength.   Skin: Skin is warm and dry. Capillary refill takes less than 2 seconds. No lesion and no rash noted.        Psychiatric: She has a normal mood and affect. Her speech is normal and behavior is normal.       Assessment:       1. PAF (paroxysmal atrial fibrillation)    2. Depression, unspecified depression type    3. Subdural hematoma    4. Subarachnoid hematoma, without loss of consciousness, initial encounter    5. Hypothyroidism due to acquired atrophy of thyroid    6. Open wound of scalp, unspecified open wound type, sequela    7. Constipation, unspecified constipation type        Plan:         Yara was seen today for hospital follow up and wound check.    Diagnoses and all orders for this visit:    PAF (paroxysmal atrial fibrillation): she is now in SR rate of 66. S/P loop recorder placement and ablation. She is followed by Dr. Gracia and Dr. Shah. She is taking the Xarelto and Betapace with good results. States she is feeing so much better at this time.     Depression, unspecified depression type: she has gone back to the 40mg dose of Paxil. She feels the 20mg did not keep her emotions under control. She will call when she needs refills.     Subdural hematoma: per her last CT scan this has resolved.  Last CT scan done 4/8/19.    Subarachnoid hematoma, without loss of consciousness, initial encounter: resolved per CT scan dated 4/8/19. NO headaches.     Hypothyroidism due to acquired atrophy of thyroid: she feels her levels are stable. She is taking her synthroid 88 mcg daily. Last TSH level was elevated at 5.1. At that time her  synthroid was increased from 75 mcg to the 88 mcg. Will need to repeat the thyroid level in about 3 months.     Open wound of scalp, unspecified open wound type, sequela: area has healed well. No tenderness. No scabbing. Looks good.     Constipation, unspecified constipation type: hydrate well. High fiber diet. Can take stool softeners as needed. If not improving over the next several weeks, follow up.    Continue current medication  Take medications only as prescribed  Healthy diet, exercise  Adequate rest  Adequate hydration  Avoid allergens  Avoid excessive caffeine

## 2019-05-22 ENCOUNTER — TELEPHONE (OUTPATIENT)
Dept: FAMILY MEDICINE | Facility: CLINIC | Age: 69
End: 2019-05-22

## 2019-05-22 DIAGNOSIS — K40.90 LEFT GROIN HERNIA: Primary | ICD-10-CM

## 2019-05-22 DIAGNOSIS — K43.9 HERNIA OF ABDOMINAL WALL: ICD-10-CM

## 2019-05-22 NOTE — TELEPHONE ENCOUNTER
I called and left a message that the u/s showed possible hernia in the area of concern.  Radiology recommended that she have a CT scan to better define the area but I feel we should just refer to surgery for evaluation.     If she agrees then please schedule with surgery.       Thanks

## 2019-05-23 ENCOUNTER — TELEPHONE (OUTPATIENT)
Dept: FAMILY MEDICINE | Facility: CLINIC | Age: 69
End: 2019-05-23

## 2019-05-23 DIAGNOSIS — H35.30 MACULAR DEGENERATION, UNSPECIFIED LATERALITY, UNSPECIFIED TYPE: Primary | ICD-10-CM

## 2019-05-23 NOTE — TELEPHONE ENCOUNTER
Patient is requesting a referral for ophthalmology to check for macular degeneration, please advise.

## 2019-05-27 ENCOUNTER — TELEPHONE (OUTPATIENT)
Dept: SURGERY | Facility: CLINIC | Age: 69
End: 2019-05-27

## 2019-05-27 NOTE — TELEPHONE ENCOUNTER
I called patient to move her appointment time to 12pm instead of 4:30pm on Wednesday, 5/29/19.  Lj

## 2019-05-28 ENCOUNTER — OFFICE VISIT (OUTPATIENT)
Dept: OBSTETRICS AND GYNECOLOGY | Facility: CLINIC | Age: 69
End: 2019-05-28
Payer: MEDICARE

## 2019-05-28 VITALS
WEIGHT: 161.81 LBS | HEIGHT: 65 IN | BODY MASS INDEX: 26.96 KG/M2 | DIASTOLIC BLOOD PRESSURE: 84 MMHG | SYSTOLIC BLOOD PRESSURE: 132 MMHG

## 2019-05-28 DIAGNOSIS — T83.721A: ICD-10-CM

## 2019-05-28 DIAGNOSIS — T83.729A: ICD-10-CM

## 2019-05-28 DIAGNOSIS — N95.0 POSTMENOPAUSAL BLEEDING: Primary | ICD-10-CM

## 2019-05-28 PROCEDURE — 1101F PT FALLS ASSESS-DOCD LE1/YR: CPT | Mod: CPTII,S$GLB,, | Performed by: OBSTETRICS & GYNECOLOGY

## 2019-05-28 PROCEDURE — 3075F SYST BP GE 130 - 139MM HG: CPT | Mod: CPTII,S$GLB,, | Performed by: OBSTETRICS & GYNECOLOGY

## 2019-05-28 PROCEDURE — 99499 RISK ADDL DX/OHS AUDIT: ICD-10-PCS | Mod: S$GLB,,, | Performed by: OBSTETRICS & GYNECOLOGY

## 2019-05-28 PROCEDURE — 99499 UNLISTED E&M SERVICE: CPT | Mod: S$GLB,,, | Performed by: OBSTETRICS & GYNECOLOGY

## 2019-05-28 PROCEDURE — 3079F PR MOST RECENT DIASTOLIC BLOOD PRESSURE 80-89 MM HG: ICD-10-PCS | Mod: CPTII,S$GLB,, | Performed by: OBSTETRICS & GYNECOLOGY

## 2019-05-28 PROCEDURE — 99999 PR PBB SHADOW E&M-EST. PATIENT-LVL IV: CPT | Mod: PBBFAC,,, | Performed by: OBSTETRICS & GYNECOLOGY

## 2019-05-28 PROCEDURE — 3075F PR MOST RECENT SYSTOLIC BLOOD PRESS GE 130-139MM HG: ICD-10-PCS | Mod: CPTII,S$GLB,, | Performed by: OBSTETRICS & GYNECOLOGY

## 2019-05-28 PROCEDURE — 99999 PR PBB SHADOW E&M-EST. PATIENT-LVL IV: ICD-10-PCS | Mod: PBBFAC,,, | Performed by: OBSTETRICS & GYNECOLOGY

## 2019-05-28 PROCEDURE — 1101F PR PT FALLS ASSESS DOC 0-1 FALLS W/OUT INJ PAST YR: ICD-10-PCS | Mod: CPTII,S$GLB,, | Performed by: OBSTETRICS & GYNECOLOGY

## 2019-05-28 PROCEDURE — 3079F DIAST BP 80-89 MM HG: CPT | Mod: CPTII,S$GLB,, | Performed by: OBSTETRICS & GYNECOLOGY

## 2019-05-28 PROCEDURE — 99203 PR OFFICE/OUTPT VISIT, NEW, LEVL III, 30-44 MIN: ICD-10-PCS | Mod: S$GLB,,, | Performed by: OBSTETRICS & GYNECOLOGY

## 2019-05-28 PROCEDURE — 99203 OFFICE O/P NEW LOW 30 MIN: CPT | Mod: S$GLB,,, | Performed by: OBSTETRICS & GYNECOLOGY

## 2019-05-28 NOTE — PROGRESS NOTES
Chief Complaint   Patient presents with    Establish Care    Vaginal Bleeding    Vaginal Discharge       History of Present Illness: Yara Whitehead is a 68 y.o. female that presents today 5/28/2019 for   Chief Complaint   Patient presents with    Establish Care    Vaginal Bleeding    Vaginal Discharge     She reports having a katarina like discharge that she thought was from her bladder. She reports she placed a tampon and realized that this was from the vagina. She has noticed this for 6 months. She reports doing premarin vaginal cram for the several weeks after this discharge started. She reports that she hasn't consistently used the premarin.     Past Medical History:   Diagnosis Date    Allergy     Arrhythmia     GERD (gastroesophageal reflux disease)     Hypertension     Hypothyroidism     ALESSANDRA (iron deficiency anemia)     Insomnia     Nephrolithiasis     had eswl - [place]    FABIANA on CPAP     PAF (paroxysmal atrial fibrillation)     Subaortic membrane     Subdural hematoma     Vitamin B 12 deficiency     Vitamin D deficiency        Past Surgical History:   Procedure Laterality Date    abdomenalplasty      Ablation atrial fibrillation N/A 4/15/2019    Performed by Edmund [facility] at [facility] EP LAB    CARDIOVERSION N/A 8/28/2016    [facility] at [facility]    CARDIOVERSION N/A 7/1/2016    Performed by [facility] at [facility]    COLONOSCOPY  2012    normal, repeat in 5 years    EXTRACORPOREAL SHOCK WAVE LITHOTRIPSY      [place]    FRACTURE SURGERY Left     has plates and screws in place.    GALLBLADDER SURGERY  1995    GASTRIC BYPASS  1997    HERNIA REPAIR  1998    HYSTERECTOMY  2013    due to vaginal prolpase with BSO - also bladder suspension at the same time - [facility]    loop recording N/A 3/18/2016    Performed by [facility] at [facility]    OPEN REDUCTION INTERNAL FIXATION-  PROXIMAL HUMERUS Left 5/30/2016    Performed by  Viet Romo [facility] at [facility] OR    Transesophageal echo (PRISCILLA) intra-procedure log documentation N/A 4/15/2019    Performed by [facility] Diagnostic Provider at [facility] EP LAB    TYMPANOPLASTY Left     replaced and then repair x2        Current Outpatient Medications   Medication Sig Dispense Refill    albuterol 90 mcg/actuation inhaler Inhale 1-2 puffs into the lungs every 6 (six) hours as needed for Wheezing. 1 Inhaler 0    atorvastatin (LIPITOR) 10 MG tablet TAKE 1 TABLET(10 MG) BY MOUTH EVERY DAY 90 tablet 4    b complex vitamins capsule Take 1 capsule by mouth once daily.      blood sugar diagnostic Strp To check BG 1 times daily, to use with insurance preferred meter 100 strip 3    blood-glucose meter kit To check BG 1 times daily, to use with insurance preferred meter 1 each 0    cetirizine (ZYRTEC) 10 MG tablet Take 10 mg by mouth once daily.      conjugated estrogens (PREMARIN) vaginal cream Place 0.5 g vaginally twice a week. 30 g 1    cyanocobalamin 1,000 mcg/mL injection Inject 1 mL (1,000 mcg total) into the muscle every 14 (fourteen) days. 6 mL 3    fluticasone (FLONASE) 50 mcg/actuation nasal spray 2 sprays (100 mcg total) by Each Nare route once daily. 1 Bottle 6    glucose 4 GM chewable tablet Take 4 tablets when glucose from 51-70  Take 6 tablets when glucose is less than 50 120 tablet 12    lancets Misc To check BG 1 times daily, to use with insurance preferred meter 100 each 3    levothyroxine (SYNTHROID) 88 MCG tablet Take 1 tablet (88 mcg total) by mouth before breakfast. 30 tablet 11    metoprolol tartrate (LOPRESSOR) 25 MG tablet Take 1 tablet (25 mg total) by mouth 3 (three) times daily. As needed 90 tablet 11    multivitamin with minerals tablet Take 1 tablet by mouth once daily.      oxybutynin (DITROPAN-XL) 10 MG 24 hr tablet Take 1 tablet (10 mg total) by mouth once daily. 30 tablet 11    pantoprazole (PROTONIX) 40 MG tablet Take 1 tablet (40 mg total) by mouth once daily. 30  tablet 0    paroxetine (PAXIL) 20 MG tablet Take 1 tablet (20 mg total) by mouth every morning. (Patient taking differently: Take 40 mg by mouth every morning. ) 90 tablet 3    rivaroxaban (XARELTO) 20 mg Tab Take 20 mg by mouth daily with dinner or evening meal.      sotalol (BETAPACE) 80 MG tablet Take 0.5 tablets (40 mg total) by mouth every 12 (twelve) hours. 30 tablet 11    telmisartan (MICARDIS) 40 MG Tab Take 1 tablet (40 mg total) by mouth once daily. Only to start if SBP consistently greater than 130 90 tablet 3    TRUEPLUS LANCETS 30 gauge Misc USE TO TEST BLOOD SUGAR BID  0     No current facility-administered medications for this visit.        Review of patient's allergies indicates:  No Known Allergies    Family History   Problem Relation Age of Onset    Aneurysm Mother     Hypertension Mother     Cancer Mother         uterine     Alzheimer's disease Father     Diabetes Father     Mental illness Sister     No Known Problems Daughter     No Known Problems Son     Stroke Maternal Grandfather     COPD Paternal Grandmother     Cancer Paternal Grandmother         colon    Alzheimer's disease Paternal Grandfather     Heart disease Paternal Grandfather     Nephrolithiasis Neg Hx        Social History     Tobacco Use    Smoking status: Former Smoker     Packs/day: 2.00     Years: 20.00     Pack years: 40.00     Last attempt to quit: 1995     Years since quittin.5    Smokeless tobacco: Never Used   Substance Use Topics    Alcohol use: Yes     Alcohol/week: 4.2 oz     Types: 7 Glasses of wine per week     Comment: one glass wine nightly    Drug use: No       OB History    Para Term  AB Living   2 2 2         SAB TAB Ectopic Multiple Live Births                  # Outcome Date GA Lbr Dustin/2nd Weight Sex Delivery Anes PTL Lv   2 Term      Vag-Spont      1 Term      Vag-Spont          Review of Symptoms:  GENERAL: Denies weight gain or weight loss. Feeling well overall.  "  SKIN: Denies rash or lesions.   HEAD: Denies head injury or headache.   NODES: Denies enlarged lymph nodes.   CHEST: Denies chest pain or shortness of breath.   CARDIOVASCULAR: Denies palpitations or left sided chest pain.   ABDOMEN: No abdominal pain, constipation, diarrhea, nausea, vomiting or rectal bleeding.   URINARY: No frequency, dysuria, hematuria, or burning on urination.  HEMATOLOGIC: No easy bruisability or excessive bleeding.   MUSCULOSKELETAL: Denies joint pain or swelling.     /84   Ht 5' 5" (1.651 m)   Wt 73.4 kg (161 lb 13.1 oz)   LMP  (LMP Unknown)   Physical Exam:  APPEARANCE: Well nourished, well developed, in no acute distress.  SKIN: Normal skin turgor, no lesions.  NECK: Neck symmetric without masses   RESPIRATORY: Normal respiratory effort with no retractions or use of accessory muscles  CARDIOVASCULAR: Peripheral vascular system with no swelling no varicosities and palpation of pulses normal  LYMPHATIC: No enlargements of the lymph nodes noted in the neck, axillae, or groin  ABDOMEN: Soft. No tenderness or masses. No hepatosplenomegaly. No hernias.  PELVIC: Normal external female genitalia without lesions. Normal hair distribution. Adequate perineal body, normal urethral meatus. Urethra with no masses.  Bladder nontender. Vagina at the apex there is large sections of mesh exposure+++   Adnexa without masses or tenderness. Urethra and bladder normal.  EXTREMITIES: No clubbing cyanosis or edema.    ASSESSMENT/PLAN:  Postmenopausal bleeding    Exposure of implanted vaginal mesh and prosthetic material in vagina, initial encounter        Will need mesh resection vaginally to prevent further infections and bleeding  I encouraged premarin cream  She will notify us when ready to schedule      30 minutes spent today with greater than half in counseling.     "

## 2019-05-28 NOTE — LETTER
May 28, 2019      Ann Richards DO  [address]  [address]  [place] [address]           [facility]  [address]  [place] [address]  Phone: [phone]  Fax: [phone]          Patient: Yara Whitehead   MR Number: [id number]   YOB: 1950   Date of Visit: 5/28/2019       Dear Dr. Ann Richards:    Thank you for referring Yara Whitehead to me for evaluation. Attached you will find relevant portions of my assessment and plan of care.    If you have questions, please do not hesitate to call me. I look forward to following Yara Whitehead along with you.    Sincerely,    Lorene Mawxell MD    Enclosure  CC:  No Recipients    If you would like to receive this communication electronically, please contact [email or web address] or [phone] to request more information on [employer] access.    For providers and/or their staff who would like to refer a patient to [facility], please contact us through our one-stop-shop provider referral line, [facility], at [phone].    If you feel you have received this communication in error or would no longer like to receive these types of communications, please e-mail [email or web address]

## 2019-05-29 ENCOUNTER — OFFICE VISIT (OUTPATIENT)
Dept: SURGERY | Facility: CLINIC | Age: 69
End: 2019-05-29
Payer: MEDICARE

## 2019-05-29 VITALS
BODY MASS INDEX: 27.63 KG/M2 | SYSTOLIC BLOOD PRESSURE: 114 MMHG | WEIGHT: 165.81 LBS | TEMPERATURE: 97 F | DIASTOLIC BLOOD PRESSURE: 59 MMHG | HEIGHT: 65 IN | HEART RATE: 66 BPM

## 2019-05-29 DIAGNOSIS — K40.90 NON-RECURRENT UNILATERAL INGUINAL HERNIA WITHOUT OBSTRUCTION OR GANGRENE: Primary | ICD-10-CM

## 2019-05-29 PROCEDURE — 99999 PR PBB SHADOW E&M-EST. PATIENT-LVL III: CPT | Mod: PBBFAC,,, | Performed by: SURGERY

## 2019-05-29 PROCEDURE — 3074F SYST BP LT 130 MM HG: CPT | Mod: CPTII,S$GLB,, | Performed by: SURGERY

## 2019-05-29 PROCEDURE — 1101F PR PT FALLS ASSESS DOC 0-1 FALLS W/OUT INJ PAST YR: ICD-10-PCS | Mod: CPTII,S$GLB,, | Performed by: SURGERY

## 2019-05-29 PROCEDURE — 99204 PR OFFICE/OUTPT VISIT, NEW, LEVL IV, 45-59 MIN: ICD-10-PCS | Mod: S$GLB,,, | Performed by: SURGERY

## 2019-05-29 PROCEDURE — 3078F DIAST BP <80 MM HG: CPT | Mod: CPTII,S$GLB,, | Performed by: SURGERY

## 2019-05-29 PROCEDURE — 3074F PR MOST RECENT SYSTOLIC BLOOD PRESSURE < 130 MM HG: ICD-10-PCS | Mod: CPTII,S$GLB,, | Performed by: SURGERY

## 2019-05-29 PROCEDURE — 99204 OFFICE O/P NEW MOD 45 MIN: CPT | Mod: S$GLB,,, | Performed by: SURGERY

## 2019-05-29 PROCEDURE — 3078F PR MOST RECENT DIASTOLIC BLOOD PRESSURE < 80 MM HG: ICD-10-PCS | Mod: CPTII,S$GLB,, | Performed by: SURGERY

## 2019-05-29 PROCEDURE — 1101F PT FALLS ASSESS-DOCD LE1/YR: CPT | Mod: CPTII,S$GLB,, | Performed by: SURGERY

## 2019-05-29 PROCEDURE — 99999 PR PBB SHADOW E&M-EST. PATIENT-LVL III: ICD-10-PCS | Mod: PBBFAC,,, | Performed by: SURGERY

## 2019-05-29 NOTE — PROGRESS NOTES
"Subjective:       Patient ID: Yara Whitehead is a 68 y.o. female.    Chief Complaint: Consult (Left abdominal hernia wall)    HPI    Pleasant 67 yo F referred to me in consultation from Dr Richards for evaluation of hernia.  Pt notes that she had a lap gastric bypass years ago.  SHe has lost well over a 100 lbs. She then had abdominoplasty and "tightening of her midline" at an outside facility.  Pt notes that with her recent weight loss she has noted a bulge in her L lower abdomen that has her concerned for possible hernia.  She denies pain.  NO n/v.  No fever/chills.  No changes in bowel habits.  US performed demonstrating possible hernia.  Pt does have afib and had recent ablation.  Pt also seeing gynecology about vaginal mesh erosion  Review of Systems   Constitutional: Negative for activity change, appetite change, fever and unexpected weight change.   Respiratory: Negative for chest tightness, shortness of breath and wheezing.    Cardiovascular: Negative for chest pain.   Gastrointestinal: Negative for abdominal distention, abdominal pain, anal bleeding, blood in stool, constipation, diarrhea, nausea, rectal pain and vomiting.   Genitourinary: Positive for vaginal pain. Negative for difficulty urinating, dysuria and frequency.   Skin: Negative for wound.   Neurological: Negative for dizziness.   Hematological: Negative for adenopathy.   Psychiatric/Behavioral: Negative for agitation.       Objective:      Physical Exam   Constitutional: She is oriented to person, place, and time. She appears well-developed and well-nourished.   HENT:   Head: Normocephalic and atraumatic.   Eyes: Pupils are equal, round, and reactive to light.   Neck: Normal range of motion. Neck supple. No tracheal deviation present. No thyromegaly present.   Cardiovascular: Normal rate, regular rhythm and normal heart sounds.   No murmur heard.  Pulmonary/Chest: Effort normal and breath sounds normal. She exhibits no tenderness. "   Abdominal: Soft. Bowel sounds are normal. She exhibits no distension, no abdominal bruit, no pulsatile midline mass and no mass. There is no hepatosplenomegaly. There is no tenderness. There is no rigidity, no rebound, no guarding, no tenderness at McBurney's point and negative Sullivan's sign. A hernia is present. Hernia confirmed negative in the ventral area.       Genitourinary: Rectum normal.   Musculoskeletal: Normal range of motion.   Lymphadenopathy:     She has no cervical adenopathy.   Neurological: She is alert and oriented to person, place, and time.   Skin: Skin is warm. No rash noted. No erythema.   Psychiatric: She has a normal mood and affect.   Vitals reviewed.        US:  IMPRESSION  There is some eventration, herniation suspected about the left clinical area of question is slightly asymmetric as compared to the right in standing positioning without abnormal bowel dilatation.  No abnormal fluid collections to suggest inflammation or obstruction are appreciated.  CT of the abdominal wall clinical area of question correlation could be performed for further evaluation if clinically warranted.  Assessment:     Reducible L inguinal hernia  No diagnosis found.    Plan:       D/w pt.  Ih ave informed pt that I feel that she does in fact have an inguinal hernia.  D/w her that since she is asymptomatic and hasn't noted an increase in size wouldb recommend observation at present.  She prefers to avoid surgery.  She will f/u with me prn.

## 2019-05-29 NOTE — LETTER
May 29, 2019      Ann Richards DO  [address] [address]  [address]  [facility] [address]           [facility]  [address] [facility] [address] [place] [address]  Phone: [phone]          Patient: Yara Whitehead   MR Number: [id number]   YOB: 1950   Date of Visit: 5/29/2019       Dear Dr. Ann Richards:    Thank you for referring Yara Whitehead to me for evaluation. Attached you will find relevant portions of my assessment and plan of care.    If you have questions, please do not hesitate to call me. I look forward to following Yara Whitehead along with you.    Sincerely,    Uriah Harris MD    Enclosure  CC:  No Recipients    If you would like to receive this communication electronically, please contact [email or web address] or [phone] to request more information on [facility] Link access.    For providers and/or their staff who would like to refer a patient to [facility], please contact us through our one-stop-shop provider referral line, Corinne Bender, at [phone].    If you feel you have received this communication in error or would no longer like to receive these types of communications, please e-mail [email or web address]

## 2019-05-31 ENCOUNTER — TELEPHONE (OUTPATIENT)
Dept: OBSTETRICS AND GYNECOLOGY | Facility: CLINIC | Age: 69
End: 2019-05-31

## 2019-05-31 NOTE — TELEPHONE ENCOUNTER
Pt has questions about scheduling mesh revision:    1) overnight stay?  2) if no overnight stay okay with outpt or main hospital?  3) lifting restrictions- how much and for how long?  4) any other restrictions?

## 2019-06-02 NOTE — PROGRESS NOTES
Subjective:    Patient ID:  Yara Whitehead is a 69 y.o. female who presents for follow-up of PAF F/U      HPI 68 yo female with Htn, morbid obesity, Sleep apnea (on CPAP), atrial fibrillation, RBBB, atrial flutter, subaortic membrane, fall with subsequent subdural hematoma.    Background:  Primary cardiologist is Dr. Garcia.  She has a son who works for [employer] in [place].  Has had paroxysmal symptomatic atrial fibrillation, starting 1/16. Had been on Flecainide. Noted to have recurrence while in hospital with arm fracture. Flecainide increased to 150 mg BID. Underwent DCCV 7/1/16.  Continued to have breakthrough.  We scheduled her for PVI + RFA for atrial flutter, and in interim initiated Sotalol.  Cancelled procedure as she felt great on Sotalol.  Woke up in middle of night to use bathroom >> dizzy, fell and suffered subdural hematoma.  Ultimately cleared to resume Xarelto.  ILR revealed pauses and continued AF.  PPM recommended.  She canceled procedure.  Her son wanted her to consider leadless device.    PVI (Cryo-ablation) 4/15/19  Sotalol decreased to 80 mg BID.    Update:  ILR interrogation reveals 2 episodes of AF since PVI, 4/26 (1 hour 26 minutes) and 4/28 (16 minutes), none since.  Feeling great.  Happy with her energy level.  No syncope.      Review of Systems   Constitution: Negative. Negative for malaise/fatigue.   Cardiovascular: Negative for chest pain, dyspnea on exertion, irregular heartbeat, leg swelling, near-syncope, orthopnea, palpitations, paroxysmal nocturnal dyspnea and syncope.   Respiratory: Negative for cough and shortness of breath.    Neurological: Negative for dizziness, light-headedness and weakness.   All other systems reviewed and are negative.       Objective:    Physical Exam   Constitutional: She is oriented to person, place, and time. She appears well-developed and well-nourished.   Eyes: Conjunctivae are normal. No scleral icterus.   Neck: No JVD present. No tracheal  deviation present.   Cardiovascular: Normal rate and regular rhythm. PMI is not displaced.   Pulmonary/Chest: Effort normal and breath sounds normal. No respiratory distress.   Abdominal: Soft. There is no hepatosplenomegaly. There is no tenderness.   Musculoskeletal: She exhibits no edema or tenderness.   Neurological: She is alert and oriented to person, place, and time.   Skin: Skin is warm and dry. No rash noted.   Psychiatric: She has a normal mood and affect. Her behavior is normal.         Assessment:       1. PAF (paroxysmal atrial fibrillation)    2. RBBB    3. Essential hypertension    4. Bradycardia    5. Subdural hematoma    6. Typical atrial flutter    7. Acquired hypothyroidism    8. FABIANA on CPAP    9. Syncope, unspecified syncope type         Plan:           Doing great.  In 2 months, discontinue Sotalol.  F/u with me in 6 months.

## 2019-06-03 ENCOUNTER — CLINICAL SUPPORT (OUTPATIENT)
Dept: CARDIOLOGY | Facility: CLINIC | Age: 69
End: 2019-06-03
Attending: INTERNAL MEDICINE
Payer: MEDICARE

## 2019-06-03 ENCOUNTER — OFFICE VISIT (OUTPATIENT)
Dept: CARDIOLOGY | Facility: CLINIC | Age: 69
End: 2019-06-03
Payer: MEDICARE

## 2019-06-03 VITALS
SYSTOLIC BLOOD PRESSURE: 98 MMHG | DIASTOLIC BLOOD PRESSURE: 62 MMHG | HEART RATE: 69 BPM | HEIGHT: 65 IN | WEIGHT: 163.13 LBS | BODY MASS INDEX: 27.18 KG/M2

## 2019-06-03 DIAGNOSIS — Z95.818 STATUS POST PLACEMENT OF IMPLANTABLE LOOP RECORDER: ICD-10-CM

## 2019-06-03 DIAGNOSIS — R00.1 BRADYCARDIA: ICD-10-CM

## 2019-06-03 DIAGNOSIS — S06.5XAA SUBDURAL HEMATOMA: ICD-10-CM

## 2019-06-03 DIAGNOSIS — I48.3 TYPICAL ATRIAL FLUTTER: ICD-10-CM

## 2019-06-03 DIAGNOSIS — G47.33 OSA ON CPAP: Chronic | ICD-10-CM

## 2019-06-03 DIAGNOSIS — I48.0 PAF (PAROXYSMAL ATRIAL FIBRILLATION): Primary | Chronic | ICD-10-CM

## 2019-06-03 DIAGNOSIS — R55 SYNCOPE, UNSPECIFIED SYNCOPE TYPE: ICD-10-CM

## 2019-06-03 DIAGNOSIS — I45.10 RBBB: ICD-10-CM

## 2019-06-03 DIAGNOSIS — I10 ESSENTIAL HYPERTENSION: Chronic | ICD-10-CM

## 2019-06-03 DIAGNOSIS — E03.9 ACQUIRED HYPOTHYROIDISM: ICD-10-CM

## 2019-06-03 PROCEDURE — 99499 RISK ADDL DX/OHS AUDIT: ICD-10-PCS | Mod: S$GLB,,, | Performed by: INTERNAL MEDICINE

## 2019-06-03 PROCEDURE — 93005 EKG 12-LEAD: ICD-10-PCS | Mod: S$GLB,,, | Performed by: INTERNAL MEDICINE

## 2019-06-03 PROCEDURE — 93005 ELECTROCARDIOGRAM TRACING: CPT | Mod: S$GLB,,, | Performed by: INTERNAL MEDICINE

## 2019-06-03 PROCEDURE — 93010 EKG 12-LEAD: ICD-10-PCS | Mod: S$GLB,,, | Performed by: INTERNAL MEDICINE

## 2019-06-03 PROCEDURE — 99499 UNLISTED E&M SERVICE: CPT | Mod: S$GLB,,, | Performed by: INTERNAL MEDICINE

## 2019-06-03 PROCEDURE — 99214 OFFICE O/P EST MOD 30 MIN: CPT | Mod: S$GLB,,, | Performed by: INTERNAL MEDICINE

## 2019-06-03 PROCEDURE — 3078F PR MOST RECENT DIASTOLIC BLOOD PRESSURE < 80 MM HG: ICD-10-PCS | Mod: CPTII,S$GLB,, | Performed by: INTERNAL MEDICINE

## 2019-06-03 PROCEDURE — 99214 PR OFFICE/OUTPT VISIT, EST, LEVL IV, 30-39 MIN: ICD-10-PCS | Mod: S$GLB,,, | Performed by: INTERNAL MEDICINE

## 2019-06-03 PROCEDURE — 3074F PR MOST RECENT SYSTOLIC BLOOD PRESSURE < 130 MM HG: ICD-10-PCS | Mod: CPTII,S$GLB,, | Performed by: INTERNAL MEDICINE

## 2019-06-03 PROCEDURE — 1101F PT FALLS ASSESS-DOCD LE1/YR: CPT | Mod: CPTII,S$GLB,, | Performed by: INTERNAL MEDICINE

## 2019-06-03 PROCEDURE — 3074F SYST BP LT 130 MM HG: CPT | Mod: CPTII,S$GLB,, | Performed by: INTERNAL MEDICINE

## 2019-06-03 PROCEDURE — 3078F DIAST BP <80 MM HG: CPT | Mod: CPTII,S$GLB,, | Performed by: INTERNAL MEDICINE

## 2019-06-03 PROCEDURE — 93010 ELECTROCARDIOGRAM REPORT: CPT | Mod: S$GLB,,, | Performed by: INTERNAL MEDICINE

## 2019-06-03 PROCEDURE — 1101F PR PT FALLS ASSESS DOC 0-1 FALLS W/OUT INJ PAST YR: ICD-10-PCS | Mod: CPTII,S$GLB,, | Performed by: INTERNAL MEDICINE

## 2019-06-03 PROCEDURE — 99999 PR PBB SHADOW E&M-EST. PATIENT-LVL III: ICD-10-PCS | Mod: PBBFAC,,, | Performed by: INTERNAL MEDICINE

## 2019-06-03 PROCEDURE — 99999 PR PBB SHADOW E&M-EST. PATIENT-LVL III: CPT | Mod: PBBFAC,,, | Performed by: INTERNAL MEDICINE

## 2019-06-04 DIAGNOSIS — T83.721D EXPOSURE OF VAGINAL MESH THROUGH VAGINAL WALL, SUBSEQUENT ENCOUNTER: Primary | ICD-10-CM

## 2019-06-04 DIAGNOSIS — T83.721D EXPOSURE OF IMPLANTED VAGINAL MESH AND PROSTHETIC MATERIAL IN VAGINA, SUBSEQUENT ENCOUNTER: ICD-10-CM

## 2019-06-04 DIAGNOSIS — T83.729D EXPOSURE OF IMPLANTED VAGINAL MESH AND PROSTHETIC MATERIAL IN VAGINA, SUBSEQUENT ENCOUNTER: ICD-10-CM

## 2019-06-13 DIAGNOSIS — N32.81 OAB (OVERACTIVE BLADDER): ICD-10-CM

## 2019-06-24 RX ORDER — OXYBUTYNIN CHLORIDE 10 MG/1
TABLET, EXTENDED RELEASE ORAL
Qty: 30 TABLET | Refills: 11 | Status: SHIPPED | OUTPATIENT
Start: 2019-06-24 | End: 2019-08-06

## 2019-06-27 ENCOUNTER — OFFICE VISIT (OUTPATIENT)
Dept: OPTOMETRY | Facility: CLINIC | Age: 69
End: 2019-06-27
Payer: MEDICARE

## 2019-06-27 DIAGNOSIS — H35.363 DEGENERATIVE RETINAL DRUSEN OF BOTH EYES: ICD-10-CM

## 2019-06-27 DIAGNOSIS — H25.13 NUCLEAR SCLEROSIS OF BOTH EYES: Primary | ICD-10-CM

## 2019-06-27 DIAGNOSIS — H52.7 REFRACTIVE ERROR: ICD-10-CM

## 2019-06-27 PROCEDURE — 99999 PR PBB SHADOW E&M-EST. PATIENT-LVL II: CPT | Mod: PBBFAC,,, | Performed by: OPTOMETRIST

## 2019-06-27 PROCEDURE — 92015 DETERMINE REFRACTIVE STATE: CPT | Mod: S$GLB,,, | Performed by: OPTOMETRIST

## 2019-06-27 PROCEDURE — 92004 PR EYE EXAM, NEW PATIENT,COMPREHESV: ICD-10-PCS | Mod: S$GLB,,, | Performed by: OPTOMETRIST

## 2019-06-27 PROCEDURE — 99999 PR PBB SHADOW E&M-EST. PATIENT-LVL II: ICD-10-PCS | Mod: PBBFAC,,, | Performed by: OPTOMETRIST

## 2019-06-27 PROCEDURE — 92004 COMPRE OPH EXAM NEW PT 1/>: CPT | Mod: S$GLB,,, | Performed by: OPTOMETRIST

## 2019-06-27 PROCEDURE — 92015 PR REFRACTION: ICD-10-PCS | Mod: S$GLB,,, | Performed by: OPTOMETRIST

## 2019-06-27 NOTE — LETTER
June 27, 2019      Ann Richards DO  [address] [address]  [address]  [place] [address]           [facility]  [address] [facility] [address] [place] [address]  Phone: [phone]  Fax: [phone]          Patient: Yara Whitehead   MR Number: [id number]   YOB: 1950   Date of Visit: 6/27/2019       Dear Dr. Ann Richards:    Thank you for referring Yara Whitehead to me for evaluation. Attached you will find relevant portions of my assessment and plan of care.    If you have questions, please do not hesitate to call me. I look forward to following Yara Whitehead along with you.    Sincerely,    Ross Eisenberg, OD    Enclosure  CC:  No Recipients    If you would like to receive this communication electronically, please contact [email or web address] or [phone] to request more information on [facility] Link access.    For providers and/or their staff who would like to refer a patient to [facility], please contact us through our one-stop-shop provider referral line, [facility], at [phone].    If you feel you have received this communication in error or would no longer like to receive these types of communications, please e-mail [email or web address]

## 2019-06-27 NOTE — PROGRESS NOTES
HPI     Routine eye exam--dle-2 years  Blur ou at near x mos, no assoc pain or red, constant, no relief over   time.  Pt complains of blurred vision at near. Needing updated glasses rx. Wants   to check for macular degeneration. Pt thinks she has family hx of   glaucoma-mother and grandmother? Denies any eye pain. No flashes,   occasional floaters.     Last edited by Ross Eisenberg, OD on 6/27/2019  3:46 PM. (History)            Assessment /Plan     For exam results, see Encounter Report.    Nuclear sclerosis of both eyes    Degenerative retinal drusen of both eyes    Refractive error      1. Educated pt on presence of cataracts and effects on vision. No surgery at this time. Recheck in one year.  2. Monitor condition. Patient to report any changes. RTC 1 year recheck.  3. New Spec Rx given. Different lens options discussed with patient. RTC 1 year full exam.

## 2019-06-28 DIAGNOSIS — N76.0 VULVOVAGINITIS: ICD-10-CM

## 2019-06-28 NOTE — TELEPHONE ENCOUNTER
----- Message from Varsha Hennessy sent at 6/28/2019  9:39 AM CDT -----  Type: Needs Medical Advice    Who Called:  Patient  Best Call Back Number: [phone]  Additional Information: Patient stated pharmacy needs prior authorization for medication:conjugated estrogens (PREMARIN) vaginal cream/also would like to speak with nurse concerning/has question/please call patient back to advise.

## 2019-07-01 NOTE — TELEPHONE ENCOUNTER
Patient was advised by GYN to continue using premarin cream until procedure scheduled for 7/11/19. Patient states she is almost out and is requesting a refill, please advise in Dr. Richards absence.

## 2019-07-02 ENCOUNTER — TELEPHONE (OUTPATIENT)
Dept: FAMILY MEDICINE | Facility: CLINIC | Age: 69
End: 2019-07-02

## 2019-07-02 RX ORDER — ESTRADIOL 0.1 MG/G
1 CREAM VAGINAL
Qty: 42.5 G | Refills: 4 | Status: SHIPPED | OUTPATIENT
Start: 2019-07-04 | End: 2020-07-03

## 2019-07-02 NOTE — TELEPHONE ENCOUNTER
Received fax stating that premarin vaginal cream 30gm is not covered by pts plan.  The preferred alternative is yuvafem tab or estradiol tabs.  Please advise.

## 2019-07-08 ENCOUNTER — OFFICE VISIT (OUTPATIENT)
Dept: OBSTETRICS AND GYNECOLOGY | Facility: CLINIC | Age: 69
End: 2019-07-08
Payer: MEDICARE

## 2019-07-08 ENCOUNTER — TELEPHONE (OUTPATIENT)
Dept: OBSTETRICS AND GYNECOLOGY | Facility: CLINIC | Age: 69
End: 2019-07-08

## 2019-07-08 VITALS
HEIGHT: 65 IN | WEIGHT: 163.56 LBS | DIASTOLIC BLOOD PRESSURE: 60 MMHG | BODY MASS INDEX: 27.25 KG/M2 | SYSTOLIC BLOOD PRESSURE: 116 MMHG

## 2019-07-08 DIAGNOSIS — N95.0 POSTMENOPAUSAL BLEEDING: ICD-10-CM

## 2019-07-08 DIAGNOSIS — T83.721D EXPOSURE OF VAGINAL MESH THROUGH VAGINAL WALL, SUBSEQUENT ENCOUNTER: Primary | ICD-10-CM

## 2019-07-08 PROBLEM — T83.721A EXPOSURE OF VAGINAL MESH THROUGH VAGINAL WALL: Status: ACTIVE | Noted: 2019-07-08

## 2019-07-08 PROCEDURE — 99024 POSTOP FOLLOW-UP VISIT: CPT | Mod: S$GLB,,, | Performed by: OBSTETRICS & GYNECOLOGY

## 2019-07-08 PROCEDURE — 99999 PR PBB SHADOW E&M-EST. PATIENT-LVL IV: CPT | Mod: PBBFAC,,, | Performed by: OBSTETRICS & GYNECOLOGY

## 2019-07-08 PROCEDURE — 99999 PR PBB SHADOW E&M-EST. PATIENT-LVL IV: ICD-10-PCS | Mod: PBBFAC,,, | Performed by: OBSTETRICS & GYNECOLOGY

## 2019-07-08 PROCEDURE — 99024 PR POST-OP FOLLOW-UP VISIT: ICD-10-PCS | Mod: S$GLB,,, | Performed by: OBSTETRICS & GYNECOLOGY

## 2019-07-08 RX ORDER — SODIUM CHLORIDE, SODIUM LACTATE, POTASSIUM CHLORIDE, CALCIUM CHLORIDE 600; 310; 30; 20 MG/100ML; MG/100ML; MG/100ML; MG/100ML
INJECTION, SOLUTION INTRAVENOUS CONTINUOUS
Status: CANCELLED | OUTPATIENT
Start: 2019-07-08

## 2019-07-08 NOTE — TELEPHONE ENCOUNTER
Pt is scheduled for a mesh revision including general anesthesia. We need a cardiac clearance to this effect. This would include clearance to be off xarelto for 7 days prior to procedure.  Please advise.

## 2019-07-08 NOTE — TELEPHONE ENCOUNTER
The following response was forwarded to Kandice Cyr (after discussion with Dr Shah):    Patient had cardiac ablation on 4/15/2019 and cannot electively hold Xarelto for 90 days post ablation. Per protocol, she can hold the Xarelto (after 7/15/19) for 2 days prior to procedure and resume at MD discretion.  Thanks

## 2019-07-08 NOTE — PROGRESS NOTES
Chief Complaint   Patient presents with    Pre-op Exam       History of Present Illness: Yara Whitehead is a 69 y.o. female that presents today 7/8/2019 for   Chief Complaint   Patient presents with    Pre-op Exam         Past Medical History:   Diagnosis Date    Allergy     Arrhythmia     GERD (gastroesophageal reflux disease)     Hypertension     Hypothyroidism     ALESSANDRA (iron deficiency anemia)     Insomnia     Nephrolithiasis     [place] eswl - [place]    FABIANA on CPAP     PAF (paroxysmal atrial fibrillation)     Subaortic membrane     Subdural hematoma     Vitamin B 12 deficiency     Vitamin D deficiency        Past Surgical History:   Procedure Laterality Date    abdomenalplasty      Ablation atrial fibrillation N/A 4/15/2019    Performed by Edmund [facility] at [facility] EP LAB    CARDIOVERSION N/A 8/28/2016    [facility]    CARDIOVERSION N/A 7/1/2016    [facility]    COLONOSCOPY  2012    normal, repeat in 5 years    EXTRACORPOREAL SHOCK WAVE LITHOTRIPSY      [place]    FRACTURE SURGERY Left     has plates and screws in place.    GALLBLADDER SURGERY  1995    GASTRIC BYPASS  1997    HERNIA REPAIR  1998    HYSTERECTOMY  2013    due to vaginal prolpase with BSO - also bladder suspension at the same time - [place]    loop recording N/A 3/18/2016    Performed by Silvano Garcia MD at [facility] CATH    OPEN REDUCTION INTERNAL FIXATION-  PROXIMAL HUMERUS Left 5/30/2016    Performed by Viet Romo MD at [facility] OR    Transesophageal echo (PRISCILLA) intra-procedure log documentation N/A 4/15/2019    Performed by [facility] Diagnostic Provider at [facility] EP LAB    TYMPANOPLASTY Left     replaced and then repair x2        Current Outpatient Medications   Medication Sig Dispense Refill    atorvastatin (LIPITOR) 10 MG tablet TAKE 1 TABLET(10 MG) BY MOUTH EVERY DAY 90 tablet 4    blood sugar diagnostic Strp To check BG 1 times  daily, to use with insurance preferred meter 100 strip 3    blood-glucose meter kit To check BG 1 times daily, to use with insurance preferred meter 1 each 0    cetirizine (ZYRTEC) 10 MG tablet Take 10 mg by mouth once daily.      cyanocobalamin 1,000 mcg/mL injection Inject 1 mL (1,000 mcg total) into the muscle every 14 (fourteen) days. 6 mL 3    estradiol (ESTRACE) 0.01 % (0.1 mg/gram) vaginal cream Place 1 g vaginally twice a week. 42.5 g 4    glucose 4 GM chewable tablet Take 4 tablets when glucose from 51-70  Take 6 tablets when glucose is less than 50 120 tablet 12    lancets Misc To check BG 1 times daily, to use with insurance preferred meter 100 each 3    levothyroxine (SYNTHROID) 88 MCG tablet Take 1 tablet (88 mcg total) by mouth before breakfast. 30 tablet 11    oxybutynin (DITROPAN-XL) 10 MG 24 hr tablet TAKE 1 TABLET(10 MG) BY MOUTH EVERY DAY 30 tablet 11    paroxetine (PAXIL) 20 MG tablet Take 1 tablet (20 mg total) by mouth every morning. (Patient taking differently: Take 40 mg by mouth every morning. ) 90 tablet 3    rivaroxaban (XARELTO) 20 mg Tab Take 20 mg by mouth daily with dinner or evening meal.      sotalol (BETAPACE) 80 MG tablet Take 0.5 tablets (40 mg total) by mouth every 12 (twelve) hours. 30 tablet 11    TRUEPLUS LANCETS 30 gauge Misc USE TO TEST BLOOD SUGAR BID  0    albuterol 90 mcg/actuation inhaler Inhale 1-2 puffs into the lungs every 6 (six) hours as needed for Wheezing. 1 Inhaler 0    b complex vitamins capsule Take 1 capsule by mouth once daily.      fluticasone (FLONASE) 50 mcg/actuation nasal spray 2 sprays (100 mcg total) by Each Nare route once daily. 1 Bottle 6    metoprolol tartrate (LOPRESSOR) 25 MG tablet Take 1 tablet (25 mg total) by mouth 3 (three) times daily. As needed 90 tablet 11    multivitamin with minerals tablet Take 1 tablet by mouth once daily.      pantoprazole (PROTONIX) 40 MG tablet Take 1 tablet (40 mg total) by mouth once daily. 30  tablet 0    telmisartan (MICARDIS) 40 MG Tab Take 1 tablet (40 mg total) by mouth once daily. Only to start if SBP consistently greater than 130 90 tablet 3     No current facility-administered medications for this visit.        Review of patient's allergies indicates:  No Known Allergies    Family History   Problem Relation Age of Onset    Aneurysm Mother     Hypertension Mother     Cancer Mother         uterine     Glaucoma Mother     Alzheimer's disease Father     Diabetes Father     Mental illness Sister     No Known Problems Daughter     No Known Problems Son     Stroke Maternal Grandfather     Glaucoma Maternal Grandfather     COPD Paternal Grandmother     Cancer Paternal Grandmother         colon    Alzheimer's disease Paternal Grandfather     Heart disease Paternal Grandfather     Nephrolithiasis Neg Hx        Social History     Tobacco Use    Smoking status: Former Smoker     Packs/day: 2.00     Years: 20.00     Pack years: 40.00     Last attempt to quit: 1995     Years since quittin.6    Smokeless tobacco: Never Used   Substance Use Topics    Alcohol use: Yes     Alcohol/week: 4.2 oz     Types: 7 Glasses of wine per week     Comment: one glass wine nightly    Drug use: No       OB History    Para Term  AB Living   2 2 2         SAB TAB Ectopic Multiple Live Births                  # Outcome Date GA Lbr Dustin/2nd Weight Sex Delivery Anes PTL Lv   2 Term      Vag-Spont      1 Term      Vag-Spont          Review of Symptoms:  GENERAL: Denies weight gain or weight loss. Feeling well overall.   SKIN: Denies rash or lesions.   HEAD: Denies head injury or headache.   NODES: Denies enlarged lymph nodes.   CHEST: Denies chest pain or shortness of breath.   CARDIOVASCULAR: Denies palpitations or left sided chest pain.   ABDOMEN: No abdominal pain, constipation, diarrhea, nausea, vomiting or rectal bleeding.   URINARY: No frequency, dysuria, hematuria, or burning on  "urination.  HEMATOLOGIC: No easy bruisability or excessive bleeding.   MUSCULOSKELETAL: Denies joint pain or swelling.     /60   Ht 5' 5" (1.651 m)   Wt 74.2 kg (163 lb 9.3 oz)   LMP  (LMP Unknown)   Physical Exam:  APPEARANCE: Well nourished, well developed, in no acute distress.  SKIN: Normal skin turgor, no lesions.  NECK: Neck symmetric without masses   RESPIRATORY: Normal respiratory effort with no retractions or use of accessory muscles  CARDIOVASCULAR: Peripheral vascular system with no swelling no varicosities and palpation of pulses normal  LYMPHATIC: No enlargements of the lymph nodes noted in the neck, axillae, or groin  ABDOMEN: Soft. No tenderness or masses. No hepatosplenomegaly. No hernias.  PELVIC: Normal external female genitalia without lesions. Normal hair distribution. Adequate perineal body, normal urethral meatus. Urethra with no masses.  Bladder nontender. Vagina apex with mesh exposure. Urethra and bladder normal.  EXTREMITIES: No clubbing cyanosis or edema.    ASSESSMENT/PLAN:  Exposure of vaginal mesh through vaginal wall, subsequent encounter  -     Full code; Standing  -     Insert peripheral IV; Standing  -     Chlorhexidine (CHG) 2% Wipes; Standing  -     Notify Physician - Potential Need of Opioid Reversal; Standing  -     X-Ray Chest PA And Lateral; Standing    Postmenopausal bleeding  -     Full code; Standing  -     Insert peripheral IV; Standing  -     Chlorhexidine (CHG) 2% Wipes; Standing  -     Notify Physician - Potential Need of Opioid Reversal; Standing  -     X-Ray Chest PA And Lateral; Standing        Will plan to remove vaginal mesh and revise vaginal mucosa once she is off her xarelto for 7 days and can be rescheduled.     "

## 2019-07-09 ENCOUNTER — TELEPHONE (OUTPATIENT)
Dept: FAMILY MEDICINE | Facility: CLINIC | Age: 69
End: 2019-07-09

## 2019-07-09 DIAGNOSIS — E16.2 HYPOGLYCEMIA: Primary | ICD-10-CM

## 2019-07-09 RX ORDER — INSULIN PUMP SYRINGE, 3 ML
EACH MISCELLANEOUS
Qty: 1 EACH | Refills: 0 | Status: SHIPPED | OUTPATIENT
Start: 2019-07-09 | End: 2019-07-17 | Stop reason: SDUPTHER

## 2019-07-09 NOTE — TELEPHONE ENCOUNTER
Patient states she has recently been experiencing hypoglycemic issues, states she is scheduled with endocrinology for October 2019 and would like to try the tawny glucose meter to allow more frequent checks of sugars daily (6-12 times daily), before and after meals. Please advise.

## 2019-07-09 NOTE — TELEPHONE ENCOUNTER
----- Message from Jocelin Ross sent at 7/9/2019  1:25 PM CDT -----  Contact: pt  ..Type: Needs Medical Advice    Who Called: pt  Best Call Back Number: [phone]  Additional Information: Pt would like to speak with a nurse regarding orders for Freestyle Walter Glucose Monitor to replace her Trueplus Lancet   Please call to advise  Thanks

## 2019-07-10 ENCOUNTER — TELEPHONE (OUTPATIENT)
Dept: ELECTROPHYSIOLOGY | Facility: CLINIC | Age: 69
End: 2019-07-10

## 2019-07-10 NOTE — TELEPHONE ENCOUNTER
----- Message from Precious Singh MA sent at 7/10/2019 11:05 AM CDT -----      ----- Message -----  From: Francia Rothman  Sent: 7/10/2019  10:50 AM  To: Alyssa Shaffer Staff    .Needs Advice    Reason for call: Shamika-Wants to discuss surgery schedule, use of  Xerelto & dead battery in her chest. Thanks        Communication Preference:  [phone]    Additional Information:

## 2019-07-10 NOTE — TELEPHONE ENCOUNTER
Spoke with patient and advised of Dr Shah's recommendation to hold Xarelto for 2 days prior to Gyn procedure (but not until after 7/15/19). She verbalizes understanding. She wants to postpone the procedure until August, if Gyn is ok with that. She thinks the battery in her ILR must be dead because she is having trouble transmitting (followed on [facility]). Advised that the in office device check did not indicate that the battery was approaching RRT. This is likely a wi-fi issue and she will follow up with Rosey.

## 2019-07-11 ENCOUNTER — TELEPHONE (OUTPATIENT)
Dept: CARDIOLOGY | Facility: CLINIC | Age: 69
End: 2019-07-11

## 2019-07-11 DIAGNOSIS — Z45.09 ENCOUNTER FOR LOOP RECORDER AT END OF BATTERY LIFE: Primary | ICD-10-CM

## 2019-07-11 NOTE — TELEPHONE ENCOUNTER
Pt has been scheduled for loop replacement. She has been given instruction date and time for the procedure and verbalized understanding.       Pt is requesting medication to sedate her during the procedure because she is very nervous. Can you give her something to keep her calm during the procedure. This is Dr Garcia's pt

## 2019-07-15 DIAGNOSIS — E16.2 HYPOGLYCEMIA: ICD-10-CM

## 2019-07-15 NOTE — TELEPHONE ENCOUNTER
----- Message from Candice Saeed sent at 7/15/2019  2:37 PM CDT -----  Contact: celia pinon/ rosario ph#[phone]   celia ponce #[phone]   Fax#[phone]    Requesting orders and clinicals for diabetic testing supplies

## 2019-07-17 ENCOUNTER — TELEPHONE (OUTPATIENT)
Dept: CARDIOLOGY | Facility: CLINIC | Age: 69
End: 2019-07-17

## 2019-07-17 RX ORDER — INSULIN PUMP SYRINGE, 3 ML
EACH MISCELLANEOUS
Qty: 1 EACH | Refills: 0 | Status: SHIPPED | OUTPATIENT
Start: 2019-07-17 | End: 2021-05-18

## 2019-07-17 NOTE — TELEPHONE ENCOUNTER
Spoke to Nikia with [facility] regarding 7/9/19 orders sent to [facility]'s for Walter meter and testing strips. Need printed order for Walter meter and testing strips sent to [facility] along with [facility] at [phone] for equipment to be covered under correct insurance plan. Orders pended for print, please advise.

## 2019-07-17 NOTE — TELEPHONE ENCOUNTER
----- Message from Armida Verde sent at 7/17/2019 11:35 AM CDT -----  Contact: self  Patient requesting to speak to nurse regarding during her appt she received script for xanax     The script does not have a date and pharmacy could not fill script without date    Please call to advice [phone] (home)     Could script please be called in per patient       [facility] [address] [place] [address] [address] [address] AT [facility] [address]  [id number] [place] [address]  Phone: [phone] Fax: [phone]

## 2019-07-19 PROBLEM — Z45.09 ENCOUNTER FOR LOOP RECORDER AT END OF BATTERY LIFE: Status: ACTIVE | Noted: 2019-07-19

## 2019-07-22 DIAGNOSIS — Z95.818 STATUS POST PLACEMENT OF IMPLANTABLE LOOP RECORDER: Primary | ICD-10-CM

## 2019-07-22 DIAGNOSIS — R55 SYNCOPE, UNSPECIFIED SYNCOPE TYPE: ICD-10-CM

## 2019-07-23 ENCOUNTER — PATIENT OUTREACH (OUTPATIENT)
Dept: ADMINISTRATIVE | Facility: HOSPITAL | Age: 69
End: 2019-07-23

## 2019-07-23 NOTE — PROGRESS NOTES
Health Maintenance Due   Topic Date Due    Shingles Vaccine (1 of 2) 05/31/2001    Mammogram  08/16/2019     Chart review completed 07/23/2019

## 2019-07-25 ENCOUNTER — TELEPHONE (OUTPATIENT)
Dept: FAMILY MEDICINE | Facility: CLINIC | Age: 69
End: 2019-07-25

## 2019-07-25 NOTE — TELEPHONE ENCOUNTER
----- Message from Denise Miner sent at 7/25/2019 12:18 PM CDT -----  Contact: [facility]  Type: Needs Medical Advice    Who Called:  Gabi  Symptoms (please be specific):  na  How long has patient had these symptoms:  na  Pharmacy name and phone #:  [facility]  Best Call Back Number: [phone] direct   Additional Information: Gabi [facility] needs clinical notes/diagnostic codes for patient to receive diabetic supplies/moniter , Please call to advise. Thanks!

## 2019-07-26 ENCOUNTER — TELEPHONE (OUTPATIENT)
Dept: FAMILY MEDICINE | Facility: CLINIC | Age: 69
End: 2019-07-26

## 2019-07-26 NOTE — TELEPHONE ENCOUNTER
----- Message from Lay Bullock sent at 7/26/2019  9:50 AM CDT -----  Type: Needs Medical Advice    Who Called:  Guillermina with [facility]   Symptoms (please be specific): additional clinical  Information   How long has patient had these symptoms:    continuous glucose meter   Pharmacy name and phone #:     Best Call Back Number: [phone]   Additional Information:

## 2019-07-30 ENCOUNTER — CLINICAL SUPPORT (OUTPATIENT)
Dept: CARDIOLOGY | Facility: CLINIC | Age: 69
End: 2019-07-30
Attending: INTERNAL MEDICINE
Payer: MEDICARE

## 2019-07-30 DIAGNOSIS — Z95.818 STATUS POST PLACEMENT OF IMPLANTABLE LOOP RECORDER: ICD-10-CM

## 2019-07-30 DIAGNOSIS — R00.1 BRADYCARDIA: ICD-10-CM

## 2019-08-06 ENCOUNTER — TELEPHONE (OUTPATIENT)
Dept: CARDIOLOGY | Facility: CLINIC | Age: 69
End: 2019-08-06

## 2019-08-06 ENCOUNTER — OFFICE VISIT (OUTPATIENT)
Dept: FAMILY MEDICINE | Facility: CLINIC | Age: 69
End: 2019-08-06
Payer: MEDICARE

## 2019-08-06 VITALS
TEMPERATURE: 98 F | WEIGHT: 158.81 LBS | OXYGEN SATURATION: 97 % | HEIGHT: 65 IN | RESPIRATION RATE: 18 BRPM | SYSTOLIC BLOOD PRESSURE: 128 MMHG | BODY MASS INDEX: 26.46 KG/M2 | HEART RATE: 76 BPM | DIASTOLIC BLOOD PRESSURE: 64 MMHG

## 2019-08-06 DIAGNOSIS — F33.0 MAJOR DEPRESSIVE DISORDER, RECURRENT, MILD: ICD-10-CM

## 2019-08-06 DIAGNOSIS — Z12.31 ENCOUNTER FOR SCREENING MAMMOGRAM FOR MALIGNANT NEOPLASM OF BREAST: ICD-10-CM

## 2019-08-06 DIAGNOSIS — E16.2 HYPOGLYCEMIC SYNDROME: ICD-10-CM
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
## 2019-08-06 DIAGNOSIS — N32.81 OAB (OVERACTIVE BLADDER): ICD-10-CM

## 2019-08-06 DIAGNOSIS — I10 ESSENTIAL HYPERTENSION: ICD-10-CM

## 2019-08-06 DIAGNOSIS — E78.2 MIXED HYPERLIPIDEMIA: ICD-10-CM

## 2019-08-06 DIAGNOSIS — K91.89 COMPLICATIONS OF GASTRIC BYPASS SURGERY: ICD-10-CM

## 2019-08-06 DIAGNOSIS — T83.711D EROSION OF VAGINAL WALL DUE TO SURGICAL MESH, SUBSEQUENT ENCOUNTER: ICD-10-CM

## 2019-08-06 DIAGNOSIS — Z86.79 HISTORY OF SUBARACHNOID HEMORRHAGE: ICD-10-CM

## 2019-08-06 DIAGNOSIS — I48.0 PAF (PAROXYSMAL ATRIAL FIBRILLATION): Primary | ICD-10-CM

## 2019-08-06 DIAGNOSIS — K40.90 LEFT INGUINAL HERNIA: ICD-10-CM

## 2019-08-06 DIAGNOSIS — E53.8 VITAMIN B12 DEFICIENCY: ICD-10-CM

## 2019-08-06 DIAGNOSIS — E03.9 HYPOTHYROIDISM, UNSPECIFIED TYPE: ICD-10-CM

## 2019-08-06 PROCEDURE — 99499 RISK ADDL DX/OHS AUDIT: ICD-10-PCS | Mod: S$GLB,,, | Performed by: INTERNAL MEDICINE

## 2019-08-06 PROCEDURE — 99214 PR OFFICE/OUTPT VISIT, EST, LEVL IV, 30-39 MIN: ICD-10-PCS | Mod: S$GLB,,, | Performed by: INTERNAL MEDICINE

## 2019-08-06 PROCEDURE — 99499 UNLISTED E&M SERVICE: CPT | Mod: S$GLB,,, | Performed by: INTERNAL MEDICINE

## 2019-08-06 PROCEDURE — 1101F PR PT FALLS ASSESS DOC 0-1 FALLS W/OUT INJ PAST YR: ICD-10-PCS | Mod: CPTII,S$GLB,, | Performed by: INTERNAL MEDICINE

## 2019-08-06 PROCEDURE — 84443 ASSAY THYROID STIM HORMONE: CPT

## 2019-08-06 PROCEDURE — 3074F SYST BP LT 130 MM HG: CPT | Mod: CPTII,S$GLB,, | Performed by: INTERNAL MEDICINE

## 2019-08-06 PROCEDURE — 3078F PR MOST RECENT DIASTOLIC BLOOD PRESSURE < 80 MM HG: ICD-10-PCS | Mod: CPTII,S$GLB,, | Performed by: INTERNAL MEDICINE

## 2019-08-06 PROCEDURE — 99214 OFFICE O/P EST MOD 30 MIN: CPT | Mod: S$GLB,,, | Performed by: INTERNAL MEDICINE

## 2019-08-06 PROCEDURE — 3078F DIAST BP <80 MM HG: CPT | Mod: CPTII,S$GLB,, | Performed by: INTERNAL MEDICINE

## 2019-08-06 PROCEDURE — 36415 PR COLLECTION VENOUS BLOOD,VENIPUNCTURE: ICD-10-PCS | Mod: S$GLB,,, | Performed by: INTERNAL MEDICINE

## 2019-08-06 PROCEDURE — 1101F PT FALLS ASSESS-DOCD LE1/YR: CPT | Mod: CPTII,S$GLB,, | Performed by: INTERNAL MEDICINE

## 2019-08-06 PROCEDURE — 36415 COLL VENOUS BLD VENIPUNCTURE: CPT | Mod: S$GLB,,, | Performed by: INTERNAL MEDICINE

## 2019-08-06 PROCEDURE — 3074F PR MOST RECENT SYSTOLIC BLOOD PRESSURE < 130 MM HG: ICD-10-PCS | Mod: CPTII,S$GLB,, | Performed by: INTERNAL MEDICINE

## 2019-08-06 RX ORDER — FLASH GLUCOSE SENSOR
KIT MISCELLANEOUS
Refills: 0 | COMMUNITY
Start: 2019-08-04 | End: 2020-05-13

## 2019-08-06 RX ORDER — PAROXETINE HYDROCHLORIDE 40 MG/1
1 TABLET, FILM COATED ORAL DAILY
Refills: 3 | COMMUNITY
Start: 2019-06-13 | End: 2020-06-05

## 2019-08-06 RX ORDER — ALPRAZOLAM 0.25 MG/1
1 TABLET ORAL DAILY
Refills: 0 | COMMUNITY
Start: 2019-07-17 | End: 2020-05-13

## 2019-08-06 NOTE — PROGRESS NOTES
Venipuncture performed with 23 gauge butterfly, x's 1 attempt,  to right hand vein.  Specimens collected per orders.      Pressure dressing applied to site, instructed patient to remove dressing in 10-15 minutes, OK to re-adjust dressing if pressure causing any discomfort, to observe closely for numbness and/or discoloration to hand or fingers, and to notify provider if bleeding persists after applying constant pressure lasting 30 minutes.

## 2019-08-06 NOTE — TELEPHONE ENCOUNTER
Returned pt's call, she wanted to inform pacemaker clinic that she stopped taking sotalol per Dr Shah's orders.  She sent a transmission from home monitor just for update of cardiac rhythm on new ILR implanted.      EGM shows normal SR with undersensing of R wave, 75bpm.  Their are 17 episodes listed as pauses but there are no true pauses available.       ----- Message from Lawson Goodman sent at 8/6/2019 12:26 PM CDT -----  Contact: patient  Type: Needs Medical Advice    Who Called:  patient  Symptoms (please be specific):    How long has patient had these symptoms:    Pharmacy name and phone #:    Best Call Back Number: [phone]  Additional Information: requesting a call back regarding loop recoder
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
## 2019-08-07 ENCOUNTER — PATIENT MESSAGE (OUTPATIENT)
Dept: FAMILY MEDICINE | Facility: CLINIC | Age: 69
End: 2019-08-07

## 2019-08-07 LAB — TSH SERPL DL<=0.005 MIU/L-ACNC: 1.49 UIU/ML (ref 0.4–4)

## 2019-08-09 ENCOUNTER — CLINICAL SUPPORT (OUTPATIENT)
Dept: CARDIOLOGY | Facility: CLINIC | Age: 69
End: 2019-08-09
Attending: INTERNAL MEDICINE
Payer: MEDICARE

## 2019-08-09 DIAGNOSIS — Z95.818 STATUS POST PLACEMENT OF IMPLANTABLE LOOP RECORDER: ICD-10-CM

## 2019-08-09 DIAGNOSIS — R55 SYNCOPE, UNSPECIFIED SYNCOPE TYPE: ICD-10-CM

## 2019-08-20 DIAGNOSIS — E16.2 HYPOGLYCEMIA: ICD-10-CM

## 2019-08-20 RX ORDER — FLASH GLUCOSE SENSOR
KIT MISCELLANEOUS
Qty: 1 KIT | Refills: 0 | Status: SHIPPED | OUTPATIENT
Start: 2019-08-20 | End: 2019-08-26 | Stop reason: SDUPTHER

## 2019-08-20 NOTE — TELEPHONE ENCOUNTER
----- Message from Ping Snyder sent at 8/20/2019 12:02 PM CDT -----  Contact: pt   Type:  Patient Returning Call    Who Called: pt  Who Left Message for Patient:Dr Richards office   Does the patient know what this is regarding?: medication  Would the patient rather a call back or a response via [facility]?  Call back  Best Call Back Number: [phone]  Additional Information:

## 2019-08-26 NOTE — TELEPHONE ENCOUNTER
----- Message from Anika Manuel sent at 8/26/2019 11:54 AM CDT -----  Contact: patient  Type: Needs Medical Advice    Who Called:  patient  Pharmacy name and phone #:    [id number] - [place], [place] - [address] [facility] [place] [address]  [address] [address] [address]  Phone: [phone] Fax: [phone]    [facility] Call Back Number: [phone]  Additional Information: requesting a refill on her tawny. She would like for it to be sent to her pharmacy today. Please give call back.

## 2019-08-26 NOTE — TELEPHONE ENCOUNTER
Patient called to report difficulty managing sugars over the weekend Needs new prescription for tawny monitor (patient requesting 90 day supply, or more than 2 weeks supply) be sent to pharmacy as previous prescription (sensor) has malfunctioned. Patient has spoken to pharmacy and a voucher is being provided to assist with the cost of a new meter. Please advise.

## 2019-08-28 ENCOUNTER — PATIENT MESSAGE (OUTPATIENT)
Dept: FAMILY MEDICINE | Facility: CLINIC | Age: 69
End: 2019-08-28

## 2019-08-28 DIAGNOSIS — E16.2 HYPOGLYCEMIA: ICD-10-CM

## 2019-08-29 ENCOUNTER — PATIENT MESSAGE (OUTPATIENT)
Dept: FAMILY MEDICINE | Facility: CLINIC | Age: 69
End: 2019-08-29

## 2019-08-29 NOTE — TELEPHONE ENCOUNTER
Message sent to Sole Eli with [facility], please see her response below:    Per Amada Patterson, Only non-insured pt's can get rx assistance with Myrbetriq. Dr Richards could submit a request to [facility] for a tiering exception request. I can fax the form if you would like this. IF she has another medication(s) recommendation, I can look to see what tier it is on with [facility].     Please advise.

## 2019-08-29 NOTE — TELEPHONE ENCOUNTER
Test strips sent    I know the urine medication is expensive.     She should talk to Han at [facility] to get help with her medications.     Please give her the number to call.     Thanks

## 2019-08-29 NOTE — TELEPHONE ENCOUNTER
Patient states she has had multiple issues with her pharmacy dispensing correct supplies and giving correct information regarding freestyle tawny monitor. Patient has contacted pharmacy, [employer], [facility], etc regarding coverage of these supplies. Patient states she was advised equipment would be covered with a diagnosis of diabetes. Patient currently has all supplies needed (strips, monitor, etc.) and would like to use a different pharmacy in the future. Patient denies any further needs at this time.

## 2019-09-03 ENCOUNTER — HOSPITAL ENCOUNTER (OUTPATIENT)
Dept: RADIOLOGY | Facility: HOSPITAL | Age: 69
Discharge: HOME | End: 2019-09-03
Attending: INTERNAL MEDICINE
Payer: MEDICARE

## 2019-09-03 DIAGNOSIS — Z12.31 ENCOUNTER FOR SCREENING MAMMOGRAM FOR MALIGNANT NEOPLASM OF BREAST: ICD-10-CM

## 2019-09-03 PROCEDURE — 77067 MAMMO DIGITAL SCREENING BILAT WITH TOMOSYNTHESIS_CAD: ICD-10-PCS | Mod: 26,,, | Performed by: RADIOLOGY

## 2019-09-03 PROCEDURE — 77063 MAMMO DIGITAL SCREENING BILAT WITH TOMOSYNTHESIS_CAD: ICD-10-PCS | Mod: 26,,, | Performed by: RADIOLOGY

## 2019-09-03 PROCEDURE — 77063 BREAST TOMOSYNTHESIS BI: CPT | Mod: 26,,, | Performed by: RADIOLOGY

## 2019-09-03 PROCEDURE — 77067 SCR MAMMO BI INCL CAD: CPT | Mod: 26,,, | Performed by: RADIOLOGY

## 2019-09-03 PROCEDURE — 77067 SCR MAMMO BI INCL CAD: CPT | Mod: TC,PO

## 2019-09-05 ENCOUNTER — PATIENT MESSAGE (OUTPATIENT)
Dept: FAMILY MEDICINE | Facility: CLINIC | Age: 69
End: 2019-09-05

## 2019-09-05 ENCOUNTER — TELEPHONE (OUTPATIENT)
Dept: FAMILY MEDICINE | Facility: CLINIC | Age: 69
End: 2019-09-05

## 2019-09-05 RX ORDER — SOLIFENACIN SUCCINATE 5 MG/1
5 TABLET, FILM COATED ORAL DAILY
Qty: 90 TABLET | Refills: 3 | Status: SHIPPED | OUTPATIENT
Start: 2019-09-05 | End: 2020-07-16

## 2019-09-05 NOTE — TELEPHONE ENCOUNTER
Please advise Yara that I want to try vesicare since this is covered.     I sent to pharm.     Thanks

## 2019-09-05 NOTE — TELEPHONE ENCOUNTER
----- Message from Rand Mcneil sent at 9/5/2019 10:33 AM CDT -----  Contact: Monika with [facility]  Type:  Pharmacy Calling to Clarify an RX    Name of Caller:  Monika  Pharmacy Name:  [facility]  Prescription Name:  Myrbetriq 25 mg  What do they need to clarify?:  Prior authorization  Best Call Back Number:  [phone]  Additional Information:  Monika is calling regarding the tier exception. It will not help this patient due to she is in [place]. It will still cost $268.06. Monika suggested going to a tier 1 or 2 which are not effected by the [place]. Please call Monika to discuss options. Thanks!

## 2019-09-05 NOTE — TELEPHONE ENCOUNTER
Please review below message from Monika with [facility] regarding prescription Myrbetriq. OOP cost is still $268 copay and no tier exception will help remove from gap unless tier 1 or tier 2 medication is prescribed. The following options fall under a $10 copay for 30 day supply:     Oxybutynin  Tamsulosin  tolterodine (generic detrol)  trospium 20mg  solifenacin    Please advise.

## 2019-09-09 ENCOUNTER — PATIENT MESSAGE (OUTPATIENT)
Dept: FAMILY MEDICINE | Facility: CLINIC | Age: 69
End: 2019-09-09

## 2019-09-10 ENCOUNTER — PATIENT MESSAGE (OUTPATIENT)
Dept: FAMILY MEDICINE | Facility: CLINIC | Age: 69
End: 2019-09-10

## 2019-09-10 DIAGNOSIS — E16.2 HYPOGLYCEMIA: ICD-10-CM

## 2019-09-10 RX ORDER — FLASH GLUCOSE SENSOR
KIT MISCELLANEOUS
Qty: 7 KIT | Refills: 3 | Status: SHIPPED | OUTPATIENT
Start: 2019-09-10 | End: 2020-09-23 | Stop reason: SDUPTHER

## 2019-11-11 ENCOUNTER — OFFICE VISIT (OUTPATIENT)
Dept: CARDIOLOGY | Facility: CLINIC | Age: 69
End: 2019-11-11
Payer: MEDICARE

## 2019-11-11 VITALS
BODY MASS INDEX: 27.18 KG/M2 | HEART RATE: 75 BPM | SYSTOLIC BLOOD PRESSURE: 144 MMHG | DIASTOLIC BLOOD PRESSURE: 79 MMHG | HEIGHT: 65 IN | WEIGHT: 163.13 LBS

## 2019-11-11 DIAGNOSIS — Z95.818 STATUS POST PLACEMENT OF IMPLANTABLE LOOP RECORDER: ICD-10-CM

## 2019-11-11 DIAGNOSIS — I45.10 RBBB: ICD-10-CM

## 2019-11-11 DIAGNOSIS — I48.0 PAF (PAROXYSMAL ATRIAL FIBRILLATION): Primary | Chronic | ICD-10-CM

## 2019-11-11 DIAGNOSIS — I10 ESSENTIAL HYPERTENSION: Chronic | ICD-10-CM

## 2019-11-11 DIAGNOSIS — I49.5 TACHYCARDIA-BRADYCARDIA: ICD-10-CM

## 2019-11-11 DIAGNOSIS — E78.2 MIXED HYPERLIPIDEMIA: ICD-10-CM

## 2019-11-11 DIAGNOSIS — I67.2 CEREBRAL ATHEROSCLEROSIS: ICD-10-CM

## 2019-11-11 PROCEDURE — 3078F PR MOST RECENT DIASTOLIC BLOOD PRESSURE < 80 MM HG: ICD-10-PCS | Mod: CPTII,S$GLB,, | Performed by: INTERNAL MEDICINE

## 2019-11-11 PROCEDURE — 99499 RISK ADDL DX/OHS AUDIT: ICD-10-PCS | Mod: S$GLB,,, | Performed by: INTERNAL MEDICINE

## 2019-11-11 PROCEDURE — 3077F PR MOST RECENT SYSTOLIC BLOOD PRESSURE >= 140 MM HG: ICD-10-PCS | Mod: CPTII,S$GLB,, | Performed by: INTERNAL MEDICINE

## 2019-11-11 PROCEDURE — 1101F PT FALLS ASSESS-DOCD LE1/YR: CPT | Mod: CPTII,S$GLB,, | Performed by: INTERNAL MEDICINE

## 2019-11-11 PROCEDURE — 1101F PR PT FALLS ASSESS DOC 0-1 FALLS W/OUT INJ PAST YR: ICD-10-PCS | Mod: CPTII,S$GLB,, | Performed by: INTERNAL MEDICINE

## 2019-11-11 PROCEDURE — 3077F SYST BP >= 140 MM HG: CPT | Mod: CPTII,S$GLB,, | Performed by: INTERNAL MEDICINE

## 2019-11-11 PROCEDURE — 99499 UNLISTED E&M SERVICE: CPT | Mod: S$GLB,,, | Performed by: INTERNAL MEDICINE

## 2019-11-11 PROCEDURE — 99214 OFFICE O/P EST MOD 30 MIN: CPT | Mod: S$GLB,,, | Performed by: INTERNAL MEDICINE

## 2019-11-11 PROCEDURE — 3078F DIAST BP <80 MM HG: CPT | Mod: CPTII,S$GLB,, | Performed by: INTERNAL MEDICINE

## 2019-11-11 PROCEDURE — 99214 PR OFFICE/OUTPT VISIT, EST, LEVL IV, 30-39 MIN: ICD-10-PCS | Mod: S$GLB,,, | Performed by: INTERNAL MEDICINE

## 2019-11-11 PROCEDURE — 99999 PR PBB SHADOW E&M-EST. PATIENT-LVL III: CPT | Mod: PBBFAC,,, | Performed by: INTERNAL MEDICINE

## 2019-11-11 PROCEDURE — 99999 PR PBB SHADOW E&M-EST. PATIENT-LVL III: ICD-10-PCS | Mod: PBBFAC,,, | Performed by: INTERNAL MEDICINE
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
## 2019-11-16 ENCOUNTER — CLINICAL SUPPORT (OUTPATIENT)
Dept: CARDIOLOGY | Facility: CLINIC | Age: 69
End: 2019-11-16
Payer: MEDICARE

## 2019-11-21 ENCOUNTER — CLINICAL SUPPORT (OUTPATIENT)
Dept: CARDIOLOGY | Facility: CLINIC | Age: 69
End: 2019-11-21
Attending: INTERNAL MEDICINE
Payer: MEDICARE

## 2019-11-21 VITALS — HEIGHT: 65 IN | BODY MASS INDEX: 27.16 KG/M2 | WEIGHT: 163 LBS

## 2019-11-21 DIAGNOSIS — E78.2 MIXED HYPERLIPIDEMIA: ICD-10-CM

## 2019-11-21 DIAGNOSIS — I48.0 PAF (PAROXYSMAL ATRIAL FIBRILLATION): ICD-10-CM

## 2019-11-21 DIAGNOSIS — I67.2 CEREBRAL ATHEROSCLEROSIS: ICD-10-CM

## 2019-11-21 DIAGNOSIS — I49.5 TACHYCARDIA-BRADYCARDIA: ICD-10-CM

## 2019-11-21 DIAGNOSIS — I10 ESSENTIAL HYPERTENSION: ICD-10-CM

## 2019-11-21 DIAGNOSIS — Z95.818 STATUS POST PLACEMENT OF IMPLANTABLE LOOP RECORDER: ICD-10-CM

## 2019-11-21 PROCEDURE — 93880 EXTRACRANIAL BILAT STUDY: CPT | Mod: S$GLB,,, | Performed by: INTERNAL MEDICINE

## 2019-11-21 PROCEDURE — 93880 CV US DOPPLER CAROTID (CUPID ONLY): ICD-10-PCS | Mod: S$GLB,,, | Performed by: INTERNAL MEDICINE

## 2019-11-21 PROCEDURE — 99999 PR PBB SHADOW E&M-EST. PATIENT-LVL I: CPT | Mod: PBBFAC,,,

## 2019-11-21 PROCEDURE — 99999 PR PBB SHADOW E&M-EST. PATIENT-LVL I: ICD-10-PCS | Mod: PBBFAC,,,

## 2019-11-24 LAB
LEFT ARM DIASTOLIC BLOOD PRESSURE: 60 MMHG
LEFT ARM SYSTOLIC BLOOD PRESSURE: 110 MMHG
LEFT CBA DIAS: 16 CM/S
LEFT CBA SYS: 59 CM/S
LEFT CCA DIST DIAS: 17 CM/S
LEFT CCA DIST SYS: 79 CM/S
LEFT CCA MID DIAS: 20 CM/S
LEFT CCA MID SYS: 101 CM/S
LEFT CCA PROX DIAS: 15 CM/S
LEFT CCA PROX SYS: 106 CM/S
LEFT ECA DIAS: 12 CM/S
LEFT ECA SYS: 101 CM/S
LEFT ICA DIST DIAS: 20 CM/S
LEFT ICA DIST SYS: 46 CM/S
LEFT ICA MID DIAS: 27 CM/S
LEFT ICA MID SYS: 64 CM/S
LEFT ICA PROX DIAS: 17 CM/S
LEFT ICA PROX SYS: 50 CM/S
LEFT VERTEBRAL DIAS: 6 CM/S
LEFT VERTEBRAL SYS: 35 CM/S
OHS CV CAROTID RIGHT ICA EDV HIGHEST: 28
OHS CV CAROTID ULTRASOUND LEFT ICA/CCA RATIO: 0.6
OHS CV CAROTID ULTRASOUND RIGHT ICA/CCA RATIO: 0.91
OHS CV PV CAROTID LEFT HIGHEST CCA: 106
OHS CV PV CAROTID LEFT HIGHEST ICA: 64
OHS CV PV CAROTID RIGHT HIGHEST CCA: 99
OHS CV PV CAROTID RIGHT HIGHEST ICA: 90
OHS CV US CAROTID LEFT HIGHEST EDV: 27
RIGHT ARM DIASTOLIC BLOOD PRESSURE: 60 MMHG
RIGHT ARM SYSTOLIC BLOOD PRESSURE: 120 MMHG
RIGHT CBA DIAS: 16 CM/S
RIGHT CBA SYS: 75 CM/S
RIGHT CCA DIST DIAS: 14 CM/S
RIGHT CCA DIST SYS: 73 CM/S
RIGHT CCA MID DIAS: 13 CM/S
RIGHT CCA MID SYS: 83 CM/S
RIGHT CCA PROX DIAS: 16 CM/S
RIGHT CCA PROX SYS: 99 CM/S
RIGHT ECA DIAS: 13 CM/S
RIGHT ECA SYS: 132 CM/S
RIGHT ICA DIST DIAS: 26 CM/S
RIGHT ICA DIST SYS: 90 CM/S
RIGHT ICA MID DIAS: 28 CM/S
RIGHT ICA MID SYS: 78 CM/S
RIGHT ICA PROX DIAS: 18 CM/S
RIGHT ICA PROX SYS: 64 CM/S
RIGHT VERTEBRAL DIAS: 15 CM/S
RIGHT VERTEBRAL SYS: 53 CM/S

## 2019-12-04 NOTE — TELEPHONE ENCOUNTER
Forwarded scripts to dr olivares to be resubmitted for 90 day supplies     The patient is a 41y Male complaining of chest pain.

## 2020-02-06 DIAGNOSIS — E78.5 HYPERLIPIDEMIA, UNSPECIFIED HYPERLIPIDEMIA TYPE: ICD-10-CM

## 2020-02-07 DIAGNOSIS — Z12.11 COLON CANCER SCREENING: ICD-10-CM

## 2020-02-07 RX ORDER — ATORVASTATIN CALCIUM 10 MG/1
TABLET, FILM COATED ORAL
Qty: 90 TABLET | Refills: 4 | Status: SHIPPED | OUTPATIENT
Start: 2020-02-07 | End: 2020-07-28 | Stop reason: SDUPTHER

## 2020-02-12 DIAGNOSIS — N76.0 VULVOVAGINITIS: ICD-10-CM

## 2020-02-13 RX ORDER — CONJUGATED ESTROGENS 0.62 MG/G
CREAM VAGINAL
Qty: 30 G | Refills: 3 | Status: SHIPPED | OUTPATIENT
Start: 2020-02-13 | End: 2020-03-16 | Stop reason: ALTCHOICE

## 2020-02-26 NOTE — ADDENDUM NOTE
Addended by: CJ JIMENES on: 7/1/2019 01:06 PM     Modules accepted: Orders     Simponi Counseling:  I discussed with the patient the risks of golimumab including but not limited to myelosuppression, immunosuppression, autoimmune hepatitis, demyelinating diseases, lymphoma, and serious infections.  The patient understands that monitoring is required including a PPD at baseline and must alert us or the primary physician if symptoms of infection or other concerning signs are noted.

## 2020-03-05 DIAGNOSIS — E53.8 VITAMIN B 12 DEFICIENCY: ICD-10-CM

## 2020-03-06 RX ORDER — CYANOCOBALAMIN 1000 UG/ML
INJECTION, SOLUTION INTRAMUSCULAR; SUBCUTANEOUS
Qty: 6 ML | Refills: 3 | Status: SHIPPED | OUTPATIENT
Start: 2020-03-06 | End: 2020-11-25

## 2020-03-09 RX ORDER — RIVAROXABAN 20 MG/1
TABLET, FILM COATED ORAL
Qty: 30 TABLET | Refills: 4 | Status: SHIPPED | OUTPATIENT
Start: 2020-03-09 | End: 2020-07-28 | Stop reason: SDUPTHER

## 2020-03-16 ENCOUNTER — OFFICE VISIT (OUTPATIENT)
Dept: FAMILY MEDICINE | Facility: CLINIC | Age: 70
End: 2020-03-16
Payer: MEDICARE

## 2020-03-16 VITALS
HEART RATE: 76 BPM | DIASTOLIC BLOOD PRESSURE: 70 MMHG | WEIGHT: 158.31 LBS | BODY MASS INDEX: 26.38 KG/M2 | TEMPERATURE: 99 F | HEIGHT: 65 IN | SYSTOLIC BLOOD PRESSURE: 110 MMHG | RESPIRATION RATE: 18 BRPM

## 2020-03-16 DIAGNOSIS — E03.9 HYPOTHYROIDISM, UNSPECIFIED TYPE: ICD-10-CM

## 2020-03-16 DIAGNOSIS — E16.2 HYPOGLYCEMIC SYNDROME: ICD-10-CM

## 2020-03-16 DIAGNOSIS — R41.3 MEMORY PROBLEM: ICD-10-CM

## 2020-03-16 DIAGNOSIS — E53.8 VITAMIN B12 DEFICIENCY: ICD-10-CM

## 2020-03-16 DIAGNOSIS — N32.81 OAB (OVERACTIVE BLADDER): ICD-10-CM

## 2020-03-16 DIAGNOSIS — K91.89 COMPLICATIONS OF GASTRIC BYPASS SURGERY: ICD-10-CM

## 2020-03-16 DIAGNOSIS — F33.0 MAJOR DEPRESSIVE DISORDER, RECURRENT, MILD: ICD-10-CM

## 2020-03-16 DIAGNOSIS — I48.0 PAF (PAROXYSMAL ATRIAL FIBRILLATION): Primary | ICD-10-CM

## 2020-03-16 DIAGNOSIS — E55.9 VITAMIN D DEFICIENCY: ICD-10-CM

## 2020-03-16 DIAGNOSIS — Z86.79 HISTORY OF SUBARACHNOID HEMORRHAGE: ICD-10-CM

## 2020-03-16 DIAGNOSIS — Z78.0 ASYMPTOMATIC MENOPAUSAL STATE: ICD-10-CM

## 2020-03-16 DIAGNOSIS — D64.9 ANEMIA, UNSPECIFIED TYPE: ICD-10-CM

## 2020-03-16 DIAGNOSIS — Z12.31 ENCOUNTER FOR SCREENING MAMMOGRAM FOR MALIGNANT NEOPLASM OF BREAST: ICD-10-CM

## 2020-03-16 DIAGNOSIS — E78.2 MIXED HYPERLIPIDEMIA: ICD-10-CM

## 2020-03-16 DIAGNOSIS — Z12.11 SCREEN FOR COLON CANCER: ICD-10-CM

## 2020-03-16 DIAGNOSIS — I10 ESSENTIAL HYPERTENSION: ICD-10-CM
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
## 2020-03-16 PROBLEM — R55 SYNCOPE: Status: RESOLVED | Noted: 2019-04-09 | Resolved: 2020-03-16

## 2020-03-16 PROBLEM — R55 NEAR SYNCOPE: Status: RESOLVED | Noted: 2019-04-08 | Resolved: 2020-03-16

## 2020-03-16 PROBLEM — I49.5 TACHYCARDIA-BRADYCARDIA: Status: RESOLVED | Noted: 2019-04-08 | Resolved: 2020-03-16

## 2020-03-16 PROBLEM — I49.9 CARDIAC ARRHYTHMIA: Status: RESOLVED | Noted: 2019-04-08 | Resolved: 2020-03-16

## 2020-03-16 PROBLEM — S06.5XAA SUBDURAL HEMATOMA: Status: RESOLVED | Noted: 2019-01-01 | Resolved: 2020-03-16

## 2020-03-16 PROBLEM — S06.6XAA SUBARACHNOID HEMATOMA: Status: RESOLVED | Noted: 2019-01-01 | Resolved: 2020-03-16

## 2020-03-16 PROBLEM — S01.00XA SCALP WOUND: Status: RESOLVED | Noted: 2019-04-08 | Resolved: 2020-03-16

## 2020-03-16 LAB
25(OH)D3+25(OH)D2 SERPL-MCNC: 35 NG/ML (ref 30–96)
ALBUMIN SERPL BCP-MCNC: 3.9 G/DL (ref 3.5–5.2)
ALP SERPL-CCNC: 72 U/L (ref 55–135)
ALT SERPL W/O P-5'-P-CCNC: 10 U/L (ref 10–44)
ANION GAP SERPL CALC-SCNC: 9 MMOL/L (ref 8–16)
AST SERPL-CCNC: 20 U/L (ref 10–40)
BASOPHILS # BLD AUTO: 0.02 K/UL (ref 0–0.2)
BASOPHILS NFR BLD: 0.3 % (ref 0–1.9)
BILIRUB SERPL-MCNC: 0.4 MG/DL (ref 0.1–1)
BUN SERPL-MCNC: 23 MG/DL (ref 8–23)
CALCIUM SERPL-MCNC: 9 MG/DL (ref 8.7–10.5)
CHLORIDE SERPL-SCNC: 102 MMOL/L (ref 95–110)
CHOLEST SERPL-MCNC: 170 MG/DL (ref 120–199)
CHOLEST/HDLC SERPL: 1.9 {RATIO} (ref 2–5)
CO2 SERPL-SCNC: 29 MMOL/L (ref 23–29)
CREAT SERPL-MCNC: 0.6 MG/DL (ref 0.5–1.4)
DIFFERENTIAL METHOD: ABNORMAL
EOSINOPHIL # BLD AUTO: 0 K/UL (ref 0–0.5)
EOSINOPHIL NFR BLD: 0.3 % (ref 0–8)
ERYTHROCYTE [DISTWIDTH] IN BLOOD BY AUTOMATED COUNT: 16.7 % (ref 11.5–14.5)
EST. GFR  (AFRICAN AMERICAN): >60 ML/MIN/1.73 M^2
EST. GFR  (NON AFRICAN AMERICAN): >60 ML/MIN/1.73 M^2
ESTIMATED AVG GLUCOSE: 100 MG/DL (ref 68–131)
FERRITIN SERPL-MCNC: 10 NG/ML (ref 20–300)
GLUCOSE SERPL-MCNC: 87 MG/DL (ref 70–110)
HBA1C MFR BLD HPLC: 5.1 % (ref 4–5.6)
HCT VFR BLD AUTO: 37.4 % (ref 37–48.5)
HDLC SERPL-MCNC: 90 MG/DL (ref 40–75)
HDLC SERPL: 52.9 % (ref 20–50)
HGB BLD-MCNC: 11.9 G/DL (ref 12–16)
IMM GRANULOCYTES # BLD AUTO: 0.02 K/UL (ref 0–0.04)
IMM GRANULOCYTES NFR BLD AUTO: 0.3 % (ref 0–0.5)
IRON SERPL-MCNC: 51 UG/DL (ref 30–160)
LDLC SERPL CALC-MCNC: 70.4 MG/DL (ref 63–159)
LYMPHOCYTES # BLD AUTO: 1.6 K/UL (ref 1–4.8)
LYMPHOCYTES NFR BLD: 24.7 % (ref 18–48)
MCH RBC QN AUTO: 30.5 PG (ref 27–31)
MCHC RBC AUTO-ENTMCNC: 31.8 G/DL (ref 32–36)
MCV RBC AUTO: 96 FL (ref 82–98)
MONOCYTES # BLD AUTO: 0.4 K/UL (ref 0.3–1)
MONOCYTES NFR BLD: 7 % (ref 4–15)
NEUTROPHILS # BLD AUTO: 4.3 K/UL (ref 1.8–7.7)
NEUTROPHILS NFR BLD: 67.4 % (ref 38–73)
NONHDLC SERPL-MCNC: 80 MG/DL
NRBC BLD-RTO: 0 /100 WBC
PLATELET # BLD AUTO: 273 K/UL (ref 150–350)
PMV BLD AUTO: 10.9 FL (ref 9.2–12.9)
POTASSIUM SERPL-SCNC: 3.8 MMOL/L (ref 3.5–5.1)
PROT SERPL-MCNC: 7.2 G/DL (ref 6–8.4)
RBC # BLD AUTO: 3.9 M/UL (ref 4–5.4)
SATURATED IRON: 10 % (ref 20–50)
SODIUM SERPL-SCNC: 140 MMOL/L (ref 136–145)
TOTAL IRON BINDING CAPACITY: 527 UG/DL (ref 250–450)
TRANSFERRIN SERPL-MCNC: 356 MG/DL (ref 200–375)
TRIGL SERPL-MCNC: 48 MG/DL (ref 30–150)
TSH SERPL DL<=0.005 MIU/L-ACNC: 1.11 UIU/ML (ref 0.4–4)
VIT B12 SERPL-MCNC: 371 PG/ML (ref 210–950)
WBC # BLD AUTO: 6.32 K/UL (ref 3.9–12.7)

## 2020-03-16 PROCEDURE — 99214 PR OFFICE/OUTPT VISIT, EST, LEVL IV, 30-39 MIN: ICD-10-PCS | Mod: S$GLB,,, | Performed by: INTERNAL MEDICINE

## 2020-03-16 PROCEDURE — 3074F SYST BP LT 130 MM HG: CPT | Mod: CPTII,S$GLB,, | Performed by: INTERNAL MEDICINE

## 2020-03-16 PROCEDURE — 1101F PT FALLS ASSESS-DOCD LE1/YR: CPT | Mod: CPTII,S$GLB,, | Performed by: INTERNAL MEDICINE

## 2020-03-16 PROCEDURE — 36415 COLL VENOUS BLD VENIPUNCTURE: CPT | Mod: S$GLB,,, | Performed by: INTERNAL MEDICINE

## 2020-03-16 PROCEDURE — 99499 RISK ADDL DX/OHS AUDIT: ICD-10-PCS | Mod: S$GLB,,, | Performed by: INTERNAL MEDICINE

## 2020-03-16 PROCEDURE — 99499 UNLISTED E&M SERVICE: CPT | Mod: S$GLB,,, | Performed by: INTERNAL MEDICINE

## 2020-03-16 PROCEDURE — 99214 OFFICE O/P EST MOD 30 MIN: CPT | Mod: S$GLB,,, | Performed by: INTERNAL MEDICINE

## 2020-03-16 PROCEDURE — 36415 PR COLLECTION VENOUS BLOOD,VENIPUNCTURE: ICD-10-PCS | Mod: S$GLB,,, | Performed by: INTERNAL MEDICINE

## 2020-03-16 PROCEDURE — 82607 VITAMIN B-12: CPT

## 2020-03-16 PROCEDURE — 84443 ASSAY THYROID STIM HORMONE: CPT

## 2020-03-16 PROCEDURE — 83036 HEMOGLOBIN GLYCOSYLATED A1C: CPT

## 2020-03-16 PROCEDURE — 1125F PR PAIN SEVERITY QUANTIFIED, PAIN PRESENT: ICD-10-PCS | Mod: S$GLB,,, | Performed by: INTERNAL MEDICINE

## 2020-03-16 PROCEDURE — 82306 VITAMIN D 25 HYDROXY: CPT

## 2020-03-16 PROCEDURE — 3074F PR MOST RECENT SYSTOLIC BLOOD PRESSURE < 130 MM HG: ICD-10-PCS | Mod: CPTII,S$GLB,, | Performed by: INTERNAL MEDICINE

## 2020-03-16 PROCEDURE — 1159F MED LIST DOCD IN RCRD: CPT | Mod: S$GLB,,, | Performed by: INTERNAL MEDICINE

## 2020-03-16 PROCEDURE — 80053 COMPREHEN METABOLIC PANEL: CPT

## 2020-03-16 PROCEDURE — 3078F DIAST BP <80 MM HG: CPT | Mod: CPTII,S$GLB,, | Performed by: INTERNAL MEDICINE

## 2020-03-16 PROCEDURE — 1125F AMNT PAIN NOTED PAIN PRSNT: CPT | Mod: S$GLB,,, | Performed by: INTERNAL MEDICINE

## 2020-03-16 PROCEDURE — 3078F PR MOST RECENT DIASTOLIC BLOOD PRESSURE < 80 MM HG: ICD-10-PCS | Mod: CPTII,S$GLB,, | Performed by: INTERNAL MEDICINE

## 2020-03-16 PROCEDURE — 1159F PR MEDICATION LIST DOCUMENTED IN MEDICAL RECORD: ICD-10-PCS | Mod: S$GLB,,, | Performed by: INTERNAL MEDICINE

## 2020-03-16 PROCEDURE — 80061 LIPID PANEL: CPT

## 2020-03-16 PROCEDURE — 83540 ASSAY OF IRON: CPT

## 2020-03-16 PROCEDURE — 1101F PR PT FALLS ASSESS DOC 0-1 FALLS W/OUT INJ PAST YR: ICD-10-PCS | Mod: CPTII,S$GLB,, | Performed by: INTERNAL MEDICINE

## 2020-03-16 PROCEDURE — 82728 ASSAY OF FERRITIN: CPT

## 2020-03-16 PROCEDURE — 85025 COMPLETE CBC W/AUTO DIFF WBC: CPT

## 2020-03-16 RX ORDER — OXYBUTYNIN CHLORIDE 5 MG/1
5 TABLET, EXTENDED RELEASE ORAL DAILY
Qty: 30 TABLET | Refills: 11
Start: 2020-03-16 | End: 2020-07-13
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
## 2020-03-17 ENCOUNTER — PATIENT MESSAGE (OUTPATIENT)
Dept: FAMILY MEDICINE | Facility: CLINIC | Age: 70
End: 2020-03-17

## 2020-03-17 DIAGNOSIS — D50.0 IRON DEFICIENCY ANEMIA DUE TO CHRONIC BLOOD LOSS: Primary | ICD-10-CM

## 2020-03-18 ENCOUNTER — TELEPHONE (OUTPATIENT)
Dept: GASTROENTEROLOGY | Facility: CLINIC | Age: 70
End: 2020-03-18

## 2020-03-18 NOTE — TELEPHONE ENCOUNTER
I spoke with pt this AM regarding scheduling cscope and she states that she would like to hold off on scheduling and speak with her PCP and wt loss physician about our physicians due to her having reactive hypoglycemia after gastric bypass. She questions having this done by a provider who is not familiar with her medical hx. I did offer an appt to see one of our providers in clinic first, but she says she didn't want to have to pay $50 first. She decided to hold off on scheduling the cscope and states she will follow up with us once she speaks with her providers. Phone number provided for call back.

## 2020-03-27 ENCOUNTER — PATIENT MESSAGE (OUTPATIENT)
Dept: FAMILY MEDICINE | Facility: CLINIC | Age: 70
End: 2020-03-27

## 2020-03-27 DIAGNOSIS — D50.0 IRON DEFICIENCY ANEMIA DUE TO CHRONIC BLOOD LOSS: Primary | ICD-10-CM

## 2020-03-27 DIAGNOSIS — K92.2 GASTROINTESTINAL HEMORRHAGE, UNSPECIFIED GASTROINTESTINAL HEMORRHAGE TYPE: ICD-10-CM

## 2020-03-27 NOTE — TELEPHONE ENCOUNTER
Stools are watery and black.  Coffee ground.     No abdominal pain.  No abdominal pain.  No epigastric pain.     She feels weak and dizzy.

## 2020-03-27 NOTE — TELEPHONE ENCOUNTER
----- Message from Corteznoe Lynn sent at 3/27/2020 12:22 PM CDT -----  Contact: pt  Type: Needs Medical Advice    Who Called:  pt    Best Call Back Number: [phone]  Additional Information: Would like to discuss her colonoscopy that she had cancelled. Pt thinks she may needs to have it down after all. Would like to discuss this with the nurse. Please call to advise.

## 2020-03-27 NOTE — TELEPHONE ENCOUNTER
"Called patient and sent to ER. She is more confused and lightheaded. She is "having trouble thinking straight". She has dark stools.       "

## 2020-03-31 ENCOUNTER — PATIENT MESSAGE (OUTPATIENT)
Dept: FAMILY MEDICINE | Facility: CLINIC | Age: 70
End: 2020-03-31

## 2020-04-14 ENCOUNTER — CLINICAL SUPPORT (OUTPATIENT)
Dept: CARDIOLOGY | Facility: CLINIC | Age: 70
End: 2020-04-14
Payer: MEDICARE

## 2020-04-14 DIAGNOSIS — Z95.818 PRESENCE OF OTHER CARDIAC IMPLANTS AND GRAFTS: ICD-10-CM

## 2020-04-14 PROCEDURE — 93298 CARDIAC DEVICE CHECK - REMOTE: ICD-10-PCS | Mod: S$GLB,,, | Performed by: INTERNAL MEDICINE

## 2020-04-14 PROCEDURE — 93298 REM INTERROG DEV EVAL SCRMS: CPT | Mod: S$GLB,,, | Performed by: INTERNAL MEDICINE

## 2020-05-05 ENCOUNTER — PATIENT MESSAGE (OUTPATIENT)
Dept: ADMINISTRATIVE | Facility: HOSPITAL | Age: 70
End: 2020-05-05

## 2020-05-10 ENCOUNTER — PATIENT MESSAGE (OUTPATIENT)
Dept: FAMILY MEDICINE | Facility: CLINIC | Age: 70
End: 2020-05-10

## 2020-05-10 DIAGNOSIS — N95.0 POSTMENOPAUSAL VAGINAL BLEEDING: Primary | ICD-10-CM

## 2020-05-12 ENCOUNTER — PATIENT OUTREACH (OUTPATIENT)
Dept: ADMINISTRATIVE | Facility: OTHER | Age: 70
End: 2020-05-12

## 2020-05-13 ENCOUNTER — OFFICE VISIT (OUTPATIENT)
Dept: OBSTETRICS AND GYNECOLOGY | Facility: CLINIC | Age: 70
End: 2020-05-13
Payer: MEDICARE

## 2020-05-13 VITALS
WEIGHT: 161.81 LBS | SYSTOLIC BLOOD PRESSURE: 122 MMHG | HEIGHT: 65 IN | BODY MASS INDEX: 26.96 KG/M2 | DIASTOLIC BLOOD PRESSURE: 74 MMHG

## 2020-05-13 DIAGNOSIS — T83.721D EXPOSURE OF IMPLANTED VAGINAL MESH AND PROSTHETIC MATERIAL IN VAGINA, SUBSEQUENT ENCOUNTER: Primary | ICD-10-CM

## 2020-05-13 DIAGNOSIS — T83.729D EXPOSURE OF IMPLANTED VAGINAL MESH AND PROSTHETIC MATERIAL IN VAGINA, SUBSEQUENT ENCOUNTER: Primary | ICD-10-CM

## 2020-05-13 PROCEDURE — 99999 PR PBB SHADOW E&M-EST. PATIENT-LVL IV: ICD-10-PCS | Mod: PBBFAC,,, | Performed by: OBSTETRICS & GYNECOLOGY

## 2020-05-13 PROCEDURE — 3074F PR MOST RECENT SYSTOLIC BLOOD PRESSURE < 130 MM HG: ICD-10-PCS | Mod: CPTII,S$GLB,, | Performed by: OBSTETRICS & GYNECOLOGY

## 2020-05-13 PROCEDURE — 1126F AMNT PAIN NOTED NONE PRSNT: CPT | Mod: S$GLB,,, | Performed by: OBSTETRICS & GYNECOLOGY

## 2020-05-13 PROCEDURE — 1126F PR PAIN SEVERITY QUANTIFIED, NO PAIN PRESENT: ICD-10-PCS | Mod: S$GLB,,, | Performed by: OBSTETRICS & GYNECOLOGY

## 2020-05-13 PROCEDURE — 1159F PR MEDICATION LIST DOCUMENTED IN MEDICAL RECORD: ICD-10-PCS | Mod: S$GLB,,, | Performed by: OBSTETRICS & GYNECOLOGY

## 2020-05-13 PROCEDURE — 99999 PR PBB SHADOW E&M-EST. PATIENT-LVL IV: CPT | Mod: PBBFAC,,, | Performed by: OBSTETRICS & GYNECOLOGY

## 2020-05-13 PROCEDURE — 1101F PR PT FALLS ASSESS DOC 0-1 FALLS W/OUT INJ PAST YR: ICD-10-PCS | Mod: CPTII,S$GLB,, | Performed by: OBSTETRICS & GYNECOLOGY

## 2020-05-13 PROCEDURE — 1101F PT FALLS ASSESS-DOCD LE1/YR: CPT | Mod: CPTII,S$GLB,, | Performed by: OBSTETRICS & GYNECOLOGY

## 2020-05-13 PROCEDURE — 3078F PR MOST RECENT DIASTOLIC BLOOD PRESSURE < 80 MM HG: ICD-10-PCS | Mod: CPTII,S$GLB,, | Performed by: OBSTETRICS & GYNECOLOGY

## 2020-05-13 PROCEDURE — 3078F DIAST BP <80 MM HG: CPT | Mod: CPTII,S$GLB,, | Performed by: OBSTETRICS & GYNECOLOGY

## 2020-05-13 PROCEDURE — 1159F MED LIST DOCD IN RCRD: CPT | Mod: S$GLB,,, | Performed by: OBSTETRICS & GYNECOLOGY

## 2020-05-13 PROCEDURE — 3074F SYST BP LT 130 MM HG: CPT | Mod: CPTII,S$GLB,, | Performed by: OBSTETRICS & GYNECOLOGY

## 2020-05-13 PROCEDURE — 99213 OFFICE O/P EST LOW 20 MIN: CPT | Mod: S$GLB,,, | Performed by: OBSTETRICS & GYNECOLOGY

## 2020-05-13 PROCEDURE — 99213 PR OFFICE/OUTPT VISIT, EST, LEVL III, 20-29 MIN: ICD-10-PCS | Mod: S$GLB,,, | Performed by: OBSTETRICS & GYNECOLOGY

## 2020-05-13 RX ORDER — OXYBUTYNIN CHLORIDE 10 MG/1
TABLET, EXTENDED RELEASE ORAL
COMMUNITY
Start: 2020-03-24 | End: 2020-07-13

## 2020-05-13 NOTE — PROGRESS NOTES
Chief Complaint   Patient presents with    Vaginal Bleeding       History of Present Illness: Yara Whitehead is a 69 y.o. female that presents today 5/13/2020 for   Chief Complaint   Patient presents with    Vaginal Bleeding     For two days after bright red.  No fevers not heavy bleeding     Past Medical History:   Diagnosis Date    Allergy     Arrhythmia     GERD (gastroesophageal reflux disease)     Hypertension     Hypothyroidism     ALESSANDRA (iron deficiency anemia)     Insomnia     [facility]     [facility]    FABIANA on CPAP     PAF (paroxysmal atrial fibrillation)     Status post placement of implantable loop recorder 3/18/2016    Subaortic membrane     Subarachnoid hematoma 1/1/2019    Subdural hematoma     Vitamin B 12 deficiency     Vitamin D deficiency        Past Surgical History:   Procedure Laterality Date    abdomenalplasty      ABLATION OF ARRHYTHMOGENIC FOCUS FOR ATRIAL FIBRILLATION N/A 4/15/2019    Procedure: Ablation atrial fibrillation;  Surgeon: Edmund Shah MD;  Location: [facility] EP LAB;  Service: Cardiology;  Laterality: N/A;  AF, PRISCILLA, PVI, Cryo, MARY, Gen, SK, 3 Prep    COLONOSCOPY  2012    normal, repeat in 5 years    EXTRACORPOREAL SHOCK WAVE LITHOTRIPSY      [place]    FRACTURE SURGERY Left     has plates and screws in place.    GALLBLADDER SURGERY  1995    GASTRIC BYPASS  1997    HERNIA REPAIR  1998    HYSTERECTOMY  2013    due to vaginal prolpase with BSO - also bladder suspension at the [facility]    INSERTION OF IMPLANTABLE LOOP RECORDER N/A 7/19/2019    Procedure: Insertion, Implantable Loop Recorder;  Surgeon: Neftali Hernandez MD;  Location: [facility] CATH;  Service: Cardiology;  Laterality: N/A;    REMOVAL OF IMPLANTABLE LOOP RECORDER N/A 7/19/2019    Procedure: REMOVAL, IMPLANTABLE LOOP RECORDER;  Surgeon: Neftali Hernandez MD;  Location: [facility] CATH;  Service: Cardiology;  Laterality: N/A;    TYMPANOPLASTY Left      replaced and then repair x2        Current Outpatient Medications   Medication Sig Dispense Refill    albuterol 90 mcg/actuation inhaler Inhale 1-2 puffs into the lungs every 6 (six) hours as needed for Wheezing. 1 Inhaler 0    atorvastatin (LIPITOR) 10 MG tablet TAKE 1 TABLET(10 MG) BY MOUTH EVERY DAY 90 tablet 4    blood sugar diagnostic Strp To check BG 1 times daily, to use with insurance preferred meter 100 strip 3    blood sugar diagnostic Strp qac and PRN lows. Freestyle vicky test strips 100 strip prn    blood-glucose meter (BLOOD GLUCOSE MONITORING) kit Use as instructed.  Freestyle vicky glucometer 1 each 0    cetirizine (ZYRTEC) 10 MG tablet Take 10 mg by mouth once daily.      cyanocobalamin 1,000 mcg/mL injection INJECT 1 ML INTO THE MUSCLE EVERY 14 DAYS 6 mL 3    estradiol (ESTRACE) 0.01 % (0.1 mg/gram) vaginal cream Place 1 g vaginally twice a week. 42.5 g 4    fluticasone (FLONASE) 50 mcg/actuation nasal spray 2 sprays (100 mcg total) by Each Nare route once daily. 1 Bottle 6    FREESTYLE VICKY 14 DAY SENSOR Kit USE AS DIRECTED 7 kit 3    glucose 4 GM chewable tablet Take 4 tablets when glucose from 51-70  Take 6 tablets when glucose is less than 50 120 tablet 12    lancets Misc To check BG 1 times daily, to use with insurance preferred meter 100 each 3    levothyroxine (SYNTHROID) 88 MCG tablet Take 1 tablet (88 mcg total) by mouth before breakfast. 30 tablet 11    metoprolol tartrate (LOPRESSOR) 25 MG tablet Take 1 tablet (25 mg total) by mouth 3 (three) times daily. As needed 90 tablet 11    oxybutynin (DITROPAN-XL) 10 MG 24 hr tablet       oxybutynin (DITROPAN-XL) 5 MG TR24 Take 1 tablet (5 mg total) by mouth once daily. 30 tablet 11    paroxetine (PAXIL) 40 MG tablet Take 1 tablet by mouth Daily.  3    telmisartan (MICARDIS) 40 MG Tab Take 1 tablet (40 mg total) by mouth once daily. Only to start if SBP consistently greater than 130 (Patient taking differently: Take 40 mg by  mouth daily as needed. Only to start if SBP consistently greater than 130) 90 tablet 3    TRUEPLUS LANCETS 30 gauge Misc USE TO TEST BLOOD SUGAR BID  0    XARELTO 20 mg Tab TAKE 1 TABLET BY MOUTH EVERY EVENING 30 tablet 4    solifenacin (VESICARE) 5 MG tablet Take 1 tablet (5 mg total) by mouth once daily. (Patient not taking: Reported on 2020) 90 tablet 3     No current facility-administered medications for this visit.        Review of patient's allergies indicates:  No Known Allergies    Family History   Problem Relation Age of Onset    Aneurysm Mother     Hypertension Mother     Cancer Mother         uterine     Glaucoma Mother     Alzheimer's disease Father     Diabetes Father     Mental illness Sister     No Known Problems Daughter     No Known Problems Son     Stroke Maternal Grandfather     Glaucoma Maternal Grandfather     COPD Paternal Grandmother     Cancer Paternal Grandmother         colon    Alzheimer's disease Paternal Grandfather     Heart disease Paternal Grandfather     Nephrolithiasis Neg Hx        Social History     Tobacco Use    Smoking status: Former Smoker     Packs/day: 2.00     Years: 20.00     Pack years: 40.00     Last attempt to quit: 1995     Years since quittin.5    Smokeless tobacco: Never Used   Substance Use Topics    Alcohol use: Yes     Alcohol/week: 7.0 standard drinks     Types: 7 Glasses of wine per week     Frequency: 4 or more times a week     Drinks per session: 1 or 2     Binge frequency: Less than monthly     Comment: one glass wine nightly    Drug use: No       OB History    Para Term  AB Living   2 2 2         SAB TAB Ectopic Multiple Live Births                  # Outcome Date GA Lbr Dustin/2nd Weight Sex Delivery Anes PTL Lv   2 Term      Vag-Spont      1 Term      Vag-Spont          Review of Symptoms:  GENERAL: Denies weight gain or weight loss. Feeling well overall.   SKIN: Denies rash or lesions.   HEAD: Denies head  "injury or headache.   NODES: Denies enlarged lymph nodes.   CHEST: Denies chest pain or shortness of breath.   CARDIOVASCULAR: Denies palpitations or left sided chest pain.   ABDOMEN: No abdominal pain, constipation, diarrhea, nausea, vomiting or rectal bleeding.   URINARY: No frequency, dysuria, hematuria, or burning on urination.  HEMATOLOGIC: No easy bruisability or excessive bleeding.   MUSCULOSKELETAL: Denies joint pain or swelling.     /74   Ht 5' 5" (1.651 m)   Wt 73.4 kg (161 lb 13.1 oz)   LMP  (LMP Unknown)   Physical Exam:  APPEARANCE: Well nourished, well developed, in no acute distress.  SKIN: Normal skin turgor, no lesions.  NECK: Neck symmetric without masses   RESPIRATORY: Normal respiratory effort with no retractions or use of accessory muscles  CARDIOVASCULAR: Peripheral vascular system with no swelling no varicosities and palpation of pulses normal  LYMPHATIC: No enlargements of the lymph nodes noted in the neck, axillae, or groin  ABDOMEN: Soft. No tenderness or masses. No hepatosplenomegaly. No hernias.  PELVIC: Normal external female genitalia without lesions. Normal hair distribution. Adequate perineal body, normal urethral meatus. Urethra with no masses.  Bladder nontender. Vagina  Golf ball size apical exposure. +++   Urethra and bladder normal.  EXTREMITIES: No clubbing cyanosis or edema.    ASSESSMENT/PLAN:  Exposure of implanted vaginal mesh and prosthetic material in vagina, subsequent encounter  -     Ambulatory referral/consult to Urogynecology; Future; Expected date: 05/20/2020        15 minutes spent today with this patient. Greater than half spent in counseling today.       "

## 2020-05-14 ENCOUNTER — CLINICAL SUPPORT (OUTPATIENT)
Dept: CARDIOLOGY | Facility: CLINIC | Age: 70
End: 2020-05-14
Payer: MEDICARE

## 2020-05-14 DIAGNOSIS — Z95.818 PRESENCE OF OTHER CARDIAC IMPLANTS AND GRAFTS: ICD-10-CM

## 2020-05-14 PROCEDURE — 93298 REM INTERROG DEV EVAL SCRMS: CPT | Mod: S$GLB,,, | Performed by: INTERNAL MEDICINE

## 2020-05-14 PROCEDURE — 93298 CARDIAC DEVICE CHECK - REMOTE: ICD-10-PCS | Mod: S$GLB,,, | Performed by: INTERNAL MEDICINE

## 2020-05-15 ENCOUNTER — TELEPHONE (OUTPATIENT)
Dept: UROGYNECOLOGY | Facility: CLINIC | Age: 70
End: 2020-05-15

## 2020-05-15 NOTE — TELEPHONE ENCOUNTER
----- Message from Gabriela Ulloa sent at 5/15/2020 10:37 AM CDT -----  Contact: DEYANIRA CAO [[id number]]  Name of Who is Calling: DEYANIRA CAO [[id number]]    What is the request in detail: Would like to speak with staff to schedule a consult for exposure of implanted vaginal mesh. Next available is in July, patient would like to be seen sooner. Please contact to further discuss and advise      Can the clinic reply by [facility]: no    What Number to Call Back if not in [facility]: [phone]

## 2020-05-20 DIAGNOSIS — F33.0 MAJOR DEPRESSIVE DISORDER, RECURRENT, MILD: ICD-10-CM

## 2020-05-20 RX ORDER — PAROXETINE HYDROCHLORIDE 20 MG/1
TABLET, FILM COATED ORAL
Qty: 90 TABLET | Refills: 3 | Status: SHIPPED | OUTPATIENT
Start: 2020-05-20 | End: 2020-06-08

## 2020-06-02 ENCOUNTER — PATIENT MESSAGE (OUTPATIENT)
Dept: FAMILY MEDICINE | Facility: CLINIC | Age: 70
End: 2020-06-02

## 2020-06-05 ENCOUNTER — PATIENT MESSAGE (OUTPATIENT)
Dept: FAMILY MEDICINE | Facility: CLINIC | Age: 70
End: 2020-06-05

## 2020-06-08 ENCOUNTER — PATIENT OUTREACH (OUTPATIENT)
Dept: ADMINISTRATIVE | Facility: HOSPITAL | Age: 70
End: 2020-06-08

## 2020-06-08 ENCOUNTER — TELEPHONE (OUTPATIENT)
Dept: FAMILY MEDICINE | Facility: CLINIC | Age: 70
End: 2020-06-08

## 2020-06-08 ENCOUNTER — OFFICE VISIT (OUTPATIENT)
Dept: FAMILY MEDICINE | Facility: CLINIC | Age: 70
End: 2020-06-08
Payer: MEDICARE

## 2020-06-08 VITALS
DIASTOLIC BLOOD PRESSURE: 82 MMHG | WEIGHT: 153.31 LBS | HEART RATE: 77 BPM | TEMPERATURE: 99 F | OXYGEN SATURATION: 96 % | RESPIRATION RATE: 20 BRPM | HEIGHT: 65 IN | SYSTOLIC BLOOD PRESSURE: 148 MMHG | BODY MASS INDEX: 25.54 KG/M2

## 2020-06-08 DIAGNOSIS — Z20.822 EXPOSURE TO COVID-19 VIRUS: ICD-10-CM

## 2020-06-08 DIAGNOSIS — D50.0 IRON DEFICIENCY ANEMIA DUE TO CHRONIC BLOOD LOSS: ICD-10-CM

## 2020-06-08 DIAGNOSIS — I10 ESSENTIAL HYPERTENSION: ICD-10-CM

## 2020-06-08 DIAGNOSIS — R41.3 MEMORY PROBLEM: ICD-10-CM

## 2020-06-08 DIAGNOSIS — F33.0 MAJOR DEPRESSIVE DISORDER, RECURRENT, MILD: Primary | ICD-10-CM

## 2020-06-08 LAB
BASOPHILS # BLD AUTO: 0.02 K/UL (ref 0–0.2)
BASOPHILS NFR BLD: 0.5 % (ref 0–1.9)
DIFFERENTIAL METHOD: ABNORMAL
EOSINOPHIL # BLD AUTO: 0 K/UL (ref 0–0.5)
EOSINOPHIL NFR BLD: 1 % (ref 0–8)
ERYTHROCYTE [DISTWIDTH] IN BLOOD BY AUTOMATED COUNT: 16 % (ref 11.5–14.5)
FERRITIN SERPL-MCNC: 24 NG/ML (ref 20–300)
HCT VFR BLD AUTO: 43 % (ref 37–48.5)
HGB BLD-MCNC: 13.9 G/DL (ref 12–16)
IMM GRANULOCYTES # BLD AUTO: 0.01 K/UL (ref 0–0.04)
IMM GRANULOCYTES NFR BLD AUTO: 0.2 % (ref 0–0.5)
IRON SERPL-MCNC: 69 UG/DL (ref 30–160)
LYMPHOCYTES # BLD AUTO: 1.2 K/UL (ref 1–4.8)
LYMPHOCYTES NFR BLD: 28.2 % (ref 18–48)
MCH RBC QN AUTO: 33.7 PG (ref 27–31)
MCHC RBC AUTO-ENTMCNC: 32.3 G/DL (ref 32–36)
MCV RBC AUTO: 104 FL (ref 82–98)
MONOCYTES # BLD AUTO: 0.4 K/UL (ref 0.3–1)
MONOCYTES NFR BLD: 8.6 % (ref 4–15)
NEUTROPHILS # BLD AUTO: 2.5 K/UL (ref 1.8–7.7)
NEUTROPHILS NFR BLD: 61.5 % (ref 38–73)
NRBC BLD-RTO: 0 /100 WBC
PLATELET # BLD AUTO: 183 K/UL (ref 150–350)
PMV BLD AUTO: 10.8 FL (ref 9.2–12.9)
RBC # BLD AUTO: 4.13 M/UL (ref 4–5.4)
SARS-COV-2 IGG SERPLBLD QL IA.RAPID: NEGATIVE
SATURATED IRON: 15 % (ref 20–50)
TOTAL IRON BINDING CAPACITY: 451 UG/DL (ref 250–450)
TRANSFERRIN SERPL-MCNC: 305 MG/DL (ref 200–375)
TSH SERPL DL<=0.005 MIU/L-ACNC: 1.37 UIU/ML (ref 0.4–4)
WBC # BLD AUTO: 4.08 K/UL (ref 3.9–12.7)

## 2020-06-08 PROCEDURE — 1101F PR PT FALLS ASSESS DOC 0-1 FALLS W/OUT INJ PAST YR: ICD-10-PCS | Mod: CPTII,S$GLB,, | Performed by: INTERNAL MEDICINE

## 2020-06-08 PROCEDURE — 1159F MED LIST DOCD IN RCRD: CPT | Mod: S$GLB,,, | Performed by: INTERNAL MEDICINE

## 2020-06-08 PROCEDURE — 1101F PT FALLS ASSESS-DOCD LE1/YR: CPT | Mod: CPTII,S$GLB,, | Performed by: INTERNAL MEDICINE

## 2020-06-08 PROCEDURE — 1126F AMNT PAIN NOTED NONE PRSNT: CPT | Mod: S$GLB,,, | Performed by: INTERNAL MEDICINE

## 2020-06-08 PROCEDURE — 83540 ASSAY OF IRON: CPT

## 2020-06-08 PROCEDURE — 99499 RISK ADDL DX/OHS AUDIT: ICD-10-PCS | Mod: S$GLB,,, | Performed by: INTERNAL MEDICINE

## 2020-06-08 PROCEDURE — 3077F PR MOST RECENT SYSTOLIC BLOOD PRESSURE >= 140 MM HG: ICD-10-PCS | Mod: CPTII,S$GLB,, | Performed by: INTERNAL MEDICINE

## 2020-06-08 PROCEDURE — 3079F PR MOST RECENT DIASTOLIC BLOOD PRESSURE 80-89 MM HG: ICD-10-PCS | Mod: CPTII,S$GLB,, | Performed by: INTERNAL MEDICINE

## 2020-06-08 PROCEDURE — 99214 OFFICE O/P EST MOD 30 MIN: CPT | Mod: S$GLB,,, | Performed by: INTERNAL MEDICINE

## 2020-06-08 PROCEDURE — 1126F PR PAIN SEVERITY QUANTIFIED, NO PAIN PRESENT: ICD-10-PCS | Mod: S$GLB,,, | Performed by: INTERNAL MEDICINE

## 2020-06-08 PROCEDURE — 82728 ASSAY OF FERRITIN: CPT

## 2020-06-08 PROCEDURE — 84443 ASSAY THYROID STIM HORMONE: CPT

## 2020-06-08 PROCEDURE — 36415 COLL VENOUS BLD VENIPUNCTURE: CPT | Mod: S$GLB,,, | Performed by: INTERNAL MEDICINE

## 2020-06-08 PROCEDURE — 99499 UNLISTED E&M SERVICE: CPT | Mod: S$GLB,,, | Performed by: INTERNAL MEDICINE

## 2020-06-08 PROCEDURE — 3079F DIAST BP 80-89 MM HG: CPT | Mod: CPTII,S$GLB,, | Performed by: INTERNAL MEDICINE

## 2020-06-08 PROCEDURE — 1159F PR MEDICATION LIST DOCUMENTED IN MEDICAL RECORD: ICD-10-PCS | Mod: S$GLB,,, | Performed by: INTERNAL MEDICINE

## 2020-06-08 PROCEDURE — 85025 COMPLETE CBC W/AUTO DIFF WBC: CPT

## 2020-06-08 PROCEDURE — 99214 PR OFFICE/OUTPT VISIT, EST, LEVL IV, 30-39 MIN: ICD-10-PCS | Mod: S$GLB,,, | Performed by: INTERNAL MEDICINE

## 2020-06-08 PROCEDURE — 86769 SARS-COV-2 COVID-19 ANTIBODY: CPT

## 2020-06-08 PROCEDURE — 36415 PR COLLECTION VENOUS BLOOD,VENIPUNCTURE: ICD-10-PCS | Mod: S$GLB,,, | Performed by: INTERNAL MEDICINE

## 2020-06-08 PROCEDURE — 3077F SYST BP >= 140 MM HG: CPT | Mod: CPTII,S$GLB,, | Performed by: INTERNAL MEDICINE

## 2020-06-08 RX ORDER — BUPROPION HYDROCHLORIDE 150 MG/1
150 TABLET ORAL DAILY
Qty: 90 TABLET | Refills: 3 | Status: SHIPPED | OUTPATIENT
Start: 2020-06-08 | End: 2020-07-28 | Stop reason: SDUPTHER

## 2020-06-08 NOTE — PROGRESS NOTES
Chart review completed 06/08/2020.  Care Everywhere updates requested and reviewed.  Immunizations reconciled. Media reviewed. Portal message sent.      Health Maintenance Due   Topic Date Due    Shingles Vaccine (1 of 2) 05/31/2001    Colonoscopy  05/31/2001

## 2020-06-08 NOTE — PROGRESS NOTES
Subjective:       Patient ID: Yara Whitehead is a 70 y.o. female.    Medication List with Changes/Refills   New Medications    BUPROPION (WELLBUTRIN XL) 150 MG TB24 TABLET    Take 1 tablet (150 mg total) by mouth once daily.   Current Medications    ALBUTEROL 90 MCG/ACTUATION INHALER    Inhale 1-2 puffs into the lungs every 6 (six) hours as needed for Wheezing.    ATORVASTATIN (LIPITOR) 10 MG TABLET    TAKE 1 TABLET(10 MG) BY MOUTH EVERY DAY    BLOOD SUGAR DIAGNOSTIC STRP    To check BG 1 times daily, to use with insurance preferred meter    BLOOD SUGAR DIAGNOSTIC STRP    qac and PRN lows. Freestyle tawny test strips    BLOOD-GLUCOSE METER (BLOOD GLUCOSE MONITORING) KIT    Use as instructed.  Freestyle tawny glucometer    CETIRIZINE (ZYRTEC) 10 MG TABLET    Take 10 mg by mouth once daily.    CYANOCOBALAMIN 1,000 MCG/ML INJECTION    INJECT 1 ML INTO THE MUSCLE EVERY 14 DAYS    ESTRADIOL (ESTRACE) 0.01 % (0.1 MG/GRAM) VAGINAL CREAM    Place 1 g vaginally twice a week.    FLUTICASONE (FLONASE) 50 MCG/ACTUATION NASAL SPRAY    2 sprays (100 mcg total) by Each Nare route once daily.    FREESTYLE TAWNY 14 DAY SENSOR KIT    USE AS DIRECTED    GLUCOSE 4 GM CHEWABLE TABLET    Take 4 tablets when glucose from 51-70  Take 6 tablets when glucose is less than 50    LANCETS MISC    To check BG 1 times daily, to use with insurance preferred meter    LEVOTHYROXINE (SYNTHROID) 88 MCG TABLET    Take 1 tablet (88 mcg total) by mouth before breakfast.    METOPROLOL TARTRATE (LOPRESSOR) 25 MG TABLET    Take 1 tablet (25 mg total) by mouth 3 (three) times daily. As needed    OXYBUTYNIN (DITROPAN-XL) 10 MG 24 HR TABLET        OXYBUTYNIN (DITROPAN-XL) 5 MG TR24    Take 1 tablet (5 mg total) by mouth once daily.    PAROXETINE (PAXIL) 40 MG TABLET    TAKE 1 TABLET(40 MG) BY MOUTH EVERY MORNING    SOLIFENACIN (VESICARE) 5 MG TABLET    Take 1 tablet (5 mg total) by mouth once daily.    TELMISARTAN (MICARDIS) 40 MG TAB    Take 1 tablet  "(40 mg total) by mouth daily as needed. Only to start if SBP consistently greater than 130    TRUEPLUS LANCETS 30 GAUGE MISC    USE TO TEST BLOOD SUGAR BID    XARELTO 20 MG TAB    TAKE 1 TABLET BY MOUTH EVERY EVENING   Discontinued Medications    PAROXETINE (PAXIL) 20 MG TABLET    TAKE 1 TABLET(20 MG) BY MOUTH EVERY MORNING       Chief Complaint: mental health issues  She walked into clinic today to discuss her anxiety/depression and memory.     She has anxiety and depression which had been previously controlled on paxil 40 mg qday. She has noticed since isolation from corona virus crisis that she is feeling progressively worse. She is crying and feels very alone. She is a "people person" and has cut off all contact with people. She is avoiding talking to people on the phone. She is not sleeping well. She is crying all the time. She is feeling her "independence is being threatened" but what she is seeing on the news.  They are "closing in on me". She denies suicidal ideations.  She feels her memory is much worse and she is very scattered. She forgets words and can not finish thoughts.  She has known vitamin B 12 deficiency and is taking replacement monthly with last level normal on 3/2020.  She has an MRI on 8/2018 for memory issues that was reassuring and lab workup that was negative for a reversible cause of demential. She has a family history of Alzheimer's disease.  No falls or urinary symptoms.  She is drinking more alcohol (wine) to help her sleep and uses daily.  She feels safe at home. She has lost 8 lbs since her visit one month ago.     She has known ALESSANDRA with low iron and ferritin levels with normal H+H checked in 3/2020.  She was started on iron but stopped because her stools became black. She was scheduled for a colonoscopy but this was cancelled due to corona virus crisis. She does have an implantable vaginal mesh which can sometimes cause vaginal bleeding and is due to establish with urogyn in July. " "She denies any further bleeding or pain.     Review of Systems   Constitutional: Negative for appetite change, fatigue, fever and unexpected weight change.   HENT: Negative for congestion, ear pain, hearing loss, sore throat and trouble swallowing.    Eyes: Negative for pain and visual disturbance.   Respiratory: Negative for cough, chest tightness, shortness of breath and wheezing.    Cardiovascular: Negative for chest pain, palpitations and leg swelling.   Gastrointestinal: Negative for abdominal pain, blood in stool, constipation, diarrhea, nausea and vomiting.   Endocrine: Negative for polyuria.   Genitourinary: Negative for dysuria and hematuria.   Musculoskeletal: Negative for arthralgias, back pain and myalgias.   Skin: Negative for rash.   Neurological: Negative for dizziness, weakness, numbness and headaches.   Hematological: Does not bruise/bleed easily.   Psychiatric/Behavioral: Positive for dysphoric mood and sleep disturbance. Negative for suicidal ideas. The patient is nervous/anxious.        Objective:      Vitals:    06/08/20 1021   BP: (!) 148/82   Pulse: 77   Resp: 20   Temp: 98.5 °F (36.9 °C)   TempSrc: Oral   SpO2: 96%   Weight: 69.6 kg (153 lb 5.3 oz)   Height: 5' 5" (1.651 m)     Body mass index is 25.52 kg/m².  Physical Exam    General appearance: No acute distress, cooperative, crying at times  Neck: FROM, soft, supple, no thyromegaly, no bruits  Lymph: no anterior or posterior cervical adenopathy  Heart::  Regular rate and rhythm, no murmur  Lung: Clear to ascultation bilaterally, no wheezing, no rales, no rhonchi, no distress  Abdomen: Soft, nontender, no distention, no hepatosplenomegaly, bowel sounds normal, no guarding, no rebound, no peritoneal signs  Skin: no rashes, no lesions  Extremities: no edema, no cyanosis  Neuro: no focal abnormalities, strength 5/5 b/l UE and LE, 2+ DTRs b/l UE and LE, normal gait  Peripheral pulses: 2+ pedal pulses bilaterally, good perfusion and " color      Assessment:       1. Major depressive disorder, recurrent, mild    2. Memory problem    3. Iron deficiency anemia due to chronic blood loss    4. Essential hypertension    5. Exposure to Covid-19 Virus        Plan:       Major depressive disorder, recurrent, mild  Uncontrolled and contributing to her memory issues. Will add wellbutrin xl 150 mg qday to her regimen and refer to clinical counseling. Will recheck in 4 weeks.   -     Ambulatory referral/consult to Psychology; Future; Expected date: 06/15/2020  -     buPROPion (WELLBUTRIN XL) 150 MG TB24 tablet; Take 1 tablet (150 mg total) by mouth once daily.  Dispense: 90 tablet; Refill: 3    Memory problem  Multifactorial and she would benefit from neuropsychiatric testing once her depression is stable.  Her workup thus far has been negative for a reversible cause of dementia.   -     Neuropsychological testing; Future  -     TSH; Future; Expected date: 06/08/2020    Iron deficiency anemia due to chronic blood loss  Most likely due to her vaginal bleed from mesh but she is due a colonoscopy.  Order placed today. Advised to restart iron supplementation.   -     Case request GI: COLONOSCOPY  -     CBC auto differential; Future; Expected date: 06/08/2020  -     Iron and TIBC; Future; Expected date: 06/08/2020  -     Ferritin; Future; Expected date: 06/08/2020    Essential hypertension  Uncontrolled but she was very upset today. She reports home readings were good. Continue to monitor.     Exposure to Covid-19 Virus  -     COVID-19 (SARS CoV-2) IgG Antibody; Future; Expected date: 06/08/2020    Follow up in about 4 weeks (around 7/6/2020) for recheck anxiety/depression.

## 2020-06-09 ENCOUNTER — TELEPHONE (OUTPATIENT)
Dept: GASTROENTEROLOGY | Facility: CLINIC | Age: 70
End: 2020-06-09

## 2020-06-09 ENCOUNTER — TELEPHONE (OUTPATIENT)
Dept: FAMILY MEDICINE | Facility: CLINIC | Age: 70
End: 2020-06-09

## 2020-06-09 ENCOUNTER — PATIENT MESSAGE (OUTPATIENT)
Dept: FAMILY MEDICINE | Facility: CLINIC | Age: 70
End: 2020-06-09

## 2020-06-09 DIAGNOSIS — R41.3 MEMORY PROBLEM: Primary | ICD-10-CM

## 2020-06-09 NOTE — TELEPHONE ENCOUNTER
My chart message sent with notification if not read within 2 days. Separate message forwarded to neuropsych

## 2020-06-09 NOTE — TELEPHONE ENCOUNTER
Pt ready to schedule cscope for iron def anemia d/t chronic blood loss. She has a loop recorder, on Xarelto, and had an ext\ensive gastric bypass surgery. I am unsure if pt would need to be scheduled at [facility] due to pt's history. Pt states she was open to coming in for an appt prior to scheduling if needed. Please adivse.

## 2020-06-09 NOTE — TELEPHONE ENCOUNTER
Please let her know that her thyroid looks good and her anemia is improved.  Her iron levels are a little low but much improved.      Her covid antibodies were negative. She did not have the corona virus.     Did we get her neuropsych consult scheduled???    Thanks

## 2020-06-09 NOTE — TELEPHONE ENCOUNTER
[employer] and [facility] sent requesting pt to call and schedule procedure.

## 2020-06-10 ENCOUNTER — TELEPHONE (OUTPATIENT)
Dept: NEUROLOGY | Facility: CLINIC | Age: 70
End: 2020-06-10

## 2020-06-13 ENCOUNTER — CLINICAL SUPPORT (OUTPATIENT)
Dept: CARDIOLOGY | Facility: CLINIC | Age: 70
End: 2020-06-13
Payer: MEDICARE

## 2020-06-13 DIAGNOSIS — Z95.818 PRESENCE OF OTHER CARDIAC IMPLANTS AND GRAFTS: ICD-10-CM

## 2020-06-13 PROCEDURE — 93298 CARDIAC DEVICE CHECK - REMOTE: ICD-10-PCS | Mod: S$GLB,,, | Performed by: INTERNAL MEDICINE

## 2020-06-13 PROCEDURE — 93298 REM INTERROG DEV EVAL SCRMS: CPT | Mod: S$GLB,,, | Performed by: INTERNAL MEDICINE

## 2020-06-17 ENCOUNTER — TELEPHONE (OUTPATIENT)
Dept: PSYCHIATRY | Facility: CLINIC | Age: 70
End: 2020-06-17

## 2020-06-17 NOTE — TELEPHONE ENCOUNTER
Contacted patient to reschedule appointment with Dr. Celeste on 6/23/2020, office number and direct extension provided for patient to call back for scheduling.   
 used

## 2020-06-23 ENCOUNTER — PATIENT MESSAGE (OUTPATIENT)
Dept: FAMILY MEDICINE | Facility: CLINIC | Age: 70
End: 2020-06-23

## 2020-06-24 ENCOUNTER — OFFICE VISIT (OUTPATIENT)
Dept: NEUROLOGY | Facility: CLINIC | Age: 70
End: 2020-06-24
Payer: MEDICARE

## 2020-06-24 ENCOUNTER — PATIENT MESSAGE (OUTPATIENT)
Dept: FAMILY MEDICINE | Facility: CLINIC | Age: 70
End: 2020-06-24

## 2020-06-24 DIAGNOSIS — F33.1 MODERATE EPISODE OF RECURRENT MAJOR DEPRESSIVE DISORDER: ICD-10-CM

## 2020-06-24 DIAGNOSIS — R41.3 MEMORY LOSS: ICD-10-CM

## 2020-06-24 PROCEDURE — 99499 NO LOS: ICD-10-PCS | Mod: 95,,, | Performed by: PSYCHIATRY & NEUROLOGY

## 2020-06-24 PROCEDURE — 90791 PSYCH DIAGNOSTIC EVALUATION: CPT | Mod: 95,,, | Performed by: PSYCHIATRY & NEUROLOGY

## 2020-06-24 PROCEDURE — 99499 UNLISTED E&M SERVICE: CPT | Mod: 95,,, | Performed by: PSYCHIATRY & NEUROLOGY

## 2020-06-24 PROCEDURE — 90791 PR PSYCHIATRIC DIAGNOSTIC EVALUATION: ICD-10-PCS | Mod: 95,,, | Performed by: PSYCHIATRY & NEUROLOGY

## 2020-06-24 NOTE — PATIENT INSTRUCTIONS
Recommendations:     Neuropsychology Follow-up: Follow-up when symptoms of depression, alcohol use, and hypoglycemia are adequately managed.     · Primary Care Follow-up: Continue usual follow up for current active medical issues.      · Behavioral Health Follow-up: Continue taking medications as prescribed. Follow up with your therapist to address symptoms of depression and maladaptive coping strategies.    · Cognitive and Brain Health Recommendations:  a. Attention: Remember that inattention and lack of focus are major culprits to forgetting information so be sure and practice paying attention for adequate learning of information. If you rely on passive attention to remembering something (e.g., yeah, uh-huh approach), youll find you cannot recall it later. I recommend the following to improve attention, which may aid in later recall:   i. Reduce distractions in the area as much as possible.  ii. Look at the person as they are speaking to you.   iii. Paraphrase as they are speaking  iv. Write down important pieces of information   v. Ask them to repeat if you zone out.   vi. Have them simplify and reduce information that you need to attend to during conversation.   vii. Have visual cues to remind you if you need to do something later.  b. Processing Speed:   i. Using multiple modalities (e.g., listening, writing notes, asking questions, recording) to learn new information is likely to allow additional time for processing, thus improving memory for the material.   ii. Allowing sufficient time to complete tasks will reduce frustration and help to ensure completion.  c. Executive Functioning:  i. Dont attempt to multi-task.  Separate tasks so that each can be completed one at a time.  ii. Consider using a calendar/day planner, as that may be effective to help you plan and stay on track.  Color-coding specific tasks by importance may add additional benefit to your planner.  iii. Break down large projects into  smaller tasks and write down the steps to completing the task.  Taking notes while reading can help with recall.  d. Storing Information: Use the below strategies to help you further enhance how information is stored.  i. Rehearse - Immediately after seeing/hearing something, try to recall it.  Wait a few minutes, then check again.  Gradually lengthen the intervals between rehearsals.  ii. Repetition of learned material is critical to ensure storage of information to be learned. Self-test at home to ensure learning.  iii. Write down important information to improve your attention and focus and to have something to look back on when you need to recall it.  iv. Make sure the person doesnt rattle off, but presents in a clear, logical, and unhurried manner.   e. Recalling Information:  i. Jog your memory - Lose something?  Think back to when you last had it.  What did you do next?  And after that?  Mentally walk yourself through each activity that followed.  Prodding your memory this way may enable you to recall the location of the missing item.  ii. Use a cue - Symbolic reminders (the proverbial string around the finger) are helpful.  So too are memos, timers, calendar notes, etc.--keep them in visible, appropriate places.  iii. Get organized - Have fixed locations for all important papers, key phone numbers, medications, keys, wallet, glasses, tools, etc.  iv. Develop routines - Routines can anchor memories so they do not drift away.    f. Sleep Hygiene: Poor sleep has a negative effect on cognition. Several strategies have been shown to improve sleep:   i. Caffeine intake in the afternoon and evening, as well as stuffing oneself at supper, can decrease the quality of restful sleep throughout the night.   ii. Bedtime and wake-up times should be consistent every night and morning so the body becomes used to a single routine, even on the weekends.  iii. Engage in daily physical activity, but not 2-3 hours before  bedtime.   iv. No technology use (television, computer, iPad) 1-2 hours before bed.   v. Have a wind down routine (e.g., soft lights in the house, bath before bed, reduced fluid intake, songs, reading, less noise) to promote sleep readiness.   vi. Visit the www.sleepfoundation.org for more strategies.     · Practice good cognitive/brain health hygiene:  · Engage in regular exercise, which increases alertness and arousal and can improve attention and focus.    · Get a good nights sleep, as this can enhance alertness and cognition.  · Eat healthy foods and balanced meals. It is notable that research indicates certain nutrients may aid in brain function, such as B vitamins (especially B6, B12, and folic acid), antioxidants (such as vitamins C and E, and beta carotene), and Omega-3 fatty acids. Talk with your physician or nutritionist about whats right for you.   · Keep your brain active. Find activities to stay mentally active, such as reading, games (cards, checkers), puzzles (crosswords, Sudoku, jig saw), crafts (models, woodworking), gardening, or participating in activities in the community.  · Stay socially engaged. Continue staying active with your family and friends and volunteering at the [employer].    · COVID-19 Recommendations     Stay healthy:    Stay home as much as possible, avoid groups or crowds, and keep six feet away from other people   Wash your hands frequently with soap and water   Understand when and how to use a mask who.int/emergencies/diseases/novel-coronavirus-2019/advice-for-public/when-and-how-to-use-masks   Ask a neighbor, friend, or relative to help run errands for you    Ask your pharmacy if they will deliver medications or find a pharmacy with a drive-thru window   Try a new budget friendly recipe choosemyplate.gov/eathealthy/budget/budget-recipes     Stay active:    Try dancing around the house to your favorite music   A pedal exerciser can be ordered online and offers an  economical way to do some 'indoor bicycling'   Sit and Be Fit exercise video clips are available free on YouTube [email or web address]     Stay engaged:    Consider arranging a call or video chat schedule with friends and family. Ask relatives to send cards or letters by mail. Consider a television schedule of programs the person prefers (cooking shows, Yarsanism services, music programs, old shows/movies).    [employer] program offers free telephone-based educational programs and activities WHObyYOU/services/well-connected/   cwvqa7pjl9tawlp.com/ offers some free activity ideas   The [employer] offers a free 24/7 [place] Line if you need to talk: [phone]     Please don't hesitate to stay in touch with us and let us know how you are doing ([phone]).

## 2020-06-24 NOTE — Clinical Note
Thank you for the referral. I'm going to hold off on testing until she's got a little more control over her depression. I asked her to call me when she/you/Dr. Celeste think she's ready.    Raul, Ann

## 2020-06-24 NOTE — ASSESSMENT & PLAN NOTE
Ms. Whitehead reported approximately 10 years of cognitive change during a neurology visit in 2018, in the context of low vitamin B12 and other health factors (nutritional mal-absorption, head injury in January 2019, vascular risk factors). She expressed concern for additional memory change during a primary care visit in March, with significant exacerbation in the context of increased symptoms of depression and anxiety due to COVID-19 restrictions and use of alcohol as a coping strategy. She remains independent in activities of daily living and noted improvement in cognitive functioning (but not full return to baseline) with addition of an antidepressant and a visit from her son and daughter.    Neuropsychology follow-up: Ms. Whitehead was encouraged to contact the office to schedule testing once her symptoms of depression, alcohol use, and hypoglycemia are stable or adequately managed.    Medical follow-up: Consider medication review to change/alter medications that may cause cognitive side-effects (e.g., oxybutynin).

## 2020-06-24 NOTE — PROGRESS NOTES
NEUROPSYCHOLOGY CONSULT (TELEHEALTH)    Referral Information  Name: Yara Whitehead  MRN: [id number]  : 1950  Age: 70 y.o.  Race: White  Gender: female  Referring Provider: Ann Richards DO  Billing: See below for details as coding/billing has changed   Telemedicine:   The patient location is: [place], [place]  The provider location is: [place], [place]  The chief complaint leading to consultation/medical necessity is: Memory concerns  Visit type: Virtual visit with synchronous audio and video  Total time spent with patient: 60 minutes  Each patient to whom he or she provides medical services by telemedicine is:  (1) informed of the relationship between the physician and patient and the respective role of any other health care provider with respect to management of the patient; and (2) notified that he or she may decline to receive medical services by telemedicine and may withdraw from such care at any time.  Consent/Emergency Plan: The patient expressed an understanding of the purpose of the evaluation and consented to all procedures. I informed the patient of limits to confidentiality and discussed an emergency plan.    SUMMARY/TREATMENT PLAN   Results from the interview indicate the following diagnoses and treatment plan recommendations. The patient likely has the ability to follow a treatment plan without help from family.    Diagnoses/Plan:  Problem List Items Addressed This Visit        Neuro    Memory loss    Current Assessment & Plan     Ms. Whitehead reported approximately 10 years of cognitive change during a neurology visit in , in the context of low vitamin B12 and other health factors (nutritional mal-absorption, head injury in 2019, vascular risk factors). She expressed concern for additional memory change during a primary care visit in March, with significant exacerbation in the context of increased symptoms of depression and anxiety due to COVID-19 restrictions and use of alcohol as a coping  strategy. She remains independent in activities of daily living and noted improvement in cognitive functioning (but not full return to baseline) with addition of an antidepressant and a visit from her son and daughter.    Neuropsychology follow-up: Ms. Whitehead was encouraged to contact the office to schedule testing once her symptoms of depression, alcohol use, and hypoglycemia are stable or adequately managed.    Medical follow-up: Consider medication review to change/alter medications that may cause cognitive side-effects (e.g., oxybutynin).            Psychiatric    Moderate episode of recurrent major depressive disorder    Current Assessment & Plan     Ms. Whitehead has a history of depression and anxiety with a recent exacerbation during COVID-19. She was prescribed Wellbutrin, which has been helpful. She is also scheduled to start individual therapy in July. She has been using alcohol as a coping mechanism and was encouraged to reduce/stop use to avoid potential cognitive side-effects.    Behavioral Health Follow-up: Continue taking medications as prescribed. Follow up with your therapist to address symptoms of depression and maladaptive coping strategies.             Thank you for allowing me to participate in Ms. Whitehead's care.  If you have any questions, please contact me at [phone].Ann Mora, Ph.D., ABPPBoard Certified in Clinical NeuropsychologyOchsner Health  Department of Neurology    HISTORY OF PRESENT ILLNESS AND CURRENT SYMPTOMS     Ms. Whitehead visited Neurology in August 2018 with approximately 10 years of reported memory and word-finding difficulty in the context of B12 deficiency.  Cognition fluctuated in the context of supplementation, with improvements when she was consistently receiving B12 shots. Performance on a brief cognitive screening measure was within normal limits (MoCA = 30/30). In January 2019, she was hospitalized after a fall with a small right frontal subdural hematoma and  "left superior parietal subarachnoid hemorrhage. She was referred by her primary care provider in March 2020 for memory loss in the context of vascular risk factors and malabsorption following bypass surgery. At a follow-up visit in June, she reported worsening anxiety and depression in the context of COVID-19 stay at home orders, with exacerbation of memory concerns despite adequate B12 levels.    Ms. Whitehead reported during the interview that she was experiencing dizziness/vertigo, and then she felt week and unsteady. She had a "brain glitch" over a year ago and is still getting back to herself. She was told it may take up to a year, and it's been a year and a half. She was losing weight (10 pounds in 3 months) and was losing her hair. She indicated a diagnosis of hypoglycemia explained most of her symptoms. In addition, her father and his sister had Alzheimer's dementia, so she worried that recent cognitive changes reflected declines. She also noted increased depression and anxiety over the last three months.    Cognitive Symptoms:   Type/Examples:   · Attention: Declining over time. She has always been able to "multipurpose," but now, she loses track.  · Mental Speed: She reported slowing.  · Memory: She reported difficulty with memory. Sometimes the information comes to her, but not always.  · Language: She has word-finding difficulty; the word will come to her eventually if she is calm.  · Visuospatial/Perceptual: She has gotten lost, but that is longstanding. No other concerns.  · Executive Functioning: She has trouble with multitasking now.   Onset: Gradual in onset years ago in the context of B12 deficiency   Course: She reported an increase in the context of recent depression and anxiety. It is better now that she started Wellbutrin and her family has visited.    Current Functional Status/Needs:  ADLs  Self-Care Eating Safety Other   Independent Independent Indepedent      Instrumental IADLs:   Driving " Medications/Health Household Finances   Independent Independent, but she forgets at times. Independent. She has left items on the stove. Independent     Psychiatric/Behavioral Symptoms:  Mood:  Depression/Dysphoria Anxiety/Fearfulness Irritability   She is in the process of getting treatment for depression. Her daughter came and stayed for 10 days, followed by her son. She is still depressed but improving. Anxiety in the context of COVID-19. This is improving. She reported increased irritability with multiple tasks.     Behavior:  Agitation/Resistance Delusions/Paranoia Hallucinations   None reported. None reported. None reported.     Apathy/Motivation Repetitive/Restlessness Other   Loss with COVID-19 but is improving. None reported.      Neurovegetative:  Sleep/Nighttime  [employer]   Records noted obstructive sleep apnea without use of CPAP due to fewer sleep issues related to weight loss. She goes to bed at 9:30 but watches television. She recently has been losing weight and trying to eat more balanced meals. Reduced with COVID but improving.     Suicidal/Homicidal Ideation: None reported.    Physical Symptoms:  She has urine incontinence. Balance is finally improving. She reported changes in hearing over time. She has noticed changes in vision over the last few months.     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    Family History   Problem Relation Age of Onset    Aneurysm Mother     Hypertension Mother     Cancer Mother         uterine     Glaucoma Mother     Alzheimer's disease Father     Diabetes Father     Mental illness Sister     No Known Problems Daughter     No Known Problems Son     Stroke Maternal Grandfather     Glaucoma Maternal Grandfather     COPD Paternal Grandmother     Cancer Paternal Grandmother         colon    Alzheimer's disease Paternal Grandfather     Heart disease Paternal Grandfather     Nephrolithiasis Neg Hx      Family Neurologic History: Alzheimer's dementia (see above)  Family Psychiatric History: Alcoholism runs in her family    MEDICAL STATUS  Patient Active Problem List   Diagnosis    Moderate episode of recurrent major depressive disorder    Essential hypertension    Intestinal malabsorption following gastrectomy    Vitamin B 12 deficiency    Vitamin D deficiency    Iron deficiency anemia    Gastroesophageal reflux disease without esophagitis    Esophageal spasm    Acquired hypothyroidism    Primary insomnia    Subaortic membrane    Hypothyroidism    Osteopenia    Hyperlipemia    Gastroesophageal reflux disease    PAF (paroxysmal atrial fibrillation)    Status post placement of implantable loop recorder    Closed fracture of proximal end of left humerus    Bradycardia    Typical atrial flutter    RBBB    Hypoglycemia    Exposure of vaginal mesh through vaginal wall    Encounter for loop recorder at end of battery life    Memory loss     Past Medical History:   Diagnosis Date    Allergy     Arrhythmia     GERD (gastroesophageal reflux disease)     Hypertension     Hypothyroidism     ALESSANDRA (iron deficiency anemia)     Insomnia     Nephrolithiasis     had [facility]    FABIANA on CPAP     PAF (paroxysmal atrial fibrillation)     Status post placement of implantable loop recorder 3/18/2016    Subaortic membrane     Subarachnoid  "hematoma 1/1/2019    Subdural hematoma     Vitamin B 12 deficiency     Vitamin D deficiency      Past Surgical History:   Procedure Laterality Date    abdomenalplasty      ABLATION OF ARRHYTHMOGENIC FOCUS FOR ATRIAL FIBRILLATION N/A 4/15/2019    Procedure: Ablation atrial fibrillation;  Surgeon: Edmund Shah MD;  Location[facility] EP LAB;  Service: Cardiology;  Laterality: N/A;  AF, PRISCILLA, PVI, Cryo, MARY, Gen, SK, 3 Prep    COLONOSCOPY  2012    normal, repeat in 5 years    EXTRACORPOREAL SHOCK WAVE LITHOTRIPSY      [place]    FRACTURE SURGERY Left     has plates and screws in place.    GALLBLADDER SURGERY  1995    GASTRIC BYPASS  1997    HERNIA REPAIR  1998    HYSTERECTOMY  2013    due to vaginal prolpase with BSO - also bladder suspension at the same time - [facility]    INSERTION OF IMPLANTABLE LOOP RECORDER N/A 7/19/2019    Procedure: Insertion, Implantable Loop Recorder;  Surgeon: Neftali Hernandez MD;  Location: [facility] CATH;  Service: Cardiology;  Laterality: N/A;    REMOVAL OF IMPLANTABLE LOOP RECORDER N/A 7/19/2019    Procedure: REMOVAL, IMPLANTABLE LOOP RECORDER;  Surgeon: Neftali Hernandez MD;  Location: [facility] CATH;  Service: Cardiology;  Laterality: N/A;    TYMPANOPLASTY Left     replaced and then repair x2      Updated/Relevant Neurologic History:  · Falls: See below  · TBI: From records: "She had a syncopal event on 1/1/2019 resulting inn a scalp hematoma, non displaced skull fx, coutrecoup injury with right frontal SDH, and left superior parietal SAH.  She has been followed by serial CT by NS and has done very well. She was seen by NS on 1/31/2019 with repeat CT showing complete resolution of her injury. She denies any headaches or vision changes. She still feels her memory is not what it was before the accident."  · Seizures: None reported  · Stroke: None reported  · Movement Concerns: None reported  · Referral Diagnosis:  Memory loss    Recent Labs and Imaging  Lab Results "   Component Value Date    OKKHKNAF12 371 03/16/2020     Lab Results   Component Value Date    RPR Non-reactive 07/27/2018     No results found for: FOLATE  Lab Results   Component Value Date    TSH 1.369 06/08/2020     Lab Results   Component Value Date    HGBA1C 5.1 03/16/2020     No results found for: HIV1X2, CUJ26VXHD    Head CT (most recent): 4/8/2019  IMPRESSION:      No acute abnormalities are seen.    Head CT (post-fall): 1/1/2019  IMPRESSION   1. Large left posterior parietal/occipital scalp hematoma with underlying   nondisplaced skull fracture.     2. Contrecoup injury with associated small right frontal subdural hematoma.   Additionally, there is component of minimal left superior parietal   subarachnoid hemorrhage.      The above findings were discussed with the attending emergency room physician  prior to final report signature (January 1, 2019, 2028 hours).    Brain MRI: 8/16/2018  IMPRESSION:      1.  There is no acute abnormality.  There is no hemorrhage, mass/mass effect, acute infarction.  There is no pathologic enhancement.  Brain volume is normal for age.  There is minimal nonspecific white matter change.    Current Outpatient Medications:     albuterol 90 mcg/actuation inhaler, Inhale 1-2 puffs into the lungs every 6 (six) hours as needed for Wheezing., Disp: 1 Inhaler, Rfl: 0    atorvastatin (LIPITOR) 10 MG tablet, TAKE 1 TABLET(10 MG) BY MOUTH EVERY DAY, Disp: 90 tablet, Rfl: 4    blood sugar diagnostic Strp, To check BG 1 times daily, to use with insurance preferred meter, Disp: 100 strip, Rfl: 3    blood sugar diagnostic Strp, qac and PRN lows. Freestyle tawny test strips, Disp: 100 strip, Rfl: prn    blood-glucose meter (BLOOD GLUCOSE MONITORING) kit, Use as instructed.  Freestyle tawny glucometer, Disp: 1 each, Rfl: 0    buPROPion (WELLBUTRIN XL) 150 MG TB24 tablet, Take 1 tablet (150 mg total) by mouth once daily., Disp: 90 tablet, Rfl: 3    cetirizine (ZYRTEC) 10 MG tablet, Take  10 mg by mouth once daily., Disp: , Rfl:     cyanocobalamin 1,000 mcg/mL injection, INJECT 1 ML INTO THE MUSCLE EVERY 14 DAYS, Disp: 6 mL, Rfl: 3    estradiol (ESTRACE) 0.01 % (0.1 mg/gram) vaginal cream, Place 1 g vaginally twice a week., Disp: 42.5 g, Rfl: 4    fluticasone (FLONASE) 50 mcg/actuation nasal spray, 2 sprays (100 mcg total) by Each Nare route once daily., Disp: 1 Bottle, Rfl: 6    FREESTYLE VICKY 14 DAY SENSOR Kit, USE AS DIRECTED, Disp: 7 kit, Rfl: 3    glucose 4 GM chewable tablet, Take 4 tablets when glucose from 51-70 Take 6 tablets when glucose is less than 50, Disp: 120 tablet, Rfl: 12    lancets Misc, To check BG 1 times daily, to use with insurance preferred meter, Disp: 100 each, Rfl: 3    levothyroxine (SYNTHROID) 88 MCG tablet, Take 1 tablet (88 mcg total) by mouth before breakfast., Disp: 30 tablet, Rfl: 11    metoprolol tartrate (LOPRESSOR) 25 MG tablet, Take 1 tablet (25 mg total) by mouth 3 (three) times daily. As needed, Disp: 90 tablet, Rfl: 11    oxybutynin (DITROPAN-XL) 10 MG 24 hr tablet, , Disp: , Rfl:     oxybutynin (DITROPAN-XL) 5 MG TR24, Take 1 tablet (5 mg total) by mouth once daily., Disp: 30 tablet, Rfl: 11    paroxetine (PAXIL) 40 MG tablet, TAKE 1 TABLET(40 MG) BY MOUTH EVERY MORNING, Disp: 90 tablet, Rfl: 2    solifenacin (VESICARE) 5 MG tablet, Take 1 tablet (5 mg total) by mouth once daily. (Patient not taking: Reported on 5/13/2020), Disp: 90 tablet, Rfl: 3    telmisartan (MICARDIS) 40 MG Tab, Take 1 tablet (40 mg total) by mouth daily as needed. Only to start if SBP consistently greater than 130, Disp: 90 tablet, Rfl: 3    TRUEPLUS LANCETS 30 gauge Misc, USE TO TEST BLOOD SUGAR BID, Disp: , Rfl: 0    XARELTO 20 mg Tab, TAKE 1 TABLET BY MOUTH EVERY EVENING, Disp: 30 tablet, Rfl: 4     Updated/Relevant Psychiatric History:  Records noted chronic anxiety and depression. She reported treatment with medication.    Substance Use: Daily wine, 1/2 - 1 bottle a  "day as a coping strategy. She hasn't felt like medicating herself since her daughter and son left and Wellbutrin.    MENTAL STATUS AND OBSERVATIONS:  APPEARANCE: Casually dressed and adequate grooming/hygiene. She wore glasses.  ALERTNESS/ORIENTATION: Attentive and alert. Fully oriented to place; mostly oriented to time but misstated the date.  GAIT: Not assessed  MOTOR MOVEMENTS/MANNERISMS: Not assessed  SPEECH/LANGUAGE: Normal in rate, rhythm, tone, and volume. Mild to moderate word finding difficulty noted. Expressive and receptive language was normal.  STATED MOOD/AFFECT: The patients stated mood was "good." Affect was congruent with stated mood, with frequent tearfulness during the conversation.  INTERPERSONAL BEHAVIOR: Rapport was quickly and easily established   SUICIDALITY/HOMICIDALITY: Denied  HALLUCINATIONS/DELUSIONS: None evidenced or endorsed  THOUGHT PROCESSES/INSIGHT: Thoughts seemed logical and goal-directed.     BILLING  Service Description CPT Code Minutes Units   Psychiatric diagnostic evaluation by physician [phone]     "

## 2020-06-24 NOTE — ASSESSMENT & PLAN NOTE
Ms. Whitehead has a history of depression and anxiety with a recent exacerbation during COVID-19. She was prescribed Wellbutrin, which has been helpful. She is also scheduled to start individual therapy in July. She has been using alcohol as a coping mechanism and was encouraged to reduce/stop use to avoid potential cognitive side-effects.    Behavioral Health Follow-up: Continue taking medications as prescribed. Follow up with your therapist to address symptoms of depression and maladaptive coping strategies.

## 2020-06-25 ENCOUNTER — PATIENT MESSAGE (OUTPATIENT)
Dept: FAMILY MEDICINE | Facility: CLINIC | Age: 70
End: 2020-06-25

## 2020-06-25 ENCOUNTER — TELEPHONE (OUTPATIENT)
Dept: FAMILY MEDICINE | Facility: CLINIC | Age: 70
End: 2020-06-25

## 2020-06-25 DIAGNOSIS — E16.1 POSTPRANDIAL HYPOGLYCEMIA: Primary | ICD-10-CM

## 2020-06-25 RX ORDER — BLOOD-GLUCOSE SENSOR
EACH MISCELLANEOUS
Qty: 12 EACH | Refills: 3 | Status: SHIPPED | OUTPATIENT
Start: 2020-06-25 | End: 2021-05-18

## 2020-06-25 RX ORDER — CEPHALEXIN 500 MG/1
500 CAPSULE ORAL EVERY 8 HOURS
Qty: 30 CAPSULE | Refills: 0 | Status: SHIPPED | OUTPATIENT
Start: 2020-06-25 | End: 2020-07-13

## 2020-06-25 RX ORDER — BLOOD-GLUCOSE,RECEIVER,CONT
EACH MISCELLANEOUS
Qty: 1 EACH | Refills: 0 | Status: SHIPPED | OUTPATIENT
Start: 2020-06-25 | End: 2021-05-18

## 2020-06-25 RX ORDER — BLOOD-GLUCOSE TRANSMITTER
EACH MISCELLANEOUS
Qty: 1 DEVICE | Refills: 0 | Status: SHIPPED | OUTPATIENT
Start: 2020-06-25 | End: 2021-05-18

## 2020-06-25 NOTE — TELEPHONE ENCOUNTER
She has postprandial hypoglycemia as her diagnosis.     Please let them know and I need to know which dexcom they cover (4 or 5).       Thanks

## 2020-06-25 NOTE — TELEPHONE ENCOUNTER
[facility] requesting a signed order for dexcom machine and to verify patient has not been diagnosed with diabetes. Please advise.

## 2020-06-25 NOTE — TELEPHONE ENCOUNTER
----- Message from Helena Edmond sent at 6/25/2020 10:08 AM CDT -----  Regarding: Signed Order  [facility] to [facility] if [facility] can fax a signed order for dexcom machine please fax to [phone], also need nurse to verify patient have not been diagnosed with diabetes please call back at [phone]    Case number [id number]

## 2020-06-29 ENCOUNTER — PATIENT OUTREACH (OUTPATIENT)
Dept: ADMINISTRATIVE | Facility: HOSPITAL | Age: 70
End: 2020-06-29

## 2020-06-29 NOTE — PROGRESS NOTES
Chart review completed 2020.  Care Everywhere updates requested and reviewed.  Immunizations reconciled. Media reports reviewed.  Duplicate HM overrides and  orders removed.  Overdue HM topic chart audit and/or requested.  Overdue lab testing linked to upcoming lab appointments if applies.      Health Maintenance Due   Topic Date Due    Shingles Vaccine (1 of 2) 2001    Colorectal Cancer Screening  2020

## 2020-07-07 ENCOUNTER — PATIENT MESSAGE (OUTPATIENT)
Dept: FAMILY MEDICINE | Facility: CLINIC | Age: 70
End: 2020-07-07

## 2020-07-07 ENCOUNTER — PATIENT OUTREACH (OUTPATIENT)
Dept: ADMINISTRATIVE | Facility: OTHER | Age: 70
End: 2020-07-07

## 2020-07-07 RX ORDER — SULFAMETHOXAZOLE AND TRIMETHOPRIM 800; 160 MG/1; MG/1
1 TABLET ORAL 2 TIMES DAILY
Qty: 20 TABLET | Refills: 0 | Status: SHIPPED | OUTPATIENT
Start: 2020-07-07 | End: 2020-07-16

## 2020-07-07 NOTE — PROGRESS NOTES
Care Everywhere: updated  Immunization: updated  Health Maintenance: updated  Media Review: reviewed for outside colon cancer report  Legacy Review:   Order placed:   Upcoming appts:mammogram 9.21.2020  Referral to gastro 4.3.2020

## 2020-07-08 ENCOUNTER — INITIAL CONSULT (OUTPATIENT)
Dept: UROGYNECOLOGY | Facility: CLINIC | Age: 70
End: 2020-07-08
Payer: MEDICARE

## 2020-07-08 VITALS
SYSTOLIC BLOOD PRESSURE: 152 MMHG | HEIGHT: 65 IN | DIASTOLIC BLOOD PRESSURE: 80 MMHG | WEIGHT: 154.31 LBS | BODY MASS INDEX: 25.71 KG/M2

## 2020-07-08 DIAGNOSIS — K59.09 CHRONIC CONSTIPATION: ICD-10-CM

## 2020-07-08 DIAGNOSIS — Z12.11 COLON CANCER SCREENING: ICD-10-CM

## 2020-07-08 DIAGNOSIS — N39.46 URINARY INCONTINENCE, MIXED: Primary | ICD-10-CM

## 2020-07-08 DIAGNOSIS — N95.2 VAGINAL ATROPHY: ICD-10-CM

## 2020-07-08 DIAGNOSIS — T83.729D EXPOSURE OF IMPLANTED VAGINAL MESH AND PROSTHETIC MATERIAL IN VAGINA, SUBSEQUENT ENCOUNTER: ICD-10-CM

## 2020-07-08 DIAGNOSIS — T83.721D EXPOSURE OF IMPLANTED VAGINAL MESH AND PROSTHETIC MATERIAL IN VAGINA, SUBSEQUENT ENCOUNTER: ICD-10-CM

## 2020-07-08 PROCEDURE — 99215 OFFICE O/P EST HI 40 MIN: CPT | Mod: 25,S$GLB,, | Performed by: OBSTETRICS & GYNECOLOGY

## 2020-07-08 PROCEDURE — 1101F PR PT FALLS ASSESS DOC 0-1 FALLS W/OUT INJ PAST YR: ICD-10-PCS | Mod: CPTII,S$GLB,, | Performed by: OBSTETRICS & GYNECOLOGY

## 2020-07-08 PROCEDURE — 51701 INSERT BLADDER CATHETER: CPT | Mod: S$GLB,,, | Performed by: OBSTETRICS & GYNECOLOGY

## 2020-07-08 PROCEDURE — 99999 PR PBB SHADOW E&M-EST. PATIENT-LVL V: ICD-10-PCS | Mod: PBBFAC,,, | Performed by: OBSTETRICS & GYNECOLOGY

## 2020-07-08 PROCEDURE — 99499 RISK ADDL DX/OHS AUDIT: ICD-10-PCS | Mod: S$GLB,,, | Performed by: OBSTETRICS & GYNECOLOGY

## 2020-07-08 PROCEDURE — 3008F PR BODY MASS INDEX (BMI) DOCUMENTED: ICD-10-PCS | Mod: CPTII,S$GLB,, | Performed by: OBSTETRICS & GYNECOLOGY

## 2020-07-08 PROCEDURE — 1159F PR MEDICATION LIST DOCUMENTED IN MEDICAL RECORD: ICD-10-PCS | Mod: S$GLB,,, | Performed by: OBSTETRICS & GYNECOLOGY

## 2020-07-08 PROCEDURE — 51701 PR INSERTION OF NON-INDWELLING BLADDER CATHETERIZATION FOR RESIDUAL UR: ICD-10-PCS | Mod: S$GLB,,, | Performed by: OBSTETRICS & GYNECOLOGY

## 2020-07-08 PROCEDURE — 99499 UNLISTED E&M SERVICE: CPT | Mod: S$GLB,,, | Performed by: OBSTETRICS & GYNECOLOGY

## 2020-07-08 PROCEDURE — 3077F PR MOST RECENT SYSTOLIC BLOOD PRESSURE >= 140 MM HG: ICD-10-PCS | Mod: CPTII,S$GLB,, | Performed by: OBSTETRICS & GYNECOLOGY

## 2020-07-08 PROCEDURE — 3077F SYST BP >= 140 MM HG: CPT | Mod: CPTII,S$GLB,, | Performed by: OBSTETRICS & GYNECOLOGY

## 2020-07-08 PROCEDURE — 1101F PT FALLS ASSESS-DOCD LE1/YR: CPT | Mod: CPTII,S$GLB,, | Performed by: OBSTETRICS & GYNECOLOGY

## 2020-07-08 PROCEDURE — 99215 PR OFFICE/OUTPT VISIT, EST, LEVL V, 40-54 MIN: ICD-10-PCS | Mod: 25,S$GLB,, | Performed by: OBSTETRICS & GYNECOLOGY

## 2020-07-08 PROCEDURE — 99999 PR PBB SHADOW E&M-EST. PATIENT-LVL V: CPT | Mod: PBBFAC,,, | Performed by: OBSTETRICS & GYNECOLOGY

## 2020-07-08 PROCEDURE — 3079F DIAST BP 80-89 MM HG: CPT | Mod: CPTII,S$GLB,, | Performed by: OBSTETRICS & GYNECOLOGY

## 2020-07-08 PROCEDURE — 87086 URINE CULTURE/COLONY COUNT: CPT

## 2020-07-08 PROCEDURE — 1126F PR PAIN SEVERITY QUANTIFIED, NO PAIN PRESENT: ICD-10-PCS | Mod: S$GLB,,, | Performed by: OBSTETRICS & GYNECOLOGY

## 2020-07-08 PROCEDURE — 1126F AMNT PAIN NOTED NONE PRSNT: CPT | Mod: S$GLB,,, | Performed by: OBSTETRICS & GYNECOLOGY

## 2020-07-08 PROCEDURE — 3008F BODY MASS INDEX DOCD: CPT | Mod: CPTII,S$GLB,, | Performed by: OBSTETRICS & GYNECOLOGY

## 2020-07-08 PROCEDURE — 3079F PR MOST RECENT DIASTOLIC BLOOD PRESSURE 80-89 MM HG: ICD-10-PCS | Mod: CPTII,S$GLB,, | Performed by: OBSTETRICS & GYNECOLOGY

## 2020-07-08 PROCEDURE — 1159F MED LIST DOCD IN RCRD: CPT | Mod: S$GLB,,, | Performed by: OBSTETRICS & GYNECOLOGY

## 2020-07-08 RX ORDER — ESTRADIOL 0.1 MG/G
1 CREAM VAGINAL EVERY OTHER DAY
Qty: 42.5 G | Refills: 11 | Status: SHIPPED | OUTPATIENT
Start: 2020-07-08 | End: 2021-08-01

## 2020-07-08 RX ORDER — METRONIDAZOLE 7.5 MG/G
1 GEL VAGINAL EVERY OTHER DAY
Qty: 70 G | Refills: 11 | Status: SHIPPED | OUTPATIENT
Start: 2020-07-08 | End: 2021-05-26

## 2020-07-08 NOTE — PROGRESS NOTES
[facility] [id number]   [address] [address]    Yara Whitehead  [id number]  1950 July 13, 2020    Consulting Physician: Lorene Maxwell, *   GYN: MD Hans  Primary M.D.: Ann Richards DO    Chief Complaint   Patient presents with    Consult     exposure of mesh       HPI:     1)  UI:  (+) MICHAELA < (+) UUI  X 3years. Does not feel urge until too late.  (+) pads:4/day, usually minimum-moderate wetness and 2/night usually minimum wetness.  Daytime frequency: Q 2 hours.  Nocturia: Yes: 2/night.   (--) dysuria,  (--) hematuria,  (--) frequent UTIs. (+) complete bladder emptying. Used to have retention prior to surgery. Does not feel like she has an issue with emptying  --oxybutynin 10 xl, vesicare 5 mg: for overactive bladder/UUI; is she still taking? Stopped taking oxybutynin x 2 weeks per psychiatrist saying it can affect the brain. are they helping? Solifenacin also stopped prior to oxybutynin. Both medications helped for a little bit.    2)  POP:  Absent. Symptoms.  (+) vaginal bleeding. (--) vaginal discharge. (--) sexually active.  (--) dyspareunia.  (--)  Vaginal dryness.  (+) vaginal estrogen use. Premarin  And one other post surgery    3)  BM:  (+) constipation/straining for a couple weeks.  (--) chronic diarrhea. (--) hematochezia.  (--) fecal incontinence.  (--) fecal smearing/urgency.  (--) complete evacuation.      4)  Vaginal mesh exposure:  --GYN exam 5/2020:  PELVIC: Normal external female genitalia without lesions. Normal hair distribution. Adequate perineal body, normal urethral meatus. Urethra with no masses.  Bladder nontender. Vagina  Golf ball size apical exposure.   --surgery + date: POP, xlap/Pfannenstiel, and vaginal entry? Around 2008.  [facility],[phone]. Mesh first notice around 1 year ago when dark tinged fluid was noticed on her pad.    Dr. Clemencia Marvin MD  [facility]  [address]  [address], [place], [address] [phone]  --symptoms: itching, typically painless. Painful when touched. Vaginal discharge - dark tinged. One episode of bright red bleeding around 2 months ago, lasting 10 hrs.    Past Medical History  Past Medical History:   Diagnosis Date    Allergy     Arrhythmia     GERD (gastroesophageal reflux disease)     Hypertension     Hypothyroidism     ALESSANDRA (iron deficiency anemia)     Insomnia     Nephrolithiasis     had esl - [place]    FABIANA on CPAP     PAF (paroxysmal atrial fibrillation)     Status post placement of implantable loop recorder 3/18/2016    Subaortic membrane     Subarachnoid hematoma 1/1/2019    Subdural hematoma     Vitamin B 12 deficiency     Vitamin D deficiency    subaortic membrane: bottom of aorta - extra flap of skin causing murmur. No pain. Was discovered per GP when heard murmur. No surgical intervention required  Subarachnoid hematoma: s/p fall. Stable. No follow ups since event  Diabetes?: no history of diabetes. Reactive hypoglycemia d/t gastric bypass   Average FBG: does not take. Wears free style tawny.   Lab Results   Component Value Date    HGBA1C 5.1 03/16/2020     Psychiatrist: first visit x 2 weeks for anxiety. No prior history. Has psychologist appt planned.   hypotyroidism    Past Surgical History  Past Surgical History:   Procedure Laterality Date    abdomenalplasty      ABLATION OF ARRHYTHMOGENIC FOCUS FOR ATRIAL FIBRILLATION N/A 4/15/2019    Procedure: Ablation atrial fibrillation;  Surgeon: Edmund Shah MD;  Location: [facility] EP LAB;  Service: Cardiology;  Laterality: N/A;  AF, PRISCILLA, PVI, Cryo, MARY, Gen, SK, 3 Prep    COLONOSCOPY  2012    normal, repeat in 5 years    EXTRACORPOREAL SHOCK WAVE LITHOTRIPSY      [place]    FRACTURE SURGERY Left     has plates and screws in place.    GALLBLADDER SURGERY  1995    GASTRIC BYPASS  1997    HERNIA REPAIR  1998    HYSTERECTOMY  2013    due to vaginal prolpase with BSO - also  bladder suspension at the same time - [facility]    INSERTION OF IMPLANTABLE LOOP RECORDER N/A 2019    Procedure: Insertion, Implantable Loop Recorder;  Surgeon: Neftali Hernandez MD;  Location: [facility] CATH;  Service: Cardiology;  Laterality: N/A;    REMOVAL OF IMPLANTABLE LOOP RECORDER N/A 2019    Procedure: REMOVAL, IMPLANTABLE LOOP RECORDER;  Surgeon: Neftali Hernandez MD;  Location: [facility] CATH;  Service: Cardiology;  Laterality: N/A;    TYMPANOPLASTY Left     replaced and then repair x2    route of cholecystectomy: LSC  route of gastric bypass: LSC  Location of hernia repair: umbilical mesh used? Yes .  Current hernia, epigastric, no surg needed now  Vaginal mesh procedure: POP (see HPI)    Hysterectomy: Yes   Date: .  Indication: POP.    Type: unknown  Cervix present: No  Ovaries present: No  Other procedures at time of hysterectomy:  POP mesh procedure    Past Ob History     x 2. .    Largest infant weight: 10 lbs 11 oz  yes FAVD. no episiotomy.      Gynecologic History  LMP: No LMP recorded (lmp unknown). Patient has had a hysterectomy.  Age of menarche: 14  Age of menopause: 53  Menstrual history: normal   Pap test: post-hyst.  History of abnormal paps: No.  History of STIs:  No  Mammogram: Date of last: 2020.  Result: Normal  Colonoscopy: Date of last: 6 yrs.  Result:  normal.  Repeat due:  Per PCP. Was supposed to have this--needs to schedule.   DEXA:  Date of last: .  Result:  normal.  Repeat due:  Per PCP.     Family History  Family History   Problem Relation Age of Onset    Aneurysm Mother     Hypertension Mother     Cancer Mother         uterine     Glaucoma Mother     Alzheimer's disease Father     Diabetes Father     Mental illness Sister     No Known Problems Daughter     No Known Problems Son     Stroke Maternal Grandfather     Glaucoma Maternal Grandfather     COPD Paternal Grandmother     Cancer Paternal Grandmother         colon     Alzheimer's disease Paternal Grandfather     Heart disease Paternal Grandfather     Nephrolithiasis Neg Hx       Colon CA: No  Breast CA: No  GYN CA: No   CA: No    Social History  Social History     Tobacco Use   Smoking Status Former Smoker    Packs/day: 2.00    Years: 20.00    Pack years: 40.00    Quit date: 1995    Years since quittin.6   Smokeless Tobacco Never Used   .     Social History     Substance and Sexual Activity   Alcohol Use Yes    Alcohol/week: 7.0 standard drinks    Types: 7 Glasses of wine per week    Frequency: 4 or more times a week    Drinks per session: 1 or 2    Binge frequency: Less than monthly    Comment: one glass wine nightly   .    Social History     Substance and Sexual Activity   Drug Use No   .  The patient is .  Resides in [place] [address].  Employment status: retired [employer] employee.       Allergies  Review of patient's allergies indicates:  No Known Allergies    Medications  Current Outpatient Medications on File Prior to Visit   Medication Sig Dispense Refill    albuterol 90 mcg/actuation inhaler Inhale 1-2 puffs into the lungs every 6 (six) hours as needed for Wheezing. 1 Inhaler 0    atorvastatin (LIPITOR) 10 MG tablet TAKE 1 TABLET(10 MG) BY MOUTH EVERY DAY 90 tablet 4    blood sugar diagnostic Strp To check BG 1 times daily, to use with insurance preferred meter 100 strip 3    blood sugar diagnostic Strp qac and PRN lows. Freestyle tawny test strips 100 strip prn    blood-glucose meter (BLOOD GLUCOSE MONITORING) kit Use as instructed.  Freestyle tawny glucometer 1 each 0    blood-glucose meter,continuous (DEXCOM G6 ) Misc To use for hypoglycemic episodes 1 each 0    blood-glucose sensor (DEXCOM G6 SENSOR) Rebeca To use for hypoglycemia 12 each 3    blood-glucose transmitter (DEXCOM G6 TRANSMITTER) Rebeca To use for hypoglycemia 1 Device 0    buPROPion (WELLBUTRIN XL) 150 MG TB24 tablet Take 1 tablet (150 mg total) by mouth once daily.  90 tablet 3    cetirizine (ZYRTEC) 10 MG tablet Take 10 mg by mouth once daily.      cyanocobalamin 1,000 mcg/mL injection INJECT 1 ML INTO THE MUSCLE EVERY 14 DAYS 6 mL 3    fluticasone (FLONASE) 50 mcg/actuation nasal spray 2 sprays (100 mcg total) by Each Nare route once daily. 1 Bottle 6    FREESTYLE VICKY 14 DAY SENSOR Kit USE AS DIRECTED 7 kit 3    glucose 4 GM chewable tablet Take 4 tablets when glucose from 51-70  Take 6 tablets when glucose is less than 50 120 tablet 12    lancets Misc To check BG 1 times daily, to use with insurance preferred meter 100 each 3    levothyroxine (SYNTHROID) 88 MCG tablet Take 1 tablet (88 mcg total) by mouth before breakfast. 30 tablet 11    metoprolol tartrate (LOPRESSOR) 25 MG tablet Take 1 tablet (25 mg total) by mouth 3 (three) times daily. As needed 90 tablet 11    paroxetine (PAXIL) 40 MG tablet TAKE 1 TABLET(40 MG) BY MOUTH EVERY MORNING 90 tablet 2    solifenacin (VESICARE) 5 MG tablet Take 1 tablet (5 mg total) by mouth once daily. (Patient not taking: Reported on 7/13/2020) 90 tablet 3    sulfamethoxazole-trimethoprim 800-160mg (BACTRIM DS) 800-160 mg Tab Take 1 tablet by mouth 2 (two) times daily. 20 tablet 0    TRUEPLUS LANCETS 30 gauge Misc USE TO TEST BLOOD SUGAR BID  0    XARELTO 20 mg Tab TAKE 1 TABLET BY MOUTH EVERY EVENING 30 tablet 4    [DISCONTINUED] oxybutynin (DITROPAN-XL) 10 MG 24 hr tablet       [DISCONTINUED] telmisartan (MICARDIS) 40 MG Tab Take 1 tablet (40 mg total) by mouth daily as needed. Only to start if SBP consistently greater than 130 (Patient not taking: Reported on 7/13/2020) 90 tablet 3    [DISCONTINUED] cephALEXin (KEFLEX) 500 MG capsule Take 1 capsule (500 mg total) by mouth every 8 (eight) hours. (Patient not taking: Reported on 7/8/2020) 30 capsule 0    [DISCONTINUED] oxybutynin (DITROPAN-XL) 5 MG TR24 Take 1 tablet (5 mg total) by mouth once daily. (Patient not taking: Reported on 7/13/2020) 30 tablet 11     No  "current facility-administered medications on file prior to visit.        Review of Systems A 14 point ROS was reviewed with pertinent positives as noted above in the history of present illness.      Constitutional: negative  Eyes: negative  Endocrine: negative  Gastrointestinal: negative  Cardiovascular: negative  Respiratory: negative  Allergic/Immunologic: negative  Integumentary: negative  Psychiatric: negative  Musculoskeletal: negative   Ear/Nose/Throat: negative  Neurologic: negative  Genitourinary: SEE HPI  Hematologic/Lymphatic: negative   Breast: negative    Urogynecologic Exam  BP (!) 152/80 (BP Location: Left arm, Patient Position: Sitting, BP Method: Medium (Manual))   Ht 5' 5" (1.651 m)   Wt 70 kg (154 lb 5.2 oz)   LMP  (LMP Unknown) Comment: total  BMI 25.68 kg/m²     GENERAL APPEARANCE:  The patient is well-developed, well-nourished.  Neck:  Supple with no thyromegaly, no carotid bruits.  Heart:  Regular rate and rhythm, no murmurs, rubs or gallops.  Lungs:  Clear.  No CVA tenderness.  Abdomen:  Soft, nontender, nondistended, no hepatosplenomegaly.  Incisions:  Abd plasty well-healed    PELVIC:    External genitalia:  Normal Bartholins, Skenes and labia bilaterally.    Urethra:  No caruncle, diverticulum or masses.  (+) hypermobility.    Vagina:  Atrophy (+) , no bladder masses or tender, +discharge, yellow-brown. 2-3 cm area of mesh exposure (blue mesh) at vaginal cuff.   Cervix:  absent  Uterus: uterus absent  Adnexa: Not palpable.    POP-Q:    Deferred.  No obvious POP present with valsalva.     NEUROLOGIC:  Cranial nerves 2 through 12 intact.  Strength 5/5.  DTRs 2+ lower extremities.  S2 through 4 normal.  Sacral reflexes intact.    EXT: MANDEL, 2+ pulses bilaterally, no C/C/E    COUGH STRESS TEST:  negative  KEGEL: 1 /5    RECTAL:    External:  Normal, (--) hemorrhoids, (--) dovetailing.   Internal:  deferred    PVR: 50 mL    Impression    1. Urinary incontinence, mixed    2. Exposure of " implanted vaginal mesh and prosthetic material in vagina, subsequent encounter    3. Colon cancer screening    4. Chronic constipation    5. Vaginal atrophy        Initial Plan  The patient was counseled regarding these issues. The patient was given a summary sheet containing each of these issues with possible options for evaluation and management. When appropriate, we also reviewed computer-generated diagrams specific to their diagnoses..  All questions were addressed to the patient's satisfaction.    1) Vaginal mesh exposure:  --need op notes from surgery 2008  Dr. Clemencia Marvin MD  [facility]  [address], [place], [address] [phone]    --to help optimize tissue and reduce bacteria:    BEFORE BED:   --continue 1 g estradiol cream every other night   --stop premarin--you can finish current tube instead of estradiol; then restart estradiol   --start 1 applicator full of metrogel (antibiotic gel) every other night (on night's not using estradiol)    --cystoscopy/urodynamics  --will need to plan surgical excision and revision of any recurrent prolapse: can we do this at same time or do we need to stage?  --taking xarelto--will need to bridge off (talk with Katib--did not want off >2 days)    2)  Mixed urinary incontinence, urge > stress:    --urine C&S  --Empty bladder every 3 hours.  Empty well: wait a minute, lean forward on toilet.    --Avoid dietary irritants (see sheet).  Keep diary x 3-5 days to determine your irritants.  --KEGELS: do 10 in AM and 10 in PM, holding each x 10 seconds.  When you feel urge to go, STOP, KEGEL, and when urge has passed, then go to bathroom.  Consider PT in future.    --URGE: start mirabegron 50 mg.  Let us know if too expensive.  Trying to avoid anticholinergics due to recent depression during COVID. Takes 2-4 weeks to see if will have effect.  For dry mouth: get sour, sugar free lozenge or gum.    --STRESS:  Pessary vs. Sling.     3)  Constipation:  --hydrate well:  Drink 3 extra glasses or bottles of water/day  Controlling constipation may help bladder urgency/leakage and fiber may better control cholesterol and blood glucose.  Start daily fiber.  Take 1 tsp of fiber powder (psyllium or other sugar-free powder).  Mix in 8 oz of water.  Take x 3-5 days.  Then, increase fiber by 1 tsp every 3-5 days until stool is easy to pass.  Stop and continue at that dose.   Do not exceed 6 tsps/day.  May also use over the counter stool softener 1-2 x/day.  AVOID laxatives.    4)  Well-woman:  --needs colonoscopy: ordered, please call to schedule     5)  RTC for UDS/cysto.  Get op notes.     Approximately 60 min were spent in consult, 90 % in discussion.     Thank you for requesting consultation of your patient.  I look forward to participating in their care.    Tracy Palencia  Female Pelvic Medicine and Reconstructive Surgery  [facility] [place], [place]

## 2020-07-08 NOTE — PATIENT INSTRUCTIONS
Bladder Irritants  Certain foods and drinks have been associated with worsening symptoms of urinary frequency, urgency, urge incontinence, or bladder pain. If you suffer from any of these conditions, you may wish to try eliminating one or more of these foods from your diet and see if your symptoms improve. If bladder symptoms are related to dietary factors, strict adherence to a diet thateliminates the food should bring marked relief in 10 days. Once you are feeling better, you can begin to add foods back into your diet, one at a time. If symptoms return, you will be able to identify the irritant. As you add foods back to your diet it is very important that you drink significant amounts of water.    -----------------------------------------------------------------------------------------------  List of Common Bladder Irritants*  Alcoholic beverages  Apples and apple juice  Cantaloupe  Carbonated beverages  Chili and spicy foods  Chocolate  Citrus fruit  Coffee (including decaffeinated)  Cranberries and cranberry juice  Grapes  Guava  Milk Products: milk, cheese, cottage cheese, yogurt, ice cream  Peaches  Pineapple  Plums  Strawberries  Sugar especially artificial sweeteners, saccharin, aspartame, corn sweeteners, honey, fructose, sucrose, lactose  Tea  Tomatoes and tomato juice  Vitamin B complex  Vinegar  *Most people are not sensitive to ALL of these products; your goal is to find the foods that make YOUR symptoms worse.  ---------------------------------------------------------------------------------------------------    Low-acid fruit substitutions include apricots, papaya, pears and watermelon. Coffee drinkers can drink Kava or other lowacid instant drinks. Tea drinkers can substitute non-citrus herbal and sun brewed teas. Calcium carbonate co-buffered with calcium ascorbate can be substituted for Vitamin C. Prelief is a dietary supplement that works as an acid blocker for the bladder.    Where to get more  information:        Overcoming Bladder Disorders by Jordyn Mccauley and Meek Miller, 1990        You Dont Have to Live with Cystitis! By Aileen Blackmon, 1988  · http://www.urologymanagement.org/oab    ----------------------------------------------    Fiber Information Sheet  Your doctor has recommended that you follow a high fiber diet. The addition of fiber to your diet can make an enormous difference in your bowel control and regularity. Fiber helps people whether they lose stool or have trouble with constipation. Fiber works by bulking the stool and keeping it formed, yet making the movement soft and easy to pass. Fiber helps keep moisture within the stool so that neither diarrhea nor hard stool occurs. Fiber makes the bowels work more regularly, but it is not a laxative. An additional bonus from eating a high fiber diet is that your risk of cancer is reduced, too.    Most of us eat some high fiber foods already, but nearly all of us do not eat the necessary amount. For example, a slice of whole wheat bread contains only about 10% of the daily recommended amount of fiber. This means if you are relying on only whole wheat bread to meet the recommended fiber requirements, you would need to eat  between 10-18 slices every day! Please note that fiber is NOT in any meat or dairy product. It is only found in grains, vegetables and fruits. The recommended daily fiber intake is 20-25 grams. Foods having high fiber content include:     Fiber One Cereal, ½ cup 13.0 g   Burns beans, ¾ cup 10.4 g   Wheat bran cereal, 1 oz 10.0 g   Kidney beans, ¾ cup 9.3 g   All Bran Cereal, ½ cup 6.0 g   Oat Bran Cereal, hot, 1 oz 4.0 g   Banana, 1medium 3.8 g   Canned pears, ½ cup 3.7 g   3 prunes or ¼ cup raisins 3.5 g   Whole Wheat Total, 1 cup 3.0 g   Carrots, ½ cup 3.2 g   Apple, small 2.8 g   Broccoli, ½ cup 2.8 g   Cauliflower, ½ cup 2.6 g   Oatmeal, 1 oz 2.5 g   Whole Wheat Toast 2.0 g    Cheerios, 1 1/3 cup 2.0 g   Baked potato with skin 2.0 g   Corn, ½ cup 1.9 g   Popcorn, 3 cups 1.9 g   Orange, medium 1.9 g   Granola bar 1.0 g   Lettuce, ½ cup 0.9 g    If you dont think that you can get enough fiber through your everyday diet, there are many good fiber supplements you can take along with eating your high fiber diet. Some of these are: Metamucil (1 heaping teaspoon or 1-2 wafers), Citrucel (1 tablespoon), Fiberall (1-2 wafers or 1 teaspoon), Perdium (2 rounded teaspoons) and 1-2 teaspoons unprocessed bran (to mix with foods)    You may need to use the fiber supplement up to 3-4 times daily to produce normal elimination. Please follow specific package directions or call us for help in regulating the dose. You may notice some bloating and/or increased gas at first. These symptoms can be relieved by adding fiber to your diet slowly. Once your body gets used to this increased fiber, these symptoms will go away.   -----------------------------------------------------------    1) Vaginal mesh exposure:  --need op notes from surgery 2008  Dr. Clemencia Marvin MD  [facility]  [address], [place], [address] [phone]    --to help optimize tissue and reduce bacteria:    BEFORE BED:   --continue 1 g estradiol cream every other night   --stop premarin--you can finish current tube instead of estradiol; then restart estradiol   --start 1 applicator full of metrogel (antibiotic gel) every other night (on night's not using estradiol)    --cystoscopy/urodynamics  --will need to plan surgical excision and revision of any recurrent prolapse: can we do this at same time or do we need to stage?  --taking xarelto--will need to bridge off (talk with Katib--did not want off >2 days)    2)  Mixed urinary incontinence, urge > stress:    --urine C&S  --Empty bladder every 3 hours.  Empty well: wait a minute, lean forward on toilet.    --Avoid dietary irritants (see sheet).  Keep  diary x 3-5 days to determine your irritants.  --KEGELS: do 10 in AM and 10 in PM, holding each x 10 seconds.  When you feel urge to go, STOP, KEGEL, and when urge has passed, then go to bathroom.  Consider PT in future.    --URGE: start mirabegron 50 mg.  Let us know if too expensive.  Trying to avoid anticholinergics due to recent depression during COVID. Takes 2-4 weeks to see if will have effect.  For dry mouth: get sour, sugar free lozenge or gum.    --STRESS:  Pessary vs. Sling.     3) Constipation:  --hydrate well:  Drink 3 extra glasses or bottles of water/day  Controlling constipation may help bladder urgency/leakage and fiber may better control cholesterol and blood glucose.  Start daily fiber.  Take 1 tsp of fiber powder (psyllium or other sugar-free powder).  Mix in 8 oz of water.  Take x 3-5 days.  Then, increase fiber by 1 tsp every 3-5 days until stool is easy to pass.  Stop and continue at that dose.   Do not exceed 6 tsps/day.  May also use over the counter stool softener 1-2 x/day.  AVOID laxatives.    4)  Well-woman:  --needs colonoscopy: ordered, please call to schedule     5)  RTC for UDS/cysto.  Get op notes.

## 2020-07-08 NOTE — LETTER
July 13, 2020      Lorene Maxwell MD  [address] [address] [address]           [facility]roGyne[facility]-XpMbrxlseLvl831   [address] [address]  Phone: [phone]          Patient: Yara Whitehead   MR Number: [id number]   YOB: 1950   Date of Visit: 7/8/2020       Dear Dr. Loerne Maxwell:    Thank you for referring Yara Whitehead to me for evaluation. Attached you will find relevant portions of my assessment and plan of care.    If you have questions, please do not hesitate to call me. I look forward to following Yara Whitehead along with you.    Sincerely,    Tracy Palencia MD    Enclosure  CC:  No Recipients    If you would like to receive this communication electronically, please contact [email or web address] or [phone] to request more information on [employer] access.    For providers and/or their staff who would like to refer a patient to [facility], please contact us through our one-stop-shop provider referral line, [facility], at [phone].    If you feel you have received this communication in error or would no longer like to receive these types of communications, please e-mail [email or web address]

## 2020-07-09 DIAGNOSIS — Z01.812 PRE-PROCEDURE LAB EXAM: ICD-10-CM

## 2020-07-10 LAB — BACTERIA UR CULT: NO GROWTH

## 2020-07-11 ENCOUNTER — PATIENT MESSAGE (OUTPATIENT)
Dept: UROGYNECOLOGY | Facility: CLINIC | Age: 70
End: 2020-07-11

## 2020-07-12 NOTE — PROGRESS NOTES
Subjective:       Patient ID: Yara Whitehead is a 70 y.o. female.    Medication List with Changes/Refills   Current Medications    ALBUTEROL 90 MCG/ACTUATION INHALER    Inhale 1-2 puffs into the lungs every 6 (six) hours as needed for Wheezing.    ATORVASTATIN (LIPITOR) 10 MG TABLET    TAKE 1 TABLET(10 MG) BY MOUTH EVERY DAY    BLOOD SUGAR DIAGNOSTIC STRP    To check BG 1 times daily, to use with insurance preferred meter    BLOOD SUGAR DIAGNOSTIC STRP    qac and PRN lows. Freestyle tawny test strips    BLOOD-GLUCOSE METER (BLOOD GLUCOSE MONITORING) KIT    Use as instructed.  Freestyle tawny glucometer    BLOOD-GLUCOSE METER,CONTINUOUS (DEXCOM G6 ) MISC    To use for hypoglycemic episodes    BLOOD-GLUCOSE SENSOR (DEXCOM G6 SENSOR) [place]    To use for hypoglycemia    BLOOD-GLUCOSE TRANSMITTER (DEXCOM G6 TRANSMITTER) [place]    To use for hypoglycemia    BUPROPION (WELLBUTRIN XL) 150 MG TB24 TABLET    Take 1 tablet (150 mg total) by mouth once daily.    CETIRIZINE (ZYRTEC) 10 MG TABLET    Take 10 mg by mouth once daily.    CYANOCOBALAMIN 1,000 MCG/ML INJECTION    INJECT 1 ML INTO THE MUSCLE EVERY 14 DAYS    ESTRADIOL (ESTRACE) 0.01 % (0.1 MG/GRAM) VAGINAL CREAM    Place 1 g vaginally twice a week.    ESTRADIOL (ESTRACE) 0.01 % (0.1 MG/GRAM) VAGINAL CREAM    Place 1 g vaginally every other day.    FLUTICASONE (FLONASE) 50 MCG/ACTUATION NASAL SPRAY    2 sprays (100 mcg total) by Each Nare route once daily.    FREESTYLE TAWNY 14 DAY SENSOR KIT    USE AS DIRECTED    GLUCOSE 4 GM CHEWABLE TABLET    Take 4 tablets when glucose from 51-70  Take 6 tablets when glucose is less than 50    LANCETS MISC    To check BG 1 times daily, to use with insurance preferred meter    LEVOTHYROXINE (SYNTHROID) 88 MCG TABLET    Take 1 tablet (88 mcg total) by mouth before breakfast.    METOPROLOL TARTRATE (LOPRESSOR) 25 MG TABLET    Take 1 tablet (25 mg total) by mouth 3 (three) times daily. As needed    METRONIDAZOLE (METROGEL)  "0.75 % VAGINAL GEL    Place 1 applicator vaginally every other day.    MIRABEGRON (MYRBETRIQ) 50 MG TB24    Take 1 tablet (50 mg total) by mouth once daily.    PAROXETINE (PAXIL) 40 MG TABLET    TAKE 1 TABLET(40 MG) BY MOUTH EVERY MORNING    SOLIFENACIN (VESICARE) 5 MG TABLET    Take 1 tablet (5 mg total) by mouth once daily.    SULFAMETHOXAZOLE-TRIMETHOPRIM 800-160MG (BACTRIM DS) 800-160 MG TAB    Take 1 tablet by mouth 2 (two) times daily.    TELMISARTAN (MICARDIS) 40 MG TAB    Take 40 mg by mouth once daily.    TRUEPLUS LANCETS 30 GAUGE MISC    USE TO TEST BLOOD SUGAR BID    XARELTO 20 MG TAB    TAKE 1 TABLET BY MOUTH EVERY EVENING   Discontinued Medications    CEPHALEXIN (KEFLEX) 500 MG CAPSULE    Take 1 capsule (500 mg total) by mouth every 8 (eight) hours.    OXYBUTYNIN (DITROPAN-XL) 10 MG 24 HR TABLET        OXYBUTYNIN (DITROPAN-XL) 5 MG TR24    Take 1 tablet (5 mg total) by mouth once daily.    TELMISARTAN (MICARDIS) 40 MG TAB    Take 1 tablet (40 mg total) by mouth daily as needed. Only to start if SBP consistently greater than 130       Chief Complaint: Follow-up  She is here today to f/u from her appt on 6/8/2020.     She has worsening anxiety and depression and at her last appt wellburtrin was added to her regimen. She continues to take paxil 40 mg qday.  She reports she is feeling much better.  She has "emotional control". She is more motivated to stay active.  She is doing some more normal things like walking and getting out of the house.  She is having her first appt with the clinical psychologist today.  She denies any side effects from the medcaiton. She is sleeping well.  No suicidal ideations. She is drinking less alcohol.      She continues to have concerns about her memory. She was seen by the neuropsychiatrist but has not yet had treatment. She does feel that her memory has improved since treatment of her depression.  She is taking some over the counter supplements to help with memory.     She " "has a facial rash under her nose and on chin x 2 weeks. She was started on kelfex for impetigo but she has no response.  Her medication was changed to bactrim to cover resistant infections.but despite changing abx the area under her nose continues to have irritation and inflammation.  Worse after wearing a mask. No lesions in her nose.  Areas on her forehead and chin have improved.     Review of Systems   Constitutional: Negative for appetite change, fatigue, fever and unexpected weight change.   HENT: Negative for congestion, ear pain, hearing loss, sore throat and trouble swallowing.    Eyes: Negative for pain and visual disturbance.   Respiratory: Negative for cough, chest tightness, shortness of breath and wheezing.    Cardiovascular: Negative for chest pain, palpitations and leg swelling.   Gastrointestinal: Negative for abdominal pain, blood in stool, constipation, diarrhea, nausea and vomiting.   Endocrine: Negative for polyuria.   Genitourinary: Negative for dysuria and hematuria.   Musculoskeletal: Negative for arthralgias, back pain and myalgias.   Skin: Negative for rash.   Neurological: Negative for dizziness, weakness, numbness and headaches.   Hematological: Does not bruise/bleed easily.   Psychiatric/Behavioral: Positive for dysphoric mood. Negative for sleep disturbance and suicidal ideas. The patient is nervous/anxious.        Objective:      Vitals:    07/13/20 1037   BP: 122/70   Pulse: 74   Resp: 16   Temp: 97.7 °F (36.5 °C)   TempSrc: Temporal   SpO2: 98%   Weight: 69.7 kg (153 lb 10.6 oz)   Height: 5' 5" (1.651 m)     Body mass index is 25.57 kg/m².  Physical Exam    General appearance: alert, no acute distress  Nose: normal mucosa, no polyps or sores, no rhinorrhea  Throat: no erythema, no exudates, tonsils appear normal  Mouth: no sores or lesion, moist mucous membranes  Neck: supple, FROM, no masses, no tenderness  Lymph: no posterior or cervical adenopathy  Lungs: no distress, no " retractions, clear to ascultation bilaterally, no wheezing, no rales, no rhonchi  Heart:: Regular rate and rhythm, no murmur  Abdomen: soft, non-tender, no guarding, no rebound, no peritoneal signs, bowel sounds normal, no hepatosplenomegaly, no masses  Skin: small erythematous papules under nose bilaterally  Perfusion: good capillary refill, normal pulses      Assessment:       1. Major depressive disorder, recurrent, mild    2. Memory problem    3. Facial rash        Plan:       Major depressive disorder, recurrent, mild  Much improved with the addition of wellbutrin.  Continue to monitor closely. She is due to establish with psychology today.     Memory problem  Stable but she will need neuropsychiatric testing in the future.     Facial rash  At first rash was consistent with impetigo but only mild improvement with abx.  Referral to dermatology for evaluation.   -     Ambulatory referral/consult to Dermatology; Future; Expected date: 07/20/2020    Follow up for already scheduled.

## 2020-07-13 ENCOUNTER — CLINICAL SUPPORT (OUTPATIENT)
Dept: CARDIOLOGY | Facility: CLINIC | Age: 70
End: 2020-07-13
Payer: MEDICARE

## 2020-07-13 ENCOUNTER — OFFICE VISIT (OUTPATIENT)
Dept: DERMATOLOGY | Facility: CLINIC | Age: 70
End: 2020-07-13
Payer: MEDICARE

## 2020-07-13 ENCOUNTER — OFFICE VISIT (OUTPATIENT)
Dept: FAMILY MEDICINE | Facility: CLINIC | Age: 70
End: 2020-07-13
Payer: MEDICARE

## 2020-07-13 VITALS
HEART RATE: 74 BPM | OXYGEN SATURATION: 98 % | SYSTOLIC BLOOD PRESSURE: 122 MMHG | TEMPERATURE: 98 F | BODY MASS INDEX: 25.61 KG/M2 | DIASTOLIC BLOOD PRESSURE: 70 MMHG | RESPIRATION RATE: 16 BRPM | HEIGHT: 65 IN | WEIGHT: 153.69 LBS

## 2020-07-13 VITALS — WEIGHT: 153.69 LBS | BODY MASS INDEX: 25.61 KG/M2 | HEIGHT: 65 IN | RESPIRATION RATE: 18 BRPM

## 2020-07-13 DIAGNOSIS — Z95.818 PRESENCE OF OTHER CARDIAC IMPLANTS AND GRAFTS: ICD-10-CM

## 2020-07-13 DIAGNOSIS — R41.3 MEMORY PROBLEM: ICD-10-CM

## 2020-07-13 DIAGNOSIS — R21 FACIAL RASH: ICD-10-CM

## 2020-07-13 DIAGNOSIS — F33.0 MAJOR DEPRESSIVE DISORDER, RECURRENT, MILD: Primary | ICD-10-CM

## 2020-07-13 DIAGNOSIS — L71.0 PERIORIFICIAL DERMATITIS: ICD-10-CM

## 2020-07-13 DIAGNOSIS — L82.0 INFLAMED SEBORRHEIC KERATOSIS: Primary | ICD-10-CM

## 2020-07-13 PROBLEM — K59.09 CHRONIC CONSTIPATION: Status: ACTIVE | Noted: 2020-07-13

## 2020-07-13 PROBLEM — N95.2 VAGINAL ATROPHY: Status: ACTIVE | Noted: 2020-07-13

## 2020-07-13 PROBLEM — N39.46 URINARY INCONTINENCE, MIXED: Status: ACTIVE | Noted: 2020-07-13

## 2020-07-13 PROBLEM — T83.729A: Status: ACTIVE | Noted: 2019-07-08

## 2020-07-13 PROCEDURE — 3008F BODY MASS INDEX DOCD: CPT | Mod: CPTII,S$GLB,, | Performed by: INTERNAL MEDICINE

## 2020-07-13 PROCEDURE — 3008F PR BODY MASS INDEX (BMI) DOCUMENTED: ICD-10-PCS | Mod: CPTII,S$GLB,, | Performed by: INTERNAL MEDICINE

## 2020-07-13 PROCEDURE — 3074F SYST BP LT 130 MM HG: CPT | Mod: CPTII,S$GLB,, | Performed by: INTERNAL MEDICINE

## 2020-07-13 PROCEDURE — 3008F BODY MASS INDEX DOCD: CPT | Mod: CPTII,S$GLB,, | Performed by: DERMATOLOGY

## 2020-07-13 PROCEDURE — 99202 OFFICE O/P NEW SF 15 MIN: CPT | Mod: S$GLB,,, | Performed by: DERMATOLOGY

## 2020-07-13 PROCEDURE — 99999 PR PBB SHADOW E&M-EST. PATIENT-LVL V: CPT | Mod: PBBFAC,,, | Performed by: DERMATOLOGY

## 2020-07-13 PROCEDURE — 3078F DIAST BP <80 MM HG: CPT | Mod: CPTII,S$GLB,, | Performed by: DERMATOLOGY

## 2020-07-13 PROCEDURE — 1159F PR MEDICATION LIST DOCUMENTED IN MEDICAL RECORD: ICD-10-PCS | Mod: S$GLB,,, | Performed by: INTERNAL MEDICINE

## 2020-07-13 PROCEDURE — 93298 REM INTERROG DEV EVAL SCRMS: CPT | Mod: S$GLB,,, | Performed by: INTERNAL MEDICINE

## 2020-07-13 PROCEDURE — 93298 CARDIAC DEVICE CHECK - REMOTE: ICD-10-PCS | Mod: S$GLB,,, | Performed by: INTERNAL MEDICINE

## 2020-07-13 PROCEDURE — 1159F MED LIST DOCD IN RCRD: CPT | Mod: S$GLB,,, | Performed by: DERMATOLOGY

## 2020-07-13 PROCEDURE — 1101F PT FALLS ASSESS-DOCD LE1/YR: CPT | Mod: CPTII,S$GLB,, | Performed by: DERMATOLOGY

## 2020-07-13 PROCEDURE — 1159F MED LIST DOCD IN RCRD: CPT | Mod: S$GLB,,, | Performed by: INTERNAL MEDICINE

## 2020-07-13 PROCEDURE — 1159F PR MEDICATION LIST DOCUMENTED IN MEDICAL RECORD: ICD-10-PCS | Mod: S$GLB,,, | Performed by: DERMATOLOGY

## 2020-07-13 PROCEDURE — 1126F PR PAIN SEVERITY QUANTIFIED, NO PAIN PRESENT: ICD-10-PCS | Mod: S$GLB,,, | Performed by: INTERNAL MEDICINE

## 2020-07-13 PROCEDURE — 3078F PR MOST RECENT DIASTOLIC BLOOD PRESSURE < 80 MM HG: ICD-10-PCS | Mod: CPTII,S$GLB,, | Performed by: INTERNAL MEDICINE

## 2020-07-13 PROCEDURE — 3074F SYST BP LT 130 MM HG: CPT | Mod: CPTII,S$GLB,, | Performed by: DERMATOLOGY

## 2020-07-13 PROCEDURE — 3008F PR BODY MASS INDEX (BMI) DOCUMENTED: ICD-10-PCS | Mod: CPTII,S$GLB,, | Performed by: DERMATOLOGY

## 2020-07-13 PROCEDURE — 1101F PR PT FALLS ASSESS DOC 0-1 FALLS W/OUT INJ PAST YR: ICD-10-PCS | Mod: CPTII,S$GLB,, | Performed by: INTERNAL MEDICINE

## 2020-07-13 PROCEDURE — 99213 OFFICE O/P EST LOW 20 MIN: CPT | Mod: S$GLB,,, | Performed by: INTERNAL MEDICINE

## 2020-07-13 PROCEDURE — 3078F DIAST BP <80 MM HG: CPT | Mod: CPTII,S$GLB,, | Performed by: INTERNAL MEDICINE

## 2020-07-13 PROCEDURE — 99202 PR OFFICE/OUTPT VISIT, NEW, LEVL II, 15-29 MIN: ICD-10-PCS | Mod: S$GLB,,, | Performed by: DERMATOLOGY

## 2020-07-13 PROCEDURE — 1101F PT FALLS ASSESS-DOCD LE1/YR: CPT | Mod: CPTII,S$GLB,, | Performed by: INTERNAL MEDICINE

## 2020-07-13 PROCEDURE — 1101F PR PT FALLS ASSESS DOC 0-1 FALLS W/OUT INJ PAST YR: ICD-10-PCS | Mod: CPTII,S$GLB,, | Performed by: DERMATOLOGY

## 2020-07-13 PROCEDURE — 3074F PR MOST RECENT SYSTOLIC BLOOD PRESSURE < 130 MM HG: ICD-10-PCS | Mod: CPTII,S$GLB,, | Performed by: DERMATOLOGY

## 2020-07-13 PROCEDURE — 3078F PR MOST RECENT DIASTOLIC BLOOD PRESSURE < 80 MM HG: ICD-10-PCS | Mod: CPTII,S$GLB,, | Performed by: DERMATOLOGY

## 2020-07-13 PROCEDURE — 99213 PR OFFICE/OUTPT VISIT, EST, LEVL III, 20-29 MIN: ICD-10-PCS | Mod: S$GLB,,, | Performed by: INTERNAL MEDICINE

## 2020-07-13 PROCEDURE — 3074F PR MOST RECENT SYSTOLIC BLOOD PRESSURE < 130 MM HG: ICD-10-PCS | Mod: CPTII,S$GLB,, | Performed by: INTERNAL MEDICINE

## 2020-07-13 PROCEDURE — 1126F AMNT PAIN NOTED NONE PRSNT: CPT | Mod: S$GLB,,, | Performed by: INTERNAL MEDICINE

## 2020-07-13 PROCEDURE — 99999 PR PBB SHADOW E&M-EST. PATIENT-LVL V: ICD-10-PCS | Mod: PBBFAC,,, | Performed by: DERMATOLOGY

## 2020-07-13 RX ORDER — TELMISARTAN 40 MG/1
40 TABLET ORAL DAILY
COMMUNITY
End: 2020-07-28 | Stop reason: SDUPTHER

## 2020-07-13 RX ORDER — DOXYCYCLINE 100 MG/1
TABLET ORAL
Qty: 30 TABLET | Refills: 1 | Status: SHIPPED | OUTPATIENT
Start: 2020-07-13 | End: 2020-07-31 | Stop reason: SDUPTHER

## 2020-07-13 RX ORDER — METRONIDAZOLE 7.5 MG/G
GEL TOPICAL 2 TIMES DAILY
Qty: 45 G | Refills: 1 | Status: SHIPPED | OUTPATIENT
Start: 2020-07-13 | End: 2021-05-26

## 2020-07-13 NOTE — PROGRESS NOTES
Subjective:       Patient ID:  Yara Whitehead is a 70 y.o. female who presents for   Chief Complaint   Patient presents with    Rash     71 yo F presents for initial visit for rash around her mouth and nose. It has been present x approx 3 weeks. The rash is itchy and senstive. Pt denies any change in soaps, meds, lotions, or detergents. Treated with cephALEXin 500 mg x pt unsure. Treated with bactrim currently. No improvement. She thinks it started after having to wear a mask    no Phx of NMSC.  no Fhx of melanoma.        Past Medical History:   Diagnosis Date    Allergy     Arrhythmia     GERD (gastroesophageal reflux disease)     Hypertension     Hypothyroidism     ALESSANDRA (iron deficiency anemia)     Insomnia     Nephrolithiasis     had [facility]    FABIANA on CPAP     PAF (paroxysmal atrial fibrillation)     Status post placement of implantable loop recorder 3/18/2016    Subaortic membrane     Subarachnoid hematoma 1/1/2019    Subdural hematoma     Vitamin B 12 deficiency     Vitamin D deficiency      Review of Systems   Constitutional: Negative for fever and chills.   HENT: Negative for rhinorrhea.    Respiratory: Negative for cough and shortness of breath.    Skin: Positive for itching, rash and dry skin.   Allergic/Immunologic: Positive for environmental allergies.        Objective:    Physical Exam   Constitutional: She appears well-developed and well-nourished. No distress.   Neurological: She is alert and oriented to person, place, and time. She is not disoriented.   Psychiatric: She has a normal mood and affect.   Skin:   Areas Examined (abnormalities noted in diagram):   Head / Face Inspection Performed  Neck Inspection Performed  RLE Inspected                   Diagram Legend     Erythematous scaling macule/papule c/w actinic keratosis       Vascular papule c/w angioma      Pigmented verrucoid papule/plaque c/w seborrheic keratosis      Yellow umbilicated papule c/w sebaceous  hyperplasia      Irregularly shaped tan macule c/w lentigo     1-2 mm smooth white papules consistent with Milia      Movable subcutaneous cyst with punctum c/w epidermal inclusion cyst      Subcutaneous movable cyst c/w pilar cyst      Firm pink to brown papule c/w dermatofibroma      Pedunculated fleshy papule(s) c/w skin tag(s)      Evenly pigmented macule c/w junctional nevus     Mildly variegated pigmented, slightly irregular-bordered macule c/w mildly atypical nevus      Flesh colored to evenly pigmented papule c/w intradermal nevus       Pink pearly papule/plaque c/w basal cell carcinoma      Erythematous hyperkeratotic cursted plaque c/w SCC      Surgical scar with no sign of skin cancer recurrence      Open and closed comedones      Inflammatory papules and pustules      Verrucoid papule consistent consistent with wart     Erythematous eczematous patches and plaques     Dystrophic onycholytic nail with subungual debris c/w onychomycosis     Umbilicated papule    Erythematous-base heme-crusted tan verrucoid plaque consistent with inflamed seborrheic keratosis     Erythematous Silvery Scaling Plaque c/w Psoriasis     See annotation      Assessment / Plan:        Inflamed seborrheic keratosis  These are benign inherited growths without a malignant potential. Reassurance given to patient.    Discussed cryo to this lesion    Periorificial dermatitis  -     doxycycline monohydrate 100 mg Tab; Take 1 po qday  Dispense: 30 tablet; Refill: 1  -     metroNIDAZOLE (METROGEL) 0.75 % gel; Apply topically 2 (two) times daily.  Dispense: 45 g; Refill: 1  Discussed benefits and risks of doxycyline therapy including but not limited to GI discomfort, esophageal irritation/ulceration, and increased sun sensitivity. Patient was counseled to take medicine with meals and at least 1 hour before lying down.              Follow up in about 2 months (around 9/13/2020).

## 2020-07-13 NOTE — LETTER
July 13, 2020      Ann Richards DO  [address] [address]  [address]  [facility] [address]           [place] [facility] [address] [place] [address]  Phone: [phone]  Fax: [phone]          Patient: Yara Whitehead   MR Number: [id number]   YOB: 1950   Date of Visit: 7/13/2020       Dear Dr. Ann Richards:    Thank you for referring Yara Whitehead to me for evaluation. Attached you will find relevant portions of my assessment and plan of care.    If you have questions, please do not hesitate to call me. I look forward to following Yara Whitehead along with you.    Sincerely,    Orly Mustafa MD    Enclosure  CC:  No Recipients    If you would like to receive this communication electronically, please contact [email or web address] or [phone] to request more information on [facility] access.    For providers and/or their staff who would like to refer a patient to [facility], please contact us through our one-stop-shop provider referral line, [facility], at [phone].    If you feel you have received this communication in error or would no longer like to receive these types of communications, please e-mail [email or web address]

## 2020-07-16 ENCOUNTER — PROCEDURE VISIT (OUTPATIENT)
Dept: UROGYNECOLOGY | Facility: CLINIC | Age: 70
End: 2020-07-16
Payer: MEDICARE

## 2020-07-16 VITALS
SYSTOLIC BLOOD PRESSURE: 110 MMHG | HEIGHT: 65 IN | BODY MASS INDEX: 25.64 KG/M2 | WEIGHT: 153.88 LBS | DIASTOLIC BLOOD PRESSURE: 60 MMHG

## 2020-07-16 DIAGNOSIS — T83.721D EXPOSURE OF IMPLANTED VAGINAL MESH AND PROSTHETIC MATERIAL IN VAGINA, SUBSEQUENT ENCOUNTER: Primary | ICD-10-CM

## 2020-07-16 DIAGNOSIS — T83.729D EXPOSURE OF IMPLANTED VAGINAL MESH AND PROSTHETIC MATERIAL IN VAGINA, SUBSEQUENT ENCOUNTER: Primary | ICD-10-CM

## 2020-07-16 LAB
BILIRUB SERPL-MCNC: NORMAL MG/DL
BLOOD URINE, POC: NORMAL
CLARITY, POC UA: CLEAR
COLOR, POC UA: NORMAL
GLUCOSE UR QL STRIP: NORMAL
KETONES UR QL STRIP: NORMAL
LEUKOCYTE ESTERASE URINE, POC: NORMAL
NITRITE, POC UA: NORMAL
PH, POC UA: 5
PROTEIN, POC: NORMAL
SPECIFIC GRAVITY, POC UA: 1.02
UROBILINOGEN, POC UA: NORMAL

## 2020-07-16 PROCEDURE — 81002 POCT URINE DIPSTICK WITHOUT MICROSCOPE: ICD-10-PCS | Mod: S$GLB,,, | Performed by: OBSTETRICS & GYNECOLOGY

## 2020-07-16 PROCEDURE — 99499 RISK ADDL DX/OHS AUDIT: ICD-10-PCS | Mod: S$GLB,,, | Performed by: OBSTETRICS & GYNECOLOGY

## 2020-07-16 PROCEDURE — 51797 PR VOIDING PRESS STUDY INTRA-ABDOMINAL VOID: ICD-10-PCS | Mod: S$GLB,,, | Performed by: OBSTETRICS & GYNECOLOGY

## 2020-07-16 PROCEDURE — 51741 ELECTRO-UROFLOWMETRY FIRST: CPT | Mod: 51,S$GLB,, | Performed by: OBSTETRICS & GYNECOLOGY

## 2020-07-16 PROCEDURE — 51728 CYSTOMETROGRAM W/VP: CPT | Mod: S$GLB,,, | Performed by: OBSTETRICS & GYNECOLOGY

## 2020-07-16 PROCEDURE — 51728 PR COMPLEX CYSTOMETROGRAM VOIDING PRESSURE STUDIES: ICD-10-PCS | Mod: S$GLB,,, | Performed by: OBSTETRICS & GYNECOLOGY

## 2020-07-16 PROCEDURE — 99499 UNLISTED E&M SERVICE: CPT | Mod: S$GLB,,, | Performed by: OBSTETRICS & GYNECOLOGY

## 2020-07-16 PROCEDURE — 51784 PR ANAL/URINARY MUSCLE STUDY: ICD-10-PCS | Mod: 51,S$GLB,, | Performed by: OBSTETRICS & GYNECOLOGY

## 2020-07-16 PROCEDURE — 51797 INTRAABDOMINAL PRESSURE TEST: CPT | Mod: S$GLB,,, | Performed by: OBSTETRICS & GYNECOLOGY

## 2020-07-16 PROCEDURE — 51741 PR UROFLOWMETRY, COMPLEX: ICD-10-PCS | Mod: 51,S$GLB,, | Performed by: OBSTETRICS & GYNECOLOGY

## 2020-07-16 PROCEDURE — 52000 PR CYSTOURETHROSCOPY: ICD-10-PCS | Mod: 59,S$GLB,, | Performed by: OBSTETRICS & GYNECOLOGY

## 2020-07-16 PROCEDURE — 52000 CYSTOURETHROSCOPY: CPT | Mod: 59,S$GLB,, | Performed by: OBSTETRICS & GYNECOLOGY

## 2020-07-16 PROCEDURE — 81002 URINALYSIS NONAUTO W/O SCOPE: CPT | Mod: S$GLB,,, | Performed by: OBSTETRICS & GYNECOLOGY

## 2020-07-16 PROCEDURE — 51784 ANAL/URINARY MUSCLE STUDY: CPT | Mod: 51,S$GLB,, | Performed by: OBSTETRICS & GYNECOLOGY

## 2020-07-16 RX ORDER — LIDOCAINE HYDROCHLORIDE 20 MG/ML
JELLY TOPICAL
Status: COMPLETED | OUTPATIENT
Start: 2020-07-16 | End: 2020-07-16

## 2020-07-16 RX ORDER — CIPROFLOXACIN 500 MG/1
500 TABLET ORAL
Status: COMPLETED | OUTPATIENT
Start: 2020-07-16 | End: 2020-07-16

## 2020-07-16 RX ORDER — CIPROFLOXACIN 500 MG/1
500 TABLET ORAL
Status: CANCELLED | OUTPATIENT
Start: 2020-07-16 | End: 2020-07-16

## 2020-07-16 RX ADMIN — CIPROFLOXACIN 500 MG: 500 TABLET ORAL at 12:07

## 2020-07-16 RX ADMIN — LIDOCAINE HYDROCHLORIDE 5 ML: 20 JELLY TOPICAL at 12:07

## 2020-07-16 NOTE — PROCEDURES
TITLE OF OPERATION:  1. Complex cystometry.  2. Complex uroflowmetry.  3. Electromyography with surface electrodes.  4. Pressure voiding flow study.  5. Abdominal pressure measurement.  6. Leak point pressure measurement.    INDICATIONS:  1)  UI:  (+) MICHAELA < (+) UUI  X 3years. Does not feel urge until too late.  (+) pads:4/day, usually minimum-moderate wetness and 2/night usually minimum wetness.  Daytime frequency: Q 2 hours.  Nocturia: Yes: 2/night.   (--) dysuria,  (--) hematuria,  (--) frequent UTIs. (+) complete bladder emptying. Used to have retention prior to surgery. Does not feel like she has an issue with emptying  --oxybutynin 10 xl, vesicare 5 mg: for overactive bladder/UUI; is she still taking? Stopped taking oxybutynin x 2 weeks per psychiatrist saying it can affect the brain. are they helping? Solifenacin also stopped prior to oxybutynin. Both medications helped for a little bit.     2)  POP:  Absent. Symptoms.  (+) vaginal bleeding. (--) vaginal discharge. (--) sexually active.  (--) dyspareunia.  (--)  Vaginal dryness.  (+) vaginal estrogen use. Premarin  And one other post surgery     3)  BM:  (+) constipation/straining for a couple weeks.  (--) chronic diarrhea. (--) hematochezia.  (--) fecal incontinence.  (--) fecal smearing/urgency.  (--) complete evacuation.       4)  Vaginal mesh exposure:  --GYN exam 5/2020:  PELVIC: Normal external female genitalia without lesions. Normal hair distribution. Adequate perineal body, normal urethral meatus. Urethra with no masses.  Bladder nontender. Vagina  Golf ball size apical exposure.   --surgery + date: POP, xlap/Pfannenstiel, and vaginal entry? Around 2008.  [facility],[phone]. Mesh first notice around 1 year ago when dark tinged fluid was noticed on her pad.    Dr. Clemencia Marvin MD  [facility]  [address], [place], [address] [phone]  --symptoms: itching, typically painless. Painful when  touched. Vaginal discharge - dark tinged. One episode of bright red bleeding around 2 months ago, lasting 10 hrs.    --initial UG pelvic:  PELVIC:    External genitalia:  Normal Bartholins, Skenes and labia bilaterally.    Urethra:  No caruncle, diverticulum or masses.  (+) hypermobility.    Vagina:  Atrophy (+) , no bladder masses or tender, +discharge, yellow-brown. 2-3 cm area of mesh exposure (blue mesh) at vaginal cuff.   Cervix:  absent  Uterus: uterus absent  Adnexa: Not palpable.    PREOPERATIVE DIAGNOSIS:  1. Urinary incontinence, mixed    2. Exposure of implanted vaginal mesh and prosthetic material in vagina, subsequent encounter    3. Colon cancer screening    4. Chronic constipation    5. Vaginal atrophy        POSTOPERATIVE DIAGNOSIS:  1. Urinary incontinence, mixed    2. Exposure of implanted vaginal mesh and prosthetic material in vagina, subsequent encounter    3. Colon cancer screening    4. Chronic constipation    5. Vaginal atrophy    6.    Concern for voiding dysfuntion      ANESTHESIA:  None.    SPECIMEN (BACTERIOLOGICAL, PATHOLOGICAL OR OTHER):  None.    PROSTHETIC DEVICE/IMPLANT:  None.    SURGEONS NARRATIVE:  A time out was performed in which the patient identity and procedure were confirmed.  Urodynamic evaluation was performed using a computerized system ([employer]  Uroflowmetry was performed on the patient in the sitting position without catheters in place.  Subsequent urodynamic testing was performed with the patient in the lithotomy position at 45 degrees. Air charged catheters were used with sterile water as the infusion medium. Vesical and abdominal (rectal) pressures were measured, and detrusor pressure was calculated. EMG activity was recorded with surface electrodes. During filling, room temperature sterile water was infused at a rate of 30 cubic centimeters per minute. The patient was asked cough after instillation of each 100cc volume. Two Valsalva leak point  pressures and two cough leak point pressures were performed with the catheters in place at 300 cubic centimeters and again at maximum capacity. Valsalva leak point pressure was defined as the difference between vesical pressure at which leakage was noted (visualized at the external urethral meatus) and the baseline vesical pressure. Following urodynamic testing, a pressure flow study was performed with the patient in the sitting position. Vesical and abdominal pressures were monitored and detrusor pressures were calculated. After the pressure flow study, the catheters were then removed. The patient tolerated the procedure well.     Urine dipstick: neg.    1.  VOIDING PHASE:      a.  Uroflowmetry:   Prolapse reduction: No   Voided volume:  79 mL    PVR:   25 mL    The overall configuration of this uroflow study was with insufficient volume for interpretation.      b.  Pressure flow:   Prolapse reduction: No   Voided volume:   726 mL   Voiding time:   94 seconds   Peak flow:  19 mL/s    Avg flow:  9 mL/s   Max det pressure:  Negative recording, as pves was dislodged during study.   Det pressure at max flow:  Negative recording, as pves was dislodged during study.   Void initiated by unknown mechanism, as  as pves was dislodged during study.   Urethral relaxation (EMG):  present.     PVR (calculated):  0 mL    The overall configuration of this pressure flow study was prolonged with unknown mechanism of void as pves catheter was dislodged during study.      2.  FILLING PHASE:   1st desire: 185 mL   Normal desire:  478 mL   Strong desire:  563 mL   Urgency:  693 mL   Compliance (calculated)  approx 300 mL/cm H20   EMG activity during filling:   stable   Detrusor contractions observed: No.     3.  URETHRAL FUNCTION/STORAGE PHASE:    a.  WITH prolapse reduction:   CLPP (150 mL): Negative  at  120 cm H20   VLPP (150 mL): Negative  at  105 cm H20    CLPP (300 mL): Negative  at  105 cm H20   VLPP (300  mL): Negative  at  173 cm H20    CLPP (450 mL): Negative  at  945747 cm H20   VLPP (450 mL): Negative  at  157 cm H20    CLPP (600 mL): Negative  at  124 cm H20   VLPP (600 mL): Negative  at  147 cm H20    CLPP (MAX ):    Negative  at  94 cm H20   VLPP (MAX):     Negative  at  96 cm H20   +CLPP and +VLPP at max cap once pves catheters removed.     These findings are consistent with Positive urodynamic stress incontinence.    Abd: patient s/p abd plasty with umbilicus recreated lower than natural umbilicus.  Patient states had small piece of mesh to repair umbilical hernia before umbilicus recreated.     Assessment:  UF with insufficient volume for interpretation.  PF prolonged with unknown mechanism of void as pves catheter was dislodged during study.   Compliance normal.  Max capacity 726 mL.  DO (--).  MICHAELA (+).      Plan:  1) Vaginal mesh exposure:  --need op notes from surgery 2008  Dr. Clemencia Marvin MD  [facility]  [address], [place], [address] [phone]     --to help optimize tissue and reduce bacteria:               BEFORE BED:              --continue 1 g estradiol cream every other night              --stop premarin--you can finish current tube instead of estradiol; then restart estradiol              --start 1 applicator full of metrogel (antibiotic gel) every other night (on night's not using estradiol)     --cystoscopy/urodynamics: normal cysto; +MICHAELA on UDS  --will need to plan surgical excision and revision of any recurrent prolapse: can we do this at same time or do we need to stage?   --can we try to resect mesh transvaginally 1st?  --taking xarelto--will need to bridge off (talk with Katib--did not want off >2 days)     2)  Mixed urinary incontinence, urge > stress:    --urine C&S  --Empty bladder every 3 hours.  Empty well: wait a minute, lean forward on toilet.    --Avoid dietary irritants (see sheet).  Keep diary x 3-5 days to determine your  irritants.  --KEGELS: do 10 in AM and 10 in PM, holding each x 10 seconds.  When you feel urge to go, STOP, KEGEL, and when urge has passed, then go to bathroom.  Consider PT in future.    --URGE: start mirabegron 50 mg.  Let us know if too expensive.  Trying to avoid anticholinergics due to recent depression during COVID. Takes 2-4 weeks to see if will have effect.  For dry mouth: get sour, sugar free lozenge or gum.    --STRESS:  +MICHAELA on UDS; get op notes; may wait to do anything re: MICHAELA until mesh exposure repaired     3) Constipation:  --hydrate well:  Drink 3 extra glasses or bottles of water/day  Controlling constipation may help bladder urgency/leakage and fiber may better control cholesterol and blood glucose.  Start daily fiber.  Take 1 tsp of fiber powder (psyllium or other sugar-free powder).  Mix in 8 oz of water.  Take x 3-5 days.  Then, increase fiber by 1 tsp every 3-5 days until stool is easy to pass.  Stop and continue at that dose.   Do not exceed 6 tsps/day.  May also use over the counter stool softener 1-2 x/day.  AVOID laxatives.     4)  Well-woman:  --needs colonoscopy: scheduled 8/2020    ----------------------------------------------------    Title of Operation:   Cystourethroscopy.     INDICATIONS:  As above    PREOPERATIVE DIAGNOSIS  As above    POSTOPERATIVE DIAGNOSIS:   As above    Anesthesia:   2% Xylocaine gel.    Specimen (Bacteriological, Pathological or other):   None.     Prosthetic Device/Implant:   None.     Surgeons Narrative:     After informed consent was obtained, the patient was placed in the lithotomy position. The urethral meatus was prepped with Betadine and 10 cubic centimeters of 2% Xylocaine gel were introduced into the urethra. A flexible cystourethroscope was introduced into the bladder. The bladder was distended with approximately 300 cubic centimeters of sterile water. A systematic survey was performed in which the bladder was surveyed using multiple sequential  passes in a clockwise fashion from the bladder dome to the bladder base to the urethrovesical junction. The trigone and ureteral orifices were observed. The scope was then flipped back on itself, and the urethrovesical junction was viewed. A vaginal examining finger was then placed with pressure suburethrally at the urethrovesical junction as the telescope was withdrawn in order to perform positive pressure urethroscopy.  Standard maneuvers of cough, squeeze and Valsalva were performed. The telescope was then completely withdrawn.     Findings: Urethroscopy:  Normal.  Cystoscopy:  Normal bladder mucosa, bilateral ureteral flow was noted.     Assessment: Essentially normal cystourethroscopy.     Plan: The patient will follow up with Dr. Palencia as scheduled.  See urodynamics note from 7/16/2020 for further plan details.

## 2020-07-17 ENCOUNTER — TELEPHONE (OUTPATIENT)
Dept: PSYCHIATRY | Facility: CLINIC | Age: 70
End: 2020-07-17

## 2020-07-17 ENCOUNTER — OFFICE VISIT (OUTPATIENT)
Dept: PSYCHIATRY | Facility: CLINIC | Age: 70
End: 2020-07-17
Payer: COMMERCIAL

## 2020-07-17 DIAGNOSIS — F43.23 ADJUSTMENT DISORDER WITH MIXED ANXIETY AND DEPRESSED MOOD: ICD-10-CM

## 2020-07-17 PROCEDURE — 90791 PR PSYCHIATRIC DIAGNOSTIC EVALUATION: ICD-10-PCS | Mod: 95,,, | Performed by: PSYCHOLOGIST

## 2020-07-17 PROCEDURE — 90791 PSYCH DIAGNOSTIC EVALUATION: CPT | Mod: 95,,, | Performed by: PSYCHOLOGIST
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
## 2020-07-17 NOTE — TELEPHONE ENCOUNTER
----- Message from Cary Celeste PsyD sent at 7/17/2020  3:18 PM CDT -----  Regarding: RE:  I apologize. This is a typo. Please schedule her for 8/18 at 10am. Thanks!  ----- Message -----  From: Jordyn Ram MA  Sent: 7/17/2020   2:15 PM CDT  To: Cary Celeste PsyD    Please look at your schedule for 08/10 it states that you are not in office.     Kristina   ----- Message -----  From: Cary Celeste PsyD  Sent: 7/17/2020   1:39 PM CDT  To: [facility] Staff    Please schedule patient for 8/10 at 10am, virtually.  Thank you!

## 2020-07-20 ENCOUNTER — TELEPHONE (OUTPATIENT)
Dept: UROGYNECOLOGY | Facility: CLINIC | Age: 70
End: 2020-07-20

## 2020-07-20 NOTE — TELEPHONE ENCOUNTER
----- Message from Tracy Palencia MD sent at 7/18/2020 11:52 PM CDT -----  Regarding: Can we please call and see if we can get op note?  CAn we call and see if we can get op note for patient from around 2008?  At the very least, can they tell us over the phone, basically, what she had done? Thanks!    2008  Dr. Clemencia Marvin MD  [facility]  [address], [place], [address] [phone]

## 2020-07-27 ENCOUNTER — TELEPHONE (OUTPATIENT)
Dept: UROGYNECOLOGY | Facility: CLINIC | Age: 70
End: 2020-07-27

## 2020-07-27 NOTE — TELEPHONE ENCOUNTER
----- Message from Bam Ibarra sent at 7/24/2020 11:36 AM CDT -----  Regarding: Pt Advice  Contact: DEYANIRA CAO [[id number]]  Type:  Patient Returning Call    Who Called: DEYANIRA CAO [[id number]]    Who Left Message for Patient:     Does the patient know what this is regarding?: yes    Would the patient rather a call back or a response via [facility]? call    Best Call Back Number: Click to [phone]    Additional Information: States she cannot tell you what procedure she had and will look for the information

## 2020-07-28 ENCOUNTER — PATIENT MESSAGE (OUTPATIENT)
Dept: FAMILY MEDICINE | Facility: CLINIC | Age: 70
End: 2020-07-28

## 2020-07-28 DIAGNOSIS — N39.46 URINARY INCONTINENCE, MIXED: ICD-10-CM

## 2020-07-28 DIAGNOSIS — E78.5 HYPERLIPIDEMIA, UNSPECIFIED HYPERLIPIDEMIA TYPE: ICD-10-CM

## 2020-07-28 DIAGNOSIS — E53.8 VITAMIN B 12 DEFICIENCY: ICD-10-CM

## 2020-07-28 DIAGNOSIS — F33.0 MAJOR DEPRESSIVE DISORDER, RECURRENT, MILD: ICD-10-CM

## 2020-07-28 RX ORDER — METOPROLOL TARTRATE 25 MG/1
25 TABLET, FILM COATED ORAL 3 TIMES DAILY
Qty: 90 TABLET | Refills: 11 | Status: SHIPPED | OUTPATIENT
Start: 2020-07-28 | End: 2021-05-26

## 2020-07-28 RX ORDER — RIVAROXABAN 20 MG/1
1 TABLET, FILM COATED ORAL NIGHTLY
Qty: 90 TABLET | Refills: 4 | Status: SHIPPED | OUTPATIENT
Start: 2020-07-28 | End: 2020-09-21 | Stop reason: SDUPTHER

## 2020-07-28 RX ORDER — PAROXETINE HYDROCHLORIDE 40 MG/1
TABLET, FILM COATED ORAL
Qty: 90 TABLET | Refills: 2 | Status: SHIPPED | OUTPATIENT
Start: 2020-07-28 | End: 2021-06-21

## 2020-07-28 RX ORDER — ALBUTEROL SULFATE 90 UG/1
1-2 AEROSOL, METERED RESPIRATORY (INHALATION) EVERY 6 HOURS PRN
Qty: 18 G | Refills: 6 | OUTPATIENT
Start: 2020-07-28 | End: 2020-07-29 | Stop reason: SDUPTHER

## 2020-07-28 RX ORDER — LEVOTHYROXINE SODIUM 88 UG/1
88 TABLET ORAL
Qty: 90 TABLET | Refills: 3 | Status: SHIPPED | OUTPATIENT
Start: 2020-07-28 | End: 2021-06-21

## 2020-07-28 RX ORDER — BUPROPION HYDROCHLORIDE 150 MG/1
150 TABLET ORAL DAILY
Qty: 90 TABLET | Refills: 3 | Status: SHIPPED | OUTPATIENT
Start: 2020-07-28 | End: 2021-06-21

## 2020-07-28 RX ORDER — TELMISARTAN 40 MG/1
40 TABLET ORAL DAILY
Qty: 90 TABLET | Refills: 2 | Status: SHIPPED | OUTPATIENT
Start: 2020-07-28 | End: 2021-04-18

## 2020-07-28 RX ORDER — ATORVASTATIN CALCIUM 10 MG/1
TABLET, FILM COATED ORAL
Qty: 90 TABLET | Refills: 4 | Status: SHIPPED | OUTPATIENT
Start: 2020-07-28 | End: 2021-07-03

## 2020-07-28 NOTE — TELEPHONE ENCOUNTER
Patient would like to refill her medications through [facility] due to low cost for medications. Please advise and review. Thank you in advance.

## 2020-07-30 ENCOUNTER — PATIENT OUTREACH (OUTPATIENT)
Dept: ADMINISTRATIVE | Facility: OTHER | Age: 70
End: 2020-07-30

## 2020-07-30 ENCOUNTER — PATIENT MESSAGE (OUTPATIENT)
Dept: FAMILY MEDICINE | Facility: CLINIC | Age: 70
End: 2020-07-30

## 2020-07-30 RX ORDER — ALBUTEROL SULFATE 90 UG/1
1-2 AEROSOL, METERED RESPIRATORY (INHALATION) EVERY 6 HOURS PRN
Qty: 18 G | Refills: 6 | Status: SHIPPED | OUTPATIENT
Start: 2020-07-30

## 2020-07-30 NOTE — PROGRESS NOTES
Requested updates within Care Everywhere.  Patient's chart was reviewed for overdue SHADI topics.  Immunizations reconciled.    Patient has ope case request to GI.

## 2020-07-31 ENCOUNTER — OFFICE VISIT (OUTPATIENT)
Dept: DERMATOLOGY | Facility: CLINIC | Age: 70
End: 2020-07-31
Payer: MEDICARE

## 2020-07-31 DIAGNOSIS — L71.0 PERIORIFICIAL DERMATITIS: ICD-10-CM

## 2020-07-31 DIAGNOSIS — L65.0 TELOGEN EFFLUVIUM: Primary | ICD-10-CM

## 2020-07-31 PROCEDURE — 99213 PR OFFICE/OUTPT VISIT, EST, LEVL III, 20-29 MIN: ICD-10-PCS | Mod: 95,,, | Performed by: DERMATOLOGY

## 2020-07-31 PROCEDURE — 1101F PT FALLS ASSESS-DOCD LE1/YR: CPT | Mod: CPTII,95,, | Performed by: DERMATOLOGY

## 2020-07-31 PROCEDURE — 1159F PR MEDICATION LIST DOCUMENTED IN MEDICAL RECORD: ICD-10-PCS | Mod: 95,,, | Performed by: DERMATOLOGY

## 2020-07-31 PROCEDURE — 1101F PR PT FALLS ASSESS DOC 0-1 FALLS W/OUT INJ PAST YR: ICD-10-PCS | Mod: CPTII,95,, | Performed by: DERMATOLOGY

## 2020-07-31 PROCEDURE — 1159F MED LIST DOCD IN RCRD: CPT | Mod: 95,,, | Performed by: DERMATOLOGY

## 2020-07-31 PROCEDURE — 99213 OFFICE O/P EST LOW 20 MIN: CPT | Mod: 95,,, | Performed by: DERMATOLOGY

## 2020-07-31 RX ORDER — DOXYCYCLINE 100 MG/1
100 TABLET ORAL 2 TIMES DAILY
Qty: 60 TABLET | Refills: 1 | Status: CANCELLED | OUTPATIENT
Start: 2020-07-31 | End: 2020-08-30

## 2020-07-31 RX ORDER — DOXYCYCLINE 100 MG/1
100 TABLET ORAL 2 TIMES DAILY
Qty: 60 TABLET | Refills: 1 | Status: SHIPPED | OUTPATIENT
Start: 2020-07-31 | End: 2020-08-30

## 2020-07-31 NOTE — PROGRESS NOTES
The patient location is: work/volunteering  The chief complaint leading to consultation is: rash    Visit type: audiovisual    Face to Face time with patient: 10 min  15 minutes of total time spent on the encounter, which includes face to face time and non-face to face time preparing to see the patient (eg, review of tests), Obtaining and/or reviewing separately obtained history, Documenting clinical information in the electronic or other health record, Independently interpreting results (not separately reported) and communicating results to the patient/family/caregiver, or Care coordination (not separately reported).         Each patient to whom he or she provides medical services by telemedicine is:  (1) informed of the relationship between the physician and patient and the respective role of any other health care provider with respect to management of the patient; and (2) notified that he or she may decline to receive medical services by telemedicine and may withdraw from such care at any time.    Notes:     Subjective:       Patient ID:  Yara Whitehead is a 70 y.o. female who presents for   Chief Complaint   Patient presents with    Rash     69 yo F presents for follow up visit for rash around her mouth and nose. It has been present x approx 6-7 weeks. The rash is itchy and senstive. Pt denies any change in soaps, meds, lotions, or detergents.     She thinks it started after having to wear a mask  Prior Tx:   Keflex  Bactrim  Doxy daily-->bid since 7/25/20 and finally thinks this is helping  Using Metrogel bid    H/o cold sores? no    She also has noticed generalized hair thinning since the quarantine.  She has been stressed with COVID stress    no Phx of NMSC.  no Fhx of melanoma.        Past Medical History:   Diagnosis Date    Allergy     Arrhythmia     GERD (gastroesophageal reflux disease)     Hypertension     Hypothyroidism     ALESSANDRA (iron deficiency anemia)     Insomnia     Nephrolithiasis      [facility]    FABIANA on CPAP     PAF (paroxysmal atrial fibrillation)     Status post placement of implantable loop recorder 3/18/2016    Subaortic membrane     Subarachnoid hematoma 1/1/2019    Subdural hematoma     Vitamin B 12 deficiency     Vitamin D deficiency      Review of Systems   Skin: Positive for itching, rash and dry skin.   Allergic/Immunologic: Positive for environmental allergies.        Objective:    Physical Exam   Constitutional: She appears well-developed and well-nourished. No distress.   Neurological: She is alert and oriented to person, place, and time. She is not disoriented.   Psychiatric: She has a normal mood and affect.   Skin:   Areas Examined (abnormalities noted in diagram):   Head / Face Inspection Performed  Neck Inspection Performed  RLE Inspected                                      Diagram Legend     Erythematous scaling macule/papule c/w actinic keratosis       Vascular papule c/w angioma      Pigmented verrucoid papule/plaque c/w seborrheic keratosis      Yellow umbilicated papule c/w sebaceous hyperplasia      Irregularly shaped tan macule c/w lentigo     1-2 mm smooth white papules consistent with Milia      Movable subcutaneous cyst with punctum c/w epidermal inclusion cyst      Subcutaneous movable cyst c/w pilar cyst      Firm pink to brown papule c/w dermatofibroma      Pedunculated fleshy papule(s) c/w skin tag(s)      Evenly pigmented macule c/w junctional nevus     Mildly variegated pigmented, slightly irregular-bordered macule c/w mildly atypical nevus      Flesh colored to evenly pigmented papule c/w intradermal nevus       Pink pearly papule/plaque c/w basal cell carcinoma      Erythematous hyperkeratotic cursted plaque c/w SCC      Surgical scar with no sign of skin cancer recurrence      Open and closed comedones      Inflammatory papules and pustules      Verrucoid papule consistent consistent with wart     Erythematous eczematous patches and  plaques     Dystrophic onycholytic nail with subungual debris c/w onychomycosis     Umbilicated papule    Erythematous-base heme-crusted tan verrucoid plaque consistent with inflamed seborrheic keratosis     Erythematous Silvery Scaling Plaque c/w Psoriasis     See annotation      Assessment / Plan:      Periorificial dermatitis  Finally improving with doxycycline 100 mg bid (will try not to extend this beyond 2 months total)  Sent new Rx    Continue Metrogel bid    Discussed benefits and risks of doxycyline therapy including but not limited to GI discomfort, esophageal irritation/ulceration, and increased sun sensitivity. Patient was counseled to take medicine with meals and at least 1 hour before lying down.     Telogen Effluvium  Reassurance that this is likely due to stress and should run its course in 1-2 months         Follow up in about 4 weeks (around 8/28/2020) for Pt will send msg via [facility].

## 2020-08-02 ENCOUNTER — LAB VISIT (OUTPATIENT)
Dept: FAMILY MEDICINE | Facility: CLINIC | Age: 70
End: 2020-08-02
Payer: MEDICARE

## 2020-08-02 DIAGNOSIS — Z01.812 PRE-PROCEDURE LAB EXAM: ICD-10-CM

## 2020-08-02 PROCEDURE — U0003 INFECTIOUS AGENT DETECTION BY NUCLEIC ACID (DNA OR RNA); SEVERE ACUTE RESPIRATORY SYNDROME CORONAVIRUS 2 (SARS-COV-2) (CORONAVIRUS DISEASE [COVID-19]), AMPLIFIED PROBE TECHNIQUE, MAKING USE OF HIGH THROUGHPUT TECHNOLOGIES AS DESCRIBED BY CMS-2020-01-R: HCPCS

## 2020-08-03 LAB — SARS-COV-2 RNA RESP QL NAA+PROBE: NOT DETECTED

## 2020-08-04 ENCOUNTER — TELEPHONE (OUTPATIENT)
Dept: GASTROENTEROLOGY | Facility: CLINIC | Age: 70
End: 2020-08-04

## 2020-08-04 NOTE — TELEPHONE ENCOUNTER
----- Message from Dori Hwang sent at 8/4/2020 10:07 AM CDT -----  Regarding: Cancel Procedure  Contact: [phone]  Type: Needs Medical Advice  Who Called:  Patient  Best Call Back Number: [phone]  Additional Information: patient would like to cancel procedure and r/s please and thank you.

## 2020-08-17 NOTE — PROGRESS NOTES
Primary Care Behavioral Health: Follow Up/Virtual visit   Patient Name:  Yara Whitehead   Date:  08/18/20  Site:  [facility]  Referral source: Ann Richards DO    The patient location is: Home  The patient phone number is: [phone]  Visit type: Virtual visit with synchronous audio and video  Each patient to whom he or she provides medical services by telemedicine is:  (1) informed of the relationship between the physician and patient and the respective role of any other health care provider with respect to management of the patient; and (2) notified that he or she may decline to receive medical services by telemedicine and may withdraw from such care at any time.     Chief complaint/reason for encounter:  Memory problem     History of present illness:  Ms. Yara Whitehead  is a 70 y.o. White  female referred by for Ann Richards DO symptoms of  Memory problem . Patient was seen, examined and chart was reviewed.  Patient presents today noting she has experienced notable improvement in symptoms of anxiety and depression.  Patient notes she has been working 6x/month at the [employer].  Patient notes in the evening hours she has been experiencing some difficulty with restlessness however has been experiencing improvement in sleep.  Patient notes she recently attended a firearms safety course and really enjoyed the learning aspect of this.  Patient endorses symptoms of attention/concentration difficulties as well as word finding difficulties at times however notes this does appear to have improved.    Past Medical History:   Diagnosis Date    Allergy     Arrhythmia     GERD (gastroesophageal reflux disease)     Hypertension     Hypothyroidism     ALESSANDRA (iron deficiency anemia)     Insomnia     Nephrolithiasis     had [facility]    FABIANA on CPAP     PAF (paroxysmal atrial fibrillation)     Status post placement of implantable loop recorder 3/18/2016    Subaortic membrane      Subarachnoid hematoma 1/1/2019    Subdural hematoma     Vitamin B 12 deficiency     Vitamin D deficiency          Current Outpatient Medications:     albuterol (PROVENTIL/VENTOLIN HFA) 90 mcg/actuation inhaler, Inhale 1-2 puffs into the lungs every 6 (six) hours as needed for Wheezing., Disp: 18 g, Rfl: 6    atorvastatin (LIPITOR) 10 MG tablet, TAKE 1 TABLET(10 MG) BY MOUTH EVERY DAY, Disp: 90 tablet, Rfl: 4    blood sugar diagnostic Strp, To check BG 1 times daily, to use with insurance preferred meter, Disp: 100 strip, Rfl: 3    blood sugar diagnostic Strp, qac and PRN lows. Freestyle vicky test strips, Disp: 100 strip, Rfl: prn    blood-glucose meter (BLOOD GLUCOSE MONITORING) kit, Use as instructed.  Freestyle vicky glucometer, Disp: 1 each, Rfl: 0    blood-glucose meter,continuous (DEXCOM G6 ) Misc, To use for hypoglycemic episodes, Disp: 1 each, Rfl: 0    blood-glucose sensor (DEXCOM G6 SENSOR) Rebeca, To use for hypoglycemia, Disp: 12 each, Rfl: 3    blood-glucose transmitter (DEXCOM G6 TRANSMITTER) Rebeca, To use for hypoglycemia, Disp: 1 Device, Rfl: 0    buPROPion (WELLBUTRIN XL) 150 MG TB24 tablet, Take 1 tablet (150 mg total) by mouth once daily., Disp: 90 tablet, Rfl: 3    cetirizine (ZYRTEC) 10 MG tablet, Take 10 mg by mouth once daily., Disp: , Rfl:     cyanocobalamin 1,000 mcg/mL injection, INJECT 1 ML INTO THE MUSCLE EVERY 14 DAYS, Disp: 6 mL, Rfl: 3    doxycycline monohydrate 100 mg Tab, Take 1 tablet (100 mg total) by mouth 2 (two) times a day., Disp: 60 tablet, Rfl: 1    estradioL (ESTRACE) 0.01 % (0.1 mg/gram) vaginal cream, Place 1 g vaginally every other day., Disp: 42.5 g, Rfl: 11    fluticasone (FLONASE) 50 mcg/actuation nasal spray, 2 sprays (100 mcg total) by Each Nare route once daily., Disp: 1 Bottle, Rfl: 6    FREESTYLE VICKY 14 DAY SENSOR Kit, USE AS DIRECTED, Disp: 7 kit, Rfl: 3    glucose 4 GM chewable tablet, Take 4 tablets when glucose from 51-70 Take 6  tablets when glucose is less than 50, Disp: 120 tablet, Rfl: 12    lancets Misc, To check BG 1 times daily, to use with insurance preferred meter, Disp: 100 each, Rfl: 3    levothyroxine (SYNTHROID) 88 MCG tablet, Take 1 tablet (88 mcg total) by mouth before breakfast., Disp: 90 tablet, Rfl: 3    metoprolol tartrate (LOPRESSOR) 25 MG tablet, Take 1 tablet (25 mg total) by mouth 3 (three) times daily. As needed, Disp: 90 tablet, Rfl: 11    metroNIDAZOLE (METROGEL) 0.75 % gel, Apply topically 2 (two) times daily., Disp: 45 g, Rfl: 1    metroNIDAZOLE (METROGEL) 0.75 % vaginal gel, Place 1 applicator vaginally every other day., Disp: 70 g, Rfl: 11    mirabegron (MYRBETRIQ) 50 mg Tb24, Take 1 tablet (50 mg total) by mouth once daily. (Patient not taking: Reported on 7/13/2020), Disp: 30 tablet, Rfl: 11    paroxetine (PAXIL) 40 MG tablet, TAKE 1 TABLET(40 MG) BY MOUTH EVERY MORNING, Disp: 90 tablet, Rfl: 2    rivaroxaban (XARELTO) 20 mg Tab, Take 1 tablet (20 mg total) by mouth every evening., Disp: 90 tablet, Rfl: 4    telmisartan (MICARDIS) 40 MG Tab, Take 1 tablet (40 mg total) by mouth once daily., Disp: 90 tablet, Rfl: 2    TRUEPLUS LANCETS 30 gauge Misc, USE TO TEST BLOOD SUGAR BID, Disp: , Rfl: 0    Psychiatric symptoms:  Depression:  Patient endorses symptoms of sleep difficulty, difficulty concentrating.  She denied suicidal ideation.  Clarissa/Hypomania:  Denied.  She denied periods of elevated mood or abnormally increased energy or goal-directed activity.  Anxiety:  Patient endorses symptoms restlessness and worry.  Thoughts:  Denied delusions, hallucinations.  Suicidal thoughts/behaviors: Patient denied suicidal and homicidal ideation, plan and intent.  Patient noted agreement to call 911/and or present to the ED if she experienced suicidal or homicidal ideation, plan or intent.    Self-injury:  Denied.        Sleep: Patient notes she was utilizing a CPAP however lost significant weight and the pressure  is too high.  Patient notes she has been having sleep difficulty and believes it is related to not utilizing CPAP.        Trauma: Patient notes [place] to [place] 5 years ago and her  passed away 4 months after they moved.  Patient notes then her friend of  passed away as well.     Mental Status Exam:  General appearance:  appears stated age, neatly dressed, well groomed  Speech:  Clear and intelligible, normal rate, normal tone, normal pitch, normal volume  Level of cooperation:  cooperative  Thought processes:  Linear, logical, goal-directed  Mood:  Generally euthymic  Thought content:  Relevant and appropriate, no illusions, no visual hallucinations, no auditory hallucinations, no delusions, no active or passive homicidal thoughts, no active or passive suicidal ideation, no obsessions, no compulsions, no violence  Affect:  Congruent and appropriate  Orientation:  oriented to person, place, situation and date  Memory/Attention/Concentration:  No gross cognitive deficits made evident during conversation.  Judgment and insight: fair  Language:  intact     Impression: Patient presents today noting she was experiencing symptoms of anxiety and depression that was worsening throughout COVID-19 pandemic.  Patient notes she was socially isolated for several months and symptoms and this was very overwhelming for her.  Patient notes symptoms of anxiety and depression have significantly improved with increased social interaction and increased activities.  Patient notes she has been mindful to engage with family/friends more by phone, her daughter visited her from out of state, she has been working at the [employer] as well as recently engaged in a firearms safety course.     Diagnosis:  1. Adjustment disorder with mixed anxiety and depressed mood  Ambulatory referral/consult to Neuropsychology      Plan:    1. Stress management discussed.  2.  Patient provided psychoeducation on  deep breathing/relaxation.  3.  Discussed engaging in educational opportunities; patient notes she really enjoyed recent course she took and enjoys learning new things.  4.  Patient to follow-up in 4 weeks.     Length of Session:  44 minutes

## 2020-08-18 ENCOUNTER — OFFICE VISIT (OUTPATIENT)
Dept: PSYCHIATRY | Facility: CLINIC | Age: 70
End: 2020-08-18
Payer: COMMERCIAL

## 2020-08-18 DIAGNOSIS — F43.23 ADJUSTMENT DISORDER WITH MIXED ANXIETY AND DEPRESSED MOOD: ICD-10-CM

## 2020-08-18 PROCEDURE — 90834 PR PSYCHOTHERAPY W/PATIENT, 45 MIN: ICD-10-PCS | Mod: 95,,, | Performed by: PSYCHOLOGIST

## 2020-08-18 PROCEDURE — 90834 PSYTX W PT 45 MINUTES: CPT | Mod: 95,,, | Performed by: PSYCHOLOGIST

## 2020-09-01 ENCOUNTER — TELEPHONE (OUTPATIENT)
Dept: GASTROENTEROLOGY | Facility: CLINIC | Age: 70
End: 2020-09-01

## 2020-09-01 DIAGNOSIS — Z01.818 PREOP TESTING: ICD-10-CM

## 2020-09-01 NOTE — TELEPHONE ENCOUNTER
Pt scheduled to have cscope on 10/27 with Dr. Valentin and will need to hold Xarelto for 2 days prior to scope. Is this OK?

## 2020-09-02 ENCOUNTER — PATIENT OUTREACH (OUTPATIENT)
Dept: ADMINISTRATIVE | Facility: OTHER | Age: 70
End: 2020-09-02

## 2020-09-02 NOTE — PROGRESS NOTES
LINKS immunization registry updated  Care Everywhere updated  Health Maintenance updated  Chart reviewed for overdue Proactive [facility] Encoun[facility] ([facility]ADI) health maintenance testing (CRS, Breast Ca, Diabetic Eye Exam)   Orders entered:N/A

## 2020-09-03 ENCOUNTER — OFFICE VISIT (OUTPATIENT)
Dept: OPTOMETRY | Facility: CLINIC | Age: 70
End: 2020-09-03
Payer: MEDICARE

## 2020-09-03 ENCOUNTER — TELEPHONE (OUTPATIENT)
Dept: GASTROENTEROLOGY | Facility: CLINIC | Age: 70
End: 2020-09-03

## 2020-09-03 DIAGNOSIS — H25.13 NUCLEAR SCLEROSIS OF BOTH EYES: Primary | ICD-10-CM

## 2020-09-03 DIAGNOSIS — H35.363 DEGENERATIVE RETINAL DRUSEN OF BOTH EYES: ICD-10-CM

## 2020-09-03 DIAGNOSIS — H52.7 REFRACTIVE ERROR: ICD-10-CM

## 2020-09-03 PROCEDURE — 99999 PR PBB SHADOW E&M-EST. PATIENT-LVL IV: CPT | Mod: PBBFAC,,, | Performed by: OPTOMETRIST

## 2020-09-03 PROCEDURE — 92015 DETERMINE REFRACTIVE STATE: CPT | Mod: S$GLB,,, | Performed by: OPTOMETRIST

## 2020-09-03 PROCEDURE — 92014 COMPRE OPH EXAM EST PT 1/>: CPT | Mod: S$GLB,,, | Performed by: OPTOMETRIST

## 2020-09-03 PROCEDURE — 99999 PR PBB SHADOW E&M-EST. PATIENT-LVL IV: ICD-10-PCS | Mod: PBBFAC,,, | Performed by: OPTOMETRIST

## 2020-09-03 PROCEDURE — 92015 PR REFRACTION: ICD-10-PCS | Mod: S$GLB,,, | Performed by: OPTOMETRIST

## 2020-09-03 PROCEDURE — 92014 PR EYE EXAM, EST PATIENT,COMPREHESV: ICD-10-PCS | Mod: S$GLB,,, | Performed by: OPTOMETRIST

## 2020-09-03 NOTE — TELEPHONE ENCOUNTER
----- Message from Nohemy Khan sent at 9/3/2020  2:57 PM CDT -----  Regarding: call back  Type: Patient Call Back    Who called:Yara     What is the request in detail: The patient is requesting a call back from the staff in regards to an appt that she has scheduled on 10/15    Can the clinic reply by [facility]?no    Would the patient rather a call back or a response via [facility]? Call back     Best call back number:[phone]

## 2020-09-03 NOTE — TELEPHONE ENCOUNTER
Pt wanted to cancel her appt that she made to be scheduled for a colonoscopy since she has already been scheduled.

## 2020-09-03 NOTE — PROGRESS NOTES
HPI     DLS: 6/27/19 pt c/o decreased vision OU noticed it drastically in the   past 6 months. trouble with glare when driving at night  Dim, need for   more light. denies pain/ FOL/ floaters.     Hemoglobin A1C       Date                     Value               Ref Range             Status                03/16/2020               5.1                 4.0 - 5.6 %           Final                  Last edited by Amanda Angel on 9/3/2020  3:34 PM. (History)            Assessment /Plan     For exam results, see Encounter Report.    Nuclear sclerosis of both eyes    Degenerative retinal drusen of both eyes    Refractive error      1. Educated pt on presence of cataracts and effects on vision. No surgery at this time. Recheck in one year.  2. Areds 2 vits. Home amsler, Monitor condition. Patient to report any changes. RTC 1 year recheck.  3. New Spec Rx given. Different lens options discussed with patient. RTC 1 year full exam.

## 2020-09-03 NOTE — LETTER
September 3, 2020      Ann Richards DO  [address] [address]  [address]  [place] [address]           [facility]  [address] [place] [address]  Phone: [phone]  Fax: [phone]          Patient: Yara Whitehead   MR Number: [id number]   YOB: 1950   Date of Visit: 9/3/2020       Dear Dr. Ann Richards:    Thank you for referring Yara Whitehead to me for evaluation. Attached you will find relevant portions of my assessment and plan of care.    If you have questions, please do not hesitate to call me. I look forward to following Yara Whitehead along with you.    Sincerely,    Ross Eisenberg, OD    Enclosure  CC:  No Recipients    If you would like to receive this communication electronically, please contact [email or web address] or [phone] to request more information on [employer] access.    For providers and/or their staff who would like to refer a patient to [facility], please contact us through our one-stop-shop provider referral line, [facility], at [phone].    If you feel you have received this communication in error or would no longer like to receive these types of communications, please e-mail [email or web address]

## 2020-09-11 ENCOUNTER — CLINICAL SUPPORT (OUTPATIENT)
Dept: CARDIOLOGY | Facility: CLINIC | Age: 70
End: 2020-09-11
Payer: MEDICARE

## 2020-09-11 DIAGNOSIS — Z95.818 PRESENCE OF OTHER CARDIAC IMPLANTS AND GRAFTS: ICD-10-CM

## 2020-09-11 PROCEDURE — 93298 CARDIAC DEVICE CHECK - REMOTE: ICD-10-PCS | Mod: S$GLB,,, | Performed by: INTERNAL MEDICINE

## 2020-09-11 PROCEDURE — 93298 REM INTERROG DEV EVAL SCRMS: CPT | Mod: S$GLB,,, | Performed by: INTERNAL MEDICINE

## 2020-09-16 ENCOUNTER — PATIENT MESSAGE (OUTPATIENT)
Dept: FAMILY MEDICINE | Facility: CLINIC | Age: 70
End: 2020-09-16

## 2020-09-18 ENCOUNTER — PATIENT OUTREACH (OUTPATIENT)
Dept: ADMINISTRATIVE | Facility: HOSPITAL | Age: 70
End: 2020-09-18

## 2020-09-18 NOTE — PROGRESS NOTES
Chart review completed 2020.  Care Everywhere updates requested and reviewed.  Immunizations reconciled. Media reports reviewed.  Duplicate HM overrides and  orders removed.  Overdue HM topic chart audit and/or requested.  Overdue lab testing linked to upcoming lab appointments if applies.      Health Maintenance Due   Topic Date Due    Shingles Vaccine (1 of 2) 2001    Influenza Vaccine (1) 2020

## 2020-09-21 ENCOUNTER — HOSPITAL ENCOUNTER (OUTPATIENT)
Dept: RADIOLOGY | Facility: HOSPITAL | Age: 70
Discharge: HOME | End: 2020-09-21
Attending: INTERNAL MEDICINE
Payer: MEDICARE

## 2020-09-21 DIAGNOSIS — Z12.31 SCREENING MAMMOGRAM, ENCOUNTER FOR: ICD-10-CM

## 2020-09-21 DIAGNOSIS — Z78.0 ASYMPTOMATIC MENOPAUSAL STATE: ICD-10-CM

## 2020-09-21 DIAGNOSIS — Z12.31 ENCOUNTER FOR SCREENING MAMMOGRAM FOR MALIGNANT NEOPLASM OF BREAST: ICD-10-CM

## 2020-09-21 PROCEDURE — 77067 MAMMO DIGITAL SCREENING BILAT WITH TOMO: ICD-10-PCS | Mod: 26,,, | Performed by: RADIOLOGY

## 2020-09-21 PROCEDURE — 77080 DXA BONE DENSITY AXIAL: CPT | Mod: 26,,, | Performed by: RADIOLOGY

## 2020-09-21 PROCEDURE — 77080 DXA BONE DENSITY AXIAL: CPT | Mod: TC,PO

## 2020-09-21 PROCEDURE — 77063 BREAST TOMOSYNTHESIS BI: CPT | Mod: 26,,, | Performed by: RADIOLOGY

## 2020-09-21 PROCEDURE — 77063 MAMMO DIGITAL SCREENING BILAT WITH TOMO: ICD-10-PCS | Mod: 26,,, | Performed by: RADIOLOGY

## 2020-09-21 PROCEDURE — 77067 SCR MAMMO BI INCL CAD: CPT | Mod: 26,,, | Performed by: RADIOLOGY

## 2020-09-21 PROCEDURE — 77067 SCR MAMMO BI INCL CAD: CPT | Mod: TC,PO

## 2020-09-21 PROCEDURE — 77080 DEXA BONE DENSITY SPINE HIP: ICD-10-PCS | Mod: 26,,, | Performed by: RADIOLOGY

## 2020-09-21 NOTE — TELEPHONE ENCOUNTER
----- Message from Sunshine Talavera sent at 9/21/2020  9:53 AM CDT -----  Contact: pt at [phone]  Type:  RX Refill Request    Who Called:  pt  Refill or New Rx: refill  RX Name and Strength:  rivaroxaban (XARELTO) 20 mg Tab  How is the patient currently taking it? (ex. 1XDay): 1xday  Is this a 30 day or 90 day RX:  90 day   Preferred Pharmacy with phone number:    [id number] - [facility] [address] [facility] [address] [facility] [address]  [id number] [place] [id number]  Phone: [phone] Fax: [phone]  Local or Mail Order:  local  Ordering Provider:  Radha Carter Call Back Number: [phone]

## 2020-09-21 NOTE — TELEPHONE ENCOUNTER
----- Message from Anika Manuel sent at 9/21/2020  2:55 PM CDT -----  Type:  RX Refill Request    Who Called:  [facility]  Refill or New Rx: refill  RX Name and Strength:  rivaroxaban (XARELTO) 20 mg Tab  Preferred Pharmacy with phone number:    [facility]  [phone]    Local or Mail Order:  mail order  Ordering Provider:  elise

## 2020-09-22 RX ORDER — RIVAROXABAN 20 MG/1
1 TABLET, FILM COATED ORAL NIGHTLY
Qty: 90 TABLET | Refills: 4 | Status: SHIPPED | OUTPATIENT
Start: 2020-09-22 | End: 2021-03-17 | Stop reason: SDUPTHER

## 2020-09-23 ENCOUNTER — PATIENT MESSAGE (OUTPATIENT)
Dept: FAMILY MEDICINE | Facility: CLINIC | Age: 70
End: 2020-09-23

## 2020-09-23 DIAGNOSIS — E16.2 HYPOGLYCEMIA: ICD-10-CM

## 2020-09-23 RX ORDER — FLASH GLUCOSE SENSOR
KIT MISCELLANEOUS
Qty: 7 KIT | Refills: 3 | Status: SHIPPED | OUTPATIENT
Start: 2020-09-23 | End: 2021-06-04 | Stop reason: SDUPTHER

## 2020-09-28 ENCOUNTER — TELEPHONE (OUTPATIENT)
Dept: FAMILY MEDICINE | Facility: CLINIC | Age: 70
End: 2020-09-28

## 2020-09-28 ENCOUNTER — OFFICE VISIT (OUTPATIENT)
Dept: FAMILY MEDICINE | Facility: CLINIC | Age: 70
End: 2020-09-28
Payer: MEDICARE

## 2020-09-28 VITALS
HEART RATE: 76 BPM | HEIGHT: 65 IN | DIASTOLIC BLOOD PRESSURE: 70 MMHG | RESPIRATION RATE: 18 BRPM | TEMPERATURE: 98 F | WEIGHT: 151.13 LBS | SYSTOLIC BLOOD PRESSURE: 118 MMHG | BODY MASS INDEX: 25.18 KG/M2

## 2020-09-28 DIAGNOSIS — I48.0 PAF (PAROXYSMAL ATRIAL FIBRILLATION): Primary | ICD-10-CM

## 2020-09-28 DIAGNOSIS — D50.9 IRON DEFICIENCY ANEMIA, UNSPECIFIED IRON DEFICIENCY ANEMIA TYPE: ICD-10-CM

## 2020-09-28 DIAGNOSIS — G31.84 MCI (MILD COGNITIVE IMPAIRMENT): ICD-10-CM

## 2020-09-28 DIAGNOSIS — N39.46 URINARY INCONTINENCE, MIXED: ICD-10-CM

## 2020-09-28 DIAGNOSIS — E03.9 HYPOTHYROIDISM, UNSPECIFIED TYPE: ICD-10-CM

## 2020-09-28 DIAGNOSIS — E78.5 HYPERLIPIDEMIA, UNSPECIFIED HYPERLIPIDEMIA TYPE: ICD-10-CM

## 2020-09-28 DIAGNOSIS — Z23 NEED FOR IMMUNIZATION AGAINST INFLUENZA: ICD-10-CM

## 2020-09-28 DIAGNOSIS — K91.89 COMPLICATIONS OF GASTRIC BYPASS SURGERY: ICD-10-CM

## 2020-09-28 DIAGNOSIS — E16.2 HYPOGLYCEMIC SYNDROME: ICD-10-CM

## 2020-09-28 DIAGNOSIS — F33.0 MAJOR DEPRESSIVE DISORDER, RECURRENT, MILD: ICD-10-CM

## 2020-09-28 DIAGNOSIS — I10 ESSENTIAL HYPERTENSION: ICD-10-CM
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
## 2020-09-28 DIAGNOSIS — E53.8 VITAMIN B 12 DEFICIENCY: ICD-10-CM

## 2020-09-28 DIAGNOSIS — E55.9 VITAMIN D DEFICIENCY: ICD-10-CM

## 2020-09-28 PROCEDURE — 1159F MED LIST DOCD IN RCRD: CPT | Mod: S$GLB,,, | Performed by: INTERNAL MEDICINE

## 2020-09-28 PROCEDURE — 3078F PR MOST RECENT DIASTOLIC BLOOD PRESSURE < 80 MM HG: ICD-10-PCS | Mod: CPTII,S$GLB,, | Performed by: INTERNAL MEDICINE

## 2020-09-28 PROCEDURE — 1126F PR PAIN SEVERITY QUANTIFIED, NO PAIN PRESENT: ICD-10-PCS | Mod: S$GLB,,, | Performed by: INTERNAL MEDICINE

## 2020-09-28 PROCEDURE — 3078F DIAST BP <80 MM HG: CPT | Mod: CPTII,S$GLB,, | Performed by: INTERNAL MEDICINE

## 2020-09-28 PROCEDURE — 90694 VACC AIIV4 NO PRSRV 0.5ML IM: CPT | Mod: S$GLB,,, | Performed by: INTERNAL MEDICINE

## 2020-09-28 PROCEDURE — 1159F PR MEDICATION LIST DOCUMENTED IN MEDICAL RECORD: ICD-10-PCS | Mod: S$GLB,,, | Performed by: INTERNAL MEDICINE

## 2020-09-28 PROCEDURE — 99214 PR OFFICE/OUTPT VISIT, EST, LEVL IV, 30-39 MIN: ICD-10-PCS | Mod: 25,S$GLB,, | Performed by: INTERNAL MEDICINE

## 2020-09-28 PROCEDURE — 3008F BODY MASS INDEX DOCD: CPT | Mod: CPTII,S$GLB,, | Performed by: INTERNAL MEDICINE

## 2020-09-28 PROCEDURE — 1126F AMNT PAIN NOTED NONE PRSNT: CPT | Mod: S$GLB,,, | Performed by: INTERNAL MEDICINE

## 2020-09-28 PROCEDURE — 99214 OFFICE O/P EST MOD 30 MIN: CPT | Mod: 25,S$GLB,, | Performed by: INTERNAL MEDICINE

## 2020-09-28 PROCEDURE — G0008 FLU VACCINE - QUADRIVALENT - ADJUVANTED: ICD-10-PCS | Mod: S$GLB,,, | Performed by: INTERNAL MEDICINE

## 2020-09-28 PROCEDURE — 3008F PR BODY MASS INDEX (BMI) DOCUMENTED: ICD-10-PCS | Mod: CPTII,S$GLB,, | Performed by: INTERNAL MEDICINE

## 2020-09-28 PROCEDURE — 99499 UNLISTED E&M SERVICE: CPT | Mod: S$GLB,,, | Performed by: INTERNAL MEDICINE

## 2020-09-28 PROCEDURE — 99499 RISK ADDL DX/OHS AUDIT: ICD-10-PCS | Mod: S$GLB,,, | Performed by: INTERNAL MEDICINE

## 2020-09-28 PROCEDURE — G0008 ADMIN INFLUENZA VIRUS VAC: HCPCS | Mod: S$GLB,,, | Performed by: INTERNAL MEDICINE

## 2020-09-28 PROCEDURE — 90694 FLU VACCINE - QUADRIVALENT - ADJUVANTED: ICD-10-PCS | Mod: S$GLB,,, | Performed by: INTERNAL MEDICINE

## 2020-09-28 PROCEDURE — 1101F PT FALLS ASSESS-DOCD LE1/YR: CPT | Mod: CPTII,S$GLB,, | Performed by: INTERNAL MEDICINE

## 2020-09-28 PROCEDURE — 3074F PR MOST RECENT SYSTOLIC BLOOD PRESSURE < 130 MM HG: ICD-10-PCS | Mod: CPTII,S$GLB,, | Performed by: INTERNAL MEDICINE

## 2020-09-28 PROCEDURE — 1101F PR PT FALLS ASSESS DOC 0-1 FALLS W/OUT INJ PAST YR: ICD-10-PCS | Mod: CPTII,S$GLB,, | Performed by: INTERNAL MEDICINE

## 2020-09-28 PROCEDURE — 3074F SYST BP LT 130 MM HG: CPT | Mod: CPTII,S$GLB,, | Performed by: INTERNAL MEDICINE

## 2020-09-28 RX ORDER — OXYBUTYNIN CHLORIDE 5 MG/1
5 TABLET, EXTENDED RELEASE ORAL DAILY
Qty: 90 TABLET | Refills: 3 | Status: SHIPPED | OUTPATIENT
Start: 2020-09-28 | End: 2021-03-01

## 2020-09-28 NOTE — TELEPHONE ENCOUNTER
Please see below and advise regarding testing frequency for dexcom meter - does patient test 4 times daily?

## 2020-09-28 NOTE — PROGRESS NOTES
Subjective:       Patient ID: Yara Whitehead is a 70 y.o. female.    Medication List with Changes/Refills   New Medications    OXYBUTYNIN (DITROPAN-XL) 5 MG TR24    Take 1 tablet (5 mg total) by mouth once daily.   Current Medications    ALBUTEROL (PROVENTIL/VENTOLIN HFA) 90 MCG/ACTUATION INHALER    Inhale 1-2 puffs into the lungs every 6 (six) hours as needed for Wheezing.    ATORVASTATIN (LIPITOR) 10 MG TABLET    TAKE 1 TABLET(10 MG) BY MOUTH EVERY DAY    BLOOD SUGAR DIAGNOSTIC STRP    To check BG 1 times daily, to use with insurance preferred meter    BLOOD SUGAR DIAGNOSTIC STRP    qac and PRN lows. Freestyle tawny test strips    BLOOD-GLUCOSE METER (BLOOD GLUCOSE MONITORING) KIT    Use as instructed.  Freestyle tawny glucometer    BLOOD-GLUCOSE METER,CONTINUOUS (DEXCOM G6 ) MISC    To use for hypoglycemic episodes    BLOOD-GLUCOSE SENSOR (DEXCOM G6 SENSOR) NGHIA    To use for hypoglycemia    BLOOD-GLUCOSE TRANSMITTER (DEXCOM G6 TRANSMITTER) N[place]IA    To use for hypoglycemia    BUPROPION (WELLBUTRIN XL) 150 MG TB24 TABLET    Take 1 tablet (150 mg total) by mouth once daily.    CETIRIZINE (ZYRTEC) 10 MG TABLET    Take 10 mg by mouth once daily.    CYANOCOBALAMIN 1,000 MCG/ML INJECTION    INJECT 1 ML INTO THE MUSCLE EVERY 14 DAYS    ESTRADIOL (ESTRACE) 0.01 % (0.1 MG/GRAM) VAGINAL CREAM    Place 1 g vaginally every other day.    FLASH GLUCOSE SENSOR (FREESTYLE TAWNY 14 DAY SENSOR) KIT    USE AS DIRECTED    FLUTICASONE (FLONASE) 50 MCG/ACTUATION NASAL SPRAY    2 sprays (100 mcg total) by Each Nare route once daily.    GLUCOSE 4 GM CHEWABLE TABLET    Take 4 tablets when glucose from 51-70  Take 6 tablets when glucose is less than 50    LANCETS MISC    To check BG 1 times daily, to use with insurance preferred meter    LEVOTHYROXINE (SYNTHROID) 88 MCG TABLET    Take 1 tablet (88 mcg total) by mouth before breakfast.    METOPROLOL TARTRATE (LOPRESSOR) 25 MG TABLET    Take 1 tablet (25 mg total) by mouth 3  (three) times daily. As needed    METRONIDAZOLE (METROGEL) 0.75 % GEL    Apply topically 2 (two) times daily.    METRONIDAZOLE (METROGEL) 0.75 % VAGINAL GEL    Place 1 applicator vaginally every other day.    MIRABEGRON (MYRBETRIQ) 50 MG TB24    Take 1 tablet (50 mg total) by mouth once daily.    PAROXETINE (PAXIL) 40 MG TABLET    TAKE 1 TABLET(40 MG) BY MOUTH EVERY MORNING    RIVAROXABAN (XARELTO) 20 MG TAB    Take 1 tablet (20 mg total) by mouth every evening.    TELMISARTAN (MICARDIS) 40 MG TAB    Take 1 tablet (40 mg total) by mouth once daily.    TRUEPLUS LANCETS 30 GAUGE MISC    USE TO TEST BLOOD SUGAR BID       Chief Complaint: Depression (6 mth follow up )  She is here today to discuss chronic medical issues.       She has paroxysmal Afib s/p PVI ablation on 4/2019.   She is taking xarelto 20 mg qday.  She denies any recurrent palpitations or chest pain.  She had her loop recorder changed on 7/19/19.  She has metoprolol at home to use PRN palpitations. She has not had to use.       She has hypertension and is taking telmisartan 40 mg qday. She has no known CAD. She denies chest pain or shortness of breath.      She has hyperlipidemia controlled on atorvastatin 10 mg qday.  Her last lipids were 170/48/90/70.       She has hypothyroid that is controlled on levothyroxine 88 mcg qday. Her last TSH was normal on 6/2020.        She had reflux symptoms in the past and was taking pepcid. Since her Afib is rate controlled her reflux is gone.       She has malabsorption since bypass surgery and has a vitamin B 12 deficiency. he is taking vitamin B12 1000 mcg IM every 2 weeks. Her last level was on 3/2020 was 371.   She has ALESSANDRA and is taking oral iron supplementation.  Labs on 6/2020 show iron 15 and ferritin of 24.  She has a vitamin D deficiency and is on supplementation. Her last level was on 3/2020 was 35.       She was diagnosed with post gastric bypass hypoglycemic syndrome. She gets chronic diarrhea after eating  large carbohydrate meal. She will get low glucose readings and has a CGM. She gets readings 49-60s about twice a week. She continues to eat frequent small meals and avoids carbohydrates. This has improved both the diarrhea and the hypoglycemia.      She has anxiety and depression. She is taking paxil 40 mg qday and wellbutrin 150 mg qday. She feels this combination is helping with her depression. She is sleeping well and denies anxiety. No suicidal ideations. She has xanax but is not using.      Recall: She had a syncopal event on 1/1/2019 resulting inn a scalp hematoma, non displaced skull fx, coutrecoup injury with right frontal SDH, and left superior parietal SAH.  She has been followed by serial CT by NS and has done very well. She was seen by NS on 1/31/2019 with repeat CT showing complete resolution of her injury. She denies any headaches or vision changes.       She has incontinence of urine and is off medication. In the past she was taking oxybutynin but stopped due to concerns about her memory.  She was give a rx for mybetriq but could not start due to cost. She continues to complain of urinary urgency and leakage.       She has vaginal bleeding and noted to have protruding vaginal mesh. She was advised that she would need to have the mesh resected when she is ready but since the bleeding is mild and she is not in pain she does not want to have done at this time. She was seen by urogyn on 7/2020 who started estradiol cream every other day.      She was noted to have a left inguinal hernia and was seen by surgery on 5/2019 and advised to just monitor.      She has FABIANA but stopped using CPAP.  She does not have the sleep issues like she did in the past when her weight was up.     She does have some memory changes and was seen by neuropsychiatry for evaluation on 6/2020. It was felt that her depression which was increased at the time of testing was uncontrolled and contributing to her memory issues. She  reports since her depression is now stable and she is taking her vitamin B 12 regularly her memory is much improved.      She lives alone and feels safe. She has worked very hard to lose weight. She has lost over 90 lbs in one year with dietary changes.  She does not exercise but stays active.  She denies any falls or balance issues.       Colonoscopy---2013 repeat in 5 years---fecal kit 12/2018 neg---scheduled for colonoscopy next month   Mammogram----9/2020 neg  DEXA-----9/2020 nl  Pap----- KEIRY  Tdap--11/2016  Influenza vaccine---11/2019---due   Pneumovax 23----4/2017  Prevnar 13---- 2/2016  Shingles vaccine-----none    Review of Systems   Constitutional: Negative for activity change, appetite change, fatigue, fever and unexpected weight change.   HENT: Positive for hearing loss. Negative for congestion, ear pain, rhinorrhea, sore throat and trouble swallowing.    Eyes: Positive for visual disturbance. Negative for pain and discharge.   Respiratory: Negative for cough, chest tightness, shortness of breath and wheezing.    Cardiovascular: Negative for chest pain, palpitations and leg swelling.   Gastrointestinal: Negative for abdominal pain, blood in stool, constipation, diarrhea, nausea and vomiting.   Endocrine: Positive for polydipsia and polyuria.   Genitourinary: Negative for difficulty urinating, dysuria, hematuria and menstrual problem.   Musculoskeletal: Positive for arthralgias, joint swelling and neck pain. Negative for back pain and myalgias.   Skin: Negative for rash.   Neurological: Negative for dizziness, weakness, numbness and headaches.   Hematological: Does not bruise/bleed easily.   Psychiatric/Behavioral: Positive for dysphoric mood. Negative for confusion, sleep disturbance and suicidal ideas. The patient is not nervous/anxious.        Objective:      Vitals:    09/28/20 1108   BP: 118/70   Pulse: 76   Resp: 18   Temp: 98.2 °F (36.8 °C)   TempSrc: Temporal   Weight: 68.6 kg (151 lb 2 oz)  "  Height: 5' 5" (1.651 m)     Body mass index is 25.15 kg/m².  Physical Exam    General appearance: No acute distress, cooperative  Eyes: PERRL, EOMI, conjunctiva clear  Neck: FROM, soft, supple, no thyromegaly, no bruits  Lymph: no anterior or posterior cervical adenopathy  Heart::  Regular rate and rhythm, no murmur  Lung: Clear to ascultation bilaterally, no wheezing, no rales, no rhonchi, no distress  Abdomen: Soft, nontender, no distention, no hepatosplenomegaly, bowel sounds normal, no guarding, no rebound, no peritoneal signs  Skin: no rashes, no lesions  Extremities: no edema, no cyanosis  Neuro: no focal abnormalities, strength 5/5 b/l UE and LE, 2+ DTRs b/l UE and LE, normal gait  Peripheral pulses: 2+ pedal pulses bilaterally, good perfusion and color  Musculoskeletal: FROM, good strenth, no tenderness  Joint: normal appearance, no swelling, no warmth, no deformity in all joints    Assessment:       1. PAF (paroxysmal atrial fibrillation)    2. Essential hypertension    3. Hyperlipidemia, unspecified hyperlipidemia type    4. Hypothyroidism, unspecified type    5. Complications of gastric bypass surgery    6. Hypoglycemic syndrome    7. Vitamin B 12 deficiency    8. Iron deficiency anemia, unspecified iron deficiency anemia type    9. Vitamin D deficiency    10. Urinary incontinence, mixed    11. Major depressive disorder, recurrent, mild    12. MCI (mild cognitive impairment)    13. Need for immunization against influenza        Plan:       PAF (paroxysmal atrial fibrillation)  NSR today on exam. She continues on anticoagulation.     Essential hypertension  Well controlled and continue current regimen.     Hyperlipidemia, unspecified hyperlipidemia type  Good control on low dose atorvastatin.   -     Comprehensive metabolic panel; Future; Expected date: 02/01/2021    Hypothyroidism, unspecified type  Good control on this dose of levothyroxine.   -     TSH; Future; Expected date: " 02/01/2021    Complications of gastric bypass surgery with Hypoglycemic syndrome  She is doing better with limiting her carbohydrates and eating smaller meals.    -     Hemoglobin A1C; Future; Expected date: 02/01/2021    Vitamin B 12 deficiency  Improved on every 2 week replacement.   -     Vitamin B12; Future; Expected date: 02/01/2021    Iron deficiency anemia, unspecified iron deficiency anemia type  Continue iron supplementation.  REcheck with next labs.   -     CBC auto differential; Future; Expected date: 02/01/2021  -     Iron and TIBC; Future; Expected date: 02/01/2021  -     Ferritin; Future; Expected date: 02/01/2021    Vitamin D deficiency  Continue supplementation.   -     Vitamin D; Future; Expected date: 02/01/2021    Urinary incontinence, mixed  Uncontrolled. She would like to restart oxybutynin.   -     oxybutynin (DITROPAN-XL) 5 MG TR24; Take 1 tablet (5 mg total) by mouth once daily.  Dispense: 90 tablet; Refill: 3    Major depressive disorder, recurrent, mild  Stable and improved on her current regimen.     MCI (mild cognitive impairment)  STable and doing better since her depression and vitamin B 12 deficiency is controlled.     Need for immunization against influenza  -     Influenza - Quadrivalent (Adjuvanted)    Follow up in about 6 months (around 3/28/2021) for chronic medical issues.

## 2020-09-28 NOTE — TELEPHONE ENCOUNTER
----- Message from Nicolasa Ann sent at 9/28/2020  1:47 PM CDT -----  Regarding: advice  Contact: representative  Type: Needs Medical Advice  Who Called:  [employer] representative -DJ Symptoms (please be specific):    How long has patient had these symptoms:   Pharmacy name and phone #:    Best Call Back Number: [phone]/fax [phone]  Additional Information: [employer] representative requesting an order for dexcom,. need patient's clinical notes stating the pt test 4 x or more a day.  The representative requesting pt medical necessity form.

## 2020-09-30 ENCOUNTER — TELEPHONE (OUTPATIENT)
Dept: FAMILY MEDICINE | Facility: CLINIC | Age: 70
End: 2020-09-30

## 2020-09-30 NOTE — TELEPHONE ENCOUNTER
Fax received from [facility] requesting a signed physician's order for the Dexcom and supplies.  They also need supporting clinicals with the patient's testing frequency and daily insulin injections.  Patient is not on insulin.  Please advise

## 2020-10-11 ENCOUNTER — CLINICAL SUPPORT (OUTPATIENT)
Dept: CARDIOLOGY | Facility: CLINIC | Age: 70
End: 2020-10-11
Payer: MEDICARE

## 2020-10-13 ENCOUNTER — PATIENT MESSAGE (OUTPATIENT)
Dept: FAMILY MEDICINE | Facility: CLINIC | Age: 70
End: 2020-10-13

## 2020-10-15 ENCOUNTER — TELEPHONE (OUTPATIENT)
Dept: FAMILY MEDICINE | Facility: CLINIC | Age: 70
End: 2020-10-15

## 2020-10-15 ENCOUNTER — PATIENT MESSAGE (OUTPATIENT)
Dept: FAMILY MEDICINE | Facility: CLINIC | Age: 70
End: 2020-10-15

## 2020-10-15 NOTE — TELEPHONE ENCOUNTER
----- Message from Laquita Waters sent at 10/15/2020 11:30 AM CDT -----  Pt called stating that she needs to speak with the office about the dexcom meter and supplies. Please reach out to her [phone]

## 2020-10-16 NOTE — TELEPHONE ENCOUNTER
Spoke to  patient's nurse care coordinator, Maria Isabel Beavers, RN @ [phone] x [phone]. She has spoken to the patient numerous times explaining to her that her diagnosis does not meet Medicare criteria for her to obtain a meter. Requests has been sent to [facility] in June, July and September, all received denials.    Maria Isabel will call patient.

## 2020-10-24 ENCOUNTER — LAB VISIT (OUTPATIENT)
Dept: FAMILY MEDICINE | Facility: CLINIC | Age: 70
End: 2020-10-24
Payer: MEDICARE

## 2020-10-24 DIAGNOSIS — Z01.818 PREOP TESTING: ICD-10-CM

## 2020-10-24 PROCEDURE — U0003 INFECTIOUS AGENT DETECTION BY NUCLEIC ACID (DNA OR RNA); SEVERE ACUTE RESPIRATORY SYNDROME CORONAVIRUS 2 (SARS-COV-2) (CORONAVIRUS DISEASE [COVID-19]), AMPLIFIED PROBE TECHNIQUE, MAKING USE OF HIGH THROUGHPUT TECHNOLOGIES AS DESCRIBED BY CMS-2020-01-R: HCPCS

## 2020-10-25 LAB — SARS-COV-2 RNA RESP QL NAA+PROBE: NOT DETECTED

## 2020-10-26 ENCOUNTER — ANESTHESIA EVENT (OUTPATIENT)
Dept: ENDOSCOPY | Facility: HOSPITAL | Age: 70
End: 2020-10-26
Payer: MEDICARE

## 2020-10-26 ENCOUNTER — PATIENT MESSAGE (OUTPATIENT)
Dept: ENDOSCOPY | Facility: HOSPITAL | Age: 70
End: 2020-10-26

## 2020-10-26 NOTE — TELEPHONE ENCOUNTER
Informed pt that she could have liquid proteitn as long as it was not milky, red or purple. Pt verbalized understanding.

## 2020-10-26 NOTE — H&P
"History & Physical - Short Stay  Gastroenterology      SUBJECTIVE:     Procedure: Colonoscopy    Chief Complaint/Indication for Procedure: Screening.  ALESSANDRA.    History of Present Illness:  Asymptomatic    [facility]   [facility]8/[facility]     Ann Richards DO  Internal Medicine  Major depressive disorder, recurrent, mild +4 more  Dx  mental health issues  Reason for Visit   Progress Notes  Ann Richards DO (Physician)   Internal Medicine   6/8/2020  9:40 AM   Signed  Expand All Collapse All    Subjective:       Patient ID: Yara Whitehead is a 70 y.o. female.    Chief Complaint: mental health issues  She walked into clinic today to discuss her anxiety/depression and memory.      She has anxiety and depression which had been previously controlled on paxil 40 mg qday. She has noticed since isolation from corona virus crisis that she is feeling progressively worse. She is crying and feels very alone. She is a "people person" and has cut off all contact with people. She is avoiding talking to people on the phone. She is not sleeping well. She is crying all the time. She is feeling her "independence is being threatened" but what she is seeing on the news.  They are "closing in on me". She denies suicidal ideations.  She feels her memory is much worse and she is very scattered. She forgets words and can not finish thoughts.  She has known vitamin B 12 deficiency and is taking replacement monthly with last level normal on 3/2020.  She has an MRI on 8/2018 for memory issues that was reassuring and lab workup that was negative for a reversible cause of demential. She has a family history of Alzheimer's disease.  No falls or urinary symptoms.  She is drinking more alcohol (wine) to help her sleep and uses daily.  She feels safe at home. She has lost 8 lbs since her visit one month ago.      She has known ALESSANDRA with low iron and ferritin levels with normal H+H checked in 3/2020.  She was " started on iron but stopped because her stools became black. She was scheduled for a colonoscopy but this was cancelled due to corona virus crisis. She does have an implantable vaginal mesh which can sometimes cause vaginal bleeding and is due to establish with urogyn in July. She denies any further bleeding or pain.      Assessment:       1. Major depressive disorder, recurrent, mild    2. Memory problem    3. Iron deficiency anemia due to chronic blood loss    4. Essential hypertension    5. Exposure to Covid-19 Virus        Plan:       Major depressive disorder, recurrent, mild  Uncontrolled and contributing to her memory issues. Will add wellbutrin xl 150 mg qday to her regimen and refer to clinical counseling. Will recheck in 4 weeks.   -     Ambulatory referral/consult to Psychology; Future; Expected date: 06/15/2020  -     buPROPion (WELLBUTRIN XL) 150 MG TB24 tablet; Take 1 tablet (150 mg total) by mouth once daily.  Dispense: 90 tablet; Refill: 3     Memory problem  Multifactorial and she would benefit from neuropsychiatric testing once her depression is stable.  Her workup thus far has been negative for a reversible cause of dementia.   -     Neuropsychological testing; Future  -     TSH; Future; Expected date: 06/08/2020     Iron deficiency anemia due to chronic blood loss  Most likely due to her vaginal bleed from mesh but she is due a colonoscopy.  Order placed today. Advised to restart iron supplementation.   -     Case request GI: COLONOSCOPY  -     CBC auto differential; Future; Expected date: 06/08/2020  -     Iron and TIBC; Future; Expected date: 06/08/2020  -     Ferritin; Future; Expected date: 06/08/2020     Essential hypertension  Uncontrolled but she was very upset today. She reports home readings were good. Continue to monitor.      Exposure to Covid-19 Virus  -     COVID-19 (SARS CoV-2) IgG Antibody; Future; Expected date: 06/08/2020               Initial consult    7/8/2020  [id number]      Tracy Palencia MD  Gynecology  Urinary incontinence, mixed +4 more  Dx  Consult   ; Referred by Lorene Maxwell MD  Reason for Visit          1) Vaginal mesh exposure:  --need op notes from surgery 2008  Dr. Clemencia Marvin MD  [facility]  [address], [place], [address] [phone]     --to help optimize tissue and reduce bacteria:          BEFORE BED:         --continue 1 g estradiol cream every other night         --stop premarin--you can finish current tube instead of estradiol; then restart estradiol         --start 1 applicator full of metrogel (antibiotic gel) every other night (on night's not using estradiol)     --cystoscopy/urodynamics  --will need to plan surgical excision and revision of any recurrent prolapse: can we do this at same time or do we need to stage?  --taking xarelto--will need to bridge off (talk with Katib--did not want off >2 days)     2)  Mixed urinary incontinence, urge > stress:    --urine C&S  --Empty bladder every 3 hours.  Empty well: wait a minute, lean forward on toilet.    --Avoid dietary irritants (see sheet).  Keep diary x 3-5 days to determine your irritants.  --KEGELS: do 10 in AM and 10 in PM, holding each x 10 seconds.  When you feel urge to go, STOP, KEGEL, and when urge has passed, then go to bathroom.  Consider PT in future.    --URGE: start mirabegron 50 mg.  Let us know if too expensive.  Trying to avoid anticholinergics due to recent depression during COVID. Takes 2-4 weeks to see if will have effect.  For dry mouth: get sour, sugar free lozenge or gum.    --STRESS:  Pessary vs. Sling.      3) Constipation:  --hydrate well:  Drink 3 extra glasses or bottles of water/day  Controlling constipation may help bladder urgency/leakage and fiber may better control cholesterol and blood glucose.  Start daily fiber.  Take 1 tsp of fiber powder (psyllium or other sugar-free powder).  Mix in 8  oz of water.  Take x 3-5 days.  Then, increase fiber by 1 tsp every 3-5 days until stool is easy to pass.  Stop and continue at that dose.   Do not exceed 6 tsps/day.  May also use over the counter stool softener 1-2 x/day.  AVOID laxatives.     4)  Well-woman:  --needs colonoscopy: ordered, please call to schedule      5)  RTC for UDS/cysto.  Get op notes.             Progress Notes  Tracy Palencia MD (Physician)   Gynecology   7/8/2020  1:00 PM   Signed     [id number]   [address] [address]     Yara Whitehead  [id number]  1950 July 13, 2020     Consulting Physician: Lorene Maxwell, *   GYN: MD Hans  Primary M.D.: Ann Richards DO          Chief Complaint   Patient presents with    Consult       exposure of mesh         HPI:      1)  UI:  (+) MICHAELA < (+) UUI  X 3years. Does not feel urge until too late.  (+) pads:4/day, usually minimum-moderate wetness and 2/night usually minimum wetness.  Daytime frequency: Q 2 hours.  Nocturia: Yes: 2/night.   (--) dysuria,  (--) hematuria,  (--) frequent UTIs. (+) complete bladder emptying. Used to have retention prior to surgery. Does not feel like she has an issue with emptying  --oxybutynin 10 xl, vesicare 5 mg: for overactive bladder/UUI; is she still taking? Stopped taking oxybutynin x 2 weeks per psychiatrist saying it can affect the brain. are they helping? Solifenacin also stopped prior to oxybutynin. Both medications helped for a little bit.     2)  POP:  Absent. Symptoms.  (+) vaginal bleeding. (--) vaginal discharge. (--) sexually active.  (--) dyspareunia.  (--)  Vaginal dryness.  (+) vaginal estrogen use. Premarin  And one other post surgery     3)  BM:  (+) constipation/straining for a couple weeks.  (--) chronic diarrhea. (--) hematochezia.  (--) fecal incontinence.  (--) fecal smearing/urgency.  (--) complete evacuation.       4)  Vaginal mesh exposure:  --GYN exam  5/2020:  PELVIC: Normal external female genitalia without lesions. Normal hair distribution. Adequate perineal body, normal urethral meatus. Urethra with no masses.  Bladder nontender. Vagina  Golf ball size apical exposure.   --surgery + date: POP, xlap/Pfannenstiel, and vaginal entry? Around 2008.  [facility],[phone]. Mesh first notice around 1 year ago when dark tinged fluid was noticed on her pad.    Dr. Clemencia Marvin MD  [facility]  [address], [place], [address] [phone]  --symptoms: itching, typically painless. Painful when touched. Vaginal discharge - dark tinged. One episode of bright red bleeding around 2 months ago, lasting 10 hrs.     Impression     1. Urinary incontinence, mixed    2. Exposure of implanted vaginal mesh and prosthetic material in vagina, subsequent encounter    3. Colon cancer screening    4. Chronic constipation    5. Vaginal atrophy          Initial Plan  The patient was counseled regarding these issues. The patient was given a summary sheet containing each of these issues with possible options for evaluation and management. When appropriate, we also reviewed computer-generated diagrams specific to their diagnoses..  All questions were addressed to the patient's satisfaction.     1) Vaginal mesh exposure:  --need op notes from surgery 2008  Dr. Clemencia Marvin MD  [facility]  [address], [place], [address] [phone]     --to help optimize tissue and reduce bacteria:               BEFORE BED:              --continue 1 g estradiol cream every other night              --stop premarin--you can finish current tube instead of estradiol; then restart estradiol              --start 1 applicator full of metrogel (antibiotic gel) every other night (on night's not using estradiol)     --cystoscopy/urodynamics  --will need to plan surgical excision and revision of any recurrent prolapse:  can we do this at same time or do we need to stage?  --taking xarelto--will need to bridge off (talk with Katib--did not want off >2 days)     2)  Mixed urinary incontinence, urge > stress:    --urine C&S  --Empty bladder every 3 hours.  Empty well: wait a minute, lean forward on toilet.    --Avoid dietary irritants (see sheet).  Keep diary x 3-5 days to determine your irritants.  --KEGELS: do 10 in AM and 10 in PM, holding each x 10 seconds.  When you feel urge to go, STOP, KEGEL, and when urge has passed, then go to bathroom.  Consider PT in future.    --URGE: start mirabegron 50 mg.  Let us know if too expensive.  Trying to avoid anticholinergics due to recent depression during COVID. Takes 2-4 weeks to see if will have effect.  For dry mouth: get sour, sugar free lozenge or gum.    --STRESS:  Pessary vs. Sling.      3) Constipation:  --hydrate well:  Drink 3 extra glasses or bottles of water/day  Controlling constipation may help bladder urgency/leakage and fiber may better control cholesterol and blood glucose.  Start daily fiber.  Take 1 tsp of fiber powder (psyllium or other sugar-free powder).  Mix in 8 oz of water.  Take x 3-5 days.  Then, increase fiber by 1 tsp every 3-5 days until stool is easy to pass.  Stop and continue at that dose.   Do not exceed 6 tsps/day.  May also use over the counter stool softener 1-2 x/day.  AVOID laxatives.     4)  Well-woman:  --needs colonoscopy: ordered, please call to schedule      5)  RTC for UDS/cysto.  Get op notes.      Approximately 60 min were spent in consult, 90 % in discussion.      Thank you for requesting consultation of your patient.  I look forward to participating in their care.     Tracy Palencia  Female Pelvic Medicine and Reconstructive Surgery  [facility] Medications   Medication Sig    atorvastatin (LIPITOR) 10 MG tablet TAKE 1 TABLET(10 MG) BY MOUTH EVERY DAY    blood sugar diagnostic Strp To check BG  1 times daily, to use with insurance preferred meter    blood sugar diagnostic Strp qac and PRN lows. Freestyle vicky test strips    blood-glucose meter (BLOOD GLUCOSE MONITORING) kit Use as instructed.  Freestyle vicky glucometer    buPROPion (WELLBUTRIN XL) 150 MG TB24 tablet Take 1 tablet (150 mg total) by mouth once daily.    cetirizine (ZYRTEC) 10 MG tablet Take 10 mg by mouth once daily.    cyanocobalamin 1,000 mcg/mL injection INJECT 1 ML INTO THE MUSCLE EVERY 14 DAYS    estradioL (ESTRACE) 0.01 % (0.1 mg/gram) vaginal cream Place 1 g vaginally every other day.    flash glucose sensor (FREESTYLE VICKY 14 DAY SENSOR) Kit USE AS DIRECTED    glucose 4 GM chewable tablet Take 4 tablets when glucose from 51-70  Take 6 tablets when glucose is less than 50    lancets Misc To check BG 1 times daily, to use with insurance preferred meter    levothyroxine (SYNTHROID) 88 MCG tablet Take 1 tablet (88 mcg total) by mouth before breakfast.    metroNIDAZOLE (METROGEL) 0.75 % gel Apply topically 2 (two) times daily.    metroNIDAZOLE (METROGEL) 0.75 % vaginal gel Place 1 applicator vaginally every other day.    oxybutynin (DITROPAN-XL) 5 MG TR24 Take 1 tablet (5 mg total) by mouth once daily.    paroxetine (PAXIL) 40 MG tablet TAKE 1 TABLET(40 MG) BY MOUTH EVERY MORNING    rivaroxaban (XARELTO) 20 mg Tab Take 1 tablet (20 mg total) by mouth every evening.    telmisartan (MICARDIS) 40 MG Tab Take 1 tablet (40 mg total) by mouth once daily.    TRUEPLUS LANCETS 30 gauge Misc USE TO TEST BLOOD SUGAR BID    albuterol (PROVENTIL/VENTOLIN HFA) 90 mcg/actuation inhaler Inhale 1-2 puffs into the lungs every 6 (six) hours as needed for Wheezing. (Patient not taking: Reported on 9/28/2020)    blood-glucose meter,continuous (DEXCOM G6 ) Misc To use for hypoglycemic episodes (Patient not taking: Reported on 10/26/2020)    blood-glucose sensor (DEXCOM G6 SENSOR) Rebeca To use for hypoglycemia (Patient not taking:  Reported on 9/28/2020)    blood-glucose transmitter (DEXCOM G6 TRANSMITTER) Rebeca To use for hypoglycemia (Patient not taking: Reported on 9/28/2020)    fluticasone (FLONASE) 50 mcg/actuation nasal spray 2 sprays (100 mcg total) by Each Nare route once daily. (Patient not taking: Reported on 10/26/2020)    metoprolol tartrate (LOPRESSOR) 25 MG tablet Take 1 tablet (25 mg total) by mouth 3 (three) times daily. As needed (Patient not taking: Reported on 10/26/2020)    mirabegron (MYRBETRIQ) 50 mg Tb24 Take 1 tablet (50 mg total) by mouth once daily. (Patient not taking: Reported on 7/13/2020)       Review of patient's allergies indicates:  No Known Allergies     Past Medical History:   Diagnosis Date    Allergy     Arrhythmia     GERD (gastroesophageal reflux disease)     Hypertension     Hypothyroidism     ALESSANDRA (iron deficiency anemia)     Insomnia     Nephrolithiasis     had eswl - [place]    FABIANA on CPAP     PAF (paroxysmal atrial fibrillation)     Status post placement of implantable loop recorder 3/18/2016    Subaortic membrane     Subarachnoid hematoma 1/1/2019    Subdural hematoma     Vitamin B 12 deficiency     Vitamin D deficiency      Past Surgical History:   Procedure Laterality Date    abdomenalplasty      ABLATION OF ARRHYTHMOGENIC FOCUS FOR ATRIAL FIBRILLATION N/A 4/15/2019    Procedure: Ablation atrial fibrillation;  Surgeon: Edmund Shah MD;  Location: [facility] EP LAB;  Service: Cardiology;  Laterality: N/A;  AF, PRISCILLA, PVI, Cryo, MARY, Gen, SK, 3 Prep    COLONOSCOPY  2012    normal, repeat in 5 years    EXTRACORPOREAL SHOCK WAVE LITHOTRIPSY      [place]    FRACTURE SURGERY Left     has plates and screws in place.    GALLBLADDER SURGERY  1995    GASTRIC BYPASS  1997    HERNIA REPAIR  1998    HYSTERECTOMY  2013    due to vaginal prolpase with BSO - also bladder suspension at the same [facility] - [facility]    INSERTION OF IMPLANTABLE LOOP RECORDER N/A 7/19/2019    Procedure:  "Insertion, Implantable Loop Recorder;  Surgeon: Neftali Hernandez MD;  Location: [facility] CATH;  Service: Cardiology;  Laterality: N/A;    REMOVAL OF IMPLANTABLE LOOP RECORDER N/A 2019    Procedure: REMOVAL, IMPLANTABLE LOOP RECORDER;  Surgeon: Neftali Hernandez MD;  Location: [facility] CATH;  Service: Cardiology;  Laterality: N/A;    TYMPANOPLASTY Left     replaced and then repair x2      Family History   Problem Relation Age of Onset    Aneurysm Mother     Hypertension Mother     Cancer Mother         uterine     Glaucoma Mother     Macular degeneration Mother     Alzheimer's disease Father     Diabetes Father     Mental illness Sister     No Known Problems Daughter     No Known Problems Son     Macular degeneration Maternal Grandmother     Stroke Maternal Grandfather     Glaucoma Maternal Grandfather     COPD Paternal Grandmother     Cancer Paternal Grandmother         colon    Alzheimer's disease Paternal Grandfather     Heart disease Paternal Grandfather     Nephrolithiasis Neg Hx      Social History     Tobacco Use    Smoking status: Former Smoker     Packs/day: 2.00     Years: 20.00     Pack years: 40.00     Quit date: 1995     Years since quittin.9    Smokeless tobacco: Never Used   Substance Use Topics    Alcohol use: Yes     Alcohol/week: 7.0 standard drinks     Types: 7 Glasses of wine per week     Frequency: 4 or more times a week     Drinks per session: 1 or 2     Binge frequency: Less than monthly     Comment: one glass wine nightly    Drug use: No         OBJECTIVE:     Vital Signs (Most Recent)  Temp: 98.2 °F (36.8 °C) (10/27/20 0851)  Pulse: (!) 57 (10/27/20 0851)  Resp: 18 (10/27/20 0851)  BP: (!) 180/74 (10/27/20 0851)  SpO2: 100 % (10/27/20 0851)    Physical Exam:  :Ht: 5' 5" (165.1 cm)   Wt: 67.1 kg (148 lb)   BMI: 24.63 kg/m²                                                          GENERAL:  Comfortable, in no acute distress.                              "    HEENT EXAM:  Nonicteric.  No adenopathy.  Oropharynx is clear.               NECK:  Supple.                                                               LUNGS:  Clear.                                                               CARDIAC:  Regular rate and rhythm.  S1, S2.  No murmur.                      ABDOMEN:  Soft, positive bowel sounds, nontender.  No hepatosplenomegaly or masses.  No rebound or guarding.                                             EXTREMITIES:  No edema.     MENTAL STATUS:  Alert and oriented.    ASSESSMENT/PLAN:     Assessment: Colorectal cancer screening.  ALESSANDRA.    Plan: Colonoscopy    Anesthesia Plan:   MAC / General Anaesthesia    ASA Grade: ASA 2 - Patient with mild systemic disease with no functional limitations    MALLAMPATI SCORE: I (soft palate, uvula, fauces, and tonsillar pillars visible)

## 2020-10-27 ENCOUNTER — HOSPITAL ENCOUNTER (OUTPATIENT)
Facility: HOSPITAL | Age: 70
Discharge: HOME | End: 2020-10-27
Attending: INTERNAL MEDICINE | Admitting: INTERNAL MEDICINE
Payer: MEDICARE

## 2020-10-27 ENCOUNTER — ANESTHESIA (OUTPATIENT)
Dept: ENDOSCOPY | Facility: HOSPITAL | Age: 70
End: 2020-10-27
Payer: MEDICARE

## 2020-10-27 VITALS
RESPIRATION RATE: 18 BRPM | HEIGHT: 65 IN | BODY MASS INDEX: 24.66 KG/M2 | HEART RATE: 60 BPM | DIASTOLIC BLOOD PRESSURE: 78 MMHG | TEMPERATURE: 98 F | OXYGEN SATURATION: 100 % | SYSTOLIC BLOOD PRESSURE: 120 MMHG | WEIGHT: 148 LBS

## 2020-10-27 DIAGNOSIS — Z12.11 COLON CANCER SCREENING: ICD-10-CM

## 2020-10-27 PROCEDURE — 88305 TISSUE EXAM BY PATHOLOGIST: CPT | Mod: 59 | Performed by: PATHOLOGY

## 2020-10-27 PROCEDURE — 45385 COLONOSCOPY W/LESION REMOVAL: CPT | Mod: PO | Performed by: INTERNAL MEDICINE

## 2020-10-27 PROCEDURE — 45385 PR COLONOSCOPY,REMV LESN,SNARE: ICD-10-PCS | Mod: PT,,, | Performed by: INTERNAL MEDICINE

## 2020-10-27 PROCEDURE — 37000009 HC ANESTHESIA EA ADD 15 MINS: Mod: PO | Performed by: INTERNAL MEDICINE

## 2020-10-27 PROCEDURE — 63600175 PHARM REV CODE 636 W HCPCS: Mod: PO | Performed by: NURSE ANESTHETIST, CERTIFIED REGISTERED

## 2020-10-27 PROCEDURE — 27201089 HC SNARE, DISP (ANY): Mod: PO | Performed by: INTERNAL MEDICINE

## 2020-10-27 PROCEDURE — 88305 TISSUE EXAM BY PATHOLOGIST: ICD-10-PCS | Mod: 26,,, | Performed by: PATHOLOGY

## 2020-10-27 PROCEDURE — 88305 TISSUE EXAM BY PATHOLOGIST: CPT | Mod: 26,,, | Performed by: PATHOLOGY

## 2020-10-27 PROCEDURE — 37000008 HC ANESTHESIA 1ST 15 MINUTES: Mod: PO | Performed by: INTERNAL MEDICINE

## 2020-10-27 PROCEDURE — D9220A PRA ANESTHESIA: Mod: PT,CRNA,, | Performed by: NURSE ANESTHETIST, CERTIFIED REGISTERED

## 2020-10-27 PROCEDURE — 25000003 PHARM REV CODE 250: Mod: PO | Performed by: NURSE ANESTHETIST, CERTIFIED REGISTERED

## 2020-10-27 PROCEDURE — D9220A PRA ANESTHESIA: ICD-10-PCS | Mod: PT,ANES,, | Performed by: ANESTHESIOLOGY

## 2020-10-27 PROCEDURE — D9220A PRA ANESTHESIA: ICD-10-PCS | Mod: PT,CRNA,, | Performed by: NURSE ANESTHETIST, CERTIFIED REGISTERED

## 2020-10-27 PROCEDURE — 45385 COLONOSCOPY W/LESION REMOVAL: CPT | Mod: PT,,, | Performed by: INTERNAL MEDICINE

## 2020-10-27 PROCEDURE — 63600175 PHARM REV CODE 636 W HCPCS: Mod: PO | Performed by: INTERNAL MEDICINE

## 2020-10-27 PROCEDURE — D9220A PRA ANESTHESIA: Mod: PT,ANES,, | Performed by: ANESTHESIOLOGY

## 2020-10-27 RX ORDER — LIDOCAINE HCL/PF 100 MG/5ML
SYRINGE (ML) INTRAVENOUS
Status: DISCONTINUED | OUTPATIENT
Start: 2020-10-27 | End: 2020-10-27

## 2020-10-27 RX ORDER — PROPOFOL 10 MG/ML
VIAL (ML) INTRAVENOUS
Status: DISCONTINUED | OUTPATIENT
Start: 2020-10-27 | End: 2020-10-27

## 2020-10-27 RX ORDER — SODIUM CHLORIDE 0.9 % (FLUSH) 0.9 %
10 SYRINGE (ML) INJECTION
Status: DISCONTINUED | OUTPATIENT
Start: 2020-10-27 | End: 2020-10-27 | Stop reason: HOSPADM

## 2020-10-27 RX ORDER — SODIUM CHLORIDE, SODIUM LACTATE, POTASSIUM CHLORIDE, CALCIUM CHLORIDE 600; 310; 30; 20 MG/100ML; MG/100ML; MG/100ML; MG/100ML
INJECTION, SOLUTION INTRAVENOUS CONTINUOUS
Status: DISCONTINUED | OUTPATIENT
Start: 2020-10-27 | End: 2020-10-27 | Stop reason: HOSPADM

## 2020-10-27 RX ADMIN — PROPOFOL 50 MG: 10 INJECTION, EMULSION INTRAVENOUS at 09:10

## 2020-10-27 RX ADMIN — PROPOFOL 50 MG: 10 INJECTION, EMULSION INTRAVENOUS at 10:10

## 2020-10-27 RX ADMIN — PROPOFOL 100 MG: 10 INJECTION, EMULSION INTRAVENOUS at 09:10

## 2020-10-27 RX ADMIN — SODIUM CHLORIDE, SODIUM LACTATE, POTASSIUM CHLORIDE, AND CALCIUM CHLORIDE: .6; .31; .03; .02 INJECTION, SOLUTION INTRAVENOUS at 09:10

## 2020-10-27 RX ADMIN — LIDOCAINE HYDROCHLORIDE 100 MG: 20 INJECTION, SOLUTION INTRAVENOUS at 09:10

## 2020-10-27 NOTE — TRANSFER OF CARE
"Anesthesia Transfer of Care Note    Patient: Yara Whitehead    Procedure(s) Performed: Procedure(s) (LRB):  COLONOSCOPY (N/A)    Patient location: PACU    Anesthesia Type: general    Transport from OR: Transported from OR on room air with adequate spontaneous ventilation    Post pain: adequate analgesia    Post assessment: no apparent anesthetic complications and tolerated procedure well    Post vital signs: stable    Level of consciousness: sedated and responds to stimulation    Nausea/Vomiting: no nausea/vomiting    Complications: none    Transfer of care protocol was followed      Last vitals:   Visit Vitals  BP (!) 180/74 (BP Location: Right arm, Patient Position: Lying)   Pulse (!) 57   Temp 36.8 °C (98.2 °F) (Skin)   Resp 18   Ht 5' 5" (1.651 m)   Wt 67.1 kg (148 lb)   LMP  (LMP Unknown)   SpO2 100%   Breastfeeding No   BMI 24.63 kg/m²     "

## 2020-10-27 NOTE — BRIEF OP NOTE
Discharge Note  Short Stay      SUMMARY     Admit Date: 10/27/2020    Attending Physician: Bud Valentin Jr., MD     Discharge Physician: Bud Valentin Jr., MD    Discharge Date: 10/27/2020 10:28 AM    Final Diagnosis: Colon cancer screening [Z12.11]  Impression:          - One 2 to 3 mm polyp at the splenic flexure,                        removed with a cold snare. Resected and retrieved.                        - One 2 mm polyp at the hepatic flexure, removed                        with a cold snare. Resected and retrieved.                        - Redundant colon.                        - Non-bleeding internal hemorrhoids.                        - The examination was otherwise normal.                        - The examined portion of the ileum was normal.   Recommendation:      - Discharge patient to home.                        - Await pathology results.                        - Repeat colonoscopy in 5 years for surveillance.                        - High fiber diet and diabetic (ADA) diet.                        - Continue present medications.                        - Resume Xarelto (rivaroxaban) at prior dose                        tomorrow.                        - Call the [facility] in 2 weeks for reports (if                        you haven't heard from us sooner) [phone].                        - Patient has a contact number available for                        emergencies. The signs and symptoms of potential                        delayed complications were discussed with the                        patient. Return to normal activities tomorrow.                        Written discharge instructions were provided to the                        patient.                        - Return to normal activities tomorrow.   Bud Valentin MD   10/27/2020      Disposition: HOME OR SELF CARE    Patient Instructions:   Current Discharge Medication List      CONTINUE these medications which have NOT  CHANGED    Details   atorvastatin (LIPITOR) 10 MG tablet TAKE 1 TABLET(10 MG) BY MOUTH EVERY DAY  Qty: 90 tablet, Refills: 4    Associated Diagnoses: Hyperlipidemia, unspecified hyperlipidemia type      !! blood sugar diagnostic Strp To check BG 1 times daily, to use with insurance preferred meter  Qty: 100 strip, Refills: 3    Associated Diagnoses: Elevated glucose level      !! blood sugar diagnostic Strp qac and PRN lows. Freestyle vicky test strips  Qty: 100 strip, Refills: prn    Comments: Provide lancets with strips  Associated Diagnoses: Hypoglycemia      blood-glucose meter (BLOOD GLUCOSE MONITORING) kit Use as instructed.  Freestyle vicky glucometer  Qty: 1 each, Refills: 0    Associated Diagnoses: Hypoglycemia      buPROPion (WELLBUTRIN XL) 150 MG TB24 tablet Take 1 tablet (150 mg total) by mouth once daily.  Qty: 90 tablet, Refills: 3    Associated Diagnoses: Major depressive disorder, recurrent, mild      cetirizine (ZYRTEC) 10 MG tablet Take 10 mg by mouth once daily.      cyanocobalamin 1,000 mcg/mL injection INJECT 1 ML INTO THE MUSCLE EVERY 14 DAYS  Qty: 6 mL, Refills: 3    Associated Diagnoses: Vitamin B 12 deficiency      estradioL (ESTRACE) 0.01 % (0.1 mg/gram) vaginal cream Place 1 g vaginally every other day.  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 lancets Misc To check BG 1 times daily, to use with insurance preferred meter  Qty: 100 each, Refills: 3    Associated Diagnoses: Elevated glucose level      levothyroxine (SYNTHROID) 88 MCG tablet Take 1 tablet (88 mcg total) by mouth before breakfast.  Qty:  90 tablet, Refills: 3      metroNIDAZOLE (METROGEL) 0.75 % gel Apply topically 2 (two) times daily.  Qty: 45 g, Refills: 1    Associated Diagnoses: Periorificial dermatitis      metroNIDAZOLE (METROGEL) 0.75 % vaginal gel Place 1 applicator vaginally every other day.  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  Qty: 90 tablet, Refills: 3    Associated Diagnoses: Urinary incontinence, mixed      paroxetine (PAXIL) 40 MG tablet TAKE 1 TABLET(40 MG) BY MOUTH EVERY MORNING  Qty: 90 tablet, Refills: 2      rivaroxaban (XARELTO) 20 mg Tab Take 1 tablet (20 mg total) by mouth every evening.  Qty: 90 tablet, Refills: 4      telmisartan (MICARDIS) 40 MG Tab Take 1 tablet (40 mg total) by mouth once daily.  Qty: 90 tablet, Refills: 2    Comments: .      !! TRUEPLUS LANCETS 30 gauge Misc USE TO TEST BLOOD SUGAR BID  Refills: 0      albuterol (PROVENTIL/VENTOLIN HFA) 90 mcg/actuation inhaler Inhale 1-2 puffs into the lungs every 6 (six) hours as needed for Wheezing.  Qty: 18 g, Refills: 6      blood-glucose meter,continuous (DEXCOM G6 ) Misc To use for hypoglycemic episodes  Qty: 1 each, Refills: 0    Associated Diagnoses: Postprandial hypoglycemia      blood-glucose sensor (DEXCOM G6 SENSOR) Rebeca To use for hypoglycemia  Qty: 12 each, Refills: 3    Associated Diagnoses: Postprandial hypoglycemia      blood-glucose transmitter (DEXCOM G6 TRANSMITTER) Rebeca To use for hypoglycemia  Qty: 1 Device, Refills: 0    Associated Diagnoses: Postprandial hypoglycemia      fluticasone (FLONASE) 50 mcg/actuation nasal spray 2 sprays (100 mcg total) by Each Nare route once daily.  Qty: 1 Bottle, Refills: 6    Associated Diagnoses: Chronic non-seasonal allergic rhinitis, unspecified trigger      metoprolol tartrate (LOPRESSOR) 25 MG tablet Take 1 tablet  (25 mg total) by mouth 3 (three) times daily. As needed  Qty: 90 tablet, Refills: 11    Comments: .      mirabegron (MYRBETRIQ) 50 mg Tb24 Take 1 tablet (50 mg total) by mouth once daily.  Qty: 30 tablet, Refills: 11    Associated Diagnoses: Urinary incontinence, mixed       !! - Potential duplicate medications found. Please discuss with provider.          Discharge Procedure Orders (must include Diet, Follow-up, Activity)    Follow Up:  Follow up with PCP as per your routine.  Please follow a high fiber ADA diet.  Activity as tolerated.    No driving day of procedure.    PROBIOTICS:  Now that your colon is so cleaned out, now is a good time for a round of PROBIOTICS.   Take a similar Probiotic product such as Align or Culturelle or Diana-Q, every day for a month.                  (The products listed are non-prescription, but you may need to ask the pharmacist for their location.)  Repeat this at least 4-6 times a year.

## 2020-10-27 NOTE — DISCHARGE INSTRUCTIONS
Recovery After Procedural Sedation (Adult)   You have been given medicine by vein to make you sleep during your surgery. This may have included both a pain medicine and sleeping medicine. Most of the effects have worn off. But you may still have some drowsiness for the next 6 to 8 hours.  Home care  Follow these guidelines when you get home:  · For the next 8 hours, you should be watched by a responsible adult. This person should make sure your condition is not getting worse.  · Don't drink any alcohol for the next 24 hours.  · Don't drive, operate dangerous machinery, or make important business or personal decisions during the next 24 hours.  · To prevent injury or falls, use caution when standing and walking for at least 24 hours after your procedure.  Note: Your healthcare provider may tell you not to take any medicine by mouth for pain or sleep in the next 4 hours. These medicines may react with the medicines you were given in the hospital. This could cause a much stronger response than usual.  Follow-up care  Follow up with your healthcare provider if you are not alert and back to your usual level of activity within 12 hours.  When to seek medical advice  Call your healthcare provider right away if any of these occur:  · Drowsiness gets worse  · Weakness or dizziness gets worse  · Repeated vomiting  · You can't be awakened  · Fever  · New rash  [employer] last reviewed this educational content on 9/1/2019  © 3132-8466 [employer]. [address] [place] [address]. All rights reserved. This information is not intended as a substitute for professional medical care. Always follow your healthcare professional's instructions.        PROBIOTICS:  Now that your colon is so cleaned out, now is a good time for a round of PROBIOTICS.   Take a similar Probiotic product such as Align or Culturelle or Diana-Q, every day for a month.                  (The products listed are non-prescription, but you  may need to ask the pharmacist for their location.)  Repeat this at least 4-6 times a year.          High-Fiber Diet  Fiber is in fruits, vegetables, cereals, and grains. Fiber passes through your body undigested. A high-fiber diet helps food move through your intestinal tract. The added bulk is helpful in preventing constipation. In people with diverticulosis, fiber helps clean out the pouches along the colon wall. It also prevents new pouches from forming. A high-fiber diet reduces the risk of colon cancer. It also lowers blood cholesterol and prevents high blood sugar in people with diabetes.    The fiber-rich foods listed below should be part of your diet. If you are not used to high-fiber foods, start with 1 or 2 foods from this list. Every 3 to 4 days add a new one to your diet. Do this until you are eating 4 high-fiber foods per day. This should give you 20 to 35 grams of fiber a day. It is also important to drink a lot of water when you are on this diet. You should have 6 to 8 glasses of water a day. Water makes the fiber swell and increases the benefit.  Foods high in dietary fiber  The following foods are high in dietary fiber:  · Breads. Breads made with 100% whole-wheat flour; tiny, wheat, or rye crackers; whole-grain tortillas, bran muffins.  · Cereals. Whole-grain and bran cereals with bran (shredded wheat, wheat flakes, raisin bran, corn bran); oatmeal, rolled oats, granola, and brown rice.  · Fruits. Fresh fruits and their edible skins (pears, prunes, raisins, berries, apples, and apricots); bananas, citrus fruit, mangoes, pineapple; and prune juice.  · Nuts. Any nuts and seeds.  · Vegetables. Best served raw or lightly cooked. All types, especially: green peas, celery, eggplant, potatoes, spinach, broccoli, [place] sprouts, winter squash, carrots, cauliflower, soybeans, lentils, and fresh and dried beans of all kinds.  · Other. Popcorn, any spices.  Date Last Reviewed: 8/1/2016  © 8976-5861 The  [employer]. [address] [place] [address], [place] [address]. All rights reserved. This information is not intended as a substitute for professional medical care. Always follow your healthcare professional's instructions.

## 2020-10-27 NOTE — ANESTHESIA POSTPROCEDURE EVALUATION
Anesthesia Post Evaluation    Patient: Yara Whitehead    Procedure(s) Performed: Procedure(s) (LRB):  COLONOSCOPY (N/A)    Final Anesthesia Type: general    Patient location during evaluation: PACU  Patient participation: Yes- Able to Participate  Level of consciousness: awake and alert, oriented and awake  Post-procedure vital signs: reviewed and stable  Pain management: adequate  Airway patency: patent    PONV status at discharge: No PONV  Anesthetic complications: no      Cardiovascular status: blood pressure returned to baseline and hemodynamically stable  Respiratory status: unassisted, spontaneous ventilation and room air  Hydration status: euvolemic  Follow-up not needed.          Vitals Value Taken Time   /78 10/27/20 1033   Temp 36.6 °C (97.8 °F) 10/27/20 1006   Pulse 60 10/27/20 1033   Resp 18 10/27/20 1033   SpO2 100 % 10/27/20 1033         Event Time   Out of Recovery 10:47:39         Pain/Fernando Score: Fernando Score: 9 (10/27/2020 10:06 AM)

## 2020-10-27 NOTE — ANESTHESIA PREPROCEDURE EVALUATION
10/27/2020  Yara Whitehead is a 70 y.o., female.    Anesthesia Evaluation    I have reviewed the Patient Summary Reports.    I have reviewed the Nursing Notes.       Review of Systems  Anesthesia Hx:  No problems with previous Anesthesia    Cardiovascular:   Hypertension Dysrhythmias    Pulmonary:   Sleep Apnea    Renal/:   Chronic Renal Disease    Hepatic/GI:   GERD    Endocrine:   Hypothyroidism    Psych:   Psychiatric History          Physical Exam  General:  Well nourished    Airway/Jaw/Neck:  Airway Findings: Mouth Opening: Normal Tongue: Normal  General Airway Assessment: Adult  Mallampati: I  TM Distance: Normal, at least 6 cm  Jaw/Neck Findings:  Neck ROM: Normal ROM     Eyes/Ears/Nose:  Eyes/Ears/Nose Findings:    Dental:  Dental Findings:   Chest/Lungs:  Chest/Lungs Findings: Normal Respiratory Rate     Heart/Vascular:  Heart Findings: Rate: Normal  Rhythm: Regular Rhythm        Mental Status:  Mental Status Findings:  Cooperative, Alert and Oriented         Anesthesia Plan  Type of Anesthesia, risks & benefits discussed:  Anesthesia Type:  general  Patient's Preference: General  Intra-op Monitoring Plan: standard ASA monitors  Intra-op Monitoring Plan Comments:   Post Op Pain Control Plan:   Post Op Pain Control Plan Comments:   Induction:   IV  Beta Blocker:  Patient is not currently on a Beta-Blocker (No further documentation required).       Informed Consent: Patient understands risks and agrees with Anesthesia plan.  Questions answered. Anesthesia consent signed with patient.  ASA Score: 3     Day of Surgery Review of History & Physical:    H&P update referred to the surgeon.         Ready For Surgery From Anesthesia Perspective.

## 2020-10-27 NOTE — [employer] PATIENT INSTRUCTIONS
Discharge Summary/Instructions for after Colonoscopy with   Biopsy/Polypectomy  Yara Whitehead    Tuesday, October 27, 2020  Bud Valentin MD  RESTRICTIONS ON ACTIVITY:  - Do not drive a car or operate machinery until the day after the procedure.      - The following day: return to full activity including work.  - For  3 days: No heavy lifting, straining or running.  - Diet: You can have solid foods, but no gassy foods (i.e. beans, broccoli,   cabbage, etc).  TREATMENT FOR COMMON SIDE EFFECTS:  - Mild abdominal pain and bloating or excessive gas: rest, eat lightly and   use a heating pad.  SYMPTOMS TO WATCH FOR AND REPORT TO YOUR PHYSICIAN:  1. Severe abdominal pain.  2. Fever within 24 hours after a procedure.  3. A large amount of rectal bleeding. (A small amount of blood from the   rectum is not serious, especially if hemorrhoids are present.  3.  Because air was put into your colon during the procedure, expelling   large amounts of air from your rectum is normal.  4.  You may not have a bowel movement for 1-3 days because of the   colonoscopy prep.  This is normal.  5.  Call immediately if you notice any of the following:   Chills and/or fever over 101   Persistent vomiting   Severe abdominal pain, other than gas cramps   Severe chest pain   Black, tarry stools   Any bleeding - exceeding one tablespoon  Your doctor recommends these additional instructions:  We are waiting for your pathology results.   Your physician has recommended a repeat colonoscopy in 5 years for   surveillance.   Eat a high fiber diet and eat a diabetic (ADA) diet.   Continue your present medications.   Resume taking Xarelto (rivaroxaban) at your prior dose tomorrow.  None  If you have any questions or problems, please call your physician.  EMERGENCY PHONE NUMBER: [phone]  LAB RESULTS: Call in two (2) weeks for lab results, [phone]  ___________________________________________  Nurse  Signature  ___________________________________________  Patient/Designated Responsible Party Signature  Bud Valentin MD  10/27/2020 10:27:09 AM  This report has been verified and signed electronically.  [employer]

## 2020-10-31 ENCOUNTER — PATIENT MESSAGE (OUTPATIENT)
Dept: CARDIOLOGY | Facility: CLINIC | Age: 70
End: 2020-10-31

## 2020-11-03 LAB
FINAL PATHOLOGIC DIAGNOSIS: NORMAL
GROSS: NORMAL
Lab: NORMAL

## 2020-11-10 ENCOUNTER — CLINICAL SUPPORT (OUTPATIENT)
Dept: CARDIOLOGY | Facility: CLINIC | Age: 70
End: 2020-11-10
Payer: MEDICARE

## 2020-11-10 DIAGNOSIS — Z95.818 PRESENCE OF OTHER CARDIAC IMPLANTS AND GRAFTS: ICD-10-CM

## 2020-11-10 PROCEDURE — 93298 REM INTERROG DEV EVAL SCRMS: CPT | Mod: S$GLB,,, | Performed by: INTERNAL MEDICINE

## 2020-11-10 PROCEDURE — 93298 CARDIAC DEVICE CHECK - REMOTE: ICD-10-PCS | Mod: S$GLB,,, | Performed by: INTERNAL MEDICINE

## 2020-11-12 ENCOUNTER — PATIENT MESSAGE (OUTPATIENT)
Dept: CARDIOLOGY | Facility: CLINIC | Age: 70
End: 2020-11-12

## 2020-11-19 ENCOUNTER — LAB VISIT (OUTPATIENT)
Dept: LAB | Facility: HOSPITAL | Age: 70
End: 2020-11-19
Attending: INTERNAL MEDICINE
Payer: MEDICARE

## 2020-11-19 DIAGNOSIS — D50.9 IRON DEFICIENCY ANEMIA, UNSPECIFIED IRON DEFICIENCY ANEMIA TYPE: ICD-10-CM

## 2020-11-19 DIAGNOSIS — E53.8 VITAMIN B 12 DEFICIENCY: ICD-10-CM

## 2020-11-19 DIAGNOSIS — E78.5 HYPERLIPIDEMIA, UNSPECIFIED HYPERLIPIDEMIA TYPE: ICD-10-CM

## 2020-11-19 DIAGNOSIS — E03.9 HYPOTHYROIDISM, UNSPECIFIED TYPE: ICD-10-CM

## 2020-11-19 DIAGNOSIS — E16.2 HYPOGLYCEMIC SYNDROME: ICD-10-CM

## 2020-11-19 DIAGNOSIS — E55.9 VITAMIN D DEFICIENCY: ICD-10-CM

## 2020-11-19 LAB
25(OH)D3+25(OH)D2 SERPL-MCNC: 29 NG/ML (ref 30–96)
ALBUMIN SERPL BCP-MCNC: 4.1 G/DL (ref 3.5–5.2)
ALP SERPL-CCNC: 69 U/L (ref 55–135)
ALT SERPL W/O P-5'-P-CCNC: 21 U/L (ref 10–44)
ANION GAP SERPL CALC-SCNC: 10 MMOL/L (ref 8–16)
AST SERPL-CCNC: 27 U/L (ref 10–40)
BASOPHILS # BLD AUTO: 0.01 K/UL (ref 0–0.2)
BASOPHILS NFR BLD: 0.2 % (ref 0–1.9)
BILIRUB SERPL-MCNC: 0.6 MG/DL (ref 0.1–1)
BUN SERPL-MCNC: 14 MG/DL (ref 8–23)
CALCIUM SERPL-MCNC: 9.3 MG/DL (ref 8.7–10.5)
CHLORIDE SERPL-SCNC: 102 MMOL/L (ref 95–110)
CO2 SERPL-SCNC: 30 MMOL/L (ref 23–29)
CREAT SERPL-MCNC: 0.7 MG/DL (ref 0.5–1.4)
DIFFERENTIAL METHOD: ABNORMAL
EOSINOPHIL # BLD AUTO: 0 K/UL (ref 0–0.5)
EOSINOPHIL NFR BLD: 0.7 % (ref 0–8)
ERYTHROCYTE [DISTWIDTH] IN BLOOD BY AUTOMATED COUNT: 13.8 % (ref 11.5–14.5)
EST. GFR  (AFRICAN AMERICAN): >60 ML/MIN/1.73 M^2
EST. GFR  (NON AFRICAN AMERICAN): >60 ML/MIN/1.73 M^2
ESTIMATED AVG GLUCOSE: 91 MG/DL (ref 68–131)
FERRITIN SERPL-MCNC: 39 NG/ML (ref 20–300)
GLUCOSE SERPL-MCNC: 86 MG/DL (ref 70–110)
HBA1C MFR BLD HPLC: 4.8 % (ref 4–5.6)
HCT VFR BLD AUTO: 43.6 % (ref 37–48.5)
HGB BLD-MCNC: 13.9 G/DL (ref 12–16)
IMM GRANULOCYTES # BLD AUTO: 0.01 K/UL (ref 0–0.04)
IMM GRANULOCYTES NFR BLD AUTO: 0.2 % (ref 0–0.5)
IRON SERPL-MCNC: 162 UG/DL (ref 30–160)
LYMPHOCYTES # BLD AUTO: 1.2 K/UL (ref 1–4.8)
LYMPHOCYTES NFR BLD: 21.5 % (ref 18–48)
MCH RBC QN AUTO: 34.3 PG (ref 27–31)
MCHC RBC AUTO-ENTMCNC: 31.9 G/DL (ref 32–36)
MCV RBC AUTO: 108 FL (ref 82–98)
MONOCYTES # BLD AUTO: 0.3 K/UL (ref 0.3–1)
MONOCYTES NFR BLD: 5 % (ref 4–15)
NEUTROPHILS # BLD AUTO: 4.1 K/UL (ref 1.8–7.7)
NEUTROPHILS NFR BLD: 72.4 % (ref 38–73)
NRBC BLD-RTO: 0 /100 WBC
PLATELET # BLD AUTO: 245 K/UL (ref 150–350)
PMV BLD AUTO: 10.7 FL (ref 9.2–12.9)
POTASSIUM SERPL-SCNC: 4.5 MMOL/L (ref 3.5–5.1)
PROT SERPL-MCNC: 7.3 G/DL (ref 6–8.4)
RBC # BLD AUTO: 4.05 M/UL (ref 4–5.4)
SATURATED IRON: 38 % (ref 20–50)
SODIUM SERPL-SCNC: 142 MMOL/L (ref 136–145)
TOTAL IRON BINDING CAPACITY: 429 UG/DL (ref 250–450)
TRANSFERRIN SERPL-MCNC: 290 MG/DL (ref 200–375)
TSH SERPL DL<=0.005 MIU/L-ACNC: 1.5 UIU/ML (ref 0.4–4)
VIT B12 SERPL-MCNC: >2000 PG/ML (ref 210–950)
WBC # BLD AUTO: 5.59 K/UL (ref 3.9–12.7)

## 2020-11-19 PROCEDURE — 83036 HEMOGLOBIN GLYCOSYLATED A1C: CPT

## 2020-11-19 PROCEDURE — 84443 ASSAY THYROID STIM HORMONE: CPT

## 2020-11-19 PROCEDURE — 83540 ASSAY OF IRON: CPT

## 2020-11-19 PROCEDURE — 82728 ASSAY OF FERRITIN: CPT

## 2020-11-19 PROCEDURE — 82607 VITAMIN B-12: CPT

## 2020-11-19 PROCEDURE — 85025 COMPLETE CBC W/AUTO DIFF WBC: CPT

## 2020-11-19 PROCEDURE — 36415 COLL VENOUS BLD VENIPUNCTURE: CPT | Mod: PO

## 2020-11-19 PROCEDURE — 80053 COMPREHEN METABOLIC PANEL: CPT

## 2020-11-19 PROCEDURE — 82306 VITAMIN D 25 HYDROXY: CPT

## 2020-11-20 ENCOUNTER — PATIENT MESSAGE (OUTPATIENT)
Dept: FAMILY MEDICINE | Facility: CLINIC | Age: 70
End: 2020-11-20

## 2020-11-20 DIAGNOSIS — E53.8 VITAMIN B 12 DEFICIENCY: ICD-10-CM

## 2020-11-23 ENCOUNTER — OFFICE VISIT (OUTPATIENT)
Dept: CARDIOLOGY | Facility: CLINIC | Age: 70
End: 2020-11-23
Payer: MEDICARE

## 2020-11-23 VITALS
BODY MASS INDEX: 25.49 KG/M2 | HEIGHT: 65 IN | HEART RATE: 69 BPM | WEIGHT: 153 LBS | SYSTOLIC BLOOD PRESSURE: 126 MMHG | DIASTOLIC BLOOD PRESSURE: 78 MMHG

## 2020-11-23 DIAGNOSIS — E78.2 MIXED HYPERLIPIDEMIA: Primary | ICD-10-CM

## 2020-11-23 DIAGNOSIS — Z45.09 ENCOUNTER FOR LOOP RECORDER AT END OF BATTERY LIFE: ICD-10-CM

## 2020-11-23 DIAGNOSIS — I48.0 PAF (PAROXYSMAL ATRIAL FIBRILLATION): Chronic | ICD-10-CM

## 2020-11-23 DIAGNOSIS — I10 ESSENTIAL HYPERTENSION: Chronic | ICD-10-CM

## 2020-11-23 DIAGNOSIS — E03.9 ACQUIRED HYPOTHYROIDISM: ICD-10-CM

## 2020-11-23 PROCEDURE — 99499 RISK ADDL DX/OHS AUDIT: ICD-10-PCS | Mod: S$GLB,,, | Performed by: PHYSICIAN ASSISTANT

## 2020-11-23 PROCEDURE — 3074F PR MOST RECENT SYSTOLIC BLOOD PRESSURE < 130 MM HG: ICD-10-PCS | Mod: CPTII,S$GLB,, | Performed by: PHYSICIAN ASSISTANT

## 2020-11-23 PROCEDURE — 3078F PR MOST RECENT DIASTOLIC BLOOD PRESSURE < 80 MM HG: ICD-10-PCS | Mod: CPTII,S$GLB,, | Performed by: PHYSICIAN ASSISTANT

## 2020-11-23 PROCEDURE — 1126F PR PAIN SEVERITY QUANTIFIED, NO PAIN PRESENT: ICD-10-PCS | Mod: S$GLB,,, | Performed by: PHYSICIAN ASSISTANT

## 2020-11-23 PROCEDURE — 3288F FALL RISK ASSESSMENT DOCD: CPT | Mod: CPTII,S$GLB,, | Performed by: PHYSICIAN ASSISTANT

## 2020-11-23 PROCEDURE — 3074F SYST BP LT 130 MM HG: CPT | Mod: CPTII,S$GLB,, | Performed by: PHYSICIAN ASSISTANT

## 2020-11-23 PROCEDURE — 99999 PR PBB SHADOW E&M-EST. PATIENT-LVL IV: CPT | Mod: PBBFAC,,, | Performed by: PHYSICIAN ASSISTANT

## 2020-11-23 PROCEDURE — 3078F DIAST BP <80 MM HG: CPT | Mod: CPTII,S$GLB,, | Performed by: PHYSICIAN ASSISTANT

## 2020-11-23 PROCEDURE — 3008F PR BODY MASS INDEX (BMI) DOCUMENTED: ICD-10-PCS | Mod: CPTII,S$GLB,, | Performed by: PHYSICIAN ASSISTANT

## 2020-11-23 PROCEDURE — 3288F PR FALLS RISK ASSESSMENT DOCUMENTED: ICD-10-PCS | Mod: CPTII,S$GLB,, | Performed by: PHYSICIAN ASSISTANT

## 2020-11-23 PROCEDURE — 99499 UNLISTED E&M SERVICE: CPT | Mod: S$GLB,,, | Performed by: PHYSICIAN ASSISTANT

## 2020-11-23 PROCEDURE — 99214 OFFICE O/P EST MOD 30 MIN: CPT | Mod: S$GLB,,, | Performed by: PHYSICIAN ASSISTANT

## 2020-11-23 PROCEDURE — 99214 PR OFFICE/OUTPT VISIT, EST, LEVL IV, 30-39 MIN: ICD-10-PCS | Mod: S$GLB,,, | Performed by: PHYSICIAN ASSISTANT

## 2020-11-23 PROCEDURE — 1126F AMNT PAIN NOTED NONE PRSNT: CPT | Mod: S$GLB,,, | Performed by: PHYSICIAN ASSISTANT

## 2020-11-23 PROCEDURE — 99999 PR PBB SHADOW E&M-EST. PATIENT-LVL IV: ICD-10-PCS | Mod: PBBFAC,,, | Performed by: PHYSICIAN ASSISTANT

## 2020-11-23 PROCEDURE — 1101F PT FALLS ASSESS-DOCD LE1/YR: CPT | Mod: CPTII,S$GLB,, | Performed by: PHYSICIAN ASSISTANT

## 2020-11-23 PROCEDURE — 1101F PR PT FALLS ASSESS DOC 0-1 FALLS W/OUT INJ PAST YR: ICD-10-PCS | Mod: CPTII,S$GLB,, | Performed by: PHYSICIAN ASSISTANT

## 2020-11-23 PROCEDURE — 1159F MED LIST DOCD IN RCRD: CPT | Mod: S$GLB,,, | Performed by: PHYSICIAN ASSISTANT

## 2020-11-23 PROCEDURE — 3008F BODY MASS INDEX DOCD: CPT | Mod: CPTII,S$GLB,, | Performed by: PHYSICIAN ASSISTANT

## 2020-11-23 PROCEDURE — 1159F PR MEDICATION LIST DOCUMENTED IN MEDICAL RECORD: ICD-10-PCS | Mod: S$GLB,,, | Performed by: PHYSICIAN ASSISTANT

## 2020-11-23 NOTE — PROGRESS NOTES
"Subjective:    Patient ID:  Yara Whitehead is a 70 y.o. female who presents for follow-up of       [facility]  Ms. Whitehead is a delightful lady who follows with Dr. Garcia. She presents today for routine follow up. She is without cardiovascular complaints. No CP, BOWSER, or change in exercise tolerance. Notes very infrequent "skipped beats" -- not bothersome.     Most recent ILR interrogation showed no arrhythmias.     Review of Systems   Constitution: Negative for chills, diaphoresis, fever, weight gain and weight loss.   HENT: Negative for sore throat.    Eyes: Negative for blurred vision, vision loss in left eye, vision loss in right eye and visual disturbance.   Cardiovascular: Negative for chest pain, claudication, dyspnea on exertion, leg swelling, near-syncope, orthopnea, palpitations, paroxysmal nocturnal dyspnea and syncope.   Respiratory: Negative for cough, hemoptysis, shortness of breath, sputum production and wheezing.    Endocrine: Negative for cold intolerance and heat intolerance.   Hematologic/Lymphatic: Negative for adenopathy. Does not bruise/bleed easily.   Skin: Negative for rash.   Musculoskeletal: Negative for falls, muscle weakness and myalgias.   Gastrointestinal: Negative for abdominal pain, change in bowel habit, constipation, diarrhea, melena and nausea.   Genitourinary: Negative for bladder incontinence.   Neurological: Negative for dizziness, focal weakness, headaches, light-headedness, numbness and weakness.   Psychiatric/Behavioral: Negative for altered mental status.         Vitals:    11/23/20 1332   BP: 126/78   BP Location: Left arm   Patient Position: Sitting   Pulse: 69   Weight: 69.4 kg (153 lb)   Height: 5' 5" (1.651 m)   Body mass index is 25.46 kg/m².    Objective:    Physical Exam   Constitutional: She is oriented to person, place, and time. She appears well-developed and well-nourished. No distress.   HENT:   Head: Normocephalic and atraumatic.   Mouth/Throat: Oropharynx is clear " and moist.   Eyes: Pupils are equal, round, and reactive to light. Conjunctivae and EOM are normal. No scleral icterus.   Neck: Neck supple. No JVD present. No tracheal deviation present.   Cardiovascular: Normal rate and regular rhythm. Exam reveals no gallop and no friction rub.   Murmur heard.   Holosystolic murmur is present with a grade of 3/6 at the apex.  Pulmonary/Chest: Effort normal and breath sounds normal. No respiratory distress. She has no wheezes. She has no rales. She exhibits no tenderness.   Abdominal: Soft. Bowel sounds are normal. She exhibits no distension. There is no hepatosplenomegaly. There is no abdominal tenderness.   Musculoskeletal:         General: No tenderness or edema.   Neurological: She is alert and oriented to person, place, and time.   Skin: Skin is warm and dry. No rash noted. No erythema.   Psychiatric: She has a normal mood and affect. Her behavior is normal.     Test(s) Reviewed  I have reviewed the following in detail:  []?  Stress test   []?  Angiography   [x]?  Echocardiogram   [x]?  Labs   []?  Other:             Assessment:        ICD-10-CM ICD-9-CM    1. PAF (paroxysmal atrial fibrillation) I48.0 427.31   2. RBBB I45.10 426.4   3. Tachycardia-bradycardia I49.5 427.81   4. Status post placement of implantable loop recorder Z95.818 V45.09   5. Essential hypertension I10 401.9   6. Mixed hyperlipidemia E78.2 272.2           Problem List Items Addressed This Visit           Tachycardia-bradycardia      Status post placement of implantable loop recorder      RBBB      PAF (paroxysmal atrial fibrillation) - Primary (Chronic)      Overview        New dx 2/16/16            Hyperlipemia      Essential hypertension (Chronic)         Plan:       Continue current medications.   Discussed diet and exercise.   F/U with Dr. Garcia in 1 year.

## 2020-11-25 RX ORDER — CYANOCOBALAMIN 1000 UG/ML
1000 INJECTION, SOLUTION INTRAMUSCULAR; SUBCUTANEOUS
Qty: 6 ML | Refills: 3
Start: 2020-11-25 | End: 2021-06-04 | Stop reason: SDUPTHER

## 2020-11-25 NOTE — TELEPHONE ENCOUNTER
Will change her vitamin B 12 to once a month and continue sublingual.     Recheck vitamin B 12 in March at her appt then.

## 2020-12-05 ENCOUNTER — PATIENT MESSAGE (OUTPATIENT)
Dept: CARDIOLOGY | Facility: CLINIC | Age: 70
End: 2020-12-05

## 2020-12-10 ENCOUNTER — CLINICAL SUPPORT (OUTPATIENT)
Dept: CARDIOLOGY | Facility: CLINIC | Age: 70
End: 2020-12-10
Payer: MEDICARE

## 2020-12-10 DIAGNOSIS — Z95.818 PRESENCE OF OTHER CARDIAC IMPLANTS AND GRAFTS: ICD-10-CM

## 2020-12-10 PROCEDURE — 93298 REM INTERROG DEV EVAL SCRMS: CPT | Mod: S$GLB,,, | Performed by: INTERNAL MEDICINE

## 2020-12-10 PROCEDURE — 93298 CARDIAC DEVICE CHECK - REMOTE: ICD-10-PCS | Mod: S$GLB,,, | Performed by: INTERNAL MEDICINE

## 2021-01-04 ENCOUNTER — PATIENT MESSAGE (OUTPATIENT)
Dept: FAMILY MEDICINE | Facility: CLINIC | Age: 71
End: 2021-01-04

## 2021-01-09 ENCOUNTER — CLINICAL SUPPORT (OUTPATIENT)
Dept: CARDIOLOGY | Facility: CLINIC | Age: 71
End: 2021-01-09
Payer: MEDICARE

## 2021-01-09 DIAGNOSIS — Z95.818 PRESENCE OF OTHER CARDIAC IMPLANTS AND GRAFTS: ICD-10-CM

## 2021-01-09 PROCEDURE — 93298 REM INTERROG DEV EVAL SCRMS: CPT | Mod: S$GLB,,, | Performed by: INTERNAL MEDICINE

## 2021-01-09 PROCEDURE — 93298 CARDIAC DEVICE CHECK - REMOTE: ICD-10-PCS | Mod: S$GLB,,, | Performed by: INTERNAL MEDICINE

## 2021-02-05 ENCOUNTER — TELEPHONE (OUTPATIENT)
Dept: FAMILY MEDICINE | Facility: CLINIC | Age: 71
End: 2021-02-05

## 2021-02-05 RX ORDER — MELOXICAM 7.5 MG/1
7.5 TABLET ORAL DAILY
Qty: 90 TABLET | Refills: 1 | Status: SHIPPED | OUTPATIENT
Start: 2021-02-05 | End: 2021-05-26

## 2021-02-08 ENCOUNTER — CLINICAL SUPPORT (OUTPATIENT)
Dept: CARDIOLOGY | Facility: CLINIC | Age: 71
End: 2021-02-08
Payer: MEDICARE

## 2021-02-08 DIAGNOSIS — Z95.818 PRESENCE OF OTHER CARDIAC IMPLANTS AND GRAFTS: ICD-10-CM

## 2021-02-08 PROCEDURE — 93298 REM INTERROG DEV EVAL SCRMS: CPT | Mod: S$GLB,,, | Performed by: INTERNAL MEDICINE

## 2021-02-08 PROCEDURE — 93298 CARDIAC DEVICE CHECK - REMOTE: ICD-10-PCS | Mod: S$GLB,,, | Performed by: INTERNAL MEDICINE

## 2021-02-11 ENCOUNTER — PATIENT MESSAGE (OUTPATIENT)
Dept: FAMILY MEDICINE | Facility: CLINIC | Age: 71
End: 2021-02-11

## 2021-02-13 RX ORDER — VALACYCLOVIR HYDROCHLORIDE 1 G/1
1000 TABLET, FILM COATED ORAL 2 TIMES DAILY
Qty: 14 TABLET | Refills: 1 | Status: SHIPPED | OUTPATIENT
Start: 2021-02-13 | End: 2021-08-01

## 2021-02-14 ENCOUNTER — PATIENT MESSAGE (OUTPATIENT)
Dept: FAMILY MEDICINE | Facility: CLINIC | Age: 71
End: 2021-02-14

## 2021-02-26 ENCOUNTER — IMMUNIZATION (OUTPATIENT)
Dept: PRIMARY CARE CLINIC | Facility: CLINIC | Age: 71
End: 2021-02-26
Payer: MEDICARE

## 2021-02-26 DIAGNOSIS — Z23 NEED FOR VACCINATION: Primary | ICD-10-CM

## 2021-02-26 PROCEDURE — 91300 COVID-19, MRNA, LNP-S, PF, 30 MCG/0.3 ML DOSE VACCINE: CPT | Mod: S$GLB,,, | Performed by: FAMILY MEDICINE

## 2021-02-26 PROCEDURE — 0001A COVID-19, MRNA, LNP-S, PF, 30 MCG/0.3 ML DOSE VACCINE: CPT | Mod: CV19,S$GLB,, | Performed by: FAMILY MEDICINE

## 2021-02-26 PROCEDURE — 0001A COVID-19, MRNA, LNP-S, PF, 30 MCG/0.3 ML DOSE VACCINE: ICD-10-PCS | Mod: CV19,S$GLB,, | Performed by: FAMILY MEDICINE

## 2021-02-26 PROCEDURE — 91300 COVID-19, MRNA, LNP-S, PF, 30 MCG/0.3 ML DOSE VACCINE: ICD-10-PCS | Mod: S$GLB,,, | Performed by: FAMILY MEDICINE

## 2021-03-01 ENCOUNTER — TELEPHONE (OUTPATIENT)
Dept: FAMILY MEDICINE | Facility: CLINIC | Age: 71
End: 2021-03-01

## 2021-03-01 ENCOUNTER — OFFICE VISIT (OUTPATIENT)
Dept: FAMILY MEDICINE | Facility: CLINIC | Age: 71
End: 2021-03-01
Payer: MEDICARE

## 2021-03-01 VITALS
WEIGHT: 148.81 LBS | HEART RATE: 86 BPM | BODY MASS INDEX: 24.79 KG/M2 | SYSTOLIC BLOOD PRESSURE: 134 MMHG | DIASTOLIC BLOOD PRESSURE: 70 MMHG | RESPIRATION RATE: 20 BRPM | OXYGEN SATURATION: 96 % | HEIGHT: 65 IN | TEMPERATURE: 97 F

## 2021-03-01 DIAGNOSIS — T83.729S: ICD-10-CM

## 2021-03-01 DIAGNOSIS — I34.0 NONRHEUMATIC MITRAL VALVE REGURGITATION: ICD-10-CM

## 2021-03-01 DIAGNOSIS — K91.89 COMPLICATIONS OF GASTRIC BYPASS SURGERY: ICD-10-CM

## 2021-03-01 DIAGNOSIS — F33.0 MAJOR DEPRESSIVE DISORDER, RECURRENT, MILD: ICD-10-CM
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
## 2021-03-01 DIAGNOSIS — N39.46 URINARY INCONTINENCE, MIXED: ICD-10-CM

## 2021-03-01 DIAGNOSIS — E78.5 HYPERLIPIDEMIA, UNSPECIFIED HYPERLIPIDEMIA TYPE: ICD-10-CM

## 2021-03-01 DIAGNOSIS — E55.9 VITAMIN D DEFICIENCY: ICD-10-CM

## 2021-03-01 DIAGNOSIS — E03.9 HYPOTHYROIDISM, UNSPECIFIED TYPE: ICD-10-CM

## 2021-03-01 DIAGNOSIS — E16.2 HYPOGLYCEMIC SYNDROME: ICD-10-CM

## 2021-03-01 DIAGNOSIS — I10 ESSENTIAL HYPERTENSION: ICD-10-CM

## 2021-03-01 DIAGNOSIS — G31.84 MCI (MILD COGNITIVE IMPAIRMENT): ICD-10-CM

## 2021-03-01 DIAGNOSIS — T83.721S: ICD-10-CM

## 2021-03-01 DIAGNOSIS — D50.9 IRON DEFICIENCY ANEMIA, UNSPECIFIED IRON DEFICIENCY ANEMIA TYPE: ICD-10-CM

## 2021-03-01 DIAGNOSIS — K43.9 ABDOMINAL WALL HERNIA: ICD-10-CM

## 2021-03-01 DIAGNOSIS — E53.8 VITAMIN B12 DEFICIENCY: ICD-10-CM

## 2021-03-01 DIAGNOSIS — I48.0 PAF (PAROXYSMAL ATRIAL FIBRILLATION): Primary | ICD-10-CM

## 2021-03-01 PROCEDURE — 3078F DIAST BP <80 MM HG: CPT | Mod: CPTII,S$GLB,, | Performed by: INTERNAL MEDICINE

## 2021-03-01 PROCEDURE — 3008F BODY MASS INDEX DOCD: CPT | Mod: CPTII,S$GLB,, | Performed by: INTERNAL MEDICINE

## 2021-03-01 PROCEDURE — 1101F PR PT FALLS ASSESS DOC 0-1 FALLS W/OUT INJ PAST YR: ICD-10-PCS | Mod: CPTII,S$GLB,, | Performed by: INTERNAL MEDICINE

## 2021-03-01 PROCEDURE — 1159F PR MEDICATION LIST DOCUMENTED IN MEDICAL RECORD: ICD-10-PCS | Mod: S$GLB,,, | Performed by: INTERNAL MEDICINE

## 2021-03-01 PROCEDURE — 99499 UNLISTED E&M SERVICE: CPT | Mod: S$GLB,,, | Performed by: INTERNAL MEDICINE

## 2021-03-01 PROCEDURE — 3008F PR BODY MASS INDEX (BMI) DOCUMENTED: ICD-10-PCS | Mod: CPTII,S$GLB,, | Performed by: INTERNAL MEDICINE

## 2021-03-01 PROCEDURE — 1126F AMNT PAIN NOTED NONE PRSNT: CPT | Mod: S$GLB,,, | Performed by: INTERNAL MEDICINE

## 2021-03-01 PROCEDURE — 36415 PR COLLECTION VENOUS BLOOD,VENIPUNCTURE: ICD-10-PCS | Mod: S$GLB,,, | Performed by: INTERNAL MEDICINE

## 2021-03-01 PROCEDURE — 3288F PR FALLS RISK ASSESSMENT DOCUMENTED: ICD-10-PCS | Mod: CPTII,S$GLB,, | Performed by: INTERNAL MEDICINE

## 2021-03-01 PROCEDURE — 3075F SYST BP GE 130 - 139MM HG: CPT | Mod: CPTII,S$GLB,, | Performed by: INTERNAL MEDICINE

## 2021-03-01 PROCEDURE — 82607 VITAMIN B-12: CPT

## 2021-03-01 PROCEDURE — 1159F MED LIST DOCD IN RCRD: CPT | Mod: S$GLB,,, | Performed by: INTERNAL MEDICINE

## 2021-03-01 PROCEDURE — 36415 COLL VENOUS BLD VENIPUNCTURE: CPT | Mod: S$GLB,,, | Performed by: INTERNAL MEDICINE

## 2021-03-01 PROCEDURE — 3078F PR MOST RECENT DIASTOLIC BLOOD PRESSURE < 80 MM HG: ICD-10-PCS | Mod: CPTII,S$GLB,, | Performed by: INTERNAL MEDICINE

## 2021-03-01 PROCEDURE — 99499 RISK ADDL DX/OHS AUDIT: ICD-10-PCS | Mod: S$GLB,,, | Performed by: INTERNAL MEDICINE

## 2021-03-01 PROCEDURE — 3288F FALL RISK ASSESSMENT DOCD: CPT | Mod: CPTII,S$GLB,, | Performed by: INTERNAL MEDICINE

## 2021-03-01 PROCEDURE — 1101F PT FALLS ASSESS-DOCD LE1/YR: CPT | Mod: CPTII,S$GLB,, | Performed by: INTERNAL MEDICINE

## 2021-03-01 PROCEDURE — 3075F PR MOST RECENT SYSTOLIC BLOOD PRESS GE 130-139MM HG: ICD-10-PCS | Mod: CPTII,S$GLB,, | Performed by: INTERNAL MEDICINE

## 2021-03-01 PROCEDURE — 99214 OFFICE O/P EST MOD 30 MIN: CPT | Mod: S$GLB,,, | Performed by: INTERNAL MEDICINE

## 2021-03-01 PROCEDURE — 1126F PR PAIN SEVERITY QUANTIFIED, NO PAIN PRESENT: ICD-10-PCS | Mod: S$GLB,,, | Performed by: INTERNAL MEDICINE

## 2021-03-01 PROCEDURE — 99214 PR OFFICE/OUTPT VISIT, EST, LEVL IV, 30-39 MIN: ICD-10-PCS | Mod: S$GLB,,, | Performed by: INTERNAL MEDICINE

## 2021-03-01 RX ORDER — OXYBUTYNIN CHLORIDE 5 MG/1
10 TABLET, EXTENDED RELEASE ORAL DAILY
Qty: 90 TABLET | Refills: 3
Start: 2021-03-01 | End: 2021-08-01 | Stop reason: SDUPTHER

## 2021-03-02 ENCOUNTER — PATIENT MESSAGE (OUTPATIENT)
Dept: FAMILY MEDICINE | Facility: CLINIC | Age: 71
End: 2021-03-02

## 2021-03-02 LAB — VIT B12 SERPL-MCNC: 1071 PG/ML (ref 210–950)

## 2021-03-10 ENCOUNTER — OFFICE VISIT (OUTPATIENT)
Dept: UROGYNECOLOGY | Facility: CLINIC | Age: 71
End: 2021-03-10
Payer: MEDICARE

## 2021-03-10 ENCOUNTER — CLINICAL SUPPORT (OUTPATIENT)
Dept: CARDIOLOGY | Facility: CLINIC | Age: 71
End: 2021-03-10
Payer: MEDICARE

## 2021-03-10 VITALS
HEART RATE: 70 BPM | WEIGHT: 147.31 LBS | BODY MASS INDEX: 24.54 KG/M2 | SYSTOLIC BLOOD PRESSURE: 136 MMHG | DIASTOLIC BLOOD PRESSURE: 66 MMHG | HEIGHT: 65 IN

## 2021-03-10 DIAGNOSIS — T83.721D EXPOSURE OF IMPLANTED VAGINAL MESH AND PROSTHETIC MATERIAL IN VAGINA, SUBSEQUENT ENCOUNTER: Primary | ICD-10-CM

## 2021-03-10 DIAGNOSIS — T83.721S: ICD-10-CM

## 2021-03-10 DIAGNOSIS — N95.2 VAGINAL ATROPHY: ICD-10-CM

## 2021-03-10 DIAGNOSIS — N39.46 URINARY INCONTINENCE, MIXED: ICD-10-CM

## 2021-03-10 DIAGNOSIS — T83.729D EXPOSURE OF IMPLANTED VAGINAL MESH AND PROSTHETIC MATERIAL IN VAGINA, SUBSEQUENT ENCOUNTER: Primary | ICD-10-CM

## 2021-03-10 DIAGNOSIS — T83.729S: ICD-10-CM

## 2021-03-10 DIAGNOSIS — Z95.818 PRESENCE OF OTHER CARDIAC IMPLANTS AND GRAFTS: ICD-10-CM

## 2021-03-10 DIAGNOSIS — K59.09 CHRONIC CONSTIPATION: ICD-10-CM

## 2021-03-10 PROCEDURE — 3008F BODY MASS INDEX DOCD: CPT | Mod: CPTII,S$GLB,, | Performed by: OBSTETRICS & GYNECOLOGY

## 2021-03-10 PROCEDURE — 3078F PR MOST RECENT DIASTOLIC BLOOD PRESSURE < 80 MM HG: ICD-10-PCS | Mod: CPTII,S$GLB,, | Performed by: OBSTETRICS & GYNECOLOGY

## 2021-03-10 PROCEDURE — 3075F PR MOST RECENT SYSTOLIC BLOOD PRESS GE 130-139MM HG: ICD-10-PCS | Mod: CPTII,S$GLB,, | Performed by: OBSTETRICS & GYNECOLOGY

## 2021-03-10 PROCEDURE — 93298 CARDIAC DEVICE CHECK - REMOTE: ICD-10-PCS | Mod: S$GLB,,, | Performed by: INTERNAL MEDICINE

## 2021-03-10 PROCEDURE — 1159F PR MEDICATION LIST DOCUMENTED IN MEDICAL RECORD: ICD-10-PCS | Mod: S$GLB,,, | Performed by: OBSTETRICS & GYNECOLOGY

## 2021-03-10 PROCEDURE — 99213 PR OFFICE/OUTPT VISIT, EST, LEVL III, 20-29 MIN: ICD-10-PCS | Mod: S$GLB,,, | Performed by: OBSTETRICS & GYNECOLOGY

## 2021-03-10 PROCEDURE — 3008F PR BODY MASS INDEX (BMI) DOCUMENTED: ICD-10-PCS | Mod: CPTII,S$GLB,, | Performed by: OBSTETRICS & GYNECOLOGY

## 2021-03-10 PROCEDURE — 3288F PR FALLS RISK ASSESSMENT DOCUMENTED: ICD-10-PCS | Mod: CPTII,S$GLB,, | Performed by: OBSTETRICS & GYNECOLOGY

## 2021-03-10 PROCEDURE — 3288F FALL RISK ASSESSMENT DOCD: CPT | Mod: CPTII,S$GLB,, | Performed by: OBSTETRICS & GYNECOLOGY

## 2021-03-10 PROCEDURE — 1101F PR PT FALLS ASSESS DOC 0-1 FALLS W/OUT INJ PAST YR: ICD-10-PCS | Mod: CPTII,S$GLB,, | Performed by: OBSTETRICS & GYNECOLOGY

## 2021-03-10 PROCEDURE — 99213 OFFICE O/P EST LOW 20 MIN: CPT | Mod: S$GLB,,, | Performed by: OBSTETRICS & GYNECOLOGY

## 2021-03-10 PROCEDURE — 99999 PR PBB SHADOW E&M-EST. PATIENT-LVL V: ICD-10-PCS | Mod: PBBFAC,,, | Performed by: OBSTETRICS & GYNECOLOGY

## 2021-03-10 PROCEDURE — 1101F PT FALLS ASSESS-DOCD LE1/YR: CPT | Mod: CPTII,S$GLB,, | Performed by: OBSTETRICS & GYNECOLOGY

## 2021-03-10 PROCEDURE — 99499 UNLISTED E&M SERVICE: CPT | Mod: S$GLB,,, | Performed by: OBSTETRICS & GYNECOLOGY

## 2021-03-10 PROCEDURE — 93298 REM INTERROG DEV EVAL SCRMS: CPT | Mod: S$GLB,,, | Performed by: INTERNAL MEDICINE

## 2021-03-10 PROCEDURE — 1126F PR PAIN SEVERITY QUANTIFIED, NO PAIN PRESENT: ICD-10-PCS | Mod: S$GLB,,, | Performed by: OBSTETRICS & GYNECOLOGY

## 2021-03-10 PROCEDURE — 1126F AMNT PAIN NOTED NONE PRSNT: CPT | Mod: S$GLB,,, | Performed by: OBSTETRICS & GYNECOLOGY

## 2021-03-10 PROCEDURE — 99499 RISK ADDL DX/OHS AUDIT: ICD-10-PCS | Mod: S$GLB,,, | Performed by: OBSTETRICS & GYNECOLOGY

## 2021-03-10 PROCEDURE — 1159F MED LIST DOCD IN RCRD: CPT | Mod: S$GLB,,, | Performed by: OBSTETRICS & GYNECOLOGY

## 2021-03-10 PROCEDURE — 3078F DIAST BP <80 MM HG: CPT | Mod: CPTII,S$GLB,, | Performed by: OBSTETRICS & GYNECOLOGY

## 2021-03-10 PROCEDURE — 3075F SYST BP GE 130 - 139MM HG: CPT | Mod: CPTII,S$GLB,, | Performed by: OBSTETRICS & GYNECOLOGY

## 2021-03-10 PROCEDURE — 99999 PR PBB SHADOW E&M-EST. PATIENT-LVL V: CPT | Mod: PBBFAC,,, | Performed by: OBSTETRICS & GYNECOLOGY

## 2021-03-12 ENCOUNTER — TELEPHONE (OUTPATIENT)
Dept: UROGYNECOLOGY | Facility: CLINIC | Age: 71
End: 2021-03-12

## 2021-03-12 ENCOUNTER — TELEPHONE (OUTPATIENT)
Dept: UROLOGY | Facility: CLINIC | Age: 71
End: 2021-03-12

## 2021-03-17 DIAGNOSIS — I48.0 PAF (PAROXYSMAL ATRIAL FIBRILLATION): Primary | ICD-10-CM

## 2021-03-19 ENCOUNTER — IMMUNIZATION (OUTPATIENT)
Dept: PRIMARY CARE CLINIC | Facility: CLINIC | Age: 71
End: 2021-03-19
Payer: MEDICARE

## 2021-03-19 DIAGNOSIS — Z23 NEED FOR VACCINATION: Primary | ICD-10-CM

## 2021-03-19 PROCEDURE — 91300 COVID-19, MRNA, LNP-S, PF, 30 MCG/0.3 ML DOSE VACCINE: ICD-10-PCS | Mod: S$GLB,,, | Performed by: FAMILY MEDICINE

## 2021-03-19 PROCEDURE — 0002A COVID-19, MRNA, LNP-S, PF, 30 MCG/0.3 ML DOSE VACCINE: CPT | Mod: CV19,S$GLB,, | Performed by: FAMILY MEDICINE

## 2021-03-19 PROCEDURE — 0002A COVID-19, MRNA, LNP-S, PF, 30 MCG/0.3 ML DOSE VACCINE: ICD-10-PCS | Mod: CV19,S$GLB,, | Performed by: FAMILY MEDICINE

## 2021-03-19 PROCEDURE — 91300 COVID-19, MRNA, LNP-S, PF, 30 MCG/0.3 ML DOSE VACCINE: CPT | Mod: S$GLB,,, | Performed by: FAMILY MEDICINE

## 2021-03-22 RX ORDER — RIVAROXABAN 20 MG/1
1 TABLET, FILM COATED ORAL NIGHTLY
Qty: 90 TABLET | Refills: 4 | Status: SHIPPED | OUTPATIENT
Start: 2021-03-22 | End: 2021-06-04 | Stop reason: SDUPTHER

## 2021-03-25 ENCOUNTER — PATIENT MESSAGE (OUTPATIENT)
Dept: NEUROLOGY | Facility: CLINIC | Age: 71
End: 2021-03-25

## 2021-03-25 ENCOUNTER — OFFICE VISIT (OUTPATIENT)
Dept: NEUROLOGY | Facility: CLINIC | Age: 71
End: 2021-03-25
Payer: COMMERCIAL

## 2021-03-25 DIAGNOSIS — F33.1 MODERATE EPISODE OF RECURRENT MAJOR DEPRESSIVE DISORDER: Primary | ICD-10-CM

## 2021-03-25 DIAGNOSIS — R41.3 MEMORY LOSS: ICD-10-CM

## 2021-03-25 PROCEDURE — 90791 PR PSYCHIATRIC DIAGNOSTIC EVALUATION: ICD-10-PCS | Mod: 95,,, | Performed by: CLINICAL NEUROPSYCHOLOGIST

## 2021-03-25 PROCEDURE — 99499 NO LOS: ICD-10-PCS | Mod: 95,,, | Performed by: CLINICAL NEUROPSYCHOLOGIST

## 2021-03-25 PROCEDURE — 90791 PSYCH DIAGNOSTIC EVALUATION: CPT | Mod: 95,,, | Performed by: CLINICAL NEUROPSYCHOLOGIST

## 2021-03-25 PROCEDURE — 99499 UNLISTED E&M SERVICE: CPT | Mod: 95,,, | Performed by: CLINICAL NEUROPSYCHOLOGIST

## 2021-03-26 ENCOUNTER — PATIENT MESSAGE (OUTPATIENT)
Dept: UROGYNECOLOGY | Facility: CLINIC | Age: 71
End: 2021-03-26

## 2021-03-26 ENCOUNTER — TELEPHONE (OUTPATIENT)
Dept: UROGYNECOLOGY | Facility: CLINIC | Age: 71
End: 2021-03-26

## 2021-04-09 ENCOUNTER — CLINICAL SUPPORT (OUTPATIENT)
Dept: CARDIOLOGY | Facility: CLINIC | Age: 71
End: 2021-04-09
Payer: MEDICARE

## 2021-04-09 DIAGNOSIS — Z95.818 PRESENCE OF OTHER CARDIAC IMPLANTS AND GRAFTS: ICD-10-CM

## 2021-04-09 PROCEDURE — 93298 CARDIAC DEVICE CHECK - REMOTE: ICD-10-PCS | Mod: S$GLB,,, | Performed by: INTERNAL MEDICINE

## 2021-04-09 PROCEDURE — 93298 REM INTERROG DEV EVAL SCRMS: CPT | Mod: S$GLB,,, | Performed by: INTERNAL MEDICINE

## 2021-04-12 ENCOUNTER — PATIENT MESSAGE (OUTPATIENT)
Dept: UROGYNECOLOGY | Facility: CLINIC | Age: 71
End: 2021-04-12

## 2021-04-13 ENCOUNTER — OFFICE VISIT (OUTPATIENT)
Dept: NEUROLOGY | Facility: CLINIC | Age: 71
End: 2021-04-13
Payer: COMMERCIAL

## 2021-04-13 DIAGNOSIS — F33.0 MILD EPISODE OF RECURRENT MAJOR DEPRESSIVE DISORDER: ICD-10-CM

## 2021-04-13 DIAGNOSIS — F43.22 ADJUSTMENT DISORDER WITH ANXIOUS MOOD: ICD-10-CM

## 2021-04-13 DIAGNOSIS — R41.9 COGNITIVE COMPLAINTS WITH NORMAL NEUROPSYCHOLOGICAL EXAM: Primary | ICD-10-CM

## 2021-04-13 DIAGNOSIS — G31.84 MCI (MILD COGNITIVE IMPAIRMENT): ICD-10-CM

## 2021-04-13 PROCEDURE — 96139 PR PSYCH/NEUROPSYCH TEST ADMIN/SCORING, BY TECH, 2+ TESTS, EA ADDTL 30 MIN: ICD-10-PCS | Mod: S$GLB,,, | Performed by: CLINICAL NEUROPSYCHOLOGIST

## 2021-04-13 PROCEDURE — 96133 PR NEUROPSYCHOLOGIC TEST EVAL SVCS, EA ADDTL HR: ICD-10-PCS | Mod: S$GLB,,, | Performed by: CLINICAL NEUROPSYCHOLOGIST

## 2021-04-13 PROCEDURE — 96132 PR NEUROPSYCHOLOGIC TEST EVAL SVCS, 1ST HR: ICD-10-PCS | Mod: S$GLB,,, | Performed by: CLINICAL NEUROPSYCHOLOGIST

## 2021-04-13 PROCEDURE — 96138 PR PSYCH/NEUROPSYCH TEST ADMIN/SCORING, BY TECH, 2+ TESTS, 1ST 30 MIN: ICD-10-PCS | Mod: S$GLB,,, | Performed by: CLINICAL NEUROPSYCHOLOGIST

## 2021-04-13 PROCEDURE — 99999 PR PBB SHADOW E&M-EST. PATIENT-LVL I: ICD-10-PCS | Mod: PBBFAC,,, | Performed by: CLINICAL NEUROPSYCHOLOGIST

## 2021-04-13 PROCEDURE — 99499 UNLISTED E&M SERVICE: CPT | Mod: S$GLB,,, | Performed by: CLINICAL NEUROPSYCHOLOGIST

## 2021-04-13 PROCEDURE — 96138 PSYCL/NRPSYC TECH 1ST: CPT | Mod: S$GLB,,, | Performed by: CLINICAL NEUROPSYCHOLOGIST

## 2021-04-13 PROCEDURE — 99499 NO LOS: ICD-10-PCS | Mod: S$GLB,,, | Performed by: CLINICAL NEUROPSYCHOLOGIST

## 2021-04-13 PROCEDURE — 96132 NRPSYC TST EVAL PHYS/QHP 1ST: CPT | Mod: S$GLB,,, | Performed by: CLINICAL NEUROPSYCHOLOGIST

## 2021-04-13 PROCEDURE — 96133 NRPSYC TST EVAL PHYS/QHP EA: CPT | Mod: S$GLB,,, | Performed by: CLINICAL NEUROPSYCHOLOGIST

## 2021-04-13 PROCEDURE — 99999 PR PBB SHADOW E&M-EST. PATIENT-LVL I: CPT | Mod: PBBFAC,,, | Performed by: CLINICAL NEUROPSYCHOLOGIST

## 2021-04-13 PROCEDURE — 96139 PSYCL/NRPSYC TST TECH EA: CPT | Mod: S$GLB,,, | Performed by: CLINICAL NEUROPSYCHOLOGIST

## 2021-04-16 PROBLEM — R41.9 COGNITIVE COMPLAINTS WITH NORMAL NEUROPSYCHOLOGICAL EXAM: Status: ACTIVE | Noted: 2020-06-24

## 2021-04-16 PROBLEM — F43.22 ADJUSTMENT DISORDER WITH ANXIOUS MOOD: Status: ACTIVE | Noted: 2021-04-16

## 2021-04-16 PROBLEM — F41.9 ANXIETY: Status: ACTIVE | Noted: 2021-04-16

## 2021-04-18 ENCOUNTER — PATIENT MESSAGE (OUTPATIENT)
Dept: CARDIOLOGY | Facility: CLINIC | Age: 71
End: 2021-04-18

## 2021-04-20 ENCOUNTER — TELEPHONE (OUTPATIENT)
Dept: UROGYNECOLOGY | Facility: CLINIC | Age: 71
End: 2021-04-20

## 2021-04-21 ENCOUNTER — PATIENT MESSAGE (OUTPATIENT)
Dept: NEUROLOGY | Facility: CLINIC | Age: 71
End: 2021-04-21

## 2021-04-21 ENCOUNTER — OFFICE VISIT (OUTPATIENT)
Dept: NEUROLOGY | Facility: CLINIC | Age: 71
End: 2021-04-21
Payer: MEDICARE

## 2021-04-21 DIAGNOSIS — F43.22 ADJUSTMENT DISORDER WITH ANXIOUS MOOD: ICD-10-CM

## 2021-04-21 DIAGNOSIS — R41.9 COGNITIVE COMPLAINTS WITH NORMAL NEUROPSYCHOLOGICAL EXAM: ICD-10-CM

## 2021-04-21 PROCEDURE — 99499 NO LOS: ICD-10-PCS | Mod: GT,95,, | Performed by: CLINICAL NEUROPSYCHOLOGIST

## 2021-04-21 PROCEDURE — 99499 UNLISTED E&M SERVICE: CPT | Mod: GT,95,, | Performed by: CLINICAL NEUROPSYCHOLOGIST

## 2021-04-29 ENCOUNTER — TELEPHONE (OUTPATIENT)
Dept: ELECTROPHYSIOLOGY | Facility: CLINIC | Age: 71
End: 2021-04-29

## 2021-04-29 ENCOUNTER — TELEPHONE (OUTPATIENT)
Dept: UROGYNECOLOGY | Facility: CLINIC | Age: 71
End: 2021-04-29

## 2021-04-29 DIAGNOSIS — T83.729D EXPOSURE OF IMPLANTED VAGINAL MESH AND PROSTHETIC MATERIAL IN VAGINA, SUBSEQUENT ENCOUNTER: Primary | ICD-10-CM

## 2021-04-29 DIAGNOSIS — Z01.818 PRE-OP EVALUATION: ICD-10-CM

## 2021-04-29 DIAGNOSIS — E74.89 OTHER SPECIFIED DISORDERS OF CARBOHYDRATE METABOLISM: ICD-10-CM

## 2021-04-29 DIAGNOSIS — T83.721D EXPOSURE OF IMPLANTED VAGINAL MESH AND PROSTHETIC MATERIAL IN VAGINA, SUBSEQUENT ENCOUNTER: Primary | ICD-10-CM

## 2021-04-29 DIAGNOSIS — R73.03 PREDIABETES: ICD-10-CM

## 2021-05-03 ENCOUNTER — TELEPHONE (OUTPATIENT)
Dept: SURGERY | Facility: OTHER | Age: 71
End: 2021-05-03

## 2021-05-03 ENCOUNTER — TELEPHONE (OUTPATIENT)
Dept: CARDIOLOGY | Facility: CLINIC | Age: 71
End: 2021-05-03

## 2021-05-03 DIAGNOSIS — I48.3 TYPICAL ATRIAL FLUTTER: ICD-10-CM

## 2021-05-03 DIAGNOSIS — Z01.818 PRE-OP EVALUATION: ICD-10-CM

## 2021-05-03 DIAGNOSIS — I45.10 RBBB: ICD-10-CM

## 2021-05-03 DIAGNOSIS — R00.1 BRADYCARDIA: ICD-10-CM

## 2021-05-03 DIAGNOSIS — I10 ESSENTIAL HYPERTENSION: Primary | ICD-10-CM

## 2021-05-04 ENCOUNTER — TELEPHONE (OUTPATIENT)
Dept: UROGYNECOLOGY | Facility: CLINIC | Age: 71
End: 2021-05-04

## 2021-05-05 ENCOUNTER — HOSPITAL ENCOUNTER (OUTPATIENT)
Dept: RADIOLOGY | Facility: HOSPITAL | Age: 71
Discharge: HOME | End: 2021-05-05
Attending: PHYSICIAN ASSISTANT
Payer: MEDICARE

## 2021-05-05 ENCOUNTER — TELEPHONE (OUTPATIENT)
Dept: CARDIOLOGY | Facility: CLINIC | Age: 71
End: 2021-05-05

## 2021-05-05 ENCOUNTER — HOSPITAL ENCOUNTER (OUTPATIENT)
Dept: CARDIOLOGY | Facility: HOSPITAL | Age: 71
Discharge: HOME | End: 2021-05-05
Attending: INTERNAL MEDICINE
Payer: MEDICARE

## 2021-05-05 DIAGNOSIS — Z01.818 PRE-OP EVALUATION: ICD-10-CM

## 2021-05-05 PROCEDURE — 71046 X-RAY EXAM CHEST 2 VIEWS: CPT | Mod: TC,FY,PO

## 2021-05-05 PROCEDURE — 71046 XR CHEST PA AND LATERAL PRE-OP: ICD-10-PCS | Mod: 26,,, | Performed by: RADIOLOGY

## 2021-05-05 PROCEDURE — 71046 X-RAY EXAM CHEST 2 VIEWS: CPT | Mod: 26,,, | Performed by: RADIOLOGY

## 2021-05-07 ENCOUNTER — TELEPHONE (OUTPATIENT)
Dept: UROGYNECOLOGY | Facility: CLINIC | Age: 71
End: 2021-05-07

## 2021-05-09 ENCOUNTER — CLINICAL SUPPORT (OUTPATIENT)
Dept: CARDIOLOGY | Facility: HOSPITAL | Age: 71
End: 2021-05-09
Payer: MEDICARE

## 2021-05-09 ENCOUNTER — HOSPITAL ENCOUNTER (OUTPATIENT)
Dept: CARDIOLOGY | Facility: HOSPITAL | Age: 71
Discharge: HOME | End: 2021-05-09
Payer: MEDICARE

## 2021-05-09 DIAGNOSIS — Z95.818 PRESENCE OF OTHER CARDIAC IMPLANTS AND GRAFTS: ICD-10-CM

## 2021-05-09 PROCEDURE — 93298 CARDIAC DEVICE CHECK - REMOTE: ICD-10-PCS | Mod: ,,, | Performed by: INTERNAL MEDICINE

## 2021-05-09 PROCEDURE — G2066 INTER DEVC REMOTE 30D: HCPCS | Mod: PO | Performed by: INTERNAL MEDICINE

## 2021-05-09 PROCEDURE — 93298 REM INTERROG DEV EVAL SCRMS: CPT | Mod: ,,, | Performed by: INTERNAL MEDICINE

## 2021-05-10 ENCOUNTER — HOSPITAL ENCOUNTER (OUTPATIENT)
Dept: PREADMISSION TESTING | Facility: OTHER | Age: 71
Discharge: HOME | End: 2021-05-10
Attending: OBSTETRICS & GYNECOLOGY
Payer: MEDICARE

## 2021-05-10 ENCOUNTER — OFFICE VISIT (OUTPATIENT)
Dept: UROGYNECOLOGY | Facility: CLINIC | Age: 71
End: 2021-05-10
Payer: MEDICARE

## 2021-05-10 ENCOUNTER — ANESTHESIA EVENT (OUTPATIENT)
Dept: SURGERY | Facility: OTHER | Age: 71
End: 2021-05-10
Payer: MEDICARE

## 2021-05-10 VITALS
HEIGHT: 65 IN | BODY MASS INDEX: 24.79 KG/M2 | WEIGHT: 148.81 LBS | SYSTOLIC BLOOD PRESSURE: 142 MMHG | DIASTOLIC BLOOD PRESSURE: 80 MMHG

## 2021-05-10 DIAGNOSIS — Z01.818 PREOPERATIVE EXAM FOR GYNECOLOGIC SURGERY: Primary | ICD-10-CM

## 2021-05-10 DIAGNOSIS — N95.2 VAGINAL ATROPHY: ICD-10-CM

## 2021-05-10 DIAGNOSIS — T83.729D EXPOSURE OF IMPLANTED VAGINAL MESH AND PROSTHETIC MATERIAL IN VAGINA, SUBSEQUENT ENCOUNTER: ICD-10-CM

## 2021-05-10 DIAGNOSIS — Z01.818 PRE-OP EVALUATION: ICD-10-CM

## 2021-05-10 DIAGNOSIS — N39.46 URINARY INCONTINENCE, MIXED: ICD-10-CM

## 2021-05-10 DIAGNOSIS — T83.721D EXPOSURE OF IMPLANTED VAGINAL MESH AND PROSTHETIC MATERIAL IN VAGINA, SUBSEQUENT ENCOUNTER: ICD-10-CM

## 2021-05-10 PROCEDURE — U0005 INFEC AGEN DETEC AMPLI PROBE: HCPCS | Performed by: PHYSICIAN ASSISTANT

## 2021-05-10 PROCEDURE — 99999 PR PBB SHADOW E&M-EST. PATIENT-LVL IV: CPT | Mod: PBBFAC,,, | Performed by: NURSE PRACTITIONER

## 2021-05-10 PROCEDURE — 1101F PT FALLS ASSESS-DOCD LE1/YR: CPT | Mod: CPTII,S$GLB,, | Performed by: NURSE PRACTITIONER

## 2021-05-10 PROCEDURE — 1101F PR PT FALLS ASSESS DOC 0-1 FALLS W/OUT INJ PAST YR: ICD-10-PCS | Mod: CPTII,S$GLB,, | Performed by: NURSE PRACTITIONER

## 2021-05-10 PROCEDURE — U0003 INFECTIOUS AGENT DETECTION BY NUCLEIC ACID (DNA OR RNA); SEVERE ACUTE RESPIRATORY SYNDROME CORONAVIRUS 2 (SARS-COV-2) (CORONAVIRUS DISEASE [COVID-19]), AMPLIFIED PROBE TECHNIQUE, MAKING USE OF HIGH THROUGHPUT TECHNOLOGIES AS DESCRIBED BY CMS-2020-01-R: HCPCS | Performed by: PHYSICIAN ASSISTANT

## 2021-05-10 PROCEDURE — 99499 UNLISTED E&M SERVICE: CPT | Mod: S$GLB,,, | Performed by: NURSE PRACTITIONER

## 2021-05-10 PROCEDURE — 99499 NO LOS: ICD-10-PCS | Mod: S$GLB,,, | Performed by: NURSE PRACTITIONER

## 2021-05-10 PROCEDURE — 1125F AMNT PAIN NOTED PAIN PRSNT: CPT | Mod: S$GLB,,, | Performed by: NURSE PRACTITIONER

## 2021-05-10 PROCEDURE — 3008F BODY MASS INDEX DOCD: CPT | Mod: CPTII,S$GLB,, | Performed by: NURSE PRACTITIONER

## 2021-05-10 PROCEDURE — 3008F PR BODY MASS INDEX (BMI) DOCUMENTED: ICD-10-PCS | Mod: CPTII,S$GLB,, | Performed by: NURSE PRACTITIONER

## 2021-05-10 PROCEDURE — 1125F PR PAIN SEVERITY QUANTIFIED, PAIN PRESENT: ICD-10-PCS | Mod: S$GLB,,, | Performed by: NURSE PRACTITIONER

## 2021-05-10 PROCEDURE — 99999 PR PBB SHADOW E&M-EST. PATIENT-LVL IV: ICD-10-PCS | Mod: PBBFAC,,, | Performed by: NURSE PRACTITIONER

## 2021-05-10 PROCEDURE — 3288F FALL RISK ASSESSMENT DOCD: CPT | Mod: CPTII,S$GLB,, | Performed by: NURSE PRACTITIONER

## 2021-05-10 PROCEDURE — 3288F PR FALLS RISK ASSESSMENT DOCUMENTED: ICD-10-PCS | Mod: CPTII,S$GLB,, | Performed by: NURSE PRACTITIONER

## 2021-05-10 RX ORDER — SODIUM CHLORIDE, SODIUM LACTATE, POTASSIUM CHLORIDE, CALCIUM CHLORIDE 600; 310; 30; 20 MG/100ML; MG/100ML; MG/100ML; MG/100ML
INJECTION, SOLUTION INTRAVENOUS CONTINUOUS
Status: CANCELLED | OUTPATIENT
Start: 2021-05-10

## 2021-05-10 RX ORDER — LIDOCAINE HYDROCHLORIDE 10 MG/ML
0.5 INJECTION, SOLUTION EPIDURAL; INFILTRATION; INTRACAUDAL; PERINEURAL ONCE
Status: CANCELLED | OUTPATIENT
Start: 2021-05-10 | End: 2021-05-10

## 2021-05-10 RX ORDER — ACETAMINOPHEN 500 MG
1000 TABLET ORAL
Status: CANCELLED | OUTPATIENT
Start: 2021-05-10 | End: 2021-05-10

## 2021-05-10 RX ORDER — FAMOTIDINE 20 MG/1
20 TABLET, FILM COATED ORAL
Status: CANCELLED | OUTPATIENT
Start: 2021-05-10 | End: 2021-05-10

## 2021-05-11 LAB — SARS-COV-2 RNA RESP QL NAA+PROBE: NOT DETECTED

## 2021-05-12 ENCOUNTER — TELEPHONE (OUTPATIENT)
Dept: UROGYNECOLOGY | Facility: CLINIC | Age: 71
End: 2021-05-12

## 2021-05-13 ENCOUNTER — HOSPITAL ENCOUNTER (OUTPATIENT)
Facility: OTHER | Age: 71
Discharge: HOME | End: 2021-05-13
Attending: OBSTETRICS & GYNECOLOGY | Admitting: OBSTETRICS & GYNECOLOGY
Payer: MEDICARE

## 2021-05-13 ENCOUNTER — ANESTHESIA (OUTPATIENT)
Dept: SURGERY | Facility: OTHER | Age: 71
End: 2021-05-13
Payer: MEDICARE

## 2021-05-13 VITALS
SYSTOLIC BLOOD PRESSURE: 154 MMHG | HEART RATE: 72 BPM | DIASTOLIC BLOOD PRESSURE: 78 MMHG | WEIGHT: 148.81 LBS | HEIGHT: 65 IN | OXYGEN SATURATION: 99 % | TEMPERATURE: 98 F | BODY MASS INDEX: 24.79 KG/M2 | RESPIRATION RATE: 16 BRPM

## 2021-05-13 DIAGNOSIS — T83.729A: Primary | ICD-10-CM

## 2021-05-13 DIAGNOSIS — T83.721A EXPOSURE OF IMPLANTED VAGINAL MESH: ICD-10-CM

## 2021-05-13 DIAGNOSIS — T83.721A: Primary | ICD-10-CM

## 2021-05-13 PROCEDURE — 52000 CYSTOURETHROSCOPY: CPT | Mod: 59,,, | Performed by: OBSTETRICS & GYNECOLOGY

## 2021-05-13 PROCEDURE — 71000016 HC POSTOP RECOV ADDL HR: Performed by: OBSTETRICS & GYNECOLOGY

## 2021-05-13 PROCEDURE — 94640 AIRWAY INHALATION TREATMENT: CPT

## 2021-05-13 PROCEDURE — 37000009 HC ANESTHESIA EA ADD 15 MINS: Performed by: OBSTETRICS & GYNECOLOGY

## 2021-05-13 PROCEDURE — 25000003 PHARM REV CODE 250: Performed by: SPECIALIST

## 2021-05-13 PROCEDURE — 57295 PR REVISION VAGINAL GRAFT: ICD-10-PCS | Mod: ,,, | Performed by: OBSTETRICS & GYNECOLOGY

## 2021-05-13 PROCEDURE — 63600175 PHARM REV CODE 636 W HCPCS: Performed by: OBSTETRICS & GYNECOLOGY

## 2021-05-13 PROCEDURE — 25000003 PHARM REV CODE 250: Performed by: OBSTETRICS & GYNECOLOGY

## 2021-05-13 PROCEDURE — 88300 SURGICAL PATH GROSS: CPT | Mod: 26,,, | Performed by: PATHOLOGY

## 2021-05-13 PROCEDURE — S0030 INJECTION, METRONIDAZOLE: HCPCS | Performed by: NURSE ANESTHETIST, CERTIFIED REGISTERED

## 2021-05-13 PROCEDURE — 88300 PR  SURG PATH,GROSS,LEVEL I: ICD-10-PCS | Mod: 26,,, | Performed by: PATHOLOGY

## 2021-05-13 PROCEDURE — 52000 PR CYSTOURETHROSCOPY: ICD-10-PCS | Mod: 59,,, | Performed by: OBSTETRICS & GYNECOLOGY

## 2021-05-13 PROCEDURE — 57295 PR REVISION VAGINAL GRAFT: ICD-10-PCS | Mod: AS,,, | Performed by: PHYSICIAN ASSISTANT

## 2021-05-13 PROCEDURE — 25000003 PHARM REV CODE 250: Performed by: NURSE ANESTHETIST, CERTIFIED REGISTERED

## 2021-05-13 PROCEDURE — 36000706: Performed by: OBSTETRICS & GYNECOLOGY

## 2021-05-13 PROCEDURE — 57295 REVISE VAG GRAFT VIA VAGINA: CPT | Mod: AS,,, | Performed by: PHYSICIAN ASSISTANT

## 2021-05-13 PROCEDURE — 88300 SURGICAL PATH GROSS: CPT | Performed by: PATHOLOGY

## 2021-05-13 PROCEDURE — 71000033 HC RECOVERY, INTIAL HOUR: Performed by: OBSTETRICS & GYNECOLOGY

## 2021-05-13 PROCEDURE — 37000008 HC ANESTHESIA 1ST 15 MINUTES: Performed by: OBSTETRICS & GYNECOLOGY

## 2021-05-13 PROCEDURE — 63600175 PHARM REV CODE 636 W HCPCS: Performed by: NURSE ANESTHETIST, CERTIFIED REGISTERED

## 2021-05-13 PROCEDURE — 71000015 HC POSTOP RECOV 1ST HR: Performed by: OBSTETRICS & GYNECOLOGY

## 2021-05-13 PROCEDURE — 25000242 PHARM REV CODE 250 ALT 637 W/ HCPCS: Performed by: OBSTETRICS & GYNECOLOGY

## 2021-05-13 PROCEDURE — P9045 ALBUMIN (HUMAN), 5%, 250 ML: HCPCS | Mod: JG | Performed by: NURSE ANESTHETIST, CERTIFIED REGISTERED

## 2021-05-13 PROCEDURE — 94761 N-INVAS EAR/PLS OXIMETRY MLT: CPT

## 2021-05-13 PROCEDURE — 63600175 PHARM REV CODE 636 W HCPCS: Performed by: SPECIALIST

## 2021-05-13 PROCEDURE — 57295 REVISE VAG GRAFT VIA VAGINA: CPT | Mod: ,,, | Performed by: OBSTETRICS & GYNECOLOGY

## 2021-05-13 PROCEDURE — 36000707: Performed by: OBSTETRICS & GYNECOLOGY

## 2021-05-13 RX ORDER — ACETAMINOPHEN 500 MG
1000 TABLET ORAL
Status: COMPLETED | OUTPATIENT
Start: 2021-05-13 | End: 2021-05-13

## 2021-05-13 RX ORDER — PROPOFOL 10 MG/ML
VIAL (ML) INTRAVENOUS
Status: DISCONTINUED | OUTPATIENT
Start: 2021-05-13 | End: 2021-05-13

## 2021-05-13 RX ORDER — FENTANYL CITRATE 50 UG/ML
INJECTION, SOLUTION INTRAMUSCULAR; INTRAVENOUS
Status: DISCONTINUED | OUTPATIENT
Start: 2021-05-13 | End: 2021-05-13

## 2021-05-13 RX ORDER — METRONIDAZOLE 500 MG/100ML
INJECTION, SOLUTION INTRAVENOUS
Status: DISCONTINUED | OUTPATIENT
Start: 2021-05-13 | End: 2021-05-13

## 2021-05-13 RX ORDER — ROCURONIUM BROMIDE 10 MG/ML
INJECTION, SOLUTION INTRAVENOUS
Status: DISCONTINUED | OUTPATIENT
Start: 2021-05-13 | End: 2021-05-13

## 2021-05-13 RX ORDER — HYDROMORPHONE HYDROCHLORIDE 2 MG/ML
0.4 INJECTION, SOLUTION INTRAMUSCULAR; INTRAVENOUS; SUBCUTANEOUS EVERY 5 MIN PRN
Status: DISCONTINUED | OUTPATIENT
Start: 2021-05-13 | End: 2021-05-13 | Stop reason: HOSPADM

## 2021-05-13 RX ORDER — ONDANSETRON 2 MG/ML
INJECTION INTRAMUSCULAR; INTRAVENOUS
Status: DISCONTINUED | OUTPATIENT
Start: 2021-05-13 | End: 2021-05-13

## 2021-05-13 RX ORDER — METRONIDAZOLE 500 MG/1
500 TABLET ORAL EVERY 12 HOURS
Qty: 14 TABLET | Refills: 0 | Status: SHIPPED | OUTPATIENT
Start: 2021-05-13 | End: 2021-05-20

## 2021-05-13 RX ORDER — LEVALBUTEROL INHALATION SOLUTION 0.63 MG/3ML
0.63 SOLUTION RESPIRATORY (INHALATION)
Status: DISCONTINUED | OUTPATIENT
Start: 2021-05-13 | End: 2021-05-13 | Stop reason: HOSPADM

## 2021-05-13 RX ORDER — AMOXICILLIN AND CLAVULANATE POTASSIUM 875; 125 MG/1; MG/1
1 TABLET, FILM COATED ORAL EVERY 12 HOURS
Qty: 14 TABLET | Refills: 0 | Status: SHIPPED | OUTPATIENT
Start: 2021-05-13 | End: 2021-05-20

## 2021-05-13 RX ORDER — DIPHENHYDRAMINE HYDROCHLORIDE 50 MG/ML
25 INJECTION INTRAMUSCULAR; INTRAVENOUS EVERY 4 HOURS PRN
Status: CANCELLED | OUTPATIENT
Start: 2021-05-13

## 2021-05-13 RX ORDER — MUPIROCIN 20 MG/G
OINTMENT TOPICAL
Status: DISCONTINUED | OUTPATIENT
Start: 2021-05-13 | End: 2021-05-13 | Stop reason: HOSPADM

## 2021-05-13 RX ORDER — SODIUM CHLORIDE 0.9 % (FLUSH) 0.9 %
3 SYRINGE (ML) INJECTION
Status: DISCONTINUED | OUTPATIENT
Start: 2021-05-13 | End: 2021-05-13 | Stop reason: HOSPADM

## 2021-05-13 RX ORDER — OXYCODONE AND ACETAMINOPHEN 5; 325 MG/1; MG/1
1 TABLET ORAL EVERY 4 HOURS PRN
Qty: 20 TABLET | Refills: 0 | Status: SHIPPED | OUTPATIENT
Start: 2021-05-13 | End: 2021-08-01

## 2021-05-13 RX ORDER — CEFAZOLIN SODIUM 1 G/3ML
2 INJECTION, POWDER, FOR SOLUTION INTRAMUSCULAR; INTRAVENOUS
Status: DISCONTINUED | OUTPATIENT
Start: 2021-05-13 | End: 2021-05-13 | Stop reason: HOSPADM

## 2021-05-13 RX ORDER — MEPERIDINE HYDROCHLORIDE 25 MG/ML
12.5 INJECTION INTRAMUSCULAR; INTRAVENOUS; SUBCUTANEOUS ONCE AS NEEDED
Status: DISCONTINUED | OUTPATIENT
Start: 2021-05-13 | End: 2021-05-13 | Stop reason: HOSPADM

## 2021-05-13 RX ORDER — ALBUMIN HUMAN 50 G/1000ML
SOLUTION INTRAVENOUS CONTINUOUS PRN
Status: DISCONTINUED | OUTPATIENT
Start: 2021-05-13 | End: 2021-05-13

## 2021-05-13 RX ORDER — HEPARIN SODIUM 5000 [USP'U]/ML
5000 INJECTION, SOLUTION INTRAVENOUS; SUBCUTANEOUS
Status: DISCONTINUED | OUTPATIENT
Start: 2021-05-13 | End: 2021-05-13 | Stop reason: HOSPADM

## 2021-05-13 RX ORDER — IBUPROFEN 600 MG/1
600 TABLET ORAL EVERY 6 HOURS PRN
Qty: 30 TABLET | Refills: 0 | Status: SHIPPED | OUTPATIENT
Start: 2021-05-13 | End: 2021-05-26

## 2021-05-13 RX ORDER — ONDANSETRON 2 MG/ML
4 INJECTION INTRAMUSCULAR; INTRAVENOUS DAILY PRN
Status: DISCONTINUED | OUTPATIENT
Start: 2021-05-13 | End: 2021-05-13 | Stop reason: HOSPADM

## 2021-05-13 RX ORDER — EPHEDRINE SULFATE 50 MG/ML
INJECTION, SOLUTION INTRAVENOUS
Status: DISCONTINUED | OUTPATIENT
Start: 2021-05-13 | End: 2021-05-13

## 2021-05-13 RX ORDER — PROCHLORPERAZINE EDISYLATE 5 MG/ML
5 INJECTION INTRAMUSCULAR; INTRAVENOUS EVERY 6 HOURS PRN
Status: DISCONTINUED | OUTPATIENT
Start: 2021-05-13 | End: 2021-05-13 | Stop reason: HOSPADM

## 2021-05-13 RX ORDER — LIDOCAINE HYDROCHLORIDE 10 MG/ML
0.5 INJECTION, SOLUTION EPIDURAL; INFILTRATION; INTRACAUDAL; PERINEURAL ONCE
Status: DISCONTINUED | OUTPATIENT
Start: 2021-05-13 | End: 2021-05-13 | Stop reason: HOSPADM

## 2021-05-13 RX ORDER — HYDROCODONE BITARTRATE AND ACETAMINOPHEN 5; 325 MG/1; MG/1
1 TABLET ORAL EVERY 4 HOURS PRN
Status: CANCELLED | OUTPATIENT
Start: 2021-05-13

## 2021-05-13 RX ORDER — FAMOTIDINE 20 MG/1
20 TABLET, FILM COATED ORAL
Status: COMPLETED | OUTPATIENT
Start: 2021-05-13 | End: 2021-05-13

## 2021-05-13 RX ORDER — LIDOCAINE HYDROCHLORIDE 20 MG/ML
INJECTION INTRAVENOUS
Status: DISCONTINUED | OUTPATIENT
Start: 2021-05-13 | End: 2021-05-13

## 2021-05-13 RX ORDER — DIPHENHYDRAMINE HCL 25 MG
25 CAPSULE ORAL EVERY 4 HOURS PRN
Status: CANCELLED | OUTPATIENT
Start: 2021-05-13

## 2021-05-13 RX ORDER — ONDANSETRON 8 MG/1
8 TABLET, ORALLY DISINTEGRATING ORAL EVERY 8 HOURS PRN
Status: DISCONTINUED | OUTPATIENT
Start: 2021-05-13 | End: 2021-05-13 | Stop reason: HOSPADM

## 2021-05-13 RX ORDER — SODIUM CHLORIDE, SODIUM LACTATE, POTASSIUM CHLORIDE, CALCIUM CHLORIDE 600; 310; 30; 20 MG/100ML; MG/100ML; MG/100ML; MG/100ML
INJECTION, SOLUTION INTRAVENOUS CONTINUOUS
Status: DISCONTINUED | OUTPATIENT
Start: 2021-05-13 | End: 2021-05-13 | Stop reason: HOSPADM

## 2021-05-13 RX ORDER — MIDAZOLAM HYDROCHLORIDE 1 MG/ML
INJECTION INTRAMUSCULAR; INTRAVENOUS
Status: DISCONTINUED | OUTPATIENT
Start: 2021-05-13 | End: 2021-05-13

## 2021-05-13 RX ORDER — DIPHENHYDRAMINE HYDROCHLORIDE 50 MG/ML
25 INJECTION INTRAMUSCULAR; INTRAVENOUS EVERY 6 HOURS PRN
Status: DISCONTINUED | OUTPATIENT
Start: 2021-05-13 | End: 2021-05-13 | Stop reason: HOSPADM

## 2021-05-13 RX ORDER — OXYCODONE HYDROCHLORIDE 5 MG/1
5 TABLET ORAL
Status: DISCONTINUED | OUTPATIENT
Start: 2021-05-13 | End: 2021-05-13 | Stop reason: HOSPADM

## 2021-05-13 RX ORDER — NEOSTIGMINE METHYLSULFATE 1 MG/ML
INJECTION, SOLUTION INTRAVENOUS
Status: DISCONTINUED | OUTPATIENT
Start: 2021-05-13 | End: 2021-05-13

## 2021-05-13 RX ORDER — FUROSEMIDE 10 MG/ML
INJECTION INTRAMUSCULAR; INTRAVENOUS
Status: DISCONTINUED | OUTPATIENT
Start: 2021-05-13 | End: 2021-05-13

## 2021-05-13 RX ORDER — DEXAMETHASONE SODIUM PHOSPHATE 4 MG/ML
INJECTION, SOLUTION INTRA-ARTICULAR; INTRALESIONAL; INTRAMUSCULAR; INTRAVENOUS; SOFT TISSUE
Status: DISCONTINUED | OUTPATIENT
Start: 2021-05-13 | End: 2021-05-13

## 2021-05-13 RX ADMIN — CEFAZOLIN 2 G: 330 INJECTION, POWDER, FOR SOLUTION INTRAMUSCULAR; INTRAVENOUS at 11:05

## 2021-05-13 RX ADMIN — DEXAMETHASONE SODIUM PHOSPHATE 8 MG: 4 INJECTION, SOLUTION INTRAMUSCULAR; INTRAVENOUS at 11:05

## 2021-05-13 RX ADMIN — FUROSEMIDE 5 MG: 10 INJECTION, SOLUTION INTRAMUSCULAR; INTRAVENOUS at 12:05

## 2021-05-13 RX ADMIN — ACETAMINOPHEN 1000 MG: 500 TABLET, FILM COATED ORAL at 09:05

## 2021-05-13 RX ADMIN — LIDOCAINE HYDROCHLORIDE 100 MG: 20 INJECTION, SOLUTION INTRAVENOUS at 11:05

## 2021-05-13 RX ADMIN — EPHEDRINE SULFATE 15 MG: 50 INJECTION INTRAVENOUS at 11:05

## 2021-05-13 RX ADMIN — ONDANSETRON HYDROCHLORIDE 4 MG: 2 INJECTION INTRAMUSCULAR; INTRAVENOUS at 11:05

## 2021-05-13 RX ADMIN — ROCURONIUM BROMIDE 40 MG: 10 INJECTION, SOLUTION INTRAVENOUS at 11:05

## 2021-05-13 RX ADMIN — NEOSTIGMINE METHYLSULFATE 4 MG: 1 INJECTION INTRAVENOUS at 12:05

## 2021-05-13 RX ADMIN — FENTANYL CITRATE 100 MCG: 50 INJECTION, SOLUTION INTRAMUSCULAR; INTRAVENOUS at 11:05

## 2021-05-13 RX ADMIN — PROPOFOL 40 MG: 10 INJECTION, EMULSION INTRAVENOUS at 11:05

## 2021-05-13 RX ADMIN — LEVALBUTEROL HYDROCHLORIDE 0.63 MG: 0.63 SOLUTION RESPIRATORY (INHALATION) at 09:05

## 2021-05-13 RX ADMIN — FENTANYL CITRATE 50 MCG: 50 INJECTION, SOLUTION INTRAMUSCULAR; INTRAVENOUS at 11:05

## 2021-05-13 RX ADMIN — EPHEDRINE SULFATE 10 MG: 50 INJECTION INTRAVENOUS at 12:05

## 2021-05-13 RX ADMIN — MIDAZOLAM HYDROCHLORIDE 1 MG: 1 INJECTION, SOLUTION INTRAMUSCULAR; INTRAVENOUS at 10:05

## 2021-05-13 RX ADMIN — FAMOTIDINE 20 MG: 20 TABLET, FILM COATED ORAL at 09:05

## 2021-05-13 RX ADMIN — METRONIDAZOLE 500 MG: 500 SOLUTION INTRAVENOUS at 12:05

## 2021-05-13 RX ADMIN — PROPOFOL 130 MG: 10 INJECTION, EMULSION INTRAVENOUS at 11:05

## 2021-05-13 RX ADMIN — GLYCOPYRROLATE 0.2 MG: 0.2 INJECTION, SOLUTION INTRAMUSCULAR; INTRAVITREAL at 11:05

## 2021-05-13 RX ADMIN — MUPIROCIN: 20 OINTMENT TOPICAL at 09:05

## 2021-05-13 RX ADMIN — GLYCOPYRROLATE 0.4 MG: 0.2 INJECTION, SOLUTION INTRAMUSCULAR; INTRAVITREAL at 12:05

## 2021-05-13 RX ADMIN — HEPARIN SODIUM 5000 UNITS: 5000 INJECTION INTRAVENOUS; SUBCUTANEOUS at 09:05

## 2021-05-13 RX ADMIN — ALBUMIN (HUMAN): 12.5 SOLUTION INTRAVENOUS at 11:05

## 2021-05-13 RX ADMIN — SODIUM CHLORIDE, SODIUM LACTATE, POTASSIUM CHLORIDE, AND CALCIUM CHLORIDE: 600; 310; 30; 20 INJECTION, SOLUTION INTRAVENOUS at 10:05

## 2021-05-14 LAB — POCT GLUCOSE: 101 MG/DL (ref 70–110)

## 2021-05-18 ENCOUNTER — OFFICE VISIT (OUTPATIENT)
Dept: FAMILY MEDICINE | Facility: CLINIC | Age: 71
End: 2021-05-18
Payer: MEDICARE

## 2021-05-18 ENCOUNTER — TELEPHONE (OUTPATIENT)
Dept: FAMILY MEDICINE | Facility: CLINIC | Age: 71
End: 2021-05-18

## 2021-05-18 DIAGNOSIS — M54.2 CHRONIC NECK PAIN: Primary | ICD-10-CM

## 2021-05-18 DIAGNOSIS — G89.29 CHRONIC NECK PAIN: Primary | ICD-10-CM

## 2021-05-18 PROCEDURE — 1159F PR MEDICATION LIST DOCUMENTED IN MEDICAL RECORD: ICD-10-PCS | Mod: 95,,, | Performed by: INTERNAL MEDICINE

## 2021-05-18 PROCEDURE — 99214 PR OFFICE/OUTPT VISIT, EST, LEVL IV, 30-39 MIN: ICD-10-PCS | Mod: 95,,, | Performed by: INTERNAL MEDICINE

## 2021-05-18 PROCEDURE — 99214 OFFICE O/P EST MOD 30 MIN: CPT | Mod: 95,,, | Performed by: INTERNAL MEDICINE

## 2021-05-18 PROCEDURE — 1159F MED LIST DOCD IN RCRD: CPT | Mod: 95,,, | Performed by: INTERNAL MEDICINE

## 2021-05-18 RX ORDER — TIZANIDINE 4 MG/1
4 TABLET ORAL NIGHTLY PRN
Qty: 30 TABLET | Refills: 1 | Status: SHIPPED | OUTPATIENT
Start: 2021-05-18 | End: 2021-07-21

## 2021-05-20 LAB
FINAL PATHOLOGIC DIAGNOSIS: NORMAL
GROSS: NORMAL
Lab: NORMAL

## 2021-05-24 ENCOUNTER — PATIENT MESSAGE (OUTPATIENT)
Dept: FAMILY MEDICINE | Facility: CLINIC | Age: 71
End: 2021-05-24

## 2021-05-24 ENCOUNTER — HOSPITAL ENCOUNTER (OUTPATIENT)
Dept: RADIOLOGY | Facility: HOSPITAL | Age: 71
Discharge: HOME | End: 2021-05-24
Attending: INTERNAL MEDICINE
Payer: MEDICARE

## 2021-05-24 DIAGNOSIS — M54.2 CHRONIC NECK PAIN: ICD-10-CM

## 2021-05-24 DIAGNOSIS — G89.29 CHRONIC NECK PAIN: ICD-10-CM

## 2021-05-24 PROCEDURE — 70498 CT ANGIOGRAPHY NECK: CPT | Mod: 26,,, | Performed by: RADIOLOGY

## 2021-05-24 PROCEDURE — 70496 CT ANGIOGRAPHY HEAD: CPT | Mod: TC,PO

## 2021-05-24 PROCEDURE — 25500020 PHARM REV CODE 255: Mod: PO | Performed by: INTERNAL MEDICINE

## 2021-05-24 PROCEDURE — 70496 CTA HEAD AND NECK (XPD): ICD-10-PCS | Mod: 26,,, | Performed by: RADIOLOGY

## 2021-05-24 PROCEDURE — 70498 CTA HEAD AND NECK (XPD): ICD-10-PCS | Mod: 26,,, | Performed by: RADIOLOGY

## 2021-05-24 PROCEDURE — 70496 CT ANGIOGRAPHY HEAD: CPT | Mod: 26,,, | Performed by: RADIOLOGY

## 2021-05-24 RX ADMIN — IOHEXOL 100 ML: 350 INJECTION, SOLUTION INTRAVENOUS at 01:05

## 2021-05-26 ENCOUNTER — OFFICE VISIT (OUTPATIENT)
Dept: UROGYNECOLOGY | Facility: CLINIC | Age: 71
End: 2021-05-26
Payer: MEDICARE

## 2021-05-26 VITALS
DIASTOLIC BLOOD PRESSURE: 57 MMHG | HEART RATE: 68 BPM | BODY MASS INDEX: 24.89 KG/M2 | WEIGHT: 149.38 LBS | HEIGHT: 65 IN | SYSTOLIC BLOOD PRESSURE: 119 MMHG

## 2021-05-26 DIAGNOSIS — Z98.890 POST-OPERATIVE STATE: Primary | ICD-10-CM

## 2021-05-26 DIAGNOSIS — B37.2 YEAST DERMATITIS: ICD-10-CM

## 2021-05-26 DIAGNOSIS — N95.2 VAGINAL ATROPHY: ICD-10-CM

## 2021-05-26 PROCEDURE — 1126F AMNT PAIN NOTED NONE PRSNT: CPT | Mod: S$GLB,,, | Performed by: NURSE PRACTITIONER

## 2021-05-26 PROCEDURE — 1126F PR PAIN SEVERITY QUANTIFIED, NO PAIN PRESENT: ICD-10-PCS | Mod: S$GLB,,, | Performed by: NURSE PRACTITIONER

## 2021-05-26 PROCEDURE — 99499 RISK ADDL DX/OHS AUDIT: ICD-10-PCS | Mod: S$GLB,,, | Performed by: NURSE PRACTITIONER

## 2021-05-26 PROCEDURE — 3008F PR BODY MASS INDEX (BMI) DOCUMENTED: ICD-10-PCS | Mod: CPTII,S$GLB,, | Performed by: NURSE PRACTITIONER

## 2021-05-26 PROCEDURE — 3008F BODY MASS INDEX DOCD: CPT | Mod: CPTII,S$GLB,, | Performed by: NURSE PRACTITIONER

## 2021-05-26 PROCEDURE — 99999 PR PBB SHADOW E&M-EST. PATIENT-LVL V: ICD-10-PCS | Mod: PBBFAC,,, | Performed by: NURSE PRACTITIONER

## 2021-05-26 PROCEDURE — 1101F PR PT FALLS ASSESS DOC 0-1 FALLS W/OUT INJ PAST YR: ICD-10-PCS | Mod: CPTII,S$GLB,, | Performed by: NURSE PRACTITIONER

## 2021-05-26 PROCEDURE — 99499 UNLISTED E&M SERVICE: CPT | Mod: S$GLB,,, | Performed by: NURSE PRACTITIONER

## 2021-05-26 PROCEDURE — 3288F PR FALLS RISK ASSESSMENT DOCUMENTED: ICD-10-PCS | Mod: CPTII,S$GLB,, | Performed by: NURSE PRACTITIONER

## 2021-05-26 PROCEDURE — 99999 PR PBB SHADOW E&M-EST. PATIENT-LVL V: CPT | Mod: PBBFAC,,, | Performed by: NURSE PRACTITIONER

## 2021-05-26 PROCEDURE — 3288F FALL RISK ASSESSMENT DOCD: CPT | Mod: CPTII,S$GLB,, | Performed by: NURSE PRACTITIONER

## 2021-05-26 PROCEDURE — 99024 POSTOP FOLLOW-UP VISIT: CPT | Mod: S$GLB,,, | Performed by: NURSE PRACTITIONER

## 2021-05-26 PROCEDURE — 99024 PR POST-OP FOLLOW-UP VISIT: ICD-10-PCS | Mod: S$GLB,,, | Performed by: NURSE PRACTITIONER

## 2021-05-26 PROCEDURE — 1101F PT FALLS ASSESS-DOCD LE1/YR: CPT | Mod: CPTII,S$GLB,, | Performed by: NURSE PRACTITIONER

## 2021-05-26 RX ORDER — FLUCONAZOLE 150 MG/1
150 TABLET ORAL
Qty: 3 TABLET | Refills: 0 | Status: SHIPPED | OUTPATIENT
Start: 2021-05-26 | End: 2021-05-31

## 2021-05-26 RX ORDER — NYSTATIN 100000 U/G
CREAM TOPICAL 2 TIMES DAILY
Qty: 30 G | Refills: 3 | Status: SHIPPED | OUTPATIENT
Start: 2021-05-26 | End: 2021-08-01

## 2021-06-03 ENCOUNTER — PATIENT MESSAGE (OUTPATIENT)
Dept: FAMILY MEDICINE | Facility: CLINIC | Age: 71
End: 2021-06-03

## 2021-06-03 DIAGNOSIS — M54.12 CERVICAL RADICULAR PAIN: Primary | ICD-10-CM

## 2021-06-03 DIAGNOSIS — M54.12 RADICULOPATHY, CERVICAL REGION: ICD-10-CM

## 2021-06-03 DIAGNOSIS — M50.30 DEGENERATION, INTERVERTEBRAL DISC, CERVICAL: ICD-10-CM

## 2021-06-04 ENCOUNTER — PATIENT MESSAGE (OUTPATIENT)
Dept: FAMILY MEDICINE | Facility: CLINIC | Age: 71
End: 2021-06-04

## 2021-06-04 DIAGNOSIS — I48.0 PAF (PAROXYSMAL ATRIAL FIBRILLATION): ICD-10-CM

## 2021-06-04 DIAGNOSIS — E53.8 VITAMIN B 12 DEFICIENCY: ICD-10-CM

## 2021-06-04 DIAGNOSIS — E16.2 HYPOGLYCEMIA: ICD-10-CM

## 2021-06-04 RX ORDER — FLASH GLUCOSE SENSOR
KIT MISCELLANEOUS
Qty: 7 KIT | Refills: 3 | Status: SHIPPED | OUTPATIENT
Start: 2021-06-04 | End: 2022-06-01 | Stop reason: SDUPTHER

## 2021-06-04 RX ORDER — RIVAROXABAN 20 MG/1
1 TABLET, FILM COATED ORAL NIGHTLY
Qty: 90 TABLET | Refills: 4 | Status: SHIPPED | OUTPATIENT
Start: 2021-06-04 | End: 2022-08-27 | Stop reason: SDUPTHER

## 2021-06-04 RX ORDER — CYANOCOBALAMIN 1000 UG/ML
1000 INJECTION, SOLUTION INTRAMUSCULAR; SUBCUTANEOUS
Qty: 6 ML | Refills: 3
Start: 2021-06-04 | End: 2022-06-24 | Stop reason: SDUPTHER

## 2021-06-08 ENCOUNTER — CLINICAL SUPPORT (OUTPATIENT)
Dept: CARDIOLOGY | Facility: HOSPITAL | Age: 71
End: 2021-06-08
Payer: MEDICARE

## 2021-06-08 ENCOUNTER — HOSPITAL ENCOUNTER (OUTPATIENT)
Dept: CARDIOLOGY | Facility: HOSPITAL | Age: 71
Discharge: HOME | End: 2021-06-08
Payer: MEDICARE

## 2021-06-08 DIAGNOSIS — Z95.818 PRESENCE OF OTHER CARDIAC IMPLANTS AND GRAFTS: ICD-10-CM

## 2021-06-08 PROCEDURE — 93298 CARDIAC DEVICE CHECK - REMOTE: ICD-10-PCS | Mod: ,,, | Performed by: INTERNAL MEDICINE

## 2021-06-08 PROCEDURE — G2066 INTER DEVC REMOTE 30D: HCPCS | Mod: PO | Performed by: INTERNAL MEDICINE

## 2021-06-08 PROCEDURE — 93298 REM INTERROG DEV EVAL SCRMS: CPT | Mod: ,,, | Performed by: INTERNAL MEDICINE

## 2021-06-09 ENCOUNTER — PATIENT MESSAGE (OUTPATIENT)
Dept: FAMILY MEDICINE | Facility: CLINIC | Age: 71
End: 2021-06-09

## 2021-07-04 ENCOUNTER — PATIENT MESSAGE (OUTPATIENT)
Dept: UROGYNECOLOGY | Facility: CLINIC | Age: 71
End: 2021-07-04

## 2021-07-06 ENCOUNTER — TELEPHONE (OUTPATIENT)
Dept: UROGYNECOLOGY | Facility: CLINIC | Age: 71
End: 2021-07-06

## 2021-07-08 ENCOUNTER — HOSPITAL ENCOUNTER (OUTPATIENT)
Dept: CARDIOLOGY | Facility: HOSPITAL | Age: 71
Discharge: HOME | End: 2021-07-08
Payer: MEDICARE

## 2021-07-12 ENCOUNTER — TELEPHONE (OUTPATIENT)
Dept: UROGYNECOLOGY | Facility: CLINIC | Age: 71
End: 2021-07-12

## 2021-07-12 DIAGNOSIS — M62.89 PELVIC FLOOR TENSION: Primary | ICD-10-CM

## 2021-07-14 ENCOUNTER — OFFICE VISIT (OUTPATIENT)
Dept: UROGYNECOLOGY | Facility: CLINIC | Age: 71
End: 2021-07-14
Payer: MEDICARE

## 2021-07-14 VITALS
WEIGHT: 143.69 LBS | DIASTOLIC BLOOD PRESSURE: 72 MMHG | SYSTOLIC BLOOD PRESSURE: 166 MMHG | HEIGHT: 65 IN | HEART RATE: 76 BPM | BODY MASS INDEX: 23.94 KG/M2

## 2021-07-14 DIAGNOSIS — N90.89 VULVAR IRRITATION: ICD-10-CM

## 2021-07-14 DIAGNOSIS — N81.11 CYSTOCELE, MIDLINE: ICD-10-CM

## 2021-07-14 DIAGNOSIS — K59.09 CHRONIC CONSTIPATION: ICD-10-CM

## 2021-07-14 DIAGNOSIS — Z98.890 POST-OPERATIVE STATE: ICD-10-CM

## 2021-07-14 DIAGNOSIS — N39.41 URINARY INCONTINENCE, URGE: ICD-10-CM

## 2021-07-14 DIAGNOSIS — N95.2 VAGINAL ATROPHY: Primary | ICD-10-CM

## 2021-07-14 PROCEDURE — 99024 POSTOP FOLLOW-UP VISIT: CPT | Mod: S$GLB,,, | Performed by: OBSTETRICS & GYNECOLOGY

## 2021-07-14 PROCEDURE — 99499 RISK ADDL DX/OHS AUDIT: ICD-10-PCS | Mod: S$GLB,,, | Performed by: OBSTETRICS & GYNECOLOGY

## 2021-07-14 PROCEDURE — 3288F FALL RISK ASSESSMENT DOCD: CPT | Mod: CPTII,S$GLB,, | Performed by: OBSTETRICS & GYNECOLOGY

## 2021-07-14 PROCEDURE — 99499 UNLISTED E&M SERVICE: CPT | Mod: S$GLB,,, | Performed by: OBSTETRICS & GYNECOLOGY

## 2021-07-14 PROCEDURE — 1101F PT FALLS ASSESS-DOCD LE1/YR: CPT | Mod: CPTII,S$GLB,, | Performed by: OBSTETRICS & GYNECOLOGY

## 2021-07-14 PROCEDURE — 99999 PR PBB SHADOW E&M-EST. PATIENT-LVL V: ICD-10-PCS | Mod: PBBFAC,,, | Performed by: OBSTETRICS & GYNECOLOGY

## 2021-07-14 PROCEDURE — 3008F BODY MASS INDEX DOCD: CPT | Mod: CPTII,S$GLB,, | Performed by: OBSTETRICS & GYNECOLOGY

## 2021-07-14 PROCEDURE — 99024 PR POST-OP FOLLOW-UP VISIT: ICD-10-PCS | Mod: S$GLB,,, | Performed by: OBSTETRICS & GYNECOLOGY

## 2021-07-14 PROCEDURE — 3008F PR BODY MASS INDEX (BMI) DOCUMENTED: ICD-10-PCS | Mod: CPTII,S$GLB,, | Performed by: OBSTETRICS & GYNECOLOGY

## 2021-07-14 PROCEDURE — 3288F PR FALLS RISK ASSESSMENT DOCUMENTED: ICD-10-PCS | Mod: CPTII,S$GLB,, | Performed by: OBSTETRICS & GYNECOLOGY

## 2021-07-14 PROCEDURE — 1126F PR PAIN SEVERITY QUANTIFIED, NO PAIN PRESENT: ICD-10-PCS | Mod: S$GLB,,, | Performed by: OBSTETRICS & GYNECOLOGY

## 2021-07-14 PROCEDURE — 99999 PR PBB SHADOW E&M-EST. PATIENT-LVL V: CPT | Mod: PBBFAC,,, | Performed by: OBSTETRICS & GYNECOLOGY

## 2021-07-14 PROCEDURE — 1126F AMNT PAIN NOTED NONE PRSNT: CPT | Mod: S$GLB,,, | Performed by: OBSTETRICS & GYNECOLOGY

## 2021-07-14 PROCEDURE — 1101F PR PT FALLS ASSESS DOC 0-1 FALLS W/OUT INJ PAST YR: ICD-10-PCS | Mod: CPTII,S$GLB,, | Performed by: OBSTETRICS & GYNECOLOGY

## 2021-07-17 PROBLEM — N90.89 VULVAR IRRITATION: Status: ACTIVE | Noted: 2021-07-17

## 2021-07-17 PROBLEM — N81.11 CYSTOCELE, MIDLINE: Status: ACTIVE | Noted: 2021-07-17

## 2021-07-17 PROBLEM — N39.41 URINARY INCONTINENCE, URGE: Status: ACTIVE | Noted: 2020-07-13

## 2021-07-17 PROBLEM — Z98.890 POST-OPERATIVE STATE: Status: ACTIVE | Noted: 2021-07-17

## 2021-07-31 ENCOUNTER — PATIENT MESSAGE (OUTPATIENT)
Dept: FAMILY MEDICINE | Facility: CLINIC | Age: 71
End: 2021-07-31

## 2021-08-01 ENCOUNTER — NURSE TRIAGE (OUTPATIENT)
Dept: ADMINISTRATIVE | Facility: CLINIC | Age: 71
End: 2021-08-01

## 2021-08-02 ENCOUNTER — TELEPHONE (OUTPATIENT)
Dept: FAMILY MEDICINE | Facility: CLINIC | Age: 71
End: 2021-08-02

## 2021-08-02 ENCOUNTER — PATIENT MESSAGE (OUTPATIENT)
Dept: FAMILY MEDICINE | Facility: CLINIC | Age: 71
End: 2021-08-02

## 2021-08-02 DIAGNOSIS — U07.1 COVID-19 VIRUS INFECTION: Primary | ICD-10-CM

## 2021-08-03 ENCOUNTER — INFUSION (OUTPATIENT)
Dept: INFECTIOUS DISEASES | Facility: HOSPITAL | Age: 71
End: 2021-08-03
Attending: INTERNAL MEDICINE
Payer: MEDICARE

## 2021-08-03 VITALS
HEART RATE: 78 BPM | OXYGEN SATURATION: 98 % | TEMPERATURE: 98 F | RESPIRATION RATE: 18 BRPM | DIASTOLIC BLOOD PRESSURE: 77 MMHG | SYSTOLIC BLOOD PRESSURE: 125 MMHG

## 2021-08-03 DIAGNOSIS — U07.1 COVID-19 VIRUS INFECTION: ICD-10-CM

## 2021-08-03 PROCEDURE — 63600175 PHARM REV CODE 636 W HCPCS: Performed by: INTERNAL MEDICINE

## 2021-08-03 PROCEDURE — M0243 CASIRIVI AND IMDEVI INFUSION: HCPCS | Performed by: INTERNAL MEDICINE

## 2021-08-03 PROCEDURE — 25000003 PHARM REV CODE 250: Performed by: INTERNAL MEDICINE

## 2021-08-03 RX ORDER — ALBUTEROL SULFATE 90 UG/1
2 AEROSOL, METERED RESPIRATORY (INHALATION)
Status: DISCONTINUED | OUTPATIENT
Start: 2021-08-03 | End: 2021-09-09

## 2021-08-03 RX ORDER — ACETAMINOPHEN 325 MG/1
650 TABLET ORAL ONCE AS NEEDED
Status: DISCONTINUED | OUTPATIENT
Start: 2021-08-03 | End: 2021-09-09

## 2021-08-03 RX ORDER — ONDANSETRON 2 MG/ML
4 INJECTION INTRAMUSCULAR; INTRAVENOUS ONCE AS NEEDED
Status: DISCONTINUED | OUTPATIENT
Start: 2021-08-03 | End: 2021-09-09

## 2021-08-03 RX ORDER — DIPHENHYDRAMINE HYDROCHLORIDE 50 MG/ML
25 INJECTION INTRAMUSCULAR; INTRAVENOUS ONCE AS NEEDED
Status: DISCONTINUED | OUTPATIENT
Start: 2021-08-03 | End: 2021-09-09

## 2021-08-03 RX ORDER — EPINEPHRINE 0.3 MG/.3ML
0.3 INJECTION SUBCUTANEOUS
Status: DISCONTINUED | OUTPATIENT
Start: 2021-08-03 | End: 2021-09-09

## 2021-08-03 RX ORDER — SODIUM CHLORIDE 0.9 % (FLUSH) 0.9 %
10 SYRINGE (ML) INJECTION
Status: DISCONTINUED | OUTPATIENT
Start: 2021-08-03 | End: 2021-09-09

## 2021-08-03 RX ADMIN — CASIRIVIMAB 600 MG: 1332 INJECTION, SOLUTION, CONCENTRATE INTRAVENOUS at 01:08

## 2021-08-03 RX ADMIN — SODIUM CHLORIDE: 0.9 INJECTION, SOLUTION INTRAVENOUS at 01:08

## 2021-08-07 ENCOUNTER — CLINICAL SUPPORT (OUTPATIENT)
Dept: CARDIOLOGY | Facility: HOSPITAL | Age: 71
End: 2021-08-07
Payer: MEDICARE

## 2021-08-07 DIAGNOSIS — Z95.818 PRESENCE OF OTHER CARDIAC IMPLANTS AND GRAFTS: ICD-10-CM

## 2021-08-07 PROCEDURE — G2066 INTER DEVC REMOTE 30D: HCPCS | Mod: PO | Performed by: INTERNAL MEDICINE

## 2021-08-07 PROCEDURE — 93298 REM INTERROG DEV EVAL SCRMS: CPT | Mod: ,,, | Performed by: INTERNAL MEDICINE

## 2021-08-07 PROCEDURE — 93298 CARDIAC DEVICE CHECK - REMOTE: ICD-10-PCS | Mod: ,,, | Performed by: INTERNAL MEDICINE

## 2021-08-09 ENCOUNTER — PATIENT MESSAGE (OUTPATIENT)
Dept: FAMILY MEDICINE | Facility: CLINIC | Age: 71
End: 2021-08-09

## 2021-08-23 DIAGNOSIS — T83.721D EXPOSURE OF IMPLANTED VAGINAL MESH AND PROSTHETIC MATERIAL IN VAGINA, SUBSEQUENT ENCOUNTER: ICD-10-CM

## 2021-08-23 DIAGNOSIS — T83.729D EXPOSURE OF IMPLANTED VAGINAL MESH AND PROSTHETIC MATERIAL IN VAGINA, SUBSEQUENT ENCOUNTER: ICD-10-CM

## 2021-08-23 RX ORDER — ESTRADIOL 0.1 MG/G
CREAM VAGINAL
Qty: 42.5 G | Refills: 11 | Status: SHIPPED | OUTPATIENT
Start: 2021-08-23 | End: 2023-04-05 | Stop reason: ALTCHOICE

## 2021-08-25 ENCOUNTER — LAB VISIT (OUTPATIENT)
Dept: LAB | Facility: HOSPITAL | Age: 71
End: 2021-08-25
Attending: INTERNAL MEDICINE
Payer: MEDICARE

## 2021-08-25 DIAGNOSIS — E53.8 VITAMIN B12 DEFICIENCY: ICD-10-CM

## 2021-08-25 DIAGNOSIS — I10 ESSENTIAL HYPERTENSION: ICD-10-CM

## 2021-08-25 DIAGNOSIS — E16.2 HYPOGLYCEMIC SYNDROME: ICD-10-CM

## 2021-08-25 DIAGNOSIS — E55.9 VITAMIN D DEFICIENCY: ICD-10-CM

## 2021-08-25 DIAGNOSIS — D50.9 IRON DEFICIENCY ANEMIA, UNSPECIFIED IRON DEFICIENCY ANEMIA TYPE: ICD-10-CM

## 2021-08-25 LAB
25(OH)D3+25(OH)D2 SERPL-MCNC: 44 NG/ML (ref 30–96)
ALBUMIN SERPL BCP-MCNC: 4.1 G/DL (ref 3.5–5.2)
ALP SERPL-CCNC: 58 U/L (ref 55–135)
ALT SERPL W/O P-5'-P-CCNC: 29 U/L (ref 10–44)
ANION GAP SERPL CALC-SCNC: 10 MMOL/L (ref 8–16)
AST SERPL-CCNC: 40 U/L (ref 10–40)
BASOPHILS # BLD AUTO: 0.02 K/UL (ref 0–0.2)
BASOPHILS NFR BLD: 0.6 % (ref 0–1.9)
BILIRUB SERPL-MCNC: 0.7 MG/DL (ref 0.1–1)
BUN SERPL-MCNC: 18 MG/DL (ref 8–23)
CALCIUM SERPL-MCNC: 9.4 MG/DL (ref 8.7–10.5)
CHLORIDE SERPL-SCNC: 103 MMOL/L (ref 95–110)
CHOLEST SERPL-MCNC: 192 MG/DL (ref 120–199)
CHOLEST/HDLC SERPL: 2.3 {RATIO} (ref 2–5)
CO2 SERPL-SCNC: 28 MMOL/L (ref 23–29)
CREAT SERPL-MCNC: 0.8 MG/DL (ref 0.5–1.4)
DIFFERENTIAL METHOD: ABNORMAL
EOSINOPHIL # BLD AUTO: 0 K/UL (ref 0–0.5)
EOSINOPHIL NFR BLD: 1.2 % (ref 0–8)
ERYTHROCYTE [DISTWIDTH] IN BLOOD BY AUTOMATED COUNT: 13.2 % (ref 11.5–14.5)
EST. GFR  (AFRICAN AMERICAN): >60 ML/MIN/1.73 M^2
EST. GFR  (NON AFRICAN AMERICAN): >60 ML/MIN/1.73 M^2
ESTIMATED AVG GLUCOSE: 97 MG/DL (ref 68–131)
FERRITIN SERPL-MCNC: 41 NG/ML (ref 20–300)
GLUCOSE SERPL-MCNC: 86 MG/DL (ref 70–110)
HBA1C MFR BLD: 5 % (ref 4–5.6)
HCT VFR BLD AUTO: 40.2 % (ref 37–48.5)
HDLC SERPL-MCNC: 85 MG/DL (ref 40–75)
HDLC SERPL: 44.3 % (ref 20–50)
HGB BLD-MCNC: 13.8 G/DL (ref 12–16)
IMM GRANULOCYTES # BLD AUTO: 0.01 K/UL (ref 0–0.04)
IMM GRANULOCYTES NFR BLD AUTO: 0.3 % (ref 0–0.5)
IRON SERPL-MCNC: 179 UG/DL (ref 30–160)
LDLC SERPL CALC-MCNC: 98 MG/DL (ref 63–159)
LYMPHOCYTES # BLD AUTO: 0.9 K/UL (ref 1–4.8)
LYMPHOCYTES NFR BLD: 27.7 % (ref 18–48)
MCH RBC QN AUTO: 34.8 PG (ref 27–31)
MCHC RBC AUTO-ENTMCNC: 34.3 G/DL (ref 32–36)
MCV RBC AUTO: 101 FL (ref 82–98)
MONOCYTES # BLD AUTO: 0.2 K/UL (ref 0.3–1)
MONOCYTES NFR BLD: 6.6 % (ref 4–15)
NEUTROPHILS # BLD AUTO: 2.1 K/UL (ref 1.8–7.7)
NEUTROPHILS NFR BLD: 63.6 % (ref 38–73)
NONHDLC SERPL-MCNC: 107 MG/DL
NRBC BLD-RTO: 0 /100 WBC
PLATELET # BLD AUTO: 186 K/UL (ref 150–450)
PMV BLD AUTO: 10.7 FL (ref 9.2–12.9)
POTASSIUM SERPL-SCNC: 4 MMOL/L (ref 3.5–5.1)
PROT SERPL-MCNC: 7.1 G/DL (ref 6–8.4)
RBC # BLD AUTO: 3.97 M/UL (ref 4–5.4)
SATURATED IRON: 46 % (ref 20–50)
SODIUM SERPL-SCNC: 141 MMOL/L (ref 136–145)
TOTAL IRON BINDING CAPACITY: 392 UG/DL (ref 250–450)
TRANSFERRIN SERPL-MCNC: 265 MG/DL (ref 200–375)
TRIGL SERPL-MCNC: 45 MG/DL (ref 30–150)
TSH SERPL DL<=0.005 MIU/L-ACNC: 3.49 UIU/ML (ref 0.4–4)
VIT B12 SERPL-MCNC: 351 PG/ML (ref 210–950)
WBC # BLD AUTO: 3.32 K/UL (ref 3.9–12.7)

## 2021-08-25 PROCEDURE — 82306 VITAMIN D 25 HYDROXY: CPT | Performed by: INTERNAL MEDICINE

## 2021-08-25 PROCEDURE — 83036 HEMOGLOBIN GLYCOSYLATED A1C: CPT | Performed by: INTERNAL MEDICINE

## 2021-08-25 PROCEDURE — 80053 COMPREHEN METABOLIC PANEL: CPT | Performed by: INTERNAL MEDICINE

## 2021-08-25 PROCEDURE — 84443 ASSAY THYROID STIM HORMONE: CPT | Performed by: INTERNAL MEDICINE

## 2021-08-25 PROCEDURE — 82607 VITAMIN B-12: CPT | Performed by: INTERNAL MEDICINE

## 2021-08-25 PROCEDURE — 85025 COMPLETE CBC W/AUTO DIFF WBC: CPT | Performed by: INTERNAL MEDICINE

## 2021-08-25 PROCEDURE — 84466 ASSAY OF TRANSFERRIN: CPT | Performed by: INTERNAL MEDICINE

## 2021-08-25 PROCEDURE — 80061 LIPID PANEL: CPT | Performed by: INTERNAL MEDICINE

## 2021-08-25 PROCEDURE — 36415 COLL VENOUS BLD VENIPUNCTURE: CPT | Mod: PO | Performed by: INTERNAL MEDICINE

## 2021-08-25 PROCEDURE — 82728 ASSAY OF FERRITIN: CPT | Performed by: INTERNAL MEDICINE

## 2021-08-26 ENCOUNTER — PATIENT MESSAGE (OUTPATIENT)
Dept: FAMILY MEDICINE | Facility: CLINIC | Age: 71
End: 2021-08-26

## 2021-09-06 ENCOUNTER — CLINICAL SUPPORT (OUTPATIENT)
Dept: CARDIOLOGY | Facility: HOSPITAL | Age: 71
End: 2021-09-06
Payer: MEDICARE

## 2021-09-06 DIAGNOSIS — Z95.818 PRESENCE OF OTHER CARDIAC IMPLANTS AND GRAFTS: ICD-10-CM

## 2021-09-06 PROCEDURE — 93298 REM INTERROG DEV EVAL SCRMS: CPT | Mod: ,,, | Performed by: INTERNAL MEDICINE

## 2021-09-06 PROCEDURE — 93298 CARDIAC DEVICE CHECK - REMOTE: ICD-10-PCS | Mod: ,,, | Performed by: INTERNAL MEDICINE

## 2021-09-06 PROCEDURE — G2066 INTER DEVC REMOTE 30D: HCPCS | Mod: PO | Performed by: INTERNAL MEDICINE

## 2021-09-09 ENCOUNTER — OFFICE VISIT (OUTPATIENT)
Dept: FAMILY MEDICINE | Facility: CLINIC | Age: 71
End: 2021-09-09
Payer: MEDICARE

## 2021-09-09 VITALS
BODY MASS INDEX: 23.03 KG/M2 | WEIGHT: 138.25 LBS | OXYGEN SATURATION: 97 % | TEMPERATURE: 98 F | HEART RATE: 69 BPM | DIASTOLIC BLOOD PRESSURE: 72 MMHG | RESPIRATION RATE: 16 BRPM | SYSTOLIC BLOOD PRESSURE: 126 MMHG | HEIGHT: 65 IN

## 2021-09-09 DIAGNOSIS — E03.9 HYPOTHYROIDISM, UNSPECIFIED TYPE: ICD-10-CM

## 2021-09-09 DIAGNOSIS — N39.46 URINARY INCONTINENCE, MIXED: ICD-10-CM

## 2021-09-09 DIAGNOSIS — I10 ESSENTIAL HYPERTENSION: ICD-10-CM

## 2021-09-09 DIAGNOSIS — D50.9 IRON DEFICIENCY ANEMIA, UNSPECIFIED IRON DEFICIENCY ANEMIA TYPE: ICD-10-CM

## 2021-09-09 DIAGNOSIS — F33.0 MAJOR DEPRESSIVE DISORDER, RECURRENT, MILD: ICD-10-CM

## 2021-09-09 DIAGNOSIS — K43.9 ABDOMINAL WALL HERNIA: ICD-10-CM

## 2021-09-09 DIAGNOSIS — I34.0 NONRHEUMATIC MITRAL VALVE REGURGITATION: ICD-10-CM

## 2021-09-09 DIAGNOSIS — Z12.31 ENCOUNTER FOR SCREENING MAMMOGRAM FOR MALIGNANT NEOPLASM OF BREAST: ICD-10-CM

## 2021-09-09 DIAGNOSIS — I48.0 PAF (PAROXYSMAL ATRIAL FIBRILLATION): Primary | ICD-10-CM

## 2021-09-09 DIAGNOSIS — G31.84 MCI (MILD COGNITIVE IMPAIRMENT): ICD-10-CM

## 2021-09-09 DIAGNOSIS — M19.041 LOCALIZED OSTEOARTHRITIS OF RIGHT HAND: ICD-10-CM

## 2021-09-09 DIAGNOSIS — E78.5 HYPERLIPIDEMIA, UNSPECIFIED HYPERLIPIDEMIA TYPE: ICD-10-CM

## 2021-09-09 DIAGNOSIS — E55.9 VITAMIN D DEFICIENCY: ICD-10-CM

## 2021-09-09 DIAGNOSIS — E16.2 HYPOGLYCEMIC SYNDROME: ICD-10-CM

## 2021-09-09 DIAGNOSIS — D70.9 NEUTROPENIA, UNSPECIFIED TYPE: ICD-10-CM

## 2021-09-09 DIAGNOSIS — E53.8 VITAMIN B12 DEFICIENCY: ICD-10-CM

## 2021-09-09 PROCEDURE — 99214 PR OFFICE/OUTPT VISIT, EST, LEVL IV, 30-39 MIN: ICD-10-PCS | Mod: S$GLB,,, | Performed by: INTERNAL MEDICINE

## 2021-09-09 PROCEDURE — 4010F PR ACE/ARB THEARPY RXD/TAKEN: ICD-10-PCS | Mod: CPTII,S$GLB,, | Performed by: INTERNAL MEDICINE

## 2021-09-09 PROCEDURE — 3008F PR BODY MASS INDEX (BMI) DOCUMENTED: ICD-10-PCS | Mod: CPTII,S$GLB,, | Performed by: INTERNAL MEDICINE

## 2021-09-09 PROCEDURE — 3074F PR MOST RECENT SYSTOLIC BLOOD PRESSURE < 130 MM HG: ICD-10-PCS | Mod: CPTII,S$GLB,, | Performed by: INTERNAL MEDICINE

## 2021-09-09 PROCEDURE — 3078F DIAST BP <80 MM HG: CPT | Mod: CPTII,S$GLB,, | Performed by: INTERNAL MEDICINE

## 2021-09-09 PROCEDURE — 3008F BODY MASS INDEX DOCD: CPT | Mod: CPTII,S$GLB,, | Performed by: INTERNAL MEDICINE

## 2021-09-09 PROCEDURE — 1159F MED LIST DOCD IN RCRD: CPT | Mod: CPTII,S$GLB,, | Performed by: INTERNAL MEDICINE

## 2021-09-09 PROCEDURE — 1160F PR REVIEW ALL MEDS BY PRESCRIBER/CLIN PHARMACIST DOCUMENTED: ICD-10-PCS | Mod: CPTII,S$GLB,, | Performed by: INTERNAL MEDICINE

## 2021-09-09 PROCEDURE — 3074F SYST BP LT 130 MM HG: CPT | Mod: CPTII,S$GLB,, | Performed by: INTERNAL MEDICINE

## 2021-09-09 PROCEDURE — 3078F PR MOST RECENT DIASTOLIC BLOOD PRESSURE < 80 MM HG: ICD-10-PCS | Mod: CPTII,S$GLB,, | Performed by: INTERNAL MEDICINE

## 2021-09-09 PROCEDURE — 1160F RVW MEDS BY RX/DR IN RCRD: CPT | Mod: CPTII,S$GLB,, | Performed by: INTERNAL MEDICINE

## 2021-09-09 PROCEDURE — 4010F ACE/ARB THERAPY RXD/TAKEN: CPT | Mod: CPTII,S$GLB,, | Performed by: INTERNAL MEDICINE

## 2021-09-09 PROCEDURE — 99499 UNLISTED E&M SERVICE: CPT | Mod: S$GLB,,, | Performed by: INTERNAL MEDICINE

## 2021-09-09 PROCEDURE — 1125F AMNT PAIN NOTED PAIN PRSNT: CPT | Mod: CPTII,S$GLB,, | Performed by: INTERNAL MEDICINE

## 2021-09-09 PROCEDURE — 3044F PR MOST RECENT HEMOGLOBIN A1C LEVEL <7.0%: ICD-10-PCS | Mod: CPTII,S$GLB,, | Performed by: INTERNAL MEDICINE

## 2021-09-09 PROCEDURE — 1125F PR PAIN SEVERITY QUANTIFIED, PAIN PRESENT: ICD-10-PCS | Mod: CPTII,S$GLB,, | Performed by: INTERNAL MEDICINE

## 2021-09-09 PROCEDURE — 1159F PR MEDICATION LIST DOCUMENTED IN MEDICAL RECORD: ICD-10-PCS | Mod: CPTII,S$GLB,, | Performed by: INTERNAL MEDICINE

## 2021-09-09 PROCEDURE — 99214 OFFICE O/P EST MOD 30 MIN: CPT | Mod: S$GLB,,, | Performed by: INTERNAL MEDICINE

## 2021-09-09 PROCEDURE — 3044F HG A1C LEVEL LT 7.0%: CPT | Mod: CPTII,S$GLB,, | Performed by: INTERNAL MEDICINE

## 2021-09-09 PROCEDURE — 99499 RISK ADDL DX/OHS AUDIT: ICD-10-PCS | Mod: S$GLB,,, | Performed by: INTERNAL MEDICINE

## 2021-09-09 RX ORDER — IBUPROFEN 100 MG/5ML
1000 SUSPENSION, ORAL (FINAL DOSE FORM) ORAL DAILY
COMMUNITY

## 2021-09-09 RX ORDER — FLUTICASONE PROPIONATE 50 MCG
1 SPRAY, SUSPENSION (ML) NASAL DAILY PRN
COMMUNITY
End: 2022-03-10

## 2021-09-09 RX ORDER — PREDNISONE 5 MG/1
TABLET ORAL
Qty: 28 TABLET | Refills: 0 | Status: SHIPPED | OUTPATIENT
Start: 2021-09-09 | End: 2022-03-03

## 2021-09-09 RX ORDER — DICLOFENAC SODIUM 10 MG/G
2 GEL TOPICAL 3 TIMES DAILY
Qty: 100 G | Refills: 6 | Status: SHIPPED | OUTPATIENT
Start: 2021-09-09 | End: 2023-06-20

## 2021-09-09 RX ORDER — CHOLECALCIFEROL (VITAMIN D3) 25 MCG
1000 TABLET ORAL DAILY
COMMUNITY

## 2021-09-13 ENCOUNTER — TELEPHONE (OUTPATIENT)
Dept: FAMILY MEDICINE | Facility: CLINIC | Age: 71
End: 2021-09-13
Payer: MEDICARE

## 2021-10-06 ENCOUNTER — CLINICAL SUPPORT (OUTPATIENT)
Dept: CARDIOLOGY | Facility: HOSPITAL | Age: 71
End: 2021-10-06
Payer: MEDICARE

## 2021-10-06 DIAGNOSIS — Z95.818 PRESENCE OF OTHER CARDIAC IMPLANTS AND GRAFTS: ICD-10-CM

## 2021-10-06 PROCEDURE — 93298 REM INTERROG DEV EVAL SCRMS: CPT | Mod: ,,, | Performed by: INTERNAL MEDICINE

## 2021-10-06 PROCEDURE — 93298 CARDIAC DEVICE CHECK - REMOTE: ICD-10-PCS | Mod: ,,, | Performed by: INTERNAL MEDICINE

## 2021-10-06 PROCEDURE — G2066 INTER DEVC REMOTE 30D: HCPCS | Mod: PO | Performed by: INTERNAL MEDICINE

## 2021-11-02 ENCOUNTER — PES CALL (OUTPATIENT)
Dept: ADMINISTRATIVE | Facility: CLINIC | Age: 71
End: 2021-11-02
Payer: MEDICARE

## 2021-11-05 ENCOUNTER — CLINICAL SUPPORT (OUTPATIENT)
Dept: CARDIOLOGY | Facility: HOSPITAL | Age: 71
End: 2021-11-05
Payer: MEDICARE

## 2021-11-05 DIAGNOSIS — Z95.818 PRESENCE OF OTHER CARDIAC IMPLANTS AND GRAFTS: ICD-10-CM

## 2021-11-05 PROCEDURE — G2066 INTER DEVC REMOTE 30D: HCPCS | Mod: PO | Performed by: INTERNAL MEDICINE

## 2021-11-05 PROCEDURE — 93298 CARDIAC DEVICE CHECK - REMOTE: ICD-10-PCS | Mod: ,,, | Performed by: INTERNAL MEDICINE

## 2021-11-05 PROCEDURE — 93298 REM INTERROG DEV EVAL SCRMS: CPT | Mod: ,,, | Performed by: INTERNAL MEDICINE

## 2021-11-13 ENCOUNTER — HOSPITAL ENCOUNTER (OUTPATIENT)
Dept: RADIOLOGY | Facility: HOSPITAL | Age: 71
Discharge: HOME | End: 2021-11-13
Attending: INTERNAL MEDICINE
Payer: MEDICARE

## 2021-11-13 DIAGNOSIS — Z12.31 ENCOUNTER FOR SCREENING MAMMOGRAM FOR MALIGNANT NEOPLASM OF BREAST: ICD-10-CM

## 2021-11-13 PROCEDURE — 77063 MAMMO DIGITAL SCREENING BILAT WITH TOMO: ICD-10-PCS | Mod: 26,,, | Performed by: RADIOLOGY

## 2021-11-13 PROCEDURE — 77067 MAMMO DIGITAL SCREENING BILAT WITH TOMO: ICD-10-PCS | Mod: 26,,, | Performed by: RADIOLOGY

## 2021-11-13 PROCEDURE — 77063 BREAST TOMOSYNTHESIS BI: CPT | Mod: 26,,, | Performed by: RADIOLOGY

## 2021-11-13 PROCEDURE — 77067 SCR MAMMO BI INCL CAD: CPT | Mod: TC,PO

## 2021-11-13 PROCEDURE — 77067 SCR MAMMO BI INCL CAD: CPT | Mod: 26,,, | Performed by: RADIOLOGY

## 2021-11-18 ENCOUNTER — PATIENT MESSAGE (OUTPATIENT)
Dept: FAMILY MEDICINE | Facility: CLINIC | Age: 71
End: 2021-11-18
Payer: MEDICARE

## 2021-11-18 ENCOUNTER — OFFICE VISIT (OUTPATIENT)
Dept: CARDIOLOGY | Facility: CLINIC | Age: 71
End: 2021-11-18
Payer: MEDICARE

## 2021-11-18 VITALS
BODY MASS INDEX: 23.83 KG/M2 | HEART RATE: 79 BPM | SYSTOLIC BLOOD PRESSURE: 146 MMHG | WEIGHT: 143.06 LBS | HEIGHT: 65 IN | DIASTOLIC BLOOD PRESSURE: 88 MMHG

## 2021-11-18 DIAGNOSIS — Z95.818 STATUS POST PLACEMENT OF IMPLANTABLE LOOP RECORDER: ICD-10-CM

## 2021-11-18 DIAGNOSIS — E78.2 MIXED HYPERLIPIDEMIA: ICD-10-CM

## 2021-11-18 DIAGNOSIS — I48.3 TYPICAL ATRIAL FLUTTER: ICD-10-CM

## 2021-11-18 DIAGNOSIS — I48.0 PAF (PAROXYSMAL ATRIAL FIBRILLATION): Chronic | ICD-10-CM

## 2021-11-18 DIAGNOSIS — Q24.4 SUBAORTIC MEMBRANE: ICD-10-CM

## 2021-11-18 DIAGNOSIS — I45.10 RBBB: Primary | ICD-10-CM

## 2021-11-18 DIAGNOSIS — I10 ESSENTIAL HYPERTENSION: Chronic | ICD-10-CM

## 2021-11-18 PROCEDURE — 99999 PR PBB SHADOW E&M-EST. PATIENT-LVL IV: ICD-10-PCS | Mod: PBBFAC,,, | Performed by: INTERNAL MEDICINE

## 2021-11-18 PROCEDURE — 1126F AMNT PAIN NOTED NONE PRSNT: CPT | Mod: CPTII,S$GLB,, | Performed by: INTERNAL MEDICINE

## 2021-11-18 PROCEDURE — 3077F PR MOST RECENT SYSTOLIC BLOOD PRESSURE >= 140 MM HG: ICD-10-PCS | Mod: CPTII,S$GLB,, | Performed by: INTERNAL MEDICINE

## 2021-11-18 PROCEDURE — 3044F PR MOST RECENT HEMOGLOBIN A1C LEVEL <7.0%: ICD-10-PCS | Mod: CPTII,S$GLB,, | Performed by: INTERNAL MEDICINE

## 2021-11-18 PROCEDURE — 1159F MED LIST DOCD IN RCRD: CPT | Mod: CPTII,S$GLB,, | Performed by: INTERNAL MEDICINE

## 2021-11-18 PROCEDURE — 3008F PR BODY MASS INDEX (BMI) DOCUMENTED: ICD-10-PCS | Mod: CPTII,S$GLB,, | Performed by: INTERNAL MEDICINE

## 2021-11-18 PROCEDURE — 3044F HG A1C LEVEL LT 7.0%: CPT | Mod: CPTII,S$GLB,, | Performed by: INTERNAL MEDICINE

## 2021-11-18 PROCEDURE — 1126F PR PAIN SEVERITY QUANTIFIED, NO PAIN PRESENT: ICD-10-PCS | Mod: CPTII,S$GLB,, | Performed by: INTERNAL MEDICINE

## 2021-11-18 PROCEDURE — 99499 UNLISTED E&M SERVICE: CPT | Mod: S$GLB,,, | Performed by: INTERNAL MEDICINE

## 2021-11-18 PROCEDURE — 1160F RVW MEDS BY RX/DR IN RCRD: CPT | Mod: CPTII,S$GLB,, | Performed by: INTERNAL MEDICINE

## 2021-11-18 PROCEDURE — 99214 PR OFFICE/OUTPT VISIT, EST, LEVL IV, 30-39 MIN: ICD-10-PCS | Mod: S$GLB,,, | Performed by: INTERNAL MEDICINE

## 2021-11-18 PROCEDURE — 99999 PR PBB SHADOW E&M-EST. PATIENT-LVL IV: CPT | Mod: PBBFAC,,, | Performed by: INTERNAL MEDICINE

## 2021-11-18 PROCEDURE — 3079F DIAST BP 80-89 MM HG: CPT | Mod: CPTII,S$GLB,, | Performed by: INTERNAL MEDICINE

## 2021-11-18 PROCEDURE — 3077F SYST BP >= 140 MM HG: CPT | Mod: CPTII,S$GLB,, | Performed by: INTERNAL MEDICINE

## 2021-11-18 PROCEDURE — 1160F PR REVIEW ALL MEDS BY PRESCRIBER/CLIN PHARMACIST DOCUMENTED: ICD-10-PCS | Mod: CPTII,S$GLB,, | Performed by: INTERNAL MEDICINE

## 2021-11-18 PROCEDURE — 4010F ACE/ARB THERAPY RXD/TAKEN: CPT | Mod: CPTII,S$GLB,, | Performed by: INTERNAL MEDICINE

## 2021-11-18 PROCEDURE — 1159F PR MEDICATION LIST DOCUMENTED IN MEDICAL RECORD: ICD-10-PCS | Mod: CPTII,S$GLB,, | Performed by: INTERNAL MEDICINE

## 2021-11-18 PROCEDURE — 3079F PR MOST RECENT DIASTOLIC BLOOD PRESSURE 80-89 MM HG: ICD-10-PCS | Mod: CPTII,S$GLB,, | Performed by: INTERNAL MEDICINE

## 2021-11-18 PROCEDURE — 99499 RISK ADDL DX/OHS AUDIT: ICD-10-PCS | Mod: S$GLB,,, | Performed by: INTERNAL MEDICINE

## 2021-11-18 PROCEDURE — 3008F BODY MASS INDEX DOCD: CPT | Mod: CPTII,S$GLB,, | Performed by: INTERNAL MEDICINE

## 2021-11-18 PROCEDURE — 4010F PR ACE/ARB THEARPY RXD/TAKEN: ICD-10-PCS | Mod: CPTII,S$GLB,, | Performed by: INTERNAL MEDICINE

## 2021-11-18 PROCEDURE — 99214 OFFICE O/P EST MOD 30 MIN: CPT | Mod: S$GLB,,, | Performed by: INTERNAL MEDICINE

## 2021-11-22 ENCOUNTER — PATIENT MESSAGE (OUTPATIENT)
Dept: FAMILY MEDICINE | Facility: CLINIC | Age: 71
End: 2021-11-22
Payer: MEDICARE

## 2021-12-05 ENCOUNTER — CLINICAL SUPPORT (OUTPATIENT)
Dept: CARDIOLOGY | Facility: HOSPITAL | Age: 71
End: 2021-12-05
Payer: MEDICARE

## 2021-12-05 DIAGNOSIS — Z95.818 PRESENCE OF OTHER CARDIAC IMPLANTS AND GRAFTS: ICD-10-CM

## 2021-12-05 PROCEDURE — G2066 INTER DEVC REMOTE 30D: HCPCS | Mod: PO | Performed by: INTERNAL MEDICINE

## 2021-12-05 PROCEDURE — 93298 REM INTERROG DEV EVAL SCRMS: CPT | Mod: ,,, | Performed by: INTERNAL MEDICINE

## 2021-12-05 PROCEDURE — 93298 CARDIAC DEVICE CHECK - REMOTE: ICD-10-PCS | Mod: ,,, | Performed by: INTERNAL MEDICINE

## 2021-12-23 ENCOUNTER — PES CALL (OUTPATIENT)
Dept: ADMINISTRATIVE | Facility: CLINIC | Age: 71
End: 2021-12-23
Payer: MEDICARE

## 2022-01-04 ENCOUNTER — CLINICAL SUPPORT (OUTPATIENT)
Dept: CARDIOLOGY | Facility: HOSPITAL | Age: 72
End: 2022-01-04
Attending: INTERNAL MEDICINE
Payer: MEDICARE

## 2022-01-04 DIAGNOSIS — Z95.818 PRESENCE OF OTHER CARDIAC IMPLANTS AND GRAFTS: ICD-10-CM

## 2022-01-04 PROCEDURE — G2066 INTER DEVC REMOTE 30D: HCPCS | Mod: PO | Performed by: INTERNAL MEDICINE

## 2022-01-14 ENCOUNTER — OFFICE VISIT (OUTPATIENT)
Dept: FAMILY MEDICINE | Facility: CLINIC | Age: 72
End: 2022-01-14
Payer: MEDICARE

## 2022-01-14 VITALS
OXYGEN SATURATION: 98 % | TEMPERATURE: 99 F | SYSTOLIC BLOOD PRESSURE: 126 MMHG | HEART RATE: 73 BPM | RESPIRATION RATE: 20 BRPM | HEIGHT: 65 IN | BODY MASS INDEX: 24.33 KG/M2 | WEIGHT: 146.06 LBS | DIASTOLIC BLOOD PRESSURE: 68 MMHG

## 2022-01-14 DIAGNOSIS — I34.0 NONRHEUMATIC MITRAL VALVE REGURGITATION: ICD-10-CM

## 2022-01-14 DIAGNOSIS — Z23 NEED FOR SHINGLES VACCINE: ICD-10-CM

## 2022-01-14 DIAGNOSIS — F33.0 MAJOR DEPRESSIVE DISORDER, RECURRENT, MILD: ICD-10-CM

## 2022-01-14 DIAGNOSIS — G31.84 MCI (MILD COGNITIVE IMPAIRMENT): ICD-10-CM

## 2022-01-14 DIAGNOSIS — E78.5 HYPERLIPIDEMIA, UNSPECIFIED HYPERLIPIDEMIA TYPE: ICD-10-CM

## 2022-01-14 DIAGNOSIS — K43.9 ABDOMINAL WALL HERNIA: ICD-10-CM

## 2022-01-14 DIAGNOSIS — M79.18 CERVICAL MYOFASCIAL PAIN SYNDROME: ICD-10-CM

## 2022-01-14 DIAGNOSIS — E16.2 HYPOGLYCEMIC SYNDROME: ICD-10-CM

## 2022-01-14 DIAGNOSIS — E53.8 VITAMIN B12 DEFICIENCY: ICD-10-CM

## 2022-01-14 DIAGNOSIS — I10 ESSENTIAL HYPERTENSION: ICD-10-CM

## 2022-01-14 DIAGNOSIS — E03.9 HYPOTHYROIDISM, UNSPECIFIED TYPE: ICD-10-CM

## 2022-01-14 DIAGNOSIS — D50.9 IRON DEFICIENCY ANEMIA, UNSPECIFIED IRON DEFICIENCY ANEMIA TYPE: ICD-10-CM

## 2022-01-14 DIAGNOSIS — N39.46 URINARY INCONTINENCE, MIXED: ICD-10-CM

## 2022-01-14 DIAGNOSIS — I48.0 PAF (PAROXYSMAL ATRIAL FIBRILLATION): Primary | ICD-10-CM

## 2022-01-14 DIAGNOSIS — E55.9 VITAMIN D DEFICIENCY: ICD-10-CM

## 2022-01-14 DIAGNOSIS — D70.9 NEUTROPENIA, UNSPECIFIED TYPE: ICD-10-CM

## 2022-01-14 PROCEDURE — 85025 COMPLETE CBC W/AUTO DIFF WBC: CPT | Performed by: INTERNAL MEDICINE

## 2022-01-14 PROCEDURE — 99214 OFFICE O/P EST MOD 30 MIN: CPT | Mod: S$GLB,,, | Performed by: INTERNAL MEDICINE

## 2022-01-14 PROCEDURE — 99214 PR OFFICE/OUTPT VISIT, EST, LEVL IV, 30-39 MIN: ICD-10-PCS | Mod: S$GLB,,, | Performed by: INTERNAL MEDICINE

## 2022-01-14 PROCEDURE — 36415 PR COLLECTION VENOUS BLOOD,VENIPUNCTURE: ICD-10-PCS | Mod: S$GLB,,, | Performed by: INTERNAL MEDICINE

## 2022-01-14 PROCEDURE — 36415 COLL VENOUS BLD VENIPUNCTURE: CPT | Mod: S$GLB,,, | Performed by: INTERNAL MEDICINE

## 2022-01-14 PROCEDURE — 80048 BASIC METABOLIC PNL TOTAL CA: CPT | Performed by: INTERNAL MEDICINE

## 2022-01-14 RX ORDER — MIRABEGRON 25 MG/1
25 TABLET, FILM COATED, EXTENDED RELEASE ORAL DAILY
Qty: 30 TABLET | Refills: 11 | Status: SHIPPED | OUTPATIENT
Start: 2022-01-14 | End: 2022-05-17

## 2022-01-14 RX ORDER — ZOSTER VACCINE RECOMBINANT, ADJUVANTED 50 MCG/0.5
0.5 KIT INTRAMUSCULAR ONCE
Qty: 1 EACH | Refills: 1 | Status: SHIPPED | OUTPATIENT
Start: 2022-01-14 | End: 2022-01-14

## 2022-01-14 RX ORDER — DONEPEZIL HYDROCHLORIDE 5 MG/1
5 TABLET, FILM COATED ORAL NIGHTLY
Qty: 30 TABLET | Refills: 11 | Status: SHIPPED | OUTPATIENT
Start: 2022-01-14 | End: 2022-03-03

## 2022-01-14 NOTE — PATIENT INSTRUCTIONS
Aricept  Call in one month if tolerating to get dose increased.     If mybetriq is covered then stop oxybutynin
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
## 2022-01-14 NOTE — PROGRESS NOTES
Venipuncture performed with 21 gauge butterfly, x's 1 attempt,  to R Cephalic vein.  Specimens collected per orders.      Pressure dressing applied to site, instructed patient to remove dressing in 10-15 minutes, OK to re-adjust dressing if pressure causing any discomfort, to observe closely for numbness and/or discoloration to hand or fingers, and to notify provider if bleeding persists after applying constant pressure lasting 30 minutes.

## 2022-01-15 LAB
ANION GAP SERPL CALC-SCNC: 12 MMOL/L (ref 8–16)
BASOPHILS # BLD AUTO: 0.02 K/UL (ref 0–0.2)
BASOPHILS NFR BLD: 0.6 % (ref 0–1.9)
BUN SERPL-MCNC: 17 MG/DL (ref 8–23)
CALCIUM SERPL-MCNC: 9.8 MG/DL (ref 8.7–10.5)
CHLORIDE SERPL-SCNC: 100 MMOL/L (ref 95–110)
CO2 SERPL-SCNC: 25 MMOL/L (ref 23–29)
CREAT SERPL-MCNC: 0.8 MG/DL (ref 0.5–1.4)
DIFFERENTIAL METHOD: ABNORMAL
EOSINOPHIL # BLD AUTO: 0 K/UL (ref 0–0.5)
EOSINOPHIL NFR BLD: 0.6 % (ref 0–8)
ERYTHROCYTE [DISTWIDTH] IN BLOOD BY AUTOMATED COUNT: 13.8 % (ref 11.5–14.5)
EST. GFR  (AFRICAN AMERICAN): >60 ML/MIN/1.73 M^2
EST. GFR  (NON AFRICAN AMERICAN): >60 ML/MIN/1.73 M^2
GLUCOSE SERPL-MCNC: 80 MG/DL (ref 70–110)
HCT VFR BLD AUTO: 37.7 % (ref 37–48.5)
HGB BLD-MCNC: 12.5 G/DL (ref 12–16)
IMM GRANULOCYTES # BLD AUTO: 0.01 K/UL (ref 0–0.04)
IMM GRANULOCYTES NFR BLD AUTO: 0.3 % (ref 0–0.5)
LYMPHOCYTES # BLD AUTO: 1.2 K/UL (ref 1–4.8)
LYMPHOCYTES NFR BLD: 31.8 % (ref 18–48)
MCH RBC QN AUTO: 33.9 PG (ref 27–31)
MCHC RBC AUTO-ENTMCNC: 33.2 G/DL (ref 32–36)
MCV RBC AUTO: 102 FL (ref 82–98)
MONOCYTES # BLD AUTO: 0.3 K/UL (ref 0.3–1)
MONOCYTES NFR BLD: 7.7 % (ref 4–15)
NEUTROPHILS # BLD AUTO: 2.1 K/UL (ref 1.8–7.7)
NEUTROPHILS NFR BLD: 59 % (ref 38–73)
NRBC BLD-RTO: 0 /100 WBC
PLATELET # BLD AUTO: 219 K/UL (ref 150–450)
PMV BLD AUTO: 10.4 FL (ref 9.2–12.9)
POTASSIUM SERPL-SCNC: 3.8 MMOL/L (ref 3.5–5.1)
RBC # BLD AUTO: 3.69 M/UL (ref 4–5.4)
SODIUM SERPL-SCNC: 137 MMOL/L (ref 136–145)
WBC # BLD AUTO: 3.62 K/UL (ref 3.9–12.7)

## 2022-01-19 ENCOUNTER — PATIENT MESSAGE (OUTPATIENT)
Dept: FAMILY MEDICINE | Facility: CLINIC | Age: 72
End: 2022-01-19
Payer: MEDICARE

## 2022-01-20 ENCOUNTER — PATIENT MESSAGE (OUTPATIENT)
Dept: FAMILY MEDICINE | Facility: CLINIC | Age: 72
End: 2022-01-20
Payer: MEDICARE

## 2022-02-03 ENCOUNTER — CLINICAL SUPPORT (OUTPATIENT)
Dept: CARDIOLOGY | Facility: HOSPITAL | Age: 72
End: 2022-02-03
Payer: MEDICARE

## 2022-02-03 DIAGNOSIS — Z95.818 PRESENCE OF OTHER CARDIAC IMPLANTS AND GRAFTS: ICD-10-CM

## 2022-02-03 PROCEDURE — G2066 INTER DEVC REMOTE 30D: HCPCS | Mod: PO | Performed by: INTERNAL MEDICINE

## 2022-02-09 ENCOUNTER — TELEPHONE (OUTPATIENT)
Dept: FAMILY MEDICINE | Facility: CLINIC | Age: 72
End: 2022-02-09
Payer: MEDICARE
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
## 2022-02-09 NOTE — TELEPHONE ENCOUNTER
She walked into clinic and I was not there. She was acting manic and called ambulance to go to ER.

## 2022-02-09 NOTE — TELEPHONE ENCOUNTER
----- Message from Mirza Valdes sent at 2/9/2022  1:06 PM CST -----  Contact: patient  Type: Needs Medical Advice  Who Called:  patient  Symptoms (please be specific): something going on, maybe a panic attack  How long has patient had these symptoms:  n/a  Pharmacy name and phone #:  n/a  Best Call Back Number: [phone]  Additional Information: patient stated she wants to be seen now to go over her medications?  Patient went to being very calm to raising her voice that she needs to be seen now.

## 2022-02-09 NOTE — TELEPHONE ENCOUNTER
"Patient irate and demanding to speak with PCP only. States she is having "something, some kind of anxiety or panic attacks or something and I desperately need to speak with her about my medications" Offered same day appt but patient declined as she requested to bring several friends to appt with her, replying "I already know the whole routine with no visitors in the office"   "

## 2022-03-03 ENCOUNTER — TELEPHONE (OUTPATIENT)
Dept: FAMILY MEDICINE | Facility: CLINIC | Age: 72
End: 2022-03-03
Payer: MEDICARE

## 2022-03-03 RX ORDER — MIRTAZAPINE 30 MG/1
30 TABLET, FILM COATED ORAL NIGHTLY
Qty: 90 TABLET | Refills: 3 | Status: SHIPPED | OUTPATIENT
Start: 2022-03-03 | End: 2023-06-15 | Stop reason: SDUPTHER

## 2022-03-03 RX ORDER — CLONIDINE HYDROCHLORIDE 0.1 MG/1
TABLET ORAL
COMMUNITY
Start: 2022-02-23 | End: 2022-03-03 | Stop reason: SDUPTHER

## 2022-03-03 RX ORDER — CLONIDINE HYDROCHLORIDE 0.1 MG/1
TABLET ORAL
Qty: 30 TABLET | Refills: 3 | Status: SHIPPED | OUTPATIENT
Start: 2022-03-03 | End: 2022-06-08 | Stop reason: SDUPTHER

## 2022-03-03 RX ORDER — DONEPEZIL HYDROCHLORIDE 10 MG/1
10 TABLET, FILM COATED ORAL NIGHTLY
Qty: 90 TABLET | Refills: 2 | Status: SHIPPED | OUTPATIENT
Start: 2022-03-03 | End: 2022-12-05

## 2022-03-03 RX ORDER — MIRTAZAPINE 30 MG/1
30 TABLET, FILM COATED ORAL NIGHTLY
COMMUNITY
Start: 2022-02-23 | End: 2022-03-03 | Stop reason: SDUPTHER

## 2022-03-03 NOTE — TELEPHONE ENCOUNTER
Patient requesting to discuss with PCP medications changed/given during her recent hospital stay. Of note, she has a 3/9 Hospital F/U with Katina

## 2022-03-03 NOTE — TELEPHONE ENCOUNTER
----- Message from Claudette Spencer sent at 3/3/2022  1:06 PM CST -----  Contact: Patient  Type:  Needs Medical Advice    Who Called:  Patient       Would the patient rather a call back or a response via [facility]?  Call    Best Call Back Number:  [phone]    Additional Information:  Patient needs to speak to the nurse about all her medications she was given while she was in the hospital    Please call to advise

## 2022-03-03 NOTE — TELEPHONE ENCOUNTER
Updated medications.    Please change hospital f/u with me to March 8th at 11 am --she is aware    Thanks

## 2022-03-04 NOTE — TELEPHONE ENCOUNTER
No new care gaps identified.  Powered by geolad [employer]. Reference number: [id number].   3/04/2022 5:24:57 PM CST

## 2022-03-05 ENCOUNTER — PATIENT MESSAGE (OUTPATIENT)
Dept: FAMILY MEDICINE | Facility: CLINIC | Age: 72
End: 2022-03-05
Payer: MEDICARE

## 2022-03-05 ENCOUNTER — CLINICAL SUPPORT (OUTPATIENT)
Dept: CARDIOLOGY | Facility: HOSPITAL | Age: 72
End: 2022-03-05
Payer: MEDICARE

## 2022-03-05 DIAGNOSIS — Z95.818 PRESENCE OF OTHER CARDIAC IMPLANTS AND GRAFTS: ICD-10-CM

## 2022-03-05 PROCEDURE — 93298 REM INTERROG DEV EVAL SCRMS: CPT | Mod: ,,, | Performed by: INTERNAL MEDICINE

## 2022-03-05 PROCEDURE — G2066 INTER DEVC REMOTE 30D: HCPCS | Mod: PO | Performed by: INTERNAL MEDICINE

## 2022-03-05 PROCEDURE — 93298 CARDIAC DEVICE CHECK - REMOTE: ICD-10-PCS | Mod: ,,, | Performed by: INTERNAL MEDICINE

## 2022-03-10 ENCOUNTER — OFFICE VISIT (OUTPATIENT)
Dept: FAMILY MEDICINE | Facility: CLINIC | Age: 72
End: 2022-03-10
Payer: MEDICARE

## 2022-03-10 VITALS
WEIGHT: 150.44 LBS | RESPIRATION RATE: 20 BRPM | HEIGHT: 65 IN | BODY MASS INDEX: 25.07 KG/M2 | OXYGEN SATURATION: 100 % | DIASTOLIC BLOOD PRESSURE: 68 MMHG | SYSTOLIC BLOOD PRESSURE: 126 MMHG | TEMPERATURE: 99 F | HEART RATE: 65 BPM

## 2022-03-10 DIAGNOSIS — E16.1 POSTPRANDIAL HYPOGLYCEMIA: ICD-10-CM

## 2022-03-10 DIAGNOSIS — F51.01 PRIMARY INSOMNIA: ICD-10-CM

## 2022-03-10 DIAGNOSIS — E53.8 VITAMIN B12 DEFICIENCY: ICD-10-CM

## 2022-03-10 DIAGNOSIS — F33.0 MAJOR DEPRESSIVE DISORDER, RECURRENT, MILD: Primary | ICD-10-CM

## 2022-03-10 DIAGNOSIS — E03.9 HYPOTHYROIDISM, UNSPECIFIED TYPE: ICD-10-CM

## 2022-03-10 DIAGNOSIS — F41.1 GAD (GENERALIZED ANXIETY DISORDER): ICD-10-CM

## 2022-03-10 DIAGNOSIS — R31.9 HEMATURIA, UNSPECIFIED TYPE: ICD-10-CM

## 2022-03-10 LAB
BLOOD URINE, POC: 50
CLARITY, POC UA: CLEAR
COLOR, POC UA: YELLOW
KETONES UR QL STRIP: ABNORMAL
LEUKOCYTE ESTERASE URINE, POC: ABNORMAL
NORMAL RANGE: ABNORMAL
PH, POC UA: 5
PROTEIN, POC: ABNORMAL
SPECIFIC GRAVITY, POC UA: 1.02
TSH SERPL DL<=0.005 MIU/L-ACNC: 2.73 UIU/ML (ref 0.4–4)
UROBILINOGEN, POC UA: ABNORMAL
VIT B12 SERPL-MCNC: 505 PG/ML (ref 210–950)

## 2022-03-10 PROCEDURE — 99214 PR OFFICE/OUTPT VISIT, EST, LEVL IV, 30-39 MIN: ICD-10-PCS | Mod: S$GLB,,, | Performed by: INTERNAL MEDICINE

## 2022-03-10 PROCEDURE — 81002 POCT URINE DIPSTICK WITHOUT MICROSCOPE: ICD-10-PCS | Mod: S$GLB,,, | Performed by: INTERNAL MEDICINE

## 2022-03-10 PROCEDURE — 36415 PR COLLECTION VENOUS BLOOD,VENIPUNCTURE: ICD-10-PCS | Mod: S$GLB,,, | Performed by: INTERNAL MEDICINE

## 2022-03-10 PROCEDURE — 84443 ASSAY THYROID STIM HORMONE: CPT | Performed by: INTERNAL MEDICINE

## 2022-03-10 PROCEDURE — 81002 URINALYSIS NONAUTO W/O SCOPE: CPT | Mod: S$GLB,,, | Performed by: INTERNAL MEDICINE

## 2022-03-10 PROCEDURE — 99214 OFFICE O/P EST MOD 30 MIN: CPT | Mod: S$GLB,,, | Performed by: INTERNAL MEDICINE

## 2022-03-10 PROCEDURE — 36415 COLL VENOUS BLD VENIPUNCTURE: CPT | Mod: S$GLB,,, | Performed by: INTERNAL MEDICINE

## 2022-03-10 PROCEDURE — 82607 VITAMIN B-12: CPT | Performed by: INTERNAL MEDICINE

## 2022-03-10 PROCEDURE — 87086 URINE CULTURE/COLONY COUNT: CPT | Performed by: INTERNAL MEDICINE

## 2022-03-10 RX ORDER — PAROXETINE HYDROCHLORIDE 40 MG/1
TABLET, FILM COATED ORAL
Qty: 90 TABLET | Refills: 3 | Status: SHIPPED | OUTPATIENT
Start: 2022-03-10 | End: 2023-06-20

## 2022-03-10 NOTE — PROGRESS NOTES
Subjective:       Patient ID: Yara Whitehead is a 71 y.o. female.    Medication List with Changes/Refills   Current Medications    ALBUTEROL (PROVENTIL/VENTOLIN HFA) 90 MCG/ACTUATION INHALER    Inhale 1-2 puffs into the lungs every 6 (six) hours as needed for Wheezing.    ASCORBIC ACID, VITAMIN C, (VITAMIN C) 1000 MG TABLET    Take 1,000 mg by mouth once daily.    ATORVASTATIN (LIPITOR) 10 MG TABLET    TAKE 1 TABLET BY MOUTH  DAILY    CETIRIZINE (ZYRTEC) 10 MG TABLET    Take 10 mg by mouth daily as needed.     CLONIDINE (CATAPRES) 0.1 MG TABLET    TAKE 1 TABLET BY MOUTH EVERY DAY AT 9PM FOR HYPERTENSION    CYANOCOBALAMIN 1,000 MCG/ML INJECTION    Inject 1 mL (1,000 mcg total) into the muscle every 30 days.    DICLOFENAC SODIUM (VOLTAREN) 1 % GEL    Apply 2 g topically 3 (three) times daily.    DONEPEZIL (ARICEPT) 10 MG TABLET    Take 1 tablet (10 mg total) by mouth every evening.    ESTRADIOL (ESTRACE) 0.01 % (0.1 MG/GRAM) VAGINAL CREAM    PLACE 1 GRAM VAGINALLY EVERY OTHER DAY    FLASH GLUCOSE SENSOR ([employer] VICKY 14 DAY SENSOR) KIT    USE AS DIRECTED    GLUCOSE 4 GM CHEWABLE TABLET    Take 4 tablets when glucose from 51-70  Take 6 tablets when glucose is less than 50    LANCETS MISC    To check BG 1 times daily, to use with insurance preferred meter    LEVOTHYROXINE (SYNTHROID) 88 MCG TABLET    TAKE 1 TABLET BY MOUTH  BEFORE BREAKFAST    MIRABEGRON (MYRBETRIQ) 25 MG TB24 ER TABLET    Take 1 tablet (25 mg total) by mouth once daily.    MIRTAZAPINE (REMERON) 30 MG TABLET    Take 1 tablet (30 mg total) by mouth nightly.    RIVAROXABAN (XARELTO) 20 MG TAB    Take 1 tablet (20 mg total) by mouth every evening.    TELMISARTAN (MICARDIS) 40 MG TAB    TAKE 1 TABLET BY MOUTH ONCE DAILY    TRUEPLUS LANCETS 30 GAUGE MISC    USE TO TEST BLOOD SUGAR BID    VITAMIN D (VITAMIN D3) 1000 UNITS TAB    Take 1,000 Units by mouth once daily.   Changed and/or Refilled Medications    Modified Medication Previous Medication     PAROXETINE (PAXIL) 40 MG TABLET paroxetine (PAXIL) 40 MG tablet       TAKE 1 TABLET BY MOUTH IN  THE MORNING    TAKE 1 TABLET BY MOUTH IN  THE MORNING   Discontinued Medications    FLUTICASONE PROPIONATE (FLONASE) 50 MCG/ACTUATION NASAL SPRAY    1 spray by Each Nostril route daily as needed for Rhinitis.       Chief Complaint: Hospital Follow Up  She is here today to f/u on recent psychiatric hospitalization.     She has anxiety and depression for many years that was maintained on paxil 40 mg and wellbutrin 150 m g daily.  In mid February she had 18 hrs of paranoia and arya. She walked into the clinic and nurse called 911. In ER she was PET and admitted to [facility] for 2 weeks.  She was found to have UTI that was treated with abx and her medications were adjusted. Her wellbutrin was stopped. She was continued on paxil 40 mg daily and was started on remeron 30 mg nightly.  She has done very well on this regimen and was d/c asia on 3/2/2022.  She was advised to start outpatient clinical counseling 3 times a week but she had not yet made an appt. She is feeling very good at this time. She is sleeping well. She denies any anxiety or depression. She is tolerating her medications well.     She has post gastric bypass hypoglycemic syndrome. She gets chronic diarrhea after eating large carbohydrate meal. She will get low glucose readings and noted that while in her hospital they had to check her frequently because on a diabetic diet she would have profound hypoglycemia.  At home she is able to control her diet with modified carbohydrates and high protein so that she rarely gets lows at home. She would like a Dexacom meter to monitor when she is going low before she gets symptoms. This was denied by insurance.     Review of Systems   Constitutional: Negative for appetite change, fatigue, fever and unexpected weight change.   HENT: Negative for congestion, ear pain, hearing loss, sore throat and trouble  "swallowing.    Eyes: Negative for pain and visual disturbance.   Respiratory: Negative for cough, chest tightness, shortness of breath and wheezing.    Cardiovascular: Negative for chest pain, palpitations and leg swelling.   Gastrointestinal: Negative for abdominal pain, blood in stool, constipation, diarrhea, nausea and vomiting.   Endocrine: Negative for polyuria.   Genitourinary: Negative for dysuria and hematuria.   Musculoskeletal: Negative for arthralgias, back pain and myalgias.   Skin: Negative for rash.   Neurological: Negative for dizziness, weakness, numbness and headaches.   Hematological: Does not bruise/bleed easily.   Psychiatric/Behavioral: Negative for dysphoric mood, sleep disturbance and suicidal ideas. The patient is not nervous/anxious.        Objective:      Vitals:    03/10/22 0908   BP: 126/68   Pulse: 65   Resp: 20   Temp: 98.5 °F (36.9 °C)   SpO2: 100%   Weight: 68.2 kg (150 lb 7.4 oz)   Height: 5' 5" (1.651 m)     Body mass index is 25.04 kg/m².  Physical Exam    General appearance: No acute distress, cooperative, good eye contact   Neck: FROM, soft, supple, no thyromegaly, no bruits  Lymph: no anterior or posterior cervical adenopathy  Heart::  Regular rate and rhythm, 3/6 systolic murmur  Lung: Clear to ascultation bilaterally, no wheezing, no rales, no rhonchi, no distress  Abdomen: Soft, nontender, no distention, no hepatosplenomegaly, bowel sounds normal, no guarding, no rebound, no peritoneal signs  Skin: no rashes, no lesions  Extremities: no edema, no cyanosis  Neuro: no focal abnormalities, strength 5/5 b/l UE and LE, 2+ DTRs b/l UE and LE, normal gait  Peripheral pulses: 2+ pedal pulses bilaterally, good perfusion and color  Musculoskeletal: FROM, good strenth, no tenderness  Joint: normal appearance, no swelling, no warmth, no deformity in all joints    Assessment:       1. Major depressive disorder, recurrent, mild    2. SMITA (generalized anxiety disorder)    3. Primary " insomnia    4. Postprandial hypoglycemia    5. Hematuria, unspecified type    6. Vitamin B12 deficiency    7. Hypothyroidism, unspecified type        Plan:       Major depressive disorder, recurrent, mild  Improved on this regimen of paxil and remeron. Continue to monitor. Encouraged her to start clinical counseling.   -     paroxetine (PAXIL) 40 MG tablet; TAKE 1 TABLET BY MOUTH IN  THE MORNING  Dispense: 90 tablet; Refill: 3    SMITA (generalized anxiety disorder)  Improved since starting remeron.    Primary insomnia  Controlled on remeron.    Postprandial hypoglycemia  Controlled on diet but she still with get low glucose readings at times and is very symptomatic. She would benefit from a dexacom meter but insurance denied. Referral to endocrine to see if this is appropriate and any treatments available.   -     Ambulatory referral/consult to Endocrinology; Future; Expected date: 03/17/2022    Hematuria, unspecified type  Noted on u/a and will send for culture.   -     POCT urine dipstick without microscope  -     Urine culture    Follow up for already scheduled.

## 2022-03-11 ENCOUNTER — PATIENT MESSAGE (OUTPATIENT)
Dept: FAMILY MEDICINE | Facility: CLINIC | Age: 72
End: 2022-03-11
Payer: MEDICARE

## 2022-03-11 LAB
BACTERIA UR CULT: NORMAL
BACTERIA UR CULT: NORMAL

## 2022-03-11 RX ORDER — PAROXETINE HYDROCHLORIDE 40 MG/1
TABLET, FILM COATED ORAL
Qty: 7 TABLET | OUTPATIENT
Start: 2022-03-11

## 2022-03-12 ENCOUNTER — PATIENT MESSAGE (OUTPATIENT)
Dept: FAMILY MEDICINE | Facility: CLINIC | Age: 72
End: 2022-03-12
Payer: MEDICARE

## 2022-04-04 ENCOUNTER — CLINICAL SUPPORT (OUTPATIENT)
Dept: CARDIOLOGY | Facility: HOSPITAL | Age: 72
End: 2022-04-04
Payer: MEDICARE

## 2022-04-04 DIAGNOSIS — Z95.818 PRESENCE OF OTHER CARDIAC IMPLANTS AND GRAFTS: ICD-10-CM

## 2022-04-04 PROCEDURE — G2066 INTER DEVC REMOTE 30D: HCPCS | Mod: PO | Performed by: INTERNAL MEDICINE

## 2022-04-21 ENCOUNTER — PATIENT OUTREACH (OUTPATIENT)
Dept: ADMINISTRATIVE | Facility: OTHER | Age: 72
End: 2022-04-21
Payer: MEDICARE

## 2022-04-21 ENCOUNTER — OFFICE VISIT (OUTPATIENT)
Dept: ENDOCRINOLOGY | Facility: CLINIC | Age: 72
End: 2022-04-21
Payer: MEDICARE

## 2022-04-21 VITALS
DIASTOLIC BLOOD PRESSURE: 70 MMHG | HEART RATE: 72 BPM | OXYGEN SATURATION: 99 % | HEIGHT: 65 IN | SYSTOLIC BLOOD PRESSURE: 144 MMHG | BODY MASS INDEX: 25.33 KG/M2 | WEIGHT: 152 LBS

## 2022-04-21 DIAGNOSIS — F43.22 ADJUSTMENT DISORDER WITH ANXIOUS MOOD: ICD-10-CM

## 2022-04-21 DIAGNOSIS — I10 ESSENTIAL HYPERTENSION: Chronic | ICD-10-CM

## 2022-04-21 DIAGNOSIS — E16.2 HYPOGLYCEMIA: Primary | ICD-10-CM

## 2022-04-21 DIAGNOSIS — E03.9 ACQUIRED HYPOTHYROIDISM: ICD-10-CM

## 2022-04-21 PROCEDURE — 99999 PR PBB SHADOW E&M-EST. PATIENT-LVL V: ICD-10-PCS | Mod: PBBFAC,,, | Performed by: INTERNAL MEDICINE

## 2022-04-21 PROCEDURE — 99204 PR OFFICE/OUTPT VISIT, NEW, LEVL IV, 45-59 MIN: ICD-10-PCS | Mod: S$GLB,,, | Performed by: INTERNAL MEDICINE

## 2022-04-21 PROCEDURE — 99999 PR PBB SHADOW E&M-EST. PATIENT-LVL V: CPT | Mod: PBBFAC,,, | Performed by: INTERNAL MEDICINE

## 2022-04-21 PROCEDURE — 99204 OFFICE O/P NEW MOD 45 MIN: CPT | Mod: S$GLB,,, | Performed by: INTERNAL MEDICINE

## 2022-04-21 RX ORDER — ACARBOSE 25 MG/1
25 TABLET ORAL
Qty: 270 TABLET | Refills: 3 | Status: SHIPPED | OUTPATIENT
Start: 2022-04-21 | End: 2022-05-17 | Stop reason: DRUGHIGH

## 2022-04-21 RX ORDER — GLUCAGON 1 MG
VIAL (EA) INJECTION
Qty: 2 EACH | Refills: 3 | Status: SHIPPED | OUTPATIENT
Start: 2022-04-21 | End: 2022-04-26

## 2022-04-21 NOTE — PROGRESS NOTES
Health Maintenance Due   Topic Date Due    Shingles Vaccine (1 of 2) Never done     Updates were requested from care everywhere.  Chart was reviewed for overdue Proactive [facility] Encounters (SHADI) topics (CRS, Breast Cancer Screening, Eye exam)  Health Maintenance has been updated.  LINKS immunization registry triggered.  Immunizations were reconciled.

## 2022-04-21 NOTE — PATIENT INSTRUCTIONS
Check blood sugar WHEN having hyoglycemic symptoms.     If BG <70 WITH symptoms record event/blood sugar and note any food/drink/ or activity 4 hours prior to the event and notify provider.     Limit carbs to 30 grams per meal. Eat small frequent meals.     Use apps like [employer] and [employer] to estimate carb content of foods.

## 2022-04-21 NOTE — PROGRESS NOTES
"    Subjective:    Patient ID:  Yara Whitehead is a 71 y.o. female.    Chief Complaint:  Hypoglycemia (Going on for 10-15 years. Feb 9th-22nd had extreme UTI and recalls BG was continuously dropping. Has had about 2-3 episodes since Feb. Recovers quickly with glucose. States sometimes symptoms do not start until BG in the 20s. )      Pt presents to establish care for hypoglycemia and review of chronic medical conditions as listed in the visit diagnosis section of this encounter.     Reviewed referring provider's last office note, Dr. Richards    Overview:   Hx of post gastric bypass/hypoglycemic syndrome. She gets chronic diarrhea after eating large carbohydrate meal. She will get low glucose readings and noted that during a recent hospital stay, they had to check her frequently because on a diabetic diet and would have profound hypoglycemia.  Pt notes she was receiving too many carbs but staff was not reactive to her complaint. At home, she is able to control her diet with modified carbohydrates and high protein so that she rarely gets lows at home. Wanted a Dexcom meter to monitor BG but this was denied by insurance.     Notes she has been following a "keto-like diet"  Notes BG dropped three times since she has been back on her diet    Notes some sx's don't start until her BG is the 20-50's    Denies LOC.    Symptoms include shakiness, weakness, sweating  Treatment included: food, juice which relieve symptoms immediately    Last A1C 5% Aug 2021    Had bariatric surgery about 15 years ago.     No diagnosis of diabetes, or igt    Denies symptoms of hyperglycemia such as polyuria, polydipsia, nocturia, or blurred vision    No new medications     Started Remeron in Feb. Doesn't take regularly.          Review of Systems     Past Medical History:   Diagnosis Date    Allergy     Arrhythmia     Asthma     childhood-severe.    GERD (gastroesophageal reflux disease)     Hypertension     Hypoglycemia     post " gastric bypass    Hypothyroidism     ALESSANDRA (iron deficiency anemia)     Insomnia     Nephrolithiasis     had [facility]    FABIANA on CPAP     PAF (paroxysmal atrial fibrillation)     Status post placement of implantable loop recorder 3/18/2016    Subaortic membrane     Subarachnoid hematoma 2019    Subdural hematoma     Vitamin B 12 deficiency     Vitamin D deficiency       Social History     Tobacco Use    Smoking status: Former Smoker     Packs/day: 2.00     Years: 20.00     Pack years: 40.00     Quit date: 1995     Years since quittin.4    Smokeless tobacco: Never Used   Substance Use Topics    Alcohol use: Yes     Alcohol/week: 7.0 standard drinks     Types: 7 Glasses of wine per week     Comment: one glass wine nightly    Drug use: No     Family History   Problem Relation Age of Onset    Aneurysm Mother     Hypertension Mother     Cancer Mother         uterine     Glaucoma Mother     Macular degeneration Mother     Alzheimer's disease Father     Diabetes Father     Mental illness Sister     No Known Problems Daughter     No Known Problems Son     Macular degeneration Maternal Grandmother     Stroke Maternal Grandfather     Glaucoma Maternal Grandfather     COPD Paternal Grandmother     Cancer Paternal Grandmother         colon    Alzheimer's disease Paternal Grandfather     Heart disease Paternal Grandfather     Nephrolithiasis Neg Hx       Past Surgical History:   Procedure Laterality Date    abdomenalplasty      ABLATION OF ARRHYTHMOGENIC FOCUS FOR ATRIAL FIBRILLATION N/A 4/15/2019    Procedure: Ablation atrial fibrillation;  Surgeon: Edmund Shah MD;  Location: [facility] EP LAB;  Service: Cardiology;  Laterality: N/A;  AF, PRISCILLA, PVI, Cryo, MARY, Gen, SK, 3 Prep    COLONOSCOPY      normal, repeat in 5 years    COLONOSCOPY N/A 10/27/2020    Procedure: COLONOSCOPY;  Surgeon: Bud Valentin Jr., MD;  Location: [facility] ENDO;  Service: Endoscopy;  Laterality: N/A;     EXTRACORPOREAL SHOCK WAVE LITHOTRIPSY      [place]    FRACTURE SURGERY Left     has plates and screws in place.-shoulder left    GALLBLADDER SURGERY  1995    GASTRIC BYPASS  1997    HERNIA REPAIR  1998    HYSTERECTOMY  2013    due to vaginal prolpase with BSO - also bladder suspension at the same [facility] - [facility]    INSERTION OF IMPLANTABLE LOOP RECORDER N/A 7/19/2019    Procedure: Insertion, Implantable Loop Recorder;  Surgeon: Neftali Hernandez MD;  Location: [facility] CATH;  Service: Cardiology;  Laterality: N/A;    REMOVAL OF IMPLANTABLE LOOP RECORDER N/A 7/19/2019    Procedure: REMOVAL, IMPLANTABLE LOOP RECORDER;  Surgeon: Neftali Hernandez MD;  Location: [facility] CATH;  Service: Cardiology;  Laterality: N/A;    TYMPANOPLASTY Left     replaced and then repair x2           Current Outpatient Medications:     albuterol (PROVENTIL/VENTOLIN HFA) 90 mcg/actuation inhaler, Inhale 1-2 puffs into the lungs every 6 (six) hours as needed for Wheezing., Disp: 18 g, Rfl: 6    ascorbic acid, vitamin C, (VITAMIN C) 1000 MG tablet, Take 1,000 mg by mouth once daily., Disp: , Rfl:     atorvastatin (LIPITOR) 10 MG tablet, TAKE 1 TABLET BY MOUTH  DAILY, Disp: 90 tablet, Rfl: 3    cetirizine (ZYRTEC) 10 MG tablet, Take 10 mg by mouth daily as needed. , Disp: , Rfl:     cloNIDine (CATAPRES) 0.1 MG tablet, TAKE 1 TABLET BY MOUTH EVERY DAY AT 9PM FOR HYPERTENSION, Disp: 30 tablet, Rfl: 3    cyanocobalamin 1,000 mcg/mL injection, Inject 1 mL (1,000 mcg total) into the muscle every 30 days., Disp: 6 mL, Rfl: 3    diclofenac sodium (VOLTAREN) 1 % Gel, Apply 2 g topically 3 (three) times daily., Disp: 100 g, Rfl: 6    donepeziL (ARICEPT) 10 MG tablet, Take 1 tablet (10 mg total) by mouth every evening., Disp: 90 tablet, Rfl: 2    estradioL (ESTRACE) 0.01 % (0.1 mg/gram) vaginal cream, PLACE 1 GRAM VAGINALLY EVERY OTHER DAY, Disp: 42.5 g, Rfl: 11    glucose 4 GM chewable tablet, Take 4 tablets when glucose from  "51-70 Take 6 tablets when glucose is less than 50, Disp: 120 tablet, Rfl: 12    lancets Misc, To check BG 1 times daily, to use with insurance preferred meter, Disp: 100 each, Rfl: 3    levothyroxine (SYNTHROID) 88 MCG tablet, TAKE 1 TABLET BY MOUTH  BEFORE BREAKFAST, Disp: 90 tablet, Rfl: 3    mirtazapine (REMERON) 30 MG tablet, Take 1 tablet (30 mg total) by mouth nightly., Disp: 90 tablet, Rfl: 3    paroxetine (PAXIL) 40 MG tablet, TAKE 1 TABLET BY MOUTH IN  THE MORNING, Disp: 90 tablet, Rfl: 3    rivaroxaban (XARELTO) 20 mg Tab, Take 1 tablet (20 mg total) by mouth every evening., Disp: 90 tablet, Rfl: 4    telmisartan (MICARDIS) 40 MG Tab, TAKE 1 TABLET BY MOUTH ONCE DAILY, Disp: 90 tablet, Rfl: 3    TRUEPLUS LANCETS 30 gauge Misc, USE TO TEST BLOOD SUGAR BID, Disp: , Rfl: 0    vitamin D (VITAMIN D3) 1000 units Tab, Take 1,000 Units by mouth once daily., Disp: , Rfl:     acarbose (PRECOSE) 25 MG Tab, Take 1 tablet (25 mg total) by mouth 3 (three) times daily with meals., Disp: 270 tablet, Rfl: 3    flash glucose sensor (FREESTYLE VICKY 14 DAY SENSOR) Kit, USE AS DIRECTED (Patient not taking: Reported on 4/21/2022), Disp: 7 kit, Rfl: 3    glucagon (GVOKE) 1 mg/0.2 mL Soln, Use for severe hypoglycemia., Disp: 0.2 mL, Rfl: 3    mirabegron (MYRBETRIQ) 25 mg Tb24 ER tablet, Take 1 tablet (25 mg total) by mouth once daily. (Patient not taking: Reported on 4/21/2022), Disp: 30 tablet, Rfl: 11     Review of patient's allergies indicates:  No Known Allergies     Objective:   BP (!) 144/70   Pulse 72   Ht 5' 5" (1.651 m)   Wt 68.9 kg (152 lb 0.1 oz)   LMP  (LMP Unknown) Comment: total  SpO2 99%   BMI 25.30 kg/m²   BP Readings from Last 3 Encounters:   04/21/22 (!) 144/70   03/10/22 126/68   02/09/22 (!) 119/56     Wt Readings from Last 3 Encounters:   04/21/22 1316 68.9 kg (152 lb 0.1 oz)   03/10/22 0908 68.2 kg (150 lb 7.4 oz)   02/09/22 1531 69.4 kg (153 lb)          Physical Exam  Vitals reviewed. "   Constitutional:       General: She is not in acute distress.     Appearance: Normal appearance. She is well-developed. She is not ill-appearing, toxic-appearing or diaphoretic.   HENT:      Head: Normocephalic and atraumatic.   Eyes:      General: No scleral icterus.  Neck:      Trachea: No tracheal deviation.   Pulmonary:      Effort: Pulmonary effort is normal. No respiratory distress.   Neurological:      Mental Status: She is alert and oriented to person, place, and time.   Psychiatric:         Thought Content: Thought content normal.           Lab Results   Component Value Date     02/09/2022    K 3.5 02/09/2022    CL 99 02/09/2022    CO2 26 02/09/2022    BUN 20 (H) 02/09/2022    CREATININE 0.48 (L) 02/09/2022     02/09/2022    HGBA1C 5.0 08/25/2021    MG 2.0 04/09/2019    AST 38 (H) 02/09/2022    ALT 25 02/09/2022    ALBUMIN 5.0 02/09/2022    PROT 8.2 02/09/2022    BILITOT 0.8 02/09/2022    WBC 6.22 02/09/2022    HGB 13.5 02/09/2022    HCT 39.9 02/09/2022     (H) 02/09/2022    MCH 34.7 (H) 02/09/2022     02/09/2022    MPV 10.0 02/09/2022    GRAN 4.9 02/09/2022    GRAN 79.4 (H) 02/09/2022    LYMPH 0.9 (L) 02/09/2022    LYMPH 13.8 (L) 02/09/2022    CHOL 192 08/25/2021    HDL 85 (H) 08/25/2021    LDLCALC 98.0 08/25/2021    TRIG 45 08/25/2021       Lab Results   Component Value Date    TSH 2.730 03/10/2022    FREET4 0.96 04/05/2017        Thyroid Labs Latest Ref Rng & Units 1/14/2022 2/9/2022 3/10/2022   TSH 0.400 - 4.000 uIU/mL - - 2.730   Free T4 0.71 - 1.51 ng/dL - - -   Sodium 136 - 145 mmol/L 137 136 -   Potassium 3.5 - 5.1 mmol/L 3.8 3.5 -   Chloride 95 - 110 mmol/L 100 99 -   Carbon Dioxide 22 - 31 mmol/L 25 26 -   Glucose 70 - 110 mg/dL 80 102 -   Blood Urea Nitrogen 7 - 18 mg/dL 17 20(H) -   Creatinine 0.50 - 1.40 mg/dL 0.8 0.48(L) -   Calcium 8.4 - 10.2 mg/dL 9.8 9.9 -   Total Protein 6.0 - 8.4 g/dL - 8.2 -   Albumin 3.5 - 5.2 g/dL - 5.0 -   Total Bilirubin 0.2 - 1.3 mg/dL -  0.8 -   AST 14 - 36 U/L - 38(H) -   ALT 0 - 35 U/L - 25 -   Anion Gap 8 - 16 mmol/L 12 11 -   eGFR (African American) >60 mL/min/1.73 m:2 >60.0 >60 -   eGFR (Non-African American) >60 mL/min/1.73 m:2 >60.0 >60 -   WBC 3.90 - 12.70 K/uL 3.62(L) 6.22 -   RBC 4.00 - 5.40 M/uL 3.69(L) 3.89(L) -   Hemoglobin 12.0 - 16.0 g/dL 12.5 13.5 -   Hematocrit 37.0 - 48.5 % 37.7 39.9 -   MCV 82 - 98 fL 102(H) 103(H) -   MCH 27.0 - 31.0 pg 33.9(H) 34.7(H) -   MCHC 32.0 - 36.0 g/dL 33.2 33.8 -   RDW 11.5 - 14.5 % 13.8 14.4 -   Platelets 150 - 450 K/uL 219 215 -   MPV 9.2 - 12.9 fL 10.4 10.0 -   Gran # 1.8 - 7.7 K/uL 2.1 4.9 -   Lymph # 1.0 - 4.8 K/uL 1.2 0.9(L) -   Mono # 0.3 - 1.0 K/uL 0.3 0.4 -   Eos # 0.0 - 0.5 K/uL 0.0 0.0 -   Baso # 0.00 - 0.20 K/uL 0.02 0.01 -   Gran % 38.0 - 73.0 % 59.0 79.4(H) -   Lymph % 18.0 - 48.0 % 31.8 13.8(L) -   Mono% 4.0 - 15.0 % 7.7 5.9 -   Eos % 0.0 - 8.0 % 0.6 0.2 -   Baso % 0.0 - 1.9 % 0.6 0.2 -   Prothrombin Time 9.0 - 12.5 sec - - -   INR 0.8 - 1.2 - - -   aPTT 21.0 - 32.0 sec - - -   Thyroperoxidase Antibodies 0.0 - 9.0 IU/mL - - -           Hemoglobin A1C   Date Value Ref Range Status   08/25/2021 5.0 4.0 - 5.6 % Final     Comment:     ADA Screening Guidelines:  5.7-6.4%  Consistent with prediabetes  >or=6.5%  Consistent with diabetes    High levels of fetal hemoglobin interfere with the HbA1C  assay. Heterozygous hemoglobin variants (HbS, HgC, etc)do  not significantly interfere with this assay.   However, presence of multiple variants may affect accuracy.     05/05/2021 4.9 4.0 - 5.6 % Final     Comment:     ADA Screening Guidelines:  5.7-6.4%  Consistent with prediabetes  >or=6.5%  Consistent with diabetes    High levels of fetal hemoglobin interfere with the HbA1C  assay. Heterozygous hemoglobin variants (HbS, HgC, etc)do  not significantly interfere with this assay.   However, presence of multiple variants may affect accuracy.     11/19/2020 4.8 4.0 - 5.6 % Final     Comment:     ADA  Screening Guidelines:  5.7-6.4%  Consistent with prediabetes  >or=6.5%  Consistent with diabetes  High levels of fetal hemoglobin interfere with the HbA1C  assay. Heterozygous hemoglobin variants (HbS, HgC, etc)do  not significantly interfere with this assay.   However, presence of multiple variants may affect accuracy.           Assessment and plan:     Problem List Items Addressed This Visit        Psychiatric    Adjustment disorder with anxious mood     On Remeron which is rarely associated with AI.               Cardiac/Vascular    Essential hypertension (Chronic)     BP not at goal. Continue current meds. Pt to notify PCP if BP consistently more than 140/90.                Endocrine    Hypoglycemia - Primary     Pt with long history of post prandial hypoglycemia. Has h/o gastric bypass. Advised pt to check blood sugar WHEN having hyoglycemic symptoms.     If BG <70 WITH symptoms record event/blood sugar and note any food/drink/ or activity 4 hours prior to the event and notify provider.     Limit carbs to 30 grams per meal. Eat small frequent meals.     Use apps like [employer] and [employer] to estimate carb content of foods.    A1C 5% Aug 2021. Screen fro AI. Do professional CGM. Pt previously prescribed dexcom but insurance would not cover. Has hypoglycemic unawareness and often does not get sx's until BG <50 and notes a rapid downward trajectory once BG is low. Apply for Walter 2. Start acarbose TID with meals. Advised Glucagon prn severe hypoglycemia.              Relevant Medications    acarbose (PRECOSE) 25 MG Tab    Other Relevant Orders    Glucose Monitoring Continuous Min 72 Hours    ACTH    Cortisol, 8AM    Acquired hypothyroidism     Clinically pt euthyroid. Most recent TSH wnls. Continue urrent dose of thyroid medication. F/u with PCP.                   Apply for walter 2    Labs 8 am (in [place])    CGM placement     RTC in 6 months with any full endo provider             Disclaimer: This note has been  generated using voice-recognition software. There may be typographical errors that have been missed during proof-reading.

## 2022-04-22 ENCOUNTER — TELEPHONE (OUTPATIENT)
Dept: ENDOCRINOLOGY | Facility: CLINIC | Age: 72
End: 2022-04-22
Payer: MEDICARE

## 2022-04-22 NOTE — TELEPHONE ENCOUNTER
----- Message from Jennifer Dickens sent at 4/22/2022 11:46 AM CDT -----  Regarding: Call back  Contact: [phone]  Type:  Patient Call Back    Who Called:pt  What this is regarding?:insurance needing additional information in order to cover pt rx glucagon (GLUCAGON EMERGENCY KIT, HUMAN,) 1 mg SolR  Pt insurance is the [facility] plan   Would the patient rather a call back or a response via [facility]? Call back  Best Call Back Number:[phone]  Additional Information:     Advised to call back directly if there are further questions, or if these symptoms fail to improve as anticipated or worsen.

## 2022-04-26 ENCOUNTER — PATIENT MESSAGE (OUTPATIENT)
Dept: ENDOCRINOLOGY | Facility: CLINIC | Age: 72
End: 2022-04-26
Payer: MEDICARE

## 2022-04-26 ENCOUNTER — TELEPHONE (OUTPATIENT)
Dept: ENDOCRINOLOGY | Facility: CLINIC | Age: 72
End: 2022-04-26
Payer: MEDICARE

## 2022-04-26 NOTE — TELEPHONE ENCOUNTER
Recvd fax from ins stating Gvoke hypopen 1-pack 1 mg/0.2ml auto injector is covered. Sent req to Dr Sandifer for approval.

## 2022-04-27 RX ORDER — GLUCAGON INJECTION, SOLUTION 1 MG/.2ML
INJECTION, SOLUTION SUBCUTANEOUS
Qty: 0.2 ML | Refills: 3 | Status: SHIPPED | OUTPATIENT
Start: 2022-04-27 | End: 2023-04-01

## 2022-04-29 NOTE — ASSESSMENT & PLAN NOTE
Pt with long history of post prandial hypoglycemia. Has h/o gastric bypass. Advised pt to check blood sugar WHEN having hyoglycemic symptoms.     If BG <70 WITH symptoms record event/blood sugar and note any food/drink/ or activity 4 hours prior to the event and notify provider.     Limit carbs to 30 grams per meal. Eat small frequent meals.     Use apps like [employer] and [employer] to estimate carb content of foods.    A1C 5% Aug 2021. Screen fro AI. Do professional CGM. Pt previously prescribed dexcom but insurance would not cover. Has hypoglycemic unawareness and often does not get sx's until BG <50 and notes a rapid downward trajectory once BG is low. Apply for Walter 2. Start acarbose TID with meals. Advised Glucagon prn severe hypoglycemia.

## 2022-05-01 NOTE — ASSESSMENT & PLAN NOTE
Clinically pt euthyroid. Most recent TSH wnls. Continue urrent dose of thyroid medication. F/u with PCP.

## 2022-05-04 ENCOUNTER — CLINICAL SUPPORT (OUTPATIENT)
Dept: CARDIOLOGY | Facility: HOSPITAL | Age: 72
End: 2022-05-04
Payer: MEDICARE

## 2022-05-04 DIAGNOSIS — Z95.818 PRESENCE OF OTHER CARDIAC IMPLANTS AND GRAFTS: ICD-10-CM

## 2022-05-04 PROCEDURE — 93298 CARDIAC DEVICE CHECK - REMOTE: ICD-10-PCS | Mod: ,,, | Performed by: INTERNAL MEDICINE

## 2022-05-04 PROCEDURE — 93298 REM INTERROG DEV EVAL SCRMS: CPT | Mod: ,,, | Performed by: INTERNAL MEDICINE

## 2022-05-04 PROCEDURE — G2066 INTER DEVC REMOTE 30D: HCPCS | Mod: PO | Performed by: INTERNAL MEDICINE

## 2022-05-12 ENCOUNTER — PES CALL (OUTPATIENT)
Dept: ADMINISTRATIVE | Facility: CLINIC | Age: 72
End: 2022-05-12
Payer: MEDICARE

## 2022-05-16 ENCOUNTER — TELEPHONE (OUTPATIENT)
Dept: ADMINISTRATIVE | Facility: CLINIC | Age: 72
End: 2022-05-16
Payer: MEDICARE

## 2022-05-17 ENCOUNTER — OFFICE VISIT (OUTPATIENT)
Dept: FAMILY MEDICINE | Facility: CLINIC | Age: 72
End: 2022-05-17
Payer: MEDICARE

## 2022-05-17 ENCOUNTER — TELEPHONE (OUTPATIENT)
Dept: FAMILY MEDICINE | Facility: CLINIC | Age: 72
End: 2022-05-17

## 2022-05-17 VITALS
HEIGHT: 65 IN | OXYGEN SATURATION: 98 % | TEMPERATURE: 98 F | DIASTOLIC BLOOD PRESSURE: 74 MMHG | HEART RATE: 58 BPM | SYSTOLIC BLOOD PRESSURE: 124 MMHG | RESPIRATION RATE: 16 BRPM | BODY MASS INDEX: 25.05 KG/M2 | WEIGHT: 150.38 LBS

## 2022-05-17 DIAGNOSIS — F51.01 PRIMARY INSOMNIA: ICD-10-CM

## 2022-05-17 DIAGNOSIS — E55.9 VITAMIN D DEFICIENCY: ICD-10-CM

## 2022-05-17 DIAGNOSIS — Z78.0 ASYMPTOMATIC MENOPAUSAL STATE: ICD-10-CM

## 2022-05-17 DIAGNOSIS — M79.18 CERVICAL MYOFASCIAL PAIN SYNDROME: ICD-10-CM

## 2022-05-17 DIAGNOSIS — Z23 NEED FOR SHINGLES VACCINE: ICD-10-CM

## 2022-05-17 DIAGNOSIS — D70.9 NEUTROPENIA, UNSPECIFIED TYPE: ICD-10-CM

## 2022-05-17 DIAGNOSIS — I48.0 PAF (PAROXYSMAL ATRIAL FIBRILLATION): Primary | ICD-10-CM

## 2022-05-17 DIAGNOSIS — F41.1 GAD (GENERALIZED ANXIETY DISORDER): ICD-10-CM

## 2022-05-17 DIAGNOSIS — E16.1 POSTPRANDIAL HYPOGLYCEMIA: Primary | ICD-10-CM

## 2022-05-17 DIAGNOSIS — D50.9 IRON DEFICIENCY ANEMIA, UNSPECIFIED IRON DEFICIENCY ANEMIA TYPE: ICD-10-CM

## 2022-05-17 DIAGNOSIS — F33.0 MAJOR DEPRESSIVE DISORDER, RECURRENT, MILD: ICD-10-CM

## 2022-05-17 DIAGNOSIS — R31.9 HEMATURIA, UNSPECIFIED TYPE: ICD-10-CM

## 2022-05-17 DIAGNOSIS — N39.46 URINARY INCONTINENCE, MIXED: ICD-10-CM

## 2022-05-17 DIAGNOSIS — I34.0 NONRHEUMATIC MITRAL VALVE REGURGITATION: ICD-10-CM

## 2022-05-17 DIAGNOSIS — E78.5 HYPERLIPIDEMIA, UNSPECIFIED HYPERLIPIDEMIA TYPE: ICD-10-CM

## 2022-05-17 DIAGNOSIS — E16.1 POSTPRANDIAL HYPOGLYCEMIA: ICD-10-CM

## 2022-05-17 DIAGNOSIS — G31.84 MCI (MILD COGNITIVE IMPAIRMENT): ICD-10-CM

## 2022-05-17 DIAGNOSIS — E53.8 VITAMIN B12 DEFICIENCY: ICD-10-CM

## 2022-05-17 DIAGNOSIS — I10 ESSENTIAL HYPERTENSION: ICD-10-CM

## 2022-05-17 DIAGNOSIS — Z12.31 ENCOUNTER FOR SCREENING MAMMOGRAM FOR MALIGNANT NEOPLASM OF BREAST: ICD-10-CM

## 2022-05-17 DIAGNOSIS — E03.9 HYPOTHYROIDISM, UNSPECIFIED TYPE: ICD-10-CM

## 2022-05-17 LAB
BILIRUB SERPL-MCNC: ABNORMAL MG/DL
BLOOD URINE, POC: 50
CLARITY, POC UA: CLEAR
COLOR, POC UA: YELLOW
GLUCOSE UR QL STRIP: ABNORMAL
KETONES UR QL STRIP: ABNORMAL
LEUKOCYTE ESTERASE URINE, POC: ABNORMAL
NITRITE, POC UA: ABNORMAL
PH, POC UA: 6
PROTEIN, POC: ABNORMAL
SPECIFIC GRAVITY, POC UA: 1.01
UROBILINOGEN, POC UA: ABNORMAL

## 2022-05-17 PROCEDURE — 99214 PR OFFICE/OUTPT VISIT, EST, LEVL IV, 30-39 MIN: ICD-10-PCS | Mod: S$GLB,,, | Performed by: INTERNAL MEDICINE

## 2022-05-17 PROCEDURE — 81002 URINALYSIS NONAUTO W/O SCOPE: CPT | Mod: S$GLB,,, | Performed by: INTERNAL MEDICINE

## 2022-05-17 PROCEDURE — 99214 OFFICE O/P EST MOD 30 MIN: CPT | Mod: S$GLB,,, | Performed by: INTERNAL MEDICINE

## 2022-05-17 PROCEDURE — 87086 URINE CULTURE/COLONY COUNT: CPT | Performed by: INTERNAL MEDICINE

## 2022-05-17 PROCEDURE — 81002 POCT URINE DIPSTICK WITHOUT MICROSCOPE: ICD-10-PCS | Mod: S$GLB,,, | Performed by: INTERNAL MEDICINE

## 2022-05-17 RX ORDER — ZOSTER VACCINE RECOMBINANT, ADJUVANTED 50 MCG/0.5
0.5 KIT INTRAMUSCULAR ONCE
Qty: 1 EACH | Refills: 1 | Status: SHIPPED | OUTPATIENT
Start: 2022-05-17 | End: 2022-05-17

## 2022-05-17 RX ORDER — ACARBOSE 25 MG/1
25 TABLET ORAL DAILY PRN
COMMUNITY

## 2022-05-17 RX ORDER — METHOCARBAMOL 500 MG/1
500 TABLET, FILM COATED ORAL 2 TIMES DAILY PRN
Qty: 60 TABLET | Refills: 2 | Status: SHIPPED | OUTPATIENT
Start: 2022-05-17 | End: 2022-08-22

## 2022-05-17 RX ORDER — OXYBUTYNIN CHLORIDE 5 MG/1
5 TABLET, EXTENDED RELEASE ORAL DAILY
Qty: 90 TABLET | Refills: 3 | Status: SHIPPED | OUTPATIENT
Start: 2022-05-17 | End: 2023-04-05 | Stop reason: ALTCHOICE
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
## 2022-05-18 ENCOUNTER — OFFICE VISIT (OUTPATIENT)
Dept: FAMILY MEDICINE | Facility: CLINIC | Age: 72
End: 2022-05-18
Payer: MEDICARE

## 2022-05-18 ENCOUNTER — TELEPHONE (OUTPATIENT)
Dept: ADMINISTRATIVE | Facility: CLINIC | Age: 72
End: 2022-05-18
Payer: MEDICARE

## 2022-05-18 DIAGNOSIS — I65.23 BILATERAL CAROTID ARTERY STENOSIS: ICD-10-CM

## 2022-05-18 DIAGNOSIS — I70.0 AORTIC ATHEROSCLEROSIS: ICD-10-CM

## 2022-05-18 DIAGNOSIS — I48.0 PAF (PAROXYSMAL ATRIAL FIBRILLATION): Chronic | ICD-10-CM

## 2022-05-18 DIAGNOSIS — I10 ESSENTIAL HYPERTENSION: Chronic | ICD-10-CM

## 2022-05-18 DIAGNOSIS — H91.93 DECREASED HEARING OF BOTH EARS: ICD-10-CM

## 2022-05-18 DIAGNOSIS — I48.3 TYPICAL ATRIAL FLUTTER: ICD-10-CM

## 2022-05-18 DIAGNOSIS — M85.80 OSTEOPENIA, UNSPECIFIED LOCATION: ICD-10-CM

## 2022-05-18 DIAGNOSIS — Z00.00 ENCOUNTER FOR PREVENTIVE HEALTH EXAMINATION: Primary | ICD-10-CM

## 2022-05-18 DIAGNOSIS — F33.0 MILD EPISODE OF RECURRENT MAJOR DEPRESSIVE DISORDER: ICD-10-CM

## 2022-05-18 PROCEDURE — G0439 PPPS, SUBSEQ VISIT: HCPCS | Mod: 95,,, | Performed by: NURSE PRACTITIONER

## 2022-05-18 PROCEDURE — G0439 PR MEDICARE ANNUAL WELLNESS SUBSEQUENT VISIT: ICD-10-PCS | Mod: 95,,, | Performed by: NURSE PRACTITIONER

## 2022-05-18 RX ORDER — FLASH GLUCOSE SCANNING READER
EACH MISCELLANEOUS
Qty: 6 EACH | Refills: 3 | Status: SHIPPED | OUTPATIENT
Start: 2022-05-18 | End: 2023-04-05 | Stop reason: ALTCHOICE

## 2022-05-18 RX ORDER — FLASH GLUCOSE SENSOR
KIT MISCELLANEOUS
Qty: 1 KIT | Refills: 0 | Status: SHIPPED | OUTPATIENT
Start: 2022-05-18 | End: 2022-11-22

## 2022-05-18 NOTE — PATIENT INSTRUCTIONS
Counseling and Referral of Other Preventative  (Italic type indicates deductible and co-insurance are waived)    Patient Name: Yara Whitehead  Today's Date: 5/18/2022    Health Maintenance       Date Due Completion Date    Shingles Vaccine (1 of 2) Never done ---    COVID-19 Vaccine (4 - Booster for Pfizer series) 03/20/2022 11/20/2021    Lipid Panel 08/25/2022 8/25/2021    Mammogram 11/13/2022 11/13/2021    DEXA Scan 09/21/2023 9/21/2020    Colorectal Cancer Screening 10/27/2025 10/27/2020    TETANUS VACCINE 11/08/2026 11/8/2016    Override on 1/1/2012: Done        Orders Placed This Encounter   Procedures    Ambulatory referral/consult to Audiology     The following information is provided to all patients.  This information is to help you find resources for any of the problems found today that may be affecting your health:                Living healthy guide: www.[facility].[email or web address].[facility]      Understanding Diabetes: www.diabetes.org      Eating healthy: www.cdc.gov/healthyweight      CDC home safety checklist: www.cdc.gov/steadi/patient.html      Agency on Aging: www.goea.[place].[facility]      Alcoholics anonymous (AA): www.aa.org      Physical Activity: www.minerva.nih.gov/or9stvh      Tobacco use: www.quitwithusla.org

## 2022-05-18 NOTE — PROGRESS NOTES
The patient location is: [place]  The chief complaint leading to consultation is: AWV visit    Visit type: audiovisual    Face to Face time with patient: 45 minutes (experienced technical difficulties)  55 minutes of total time spent on the encounter, which includes face to face time and non-face to face time preparing to see the patient (eg, review of tests), Obtaining and/or reviewing separately obtained history, Documenting clinical information in the electronic or other health record, Independently interpreting results (not separately reported) and communicating results to the patient/family/caregiver, or Care coordination (not separately reported).         Each patient to whom he or she provides medical services by telemedicine is:  (1) informed of the relationship between the physician and patient and the respective role of any other health care provider with respect to management of the patient; and (2) notified that he or she may decline to receive medical services by telemedicine and may withdraw from such care at any time.    Notes:       Yara Whitehead presented for a  Medicare AWV and comprehensive Health Risk Assessment today. The following components were reviewed and updated:    · Medical history  · Family History  · Social history  · Allergies and Current Medications  · Health Risk Assessment  · Health Maintenance  · Care Team         ** See Completed Assessments for Annual Wellness Visit within the encounter summary.**         The following assessments were completed:  · Living Situation  · CAGE  · Depression Screening  · Fall Risk Assessment (MACH 10)  · Hearing Assessment(HHI)  · Cognitive Function Screening  · Nutrition Screening  · ADL Screening  · PAQ Screening      There were no vitals filed for this visit.  There is no height or weight on file to calculate BMI.  Physical Exam  Constitutional:       General: She is not in acute distress.  HENT:      Head: Normocephalic.   Pulmonary:       Effort: Pulmonary effort is normal. No respiratory distress.   Neurological:      Mental Status: She is alert.   Psychiatric:         Mood and Affect: Mood normal.               Diagnoses and health risks identified today and associated recommendations/orders:    1. Encounter for preventive health examination  Reviewed health maintenance and provided recommendations   Recommend COIVD booster and shingrix vaccine  Mammogram, lipid panel and dexa scheduled     2. PAF (paroxysmal atrial fibrillation)  Continue to monitor  Followed by Dr. Garcia.      3. Typical atrial flutter  Continue to monitor  Followed by Dr Garcia.      4. Mild episode of recurrent major depressive disorder  Continue to monitor  Followed by Ann Richards DO   No si/hi.      5. Aortic atherosclerosis  Continue to monitor  Followed by Dr Garcia  CTA 5/24/21.      6. Bilateral carotid artery stenosis  Continue to monitor  Followed by Dr Radha BUENO 5/24/21.      7. Essential hypertension  Continue to monitor  Followed by Ann Richadrs DO .      8. Osteopenia, unspecified location  Continue to monitor  Followed by Ann Richards DO .      9. Decreased hearing of both ears    - Ambulatory referral/consult to Audiology; Future      Provided Yara with a 5-10 year written screening schedule and personal prevention plan. Recommendations were developed using the USPSTF age appropriate recommendations. Education, counseling, and referrals were provided as needed. After Visit Summary printed and given to patient which includes a list of additional screenings\tests needed.    Follow up in one year    Lili Quiroga NP  I offered to discuss advanced care planning, including how to pick a person who would make decisions for you if you were unable to make them for yourself, called a health care power of , and what kind of decisions you might make such as use of life sustaining treatments such as ventilators and tube feeding when faced with a life  limiting illness recorded on a living will that they will need to know. (How you want to be cared for as you near the end of your natural life)     X  Patient has advanced directives on file, which we reviewed, and they do not wish to make changes.

## 2022-05-19 ENCOUNTER — PATIENT MESSAGE (OUTPATIENT)
Dept: FAMILY MEDICINE | Facility: CLINIC | Age: 72
End: 2022-05-19
Payer: MEDICARE

## 2022-05-19 LAB
BACTERIA UR CULT: NORMAL
BACTERIA UR CULT: NORMAL

## 2022-05-26 PROBLEM — I70.0 AORTIC ATHEROSCLEROSIS: Status: ACTIVE | Noted: 2022-05-26

## 2022-05-26 PROBLEM — I65.23 BILATERAL CAROTID ARTERY STENOSIS: Status: ACTIVE | Noted: 2022-05-26

## 2022-06-01 DIAGNOSIS — E16.1 POSTPRANDIAL HYPOGLYCEMIA: ICD-10-CM

## 2022-06-01 DIAGNOSIS — E16.2 HYPOGLYCEMIA: ICD-10-CM

## 2022-06-01 RX ORDER — FLASH GLUCOSE SENSOR
KIT MISCELLANEOUS
Qty: 6 KIT | Refills: 3 | OUTPATIENT
Start: 2022-06-01

## 2022-06-01 RX ORDER — FLASH GLUCOSE SENSOR
KIT MISCELLANEOUS
Qty: 6 KIT | Refills: 3 | Status: SHIPPED | OUTPATIENT
Start: 2022-06-01 | End: 2022-09-18

## 2022-06-01 NOTE — TELEPHONE ENCOUNTER
Refill Authorization Note   Yara Whitehead  is requesting a refill authorization.  Brief Assessment and Rationale for Refill:  Approve  Quick Discontinue     Medication Therapy Plan:  FLOS 8/27/2022    Medication Reconciliation Completed: No   Comments:     No Care Gaps recommended.     Note composed:6:26 PM 06/01/2022

## 2022-06-01 NOTE — TELEPHONE ENCOUNTER
Care Due:                  Date            Visit Type   Department     Provider  --------------------------------------------------------------------------------                                EP -                              PRIMARY      [facility]  Last Visit: 05-      CARE ([facility])   [facility]       Ann Richards                               -                              PRIMARY      [facility] FAM[facility]  Next Visit: 09-      CARE ([facility])   [facility]       Ann Richards                                                            Last  Test          Frequency    Reason                     Performed    Due Date  --------------------------------------------------------------------------------    Lipid Panel.  12 months..  atorvastatin, mirtazapine  08- 08-    [facility] Embedded Care Gaps. Reference number: [id number]. 6/01/2022   2:33:48 PM CDT

## 2022-06-02 ENCOUNTER — OFFICE VISIT (OUTPATIENT)
Dept: PAIN MEDICINE | Facility: CLINIC | Age: 72
End: 2022-06-02
Payer: MEDICARE

## 2022-06-02 VITALS
HEIGHT: 65 IN | HEART RATE: 63 BPM | WEIGHT: 154.88 LBS | DIASTOLIC BLOOD PRESSURE: 59 MMHG | SYSTOLIC BLOOD PRESSURE: 132 MMHG | BODY MASS INDEX: 25.8 KG/M2 | OXYGEN SATURATION: 98 %

## 2022-06-02 DIAGNOSIS — M18.9 OSTEOARTHRITIS OF FIRST CARPOMETACARPAL (CMC) JOINT OF ONE HAND: ICD-10-CM

## 2022-06-02 DIAGNOSIS — M47.812 CERVICAL SPONDYLOSIS: ICD-10-CM

## 2022-06-02 DIAGNOSIS — M79.18 CERVICAL MYOFASCIAL PAIN SYNDROME: Primary | ICD-10-CM

## 2022-06-02 PROCEDURE — 99999 PR PBB SHADOW E&M-EST. PATIENT-LVL V: ICD-10-PCS | Mod: PBBFAC,,, | Performed by: ANESTHESIOLOGY

## 2022-06-02 PROCEDURE — 99204 PR OFFICE/OUTPT VISIT, NEW, LEVL IV, 45-59 MIN: ICD-10-PCS | Mod: 25,S$GLB,, | Performed by: ANESTHESIOLOGY

## 2022-06-02 PROCEDURE — 99204 OFFICE O/P NEW MOD 45 MIN: CPT | Mod: 25,S$GLB,, | Performed by: ANESTHESIOLOGY

## 2022-06-02 PROCEDURE — 99999 PR PBB SHADOW E&M-EST. PATIENT-LVL V: CPT | Mod: PBBFAC,,, | Performed by: ANESTHESIOLOGY

## 2022-06-02 PROCEDURE — 20553 PR INJECT TRIGGER POINTS, > 3: ICD-10-PCS | Mod: S$GLB,,, | Performed by: ANESTHESIOLOGY

## 2022-06-02 PROCEDURE — 20553 NJX 1/MLT TRIGGER POINTS 3/>: CPT | Mod: S$GLB,,, | Performed by: ANESTHESIOLOGY

## 2022-06-02 RX ORDER — METHYLPREDNISOLONE ACETATE 40 MG/ML
40 INJECTION, SUSPENSION INTRA-ARTICULAR; INTRALESIONAL; INTRAMUSCULAR; SOFT TISSUE
Status: COMPLETED | OUTPATIENT
Start: 2022-06-02 | End: 2022-06-02

## 2022-06-02 RX ADMIN — METHYLPREDNISOLONE ACETATE 40 MG: 40 INJECTION, SUSPENSION INTRA-ARTICULAR; INTRALESIONAL; INTRAMUSCULAR; SOFT TISSUE at 11:06

## 2022-06-02 NOTE — PROGRESS NOTES
[facility] New Patient Evaluation    Referred by: Dr. Richards  Reason for referral: neck pain    CC:   Chief Complaint   Patient presents with    Neck Pain    Hand Pain    Knee Pain    Back Pain      No flowsheet data found.    HPI:   Yara Whitehead is a 72 y.o. female who presents with neck pain.  She had had this pain for over the past 6 months.  Today her pain is 4/10, constant, sharp, aching on the right side of her neck.  She denies any radicular pain, numbness, weakness.  Pain is worse with lateral neck rotation.    History:    Current Outpatient Medications:     acarbose (PRECOSE) 25 MG Tab, Take 25 mg by mouth daily as needed. When eating high carb meal, Disp: , Rfl:     albuterol (PROVENTIL/VENTOLIN HFA) 90 mcg/actuation inhaler, Inhale 1-2 puffs into the lungs every 6 (six) hours as needed for Wheezing., Disp: 18 g, Rfl: 6    ascorbic acid, vitamin C, (VITAMIN C) 1000 MG tablet, Take 1,000 mg by mouth once daily., Disp: , Rfl:     atorvastatin (LIPITOR) 10 MG tablet, TAKE 1 TABLET BY MOUTH  DAILY, Disp: 90 tablet, Rfl: 3    cetirizine (ZYRTEC) 10 MG tablet, Take 10 mg by mouth daily as needed. , Disp: , Rfl:     cloNIDine (CATAPRES) 0.1 MG tablet, TAKE 1 TABLET BY MOUTH EVERY DAY AT 9PM FOR HYPERTENSION, Disp: 30 tablet, Rfl: 3    cyanocobalamin 1,000 mcg/mL injection, Inject 1 mL (1,000 mcg total) into the muscle every 30 days., Disp: 6 mL, Rfl: 3    diclofenac sodium (VOLTAREN) 1 % Gel, Apply 2 g topically 3 (three) times daily., Disp: 100 g, Rfl: 6    donepeziL (ARICEPT) 10 MG tablet, Take 1 tablet (10 mg total) by mouth every evening., Disp: 90 tablet, Rfl: 2    estradioL (ESTRACE) 0.01 % (0.1 mg/gram) vaginal cream, PLACE 1 GRAM VAGINALLY EVERY OTHER DAY, Disp: 42.5 g, Rfl: 11    flash glucose scanning reader (FREESTYLE VICKY 2 READER) Misc, To check glucose qac and qhs and PRN lows, Disp: 6 each, Rfl: 3    flash glucose sensor (FREESTYLE VICKY 14 DAY SENSOR) Kit, USE  AS DIRECTED, Disp: 6 kit, Rfl: 3    flash glucose sensor (FREESTYLE VICKY 2 SENSOR) Kit, To check glucose qac and qhs and PRN lows, Disp: 1 kit, Rfl: 0    glucagon (GVOKE) 1 mg/0.2 mL Soln, Use for severe hypoglycemia., Disp: 0.2 mL, Rfl: 3    glucose 4 GM chewable tablet, Take 4 tablets when glucose from 51-70 Take 6 tablets when glucose is less than 50, Disp: 120 tablet, Rfl: 12    lancets Misc, To check BG 1 times daily, to use with insurance preferred meter, Disp: 100 each, Rfl: 3    levothyroxine (SYNTHROID) 88 MCG tablet, TAKE 1 TABLET BY MOUTH  BEFORE BREAKFAST, Disp: 90 tablet, Rfl: 3    MAGNESIUM ORAL, Take by mouth once daily at 6am., Disp: , Rfl:     methocarbamoL (ROBAXIN) 500 MG Tab, Take 1 tablet (500 mg total) by mouth 2 (two) times daily as needed., Disp: 60 tablet, Rfl: 2    mirtazapine (REMERON) 30 MG tablet, Take 1 tablet (30 mg total) by mouth nightly., Disp: 90 tablet, Rfl: 3    oxybutynin (DITROPAN-XL) 5 MG TR24, Take 1 tablet (5 mg total) by mouth once daily., Disp: 90 tablet, Rfl: 3    paroxetine (PAXIL) 40 MG tablet, TAKE 1 TABLET BY MOUTH IN  THE MORNING, Disp: 90 tablet, Rfl: 3    rivaroxaban (XARELTO) 20 mg Tab, Take 1 tablet (20 mg total) by mouth every evening., Disp: 90 tablet, Rfl: 4    telmisartan (MICARDIS) 40 MG Tab, TAKE 1 TABLET BY MOUTH ONCE DAILY, Disp: 90 tablet, Rfl: 3    TRUEPLUS LANCETS 30 gauge Misc, USE TO TEST BLOOD SUGAR BID, Disp: , Rfl: 0    vitamin D (VITAMIN D3) 1000 units Tab, Take 1,000 Units by mouth once daily., Disp: , Rfl:     Past Medical History:   Diagnosis Date    Allergy     Arrhythmia     Asthma     childhood-severe.    Closed fracture of proximal end of left humerus 5/29/2016    GERD (gastroesophageal reflux disease)     Hypertension     Hypoglycemia     post gastric bypass    Hypothyroidism     ALESSANDRA (iron deficiency anemia)     Insomnia     Nephrolithiasis     had es - [place]    FABIANA on CPAP     PAF (paroxysmal atrial  fibrillation)     Status post placement of implantable loop recorder 3/18/2016    Subaortic membrane     Subarachnoid hematoma 1/1/2019    Subdural hematoma     Vitamin B 12 deficiency     Vitamin D deficiency        Past Surgical History:   Procedure Laterality Date    abdomenalplasty      ABLATION OF ARRHYTHMOGENIC FOCUS FOR ATRIAL FIBRILLATION N/A 4/15/2019    Procedure: Ablation atrial fibrillation;  Surgeon: Edmund Shah MD;  Location[facility] EP LAB;  Service: Cardiology;  Laterality: N/A;  AF, PRISCILLA, PVI, Cryo, MARY, Gen, SK, 3 Prep    COLONOSCOPY  2012    normal, repeat in 5 years    COLONOSCOPY N/A 10/27/2020    Procedure: COLONOSCOPY;  Surgeon: Bud Valentin Jr., MD;  Location: [facility] ENDO;  Service: Endoscopy;  Laterality: N/A;    EXTRACORPOREAL SHOCK WAVE LITHOTRIPSY      [place]    FRACTURE SURGERY Left     has plates and screws in place.-shoulder left    GALLBLADDER SURGERY  1995    GASTRIC BYPASS  1997    HERNIA REPAIR  1998    HYSTERECTOMY  2013    due to vaginal prolpase with BSO - also bladder suspension at the same time - [facility]    INSERTION OF IMPLANTABLE LOOP RECORDER N/A 7/19/2019    Procedure: Insertion, Implantable Loop Recorder;  Surgeon: Neftali Hernandez MD;  Location: [facility] CATH;  Service: Cardiology;  Laterality: N/A;    REMOVAL OF IMPLANTABLE LOOP RECORDER N/A 7/19/2019    Procedure: REMOVAL, IMPLANTABLE LOOP RECORDER;  Surgeon: Neftali Hernandez MD;  Location: [facility] CATH;  Service: Cardiology;  Laterality: N/A;    TYMPANOPLASTY Left     replaced and then repair x2        Family History   Problem Relation Age of Onset    Aneurysm Mother     Hypertension Mother     Cancer Mother         uterine     Glaucoma Mother     Macular degeneration Mother     Alzheimer's disease Father     Diabetes Father     Mental illness Sister     No Known Problems Daughter     No Known Problems Son     Macular degeneration Maternal Grandmother     Stroke Maternal  Grandfather     Glaucoma Maternal Grandfather     COPD Paternal Grandmother     Cancer Paternal Grandmother         colon    Alzheimer's disease Paternal Grandfather     Heart disease Paternal Grandfather     Nephrolithiasis Neg Hx        Social History     Socioeconomic History    Marital status:     Number of children: 2   Occupational History    Occupation: artist    Occupation: retired   Tobacco Use    Smoking status: Former Smoker     Packs/day: 2.00     Years: 20.00     Pack years: 40.00     Quit date: 1995     Years since quittin.5    Smokeless tobacco: Never Used   Substance and Sexual Activity    Alcohol use: Yes     Alcohol/week: 7.0 standard drinks     Types: 7 Glasses of wine per week     Comment: one glass wine nightly    Drug use: No    Sexual activity: Not Currently     Social Determinants of Health     Financial Resource Strain: Low Risk     Difficulty of Paying Living Expenses: Not hard at all   Food Insecurity: No Food Insecurity    Worried About Running Out of Food in the Last Year: Never true    Ran Out of Food in the Last Year: Never true   Transportation Needs: No Transportation Needs    Lack of Transportation (Medical): No    Lack of Transportation (Non-Medical): No   Physical Activity: Insufficiently Active    Days of Exercise per Week: 1 day    Minutes of Exercise per Session: 30 min   Stress: No Stress Concern Present    Feeling of Stress : Only a little   Social Connections: Moderately Integrated    Frequency of Communication with Friends and Family: More than three times a week    Frequency of Social Gatherings with Friends and Family: More than three times a week    Attends Druze Services: More than 4 times per year    Active Member of Clubs or Organizations: Yes    Attends Club or Organization Meetings: More than 4 times per year    Marital Status:    Housing Stability: Low Risk     Unable to Pay for Housing in the Last Year: No  "   Number of Places Lived in the Last Year: 1    Unstable Housing in the Last Year: No       Review of patient's allergies indicates:  No Known Allergies    Review of Systems:  General ROS: negative for - fever  Psychological ROS: negative for - hostility  Hematological and Lymphatic ROS: negative for - bleeding problems  Endocrine ROS: negative for - unexpected weight changes  Respiratory ROS: no cough, shortness of breath, or wheezing  Cardiovascular ROS: no chest pain or dyspnea on exertion  Gastrointestinal ROS: no abdominal pain, change in bowel habits, or black or bloody stools  Musculoskeletal ROS: negative for - muscular weakness  Neurological ROS: negative for - numbness/tingling  Dermatological ROS: negative for rash    Physical Exam:  Vitals:    06/02/22 1033   BP: (!) 132/59   Pulse: 63   SpO2: 98%   Weight: 70.2 kg (154 lb 14 oz)   Height: 5' 5" (1.651 m)   PainSc:   8   PainLoc: Neck     Body mass index is 25.77 kg/m².    Gen: NAD  Psych: mood appropriate for given condition  CV: 2+ radial pulse  HEENT: anicteric   Respiratory: non labored  Abd: soft nt, nd  Skin: intact  Sensation: intact to lt touch bilaterally in c4-t1   Reflexes: 1+ b/l Bicep, tricep and 0 b/l patella Escobar negative  ROM: Cervical ROM full, shoulder, elbow and wrist ROM full  Tone:  Normal at elbow, wrist and shoulder   Inspection: no atrophy of bicep, FDI or APB noted  Special tests: + axial facet loading  Palpation: tender cervical paraspinals, levator scapula and trapezius    Motor:    Right Left   C4 Shoulder Abduction  5  5   C5 Elbow Flexion    5  5   C6 Wrist Extension  5  5   C7 Elbow Extension   5  5   C8/T1 Hand Intrinsics   5  5                 Imaging:  MRI cervical spine 6/7/21  FINDINGS:  CORD: Normal size and signal.  No syrinx.  Cervicomedullary junction is normal.  Incidental left foraminal perineural cysts at the C6-7 level     ALIGNMENT: Trace anterolisthesis of C3 on C4, C7 on T1 and T1 on T2.  Lateral " masses of C1 and C2 are congruent.     BONES: Vertebral body heights are maintained.  Fusion of the bilateral C3-4 facets.  Mild STIR signal about the left C6-7 facets.  Minor endplate changes, greatest at C5-6.  No aggressive bone marrow signal.     PARASPINAL AREA: Normal.     CERVICAL DISC LEVELS:     C2-C3: Minor left uncovertebral spurring.  Moderate left facet hypertrophy.  No significant spinal canal stenosis.  No significant foraminal stenosis.  C3-C4: Trace anterolisthesis.  Bilateral facet fusion.  Preserved ventral and dorsal CSF surrounding the cord.  No significant foraminal stenosis.  C4-C5: Mild disc osteophyte complex.  Mild-moderate left facet hypertrophy.  Ligamentum flavum thickening.  Slight ventral cord flattening with preserved ventral and dorsal CSF.  No significant foraminal stenosis.  C5-C6: Mild disc osteophyte complex.  Mild left facet hypertrophy.  Ligamentum flavum thickening.  Mild ventral cord flattening with preserved ventral and dorsal CSF.  Moderate-severe left foraminal stenosis.  C6-C7: Mild disc osteophyte complex.  Mild-moderate right and minimal left facet hypertrophy. Ligamentum flavum thickening.  Preserved ventral and dorsal CSF surrounding the cord.  No significant foraminal stenosis.  C7-T1: Trace anterolisthesis.  No disc herniation or significant posterior osseous ridging.  Mild left facet hypertrophy.  No significant spinal canal or foraminal stenosis.    Labs:  BMP  Lab Results   Component Value Date     02/09/2022    K 3.5 02/09/2022    CL 99 02/09/2022    CO2 26 02/09/2022    BUN 20 (H) 02/09/2022    CREATININE 0.48 (L) 02/09/2022    CALCIUM 9.9 02/09/2022    ANIONGAP 11 02/09/2022    ESTGFRAFRICA >60 02/09/2022    EGFRNONAA >60 02/09/2022     Lab Results   Component Value Date    ALT 25 02/09/2022    AST 38 (H) 02/09/2022    ALKPHOS 78 02/09/2022    BILITOT 0.8 02/09/2022       Assessment:   Problem List Items Addressed This Visit    None     Visit Diagnoses      Cervical myofascial pain syndrome    -  Primary    Osteoarthritis of first carpometacarpal (CMC) joint of one hand        Relevant Orders    Ambulatory referral/consult to Orthopedics    Cervical spondylosis              72 y.o. year old female with PMH HTN, paroxysmal Afib on Xarelto who presents with neck pain.  She had had this pain for over the past 6 months.  Today her pain is 4/10, constant, sharp, aching on the right side of her neck.  She denies any radicular pain, numbness, weakness.  Pain is worse with lateral neck rotation.    - on exam she has full strength and intact sensation to light touch.  Tenderness to palpation over the right trapezius.  Mild axial facet loading  - independently reviewed her cervical MRI and is consistent with multilevel bilateral facet arthropathy that appears a little worse on the left compared to the right.  Foraminal narrowing at C5-6 on the left  - she has completed formal physical therapy as well as dry needling but has persistent pain on the right side of her neck  - I think she has a combination of myofascial pain and also pain from cervical facets  - we will do a TPI in the office today for the myofascial component of her pain.  She will continue to take Robaxin as prescribed by PCP as she does feel this has been helpful for her  - she is going to call and update me in the next couple weeks and let me know how she is doing.  If she fails to get significant relief then my next recommendation would be to do diagnostic right-sided cervical medial branch blocks.  - she was also having some pain at the base of her right thumb and I have referred her to Orthopedics to consider if she needs any type of injection as I think it due to osteoarthritis    : Not applicable    John Rose M.D.  Interventional Pain Medicine / Anesthesiology    This note was completed with dictation software and grammatical errors may exist.      Trigger Point Injection    This is a pain clinic  procedure note.    Procedure: Trigger point injection  Procedure Date: 06/02/2022  Muscles Injected: right trapezius  Subjective: Multiple trigger points and areas of tenderness were identified and marked.  Preoperative Diagnosis: Myofascial Pain   Post Procedure Diagnosis: Myofascial Pain   Findings: Radiating trigger points  Complications: None  Anesthetic: 50:50 Mixture of Lidocaine 2% and Bupivacaine 0.5%, 0.5- 1.0 ml per site + 40mg depomedrol 5 cc total    Procedure details: Skin overlying target injection sites cleaned with alcohol swabs.  Each identified trigger point was injected with the aforementioned anesthetic using a 1.25 inch 25G needle followed by needling technique.  Twitch response was elicited at some sites.    Total injections: 3    Dispo: no complications, pt tolerated the procedure well.

## 2022-06-03 ENCOUNTER — CLINICAL SUPPORT (OUTPATIENT)
Dept: CARDIOLOGY | Facility: HOSPITAL | Age: 72
End: 2022-06-03
Payer: MEDICARE

## 2022-06-03 DIAGNOSIS — Z95.818 PRESENCE OF OTHER CARDIAC IMPLANTS AND GRAFTS: ICD-10-CM

## 2022-06-03 PROCEDURE — G2066 INTER DEVC REMOTE 30D: HCPCS | Mod: PO | Performed by: INTERNAL MEDICINE

## 2022-06-08 DIAGNOSIS — E16.1 POSTPRANDIAL HYPOGLYCEMIA: ICD-10-CM

## 2022-06-08 RX ORDER — FLASH GLUCOSE SENSOR
KIT MISCELLANEOUS
Qty: 1 KIT | Refills: 3 | OUTPATIENT
Start: 2022-06-08

## 2022-06-08 RX ORDER — LEVOTHYROXINE SODIUM 88 UG/1
TABLET ORAL
Qty: 90 TABLET | Refills: 3 | Status: SHIPPED | OUTPATIENT
Start: 2022-06-08 | End: 2023-06-05

## 2022-06-08 NOTE — TELEPHONE ENCOUNTER
No new care gaps identified.  [facility] Embedded Care Gaps. Reference number: [id number]. 6/08/2022   12:04:09 PM CDT

## 2022-06-08 NOTE — TELEPHONE ENCOUNTER
----- Message from Nini Prado sent at 6/8/2022  2:21 PM CDT -----  Contact: [phone]  Type:  RX Refill Request    Who Called:  Pt  Refill or New Rx:  new  RX Name and Strength:  Freestyle tawny strips 2   How is the patient currently taking it? (ex. 1XDay):  350  Is this a 30 day or 90 day RX:    Preferred Pharmacy with phone number:   [facility] #[id number] [place], [place] - [address] [address] AT [facility] [address]  [address] [place] [address]  Phone: [phone] Fax: [phone]    Phone: [phone] Fax: [phone]  Local or Mail Order: Local  Ordering Provider:    [facility] Call Back Number:  [phone]  Additional Information:  Pls call back and advise     Type:  RX Refill Request    Who Called:  Pt  Refill or New Rx:  refill  RX Name and Strength:  cloNIDine (CATAPRES) 0.1 MG tablet  How is the patient currently taking it? (ex. 1XDay):    Is this a 30 day or 90 day RX:  90  Preferred Pharmacy with phone number:    [facility] #[id number] - [facility] [id number] [place] [address] AT [facility] [address]  [id number] [place] [address] [address]  [facility] [address]  Phone: [phone] Fax: [phone]

## 2022-06-09 RX ORDER — CLONIDINE HYDROCHLORIDE 0.1 MG/1
TABLET ORAL
Qty: 90 TABLET | Refills: 3 | Status: SHIPPED | OUTPATIENT
Start: 2022-06-09 | End: 2022-07-12

## 2022-06-09 NOTE — TELEPHONE ENCOUNTER
Refill Routing Note   Medication(s) are not appropriate for processing by [facility] for the following reason(s):      - Outside of protocol    [facility] action(s):  Quick Discontinue  Route  Approve       Medication Therapy Plan: FreeStyle Walter approved (6/1/22)  Medication reconciliation completed: No     Appointments  past 12m or future 3m with PCP    Date Provider   Last Visit   5/17/2022 Ann Richards, DO   Next Visit   9/20/2022 Ann Richards, DO   ED visits in past 90 days: 0        Note composed:7:11 PM 06/08/2022

## 2022-06-27 DIAGNOSIS — E53.8 VITAMIN B 12 DEFICIENCY: ICD-10-CM

## 2022-06-28 ENCOUNTER — TELEPHONE (OUTPATIENT)
Dept: FAMILY MEDICINE | Facility: CLINIC | Age: 72
End: 2022-06-28
Payer: MEDICARE

## 2022-06-28 RX ORDER — CYANOCOBALAMIN 1000 UG/ML
INJECTION, SOLUTION INTRAMUSCULAR; SUBCUTANEOUS
Qty: 6 ML | Refills: 3 | Status: CANCELLED | OUTPATIENT
Start: 2022-06-28

## 2022-06-28 RX ORDER — CYANOCOBALAMIN 1000 UG/ML
1000 INJECTION, SOLUTION INTRAMUSCULAR; SUBCUTANEOUS
Qty: 6 ML | Refills: 3
Start: 2022-06-28

## 2022-06-28 NOTE — TELEPHONE ENCOUNTER
Received fax from [facility], B12 injections need PA   Ph # [phone]  ID # [id number]    PA submitted over the phone . Ref # [id number]. Waiting for response.--lp

## 2022-07-03 ENCOUNTER — CLINICAL SUPPORT (OUTPATIENT)
Dept: CARDIOLOGY | Facility: HOSPITAL | Age: 72
End: 2022-07-03
Payer: MEDICARE

## 2022-07-03 DIAGNOSIS — Z95.818 PRESENCE OF OTHER CARDIAC IMPLANTS AND GRAFTS: ICD-10-CM

## 2022-07-03 PROCEDURE — 93298 CARDIAC DEVICE CHECK - REMOTE: ICD-10-PCS | Mod: ,,, | Performed by: INTERNAL MEDICINE

## 2022-07-03 PROCEDURE — 93298 REM INTERROG DEV EVAL SCRMS: CPT | Mod: ,,, | Performed by: INTERNAL MEDICINE

## 2022-07-03 PROCEDURE — G2066 INTER DEVC REMOTE 30D: HCPCS | Mod: PO | Performed by: INTERNAL MEDICINE

## 2022-07-05 ENCOUNTER — HOSPITAL ENCOUNTER (OUTPATIENT)
Dept: RADIOLOGY | Facility: HOSPITAL | Age: 72
Discharge: HOME | End: 2022-07-05
Attending: PHYSICIAN ASSISTANT
Payer: MEDICARE

## 2022-07-05 ENCOUNTER — OFFICE VISIT (OUTPATIENT)
Dept: ORTHOPEDICS | Facility: CLINIC | Age: 72
End: 2022-07-05
Payer: MEDICARE

## 2022-07-05 DIAGNOSIS — M19.041 PRIMARY OSTEOARTHRITIS OF RIGHT HAND: Primary | ICD-10-CM

## 2022-07-05 DIAGNOSIS — M79.641 RIGHT HAND PAIN: ICD-10-CM

## 2022-07-05 PROCEDURE — 73130 X-RAY EXAM OF HAND: CPT | Mod: 26,RT,, | Performed by: RADIOLOGY

## 2022-07-05 PROCEDURE — 99203 OFFICE O/P NEW LOW 30 MIN: CPT | Mod: S$GLB,,, | Performed by: PHYSICIAN ASSISTANT

## 2022-07-05 PROCEDURE — 73130 XR HAND COMPLETE 3 VIEW RIGHT: ICD-10-PCS | Mod: 26,RT,, | Performed by: RADIOLOGY

## 2022-07-05 PROCEDURE — 99999 PR PBB SHADOW E&M-EST. PATIENT-LVL IV: ICD-10-PCS | Mod: PBBFAC,,, | Performed by: PHYSICIAN ASSISTANT

## 2022-07-05 PROCEDURE — 99999 PR PBB SHADOW E&M-EST. PATIENT-LVL IV: CPT | Mod: PBBFAC,,, | Performed by: PHYSICIAN ASSISTANT

## 2022-07-05 PROCEDURE — 99203 PR OFFICE/OUTPT VISIT, NEW, LEVL III, 30-44 MIN: ICD-10-PCS | Mod: S$GLB,,, | Performed by: PHYSICIAN ASSISTANT

## 2022-07-05 PROCEDURE — 73130 X-RAY EXAM OF HAND: CPT | Mod: TC,PO,RT

## 2022-07-05 NOTE — PROGRESS NOTES
7/5/2022    Chief Complaint:  No chief complaint on file.      HPI:  Yara Whitehead is a 72 y.o. female, who presents to clinic today for evaluation of her right hand pain and thumb deformity.  States she has had intermittent pain throughout the right hand for the last 10 years.  States pain is worse with activity.  States pain is better with rest.  States pain on average is currently 1/10.  Additionally, states she has chronic neck pain.  States she recently has been prescribed medical marijuana for her chronic neck and hand pain.  States it has significantly helped relieved her pain, and that she has been doing well since that time.  Denies any other complaints this time.    PMHX:  Past Medical History:   Diagnosis Date    Allergy     Arrhythmia     Asthma     childhood-severe.    Closed fracture of proximal end of left humerus 5/29/2016    GERD (gastroesophageal reflux disease)     Hypertension     Hypoglycemia     post gastric bypass    Hypothyroidism     ALESSANDRA (iron deficiency anemia)     Insomnia     Nephrolithiasis     had eswl - [place]    FABIANA on CPAP     PAF (paroxysmal atrial fibrillation)     Status post placement of implantable loop recorder 3/18/2016    Subaortic membrane     Subarachnoid hematoma 1/1/2019    Subdural hematoma     Vitamin B 12 deficiency     Vitamin D deficiency        PSHX:  Past Surgical History:   Procedure Laterality Date    abdomenalplasty      ABLATION OF ARRHYTHMOGENIC FOCUS FOR ATRIAL FIBRILLATION N/A 4/15/2019    Procedure: Ablation atrial fibrillation;  Surgeon: Edmund Shah MD;  Location: [facility] EP LAB;  Service: Cardiology;  Laterality: N/A;  AF, PRISCILLA, PVI, Cryo, MARY, Gen, SK, 3 Prep    COLONOSCOPY  2012    normal, repeat in 5 years    COLONOSCOPY N/A 10/27/2020    Procedure: COLONOSCOPY;  Surgeon: Bud Valentin Jr., MD;  Location: [facility] ENDO;  Service: Endoscopy;  Laterality: N/A;    EXTRACORPOREAL SHOCK WAVE LITHOTRIPSY      [place]     FRACTURE SURGERY Left     has plates and screws in place.-shoulder left    GALLBLADDER SURGERY      GASTRIC BYPASS      HERNIA REPAIR      HYSTERECTOMY      due to vaginal prolpase with BSO - also bladder suspension at the same time - [facility]    INSERTION OF IMPLANTABLE LOOP RECORDER N/A 2019    Procedure: Insertion, Implantable Loop Recorder;  Surgeon: Neftali Hernandez MD;  Location: [facility] CATH;  Service: Cardiology;  Laterality: N/A;    REMOVAL OF IMPLANTABLE LOOP RECORDER N/A 2019    Procedure: REMOVAL, IMPLANTABLE LOOP RECORDER;  Surgeon: Neftali Hernandez MD;  Location: [facility] CATH;  Service: Cardiology;  Laterality: N/A;    TYMPANOPLASTY Left     replaced and then repair x2        FMHX:  Family History   Problem Relation Age of Onset    Aneurysm Mother     Hypertension Mother     Cancer Mother         uterine     Glaucoma Mother     Macular degeneration Mother     Alzheimer's disease Father     Diabetes Father     Mental illness Sister     No Known Problems Daughter     No Known Problems Son     Macular degeneration Maternal Grandmother     Stroke Maternal Grandfather     Glaucoma Maternal Grandfather     COPD Paternal Grandmother     Cancer Paternal Grandmother         colon    Alzheimer's disease Paternal Grandfather     Heart disease Paternal Grandfather     Nephrolithiasis Neg Hx        SOCHX:  Social History     Tobacco Use    Smoking status: Former Smoker     Packs/day: 2.00     Years: 20.00     Pack years: 40.00     Quit date: 1995     Years since quittin.6    Smokeless tobacco: Never Used   Substance Use Topics    Alcohol use: Yes     Alcohol/week: 7.0 standard drinks     Types: 7 Glasses of wine per week     Comment: one glass wine nightly       ALLERGIES:  Patient has no known allergies.    CURRENT MEDICATIONS:  Current Outpatient Medications on File Prior to Visit   Medication Sig Dispense Refill    cyanocobalamin 1,000  mcg/mL injection INJECT 1 ML INTO THE MUSCLE EVERY 14 DAYS 6 mL 3    acarbose (PRECOSE) 25 MG Tab Take 25 mg by mouth daily as needed. When eating high carb meal      albuterol (PROVENTIL/VENTOLIN HFA) 90 mcg/actuation inhaler Inhale 1-2 puffs into the lungs every 6 (six) hours as needed for Wheezing. 18 g 6    ascorbic acid, vitamin C, (VITAMIN C) 1000 MG tablet Take 1,000 mg by mouth once daily.      atorvastatin (LIPITOR) 10 MG tablet TAKE 1 TABLET BY MOUTH  DAILY 90 tablet 3    cetirizine (ZYRTEC) 10 MG tablet Take 10 mg by mouth daily as needed.       cloNIDine (CATAPRES) 0.1 MG tablet TAKE 1 TABLET BY MOUTH EVERY DAY AT 9PM FOR HYPERTENSION 90 tablet 3    diclofenac sodium (VOLTAREN) 1 % Gel Apply 2 g topically 3 (three) times daily. 100 g 6    donepeziL (ARICEPT) 10 MG tablet Take 1 tablet (10 mg total) by mouth every evening. 90 tablet 2    estradioL (ESTRACE) 0.01 % (0.1 mg/gram) vaginal cream PLACE 1 GRAM VAGINALLY EVERY OTHER DAY 42.5 g 11    flash glucose scanning reader (FREESTYLE VICKY 2 READER) Misc To check glucose qac and qhs and PRN lows 6 each 3    flash glucose sensor (FREESTYLE VICKY 14 DAY SENSOR) Kit USE AS DIRECTED 6 kit 3    flash glucose sensor (FREESTYLE VICKY 2 SENSOR) Kit To check glucose qac and qhs and PRN lows 1 kit 0    glucagon (GVOKE) 1 mg/0.2 mL Soln Use for severe hypoglycemia. 0.2 mL 3    glucose 4 GM chewable tablet Take 4 tablets when glucose from 51-70  Take 6 tablets when glucose is less than 50 120 tablet 12    lancets Misc To check BG 1 times daily, to use with insurance preferred meter 100 each 3    levothyroxine (SYNTHROID) 88 MCG tablet TAKE 1 TABLET BY MOUTH EVERY DAY AT 6AM FOR THYROID HEALTH 90 tablet 3    MAGNESIUM ORAL Take by mouth once daily at 6am.      mirtazapine (REMERON) 30 MG tablet Take 1 tablet (30 mg total) by mouth nightly. 90 tablet 3    oxybutynin (DITROPAN-XL) 5 MG TR24 Take 1 tablet (5 mg total) by mouth once daily. 90 tablet 3     paroxetine (PAXIL) 40 MG tablet TAKE 1 TABLET BY MOUTH IN  THE MORNING 90 tablet 3    rivaroxaban (XARELTO) 20 mg Tab Take 1 tablet (20 mg total) by mouth every evening. 90 tablet 4    telmisartan (MICARDIS) 40 MG Tab Take 1 tablet (40 mg total) by mouth once daily. 90 tablet 3    TRUEPLUS LANCETS 30 gauge Misc USE TO TEST BLOOD SUGAR BID  0    vitamin D (VITAMIN D3) 1000 units Tab Take 1,000 Units by mouth once daily.       No current facility-administered medications on file prior to visit.       REVIEW OF SYSTEMS:  Review of Systems   Constitutional: Positive for diaphoresis. Negative for fever.   HENT: Positive for hearing loss. Negative for ear pain, nosebleeds and tinnitus.    Eyes: Negative for pain and redness.   Respiratory: Positive for cough. Negative for shortness of breath.    Cardiovascular: Negative for chest pain and palpitations.   Gastrointestinal: Negative for blood in stool, constipation, diarrhea, nausea and vomiting.   Genitourinary: Positive for frequency. Negative for dysuria and hematuria.   Musculoskeletal: Positive for myalgias. Negative for back pain.   Skin: Positive for rash. Negative for itching.   Neurological: Positive for weakness and headaches. Negative for dizziness, tingling and seizures.   Endo/Heme/Allergies: Positive for environmental allergies.   Psychiatric/Behavioral: The patient is nervous/anxious.        GENERAL PHYSICAL EXAM:   LMP  (LMP Unknown) Comment: total   GEN: well developed, well nourished, no acute distress   HENT: Normocephalic, atraumatic   EYES: No discharge, conjunctiva normal   NECK: Supple, non-tender   PULM: No wheezing, no respiratory distress   CV: RRR   ABD: Soft, non-tender    ORTHO EXAM:   Examination of the right hand reveals a radial subluxation deformity of the distal phalanx of the right thumb.  Arthritis deformities noted throughout the IP joints of the right hand.  Able to make composite fist and fully extend all fingers.  No active  triggering noted of any of the fingers of the right hand.  No tenderness to palpation overlying the areas of A1 pulleys of any of the fingers of the right hand.  5/5 /intrinsic strength.  Full intact range of motion of the right wrist.  Mild tenderness palpation throughout the IP joints of the right hand.  Mild tenderness to palpation of the 1st CMC joint.  Sensation is grossly intact in the radial, ulnar, median nerve distributions.  Capillary refill less than 2 seconds in all fingers.    RADIOLOGY:   X-rays of the right hand were taken today in clinic.  X-rays reviewed by myself.  Imaging showed no acute fracture or dislocation.  Presence of severe degenerative changes of the IP joint of the right thumb with associated radial subluxation of the distal phalanx.  Presence of mild to severe degenerative changes throughout the IP joints of the right hand.  Presence of mild degenerative changes of the 2nd and 3rd MCP joints of the right hand.  Presence of moderate degenerative changes of the 1st CMC joint and STT joint of the right wrist.  Presence of mild degenerative changes the radiocarpal joint.  No osseous destructive processes noted.  No other significant bony abnormalities noted.    ASSESSMENT:     Right hand/wrist osteoarthritis    PLAN:  1. I discussed with Yara Whitehead the osteoarthritis pathology and treatment options in detail during today's visit.  After treatment options were discussed, this had the best course of action is to continue her current treatment and monitor her symptoms.  We discussed for any worsening of her symptoms we could discuss with her primary care provider about performing a Medrol Dosepak to relieve her osteoarthritis pain.  She verbally agreed with the treatment plan.    2. I would like her follow up in clinic on a p.r.n. basis for any worsening of her symptoms or for any hand, wrist, or elbow problems/concerns.  She was instructed to contact the clinic for any problems  or concerns in the interim.      Answers for HPI/ROS submitted by the patient on 7/4/2022  unexpected weight change: No  appetite change : No  sleep disturbance: Yes  IMMUNOCOMPROMISED: No  dysphoric mood: Yes  visual disturbance: Yes  sinus pressure : Yes  food allergies: No  difficulty urinating: No  painful intercourse: No  numbness: No  joint swelling: Yes  Pain Chronicity: chronic  History of trauma: No  Onset: more than 1 month ago  Frequency: constantly  Progression since onset: waxing and waning  injury location: at home  pain- numeric: 5/10  pain location: left fingers, right fingers  pain quality: aching, sharp, tightness, tight, generalized  Radiating Pain: No  Aggravating factors: activity, bending, bearing weight, exercise, extension, flexion, rotation  inability to bear weight: Yes  joint locking: Yes  limited range of motion: Yes  stiffness: Yes  Treatments tried: cold, heat, movement, NSAIDs, OTC ointments, OTC pain meds, rest  physical therapy: not tried  Improvement on treatment: mild

## 2022-08-02 ENCOUNTER — CLINICAL SUPPORT (OUTPATIENT)
Dept: CARDIOLOGY | Facility: HOSPITAL | Age: 72
End: 2022-08-02
Payer: MEDICARE

## 2022-08-02 DIAGNOSIS — Z95.818 PRESENCE OF OTHER CARDIAC IMPLANTS AND GRAFTS: ICD-10-CM

## 2022-08-02 PROCEDURE — G2066 INTER DEVC REMOTE 30D: HCPCS | Mod: PO | Performed by: INTERNAL MEDICINE

## 2022-08-16 ENCOUNTER — PATIENT MESSAGE (OUTPATIENT)
Dept: CARDIOLOGY | Facility: HOSPITAL | Age: 72
End: 2022-08-16
Payer: MEDICARE

## 2022-08-27 ENCOUNTER — LAB VISIT (OUTPATIENT)
Dept: LAB | Facility: HOSPITAL | Age: 72
End: 2022-08-27
Attending: INTERNAL MEDICINE
Payer: MEDICARE

## 2022-08-27 DIAGNOSIS — M79.18 CERVICAL MYOFASCIAL PAIN SYNDROME: ICD-10-CM

## 2022-08-27 DIAGNOSIS — I10 ESSENTIAL HYPERTENSION: ICD-10-CM

## 2022-08-27 DIAGNOSIS — E53.8 VITAMIN B12 DEFICIENCY: ICD-10-CM

## 2022-08-27 DIAGNOSIS — E16.1 POSTPRANDIAL HYPOGLYCEMIA: ICD-10-CM

## 2022-08-27 LAB
ALBUMIN SERPL BCP-MCNC: 4.1 G/DL (ref 3.5–5.2)
ALP SERPL-CCNC: 77 U/L (ref 55–135)
ALT SERPL W/O P-5'-P-CCNC: 27 U/L (ref 10–44)
ANION GAP SERPL CALC-SCNC: 9 MMOL/L (ref 8–16)
AST SERPL-CCNC: 38 U/L (ref 10–40)
BASOPHILS # BLD AUTO: 0.02 K/UL (ref 0–0.2)
BASOPHILS NFR BLD: 0.4 % (ref 0–1.9)
BILIRUB SERPL-MCNC: 0.6 MG/DL (ref 0.1–1)
BUN SERPL-MCNC: 15 MG/DL (ref 8–23)
CALCIUM SERPL-MCNC: 9.7 MG/DL (ref 8.7–10.5)
CHLORIDE SERPL-SCNC: 105 MMOL/L (ref 95–110)
CHOLEST SERPL-MCNC: 191 MG/DL (ref 120–199)
CHOLEST/HDLC SERPL: 1.9 {RATIO} (ref 2–5)
CO2 SERPL-SCNC: 28 MMOL/L (ref 23–29)
CREAT SERPL-MCNC: 0.7 MG/DL (ref 0.5–1.4)
DIFFERENTIAL METHOD: ABNORMAL
EOSINOPHIL # BLD AUTO: 0.1 K/UL (ref 0–0.5)
EOSINOPHIL NFR BLD: 1.3 % (ref 0–8)
ERYTHROCYTE [DISTWIDTH] IN BLOOD BY AUTOMATED COUNT: 16.3 % (ref 11.5–14.5)
EST. GFR  (NO RACE VARIABLE): >60 ML/MIN/1.73 M^2
ESTIMATED AVG GLUCOSE: 88 MG/DL (ref 68–131)
GLUCOSE SERPL-MCNC: 104 MG/DL (ref 70–110)
HBA1C MFR BLD: 4.7 % (ref 4–5.6)
HCT VFR BLD AUTO: 42 % (ref 37–48.5)
HDLC SERPL-MCNC: 99 MG/DL (ref 40–75)
HDLC SERPL: 51.8 % (ref 20–50)
HGB BLD-MCNC: 14.1 G/DL (ref 12–16)
IMM GRANULOCYTES # BLD AUTO: 0.01 K/UL (ref 0–0.04)
IMM GRANULOCYTES NFR BLD AUTO: 0.2 % (ref 0–0.5)
LDLC SERPL CALC-MCNC: 83.4 MG/DL (ref 63–159)
LYMPHOCYTES # BLD AUTO: 1.2 K/UL (ref 1–4.8)
LYMPHOCYTES NFR BLD: 24.1 % (ref 18–48)
MCH RBC QN AUTO: 34.5 PG (ref 27–31)
MCHC RBC AUTO-ENTMCNC: 33.6 G/DL (ref 32–36)
MCV RBC AUTO: 103 FL (ref 82–98)
MONOCYTES # BLD AUTO: 0.4 K/UL (ref 0.3–1)
MONOCYTES NFR BLD: 7.5 % (ref 4–15)
NEUTROPHILS # BLD AUTO: 3.2 K/UL (ref 1.8–7.7)
NEUTROPHILS NFR BLD: 66.5 % (ref 38–73)
NONHDLC SERPL-MCNC: 92 MG/DL
NRBC BLD-RTO: 0 /100 WBC
PLATELET # BLD AUTO: 242 K/UL (ref 150–450)
PMV BLD AUTO: 10.3 FL (ref 9.2–12.9)
POTASSIUM SERPL-SCNC: 4.3 MMOL/L (ref 3.5–5.1)
PROT SERPL-MCNC: 7.4 G/DL (ref 6–8.4)
RBC # BLD AUTO: 4.09 M/UL (ref 4–5.4)
SODIUM SERPL-SCNC: 142 MMOL/L (ref 136–145)
TRIGL SERPL-MCNC: 43 MG/DL (ref 30–150)
TSH SERPL DL<=0.005 MIU/L-ACNC: 2.48 UIU/ML (ref 0.4–4)
VIT B12 SERPL-MCNC: 278 PG/ML (ref 210–950)
WBC # BLD AUTO: 4.77 K/UL (ref 3.9–12.7)

## 2022-08-27 PROCEDURE — 80061 LIPID PANEL: CPT | Performed by: INTERNAL MEDICINE

## 2022-08-27 PROCEDURE — 36415 COLL VENOUS BLD VENIPUNCTURE: CPT | Mod: PO | Performed by: INTERNAL MEDICINE

## 2022-08-27 PROCEDURE — 80053 COMPREHEN METABOLIC PANEL: CPT | Performed by: INTERNAL MEDICINE

## 2022-08-27 PROCEDURE — 84443 ASSAY THYROID STIM HORMONE: CPT | Performed by: INTERNAL MEDICINE

## 2022-08-27 PROCEDURE — 83036 HEMOGLOBIN GLYCOSYLATED A1C: CPT | Performed by: INTERNAL MEDICINE

## 2022-08-27 PROCEDURE — 82607 VITAMIN B-12: CPT | Performed by: INTERNAL MEDICINE

## 2022-08-27 PROCEDURE — 85025 COMPLETE CBC W/AUTO DIFF WBC: CPT | Performed by: INTERNAL MEDICINE

## 2022-08-27 RX ORDER — METHOCARBAMOL 500 MG/1
TABLET, FILM COATED ORAL
Qty: 60 TABLET | Refills: 2 | Status: CANCELLED | OUTPATIENT
Start: 2022-08-27

## 2022-08-27 NOTE — TELEPHONE ENCOUNTER
No new care gaps identified.  [facility] Embedded Care Gaps. Reference number: [id number]. 8/27/2022   1:43:26 PM CDT

## 2022-08-29 ENCOUNTER — PATIENT MESSAGE (OUTPATIENT)
Dept: FAMILY MEDICINE | Facility: CLINIC | Age: 72
End: 2022-08-29
Payer: MEDICARE

## 2022-08-29 ENCOUNTER — TELEPHONE (OUTPATIENT)
Dept: CARDIOLOGY | Facility: HOSPITAL | Age: 72
End: 2022-08-29
Payer: MEDICARE

## 2022-08-29 NOTE — TELEPHONE ENCOUNTER
Ordered hand set for pt's HM.  Should arrive in 7-10 days.  Pt will contact office if needing assistance.      Pt is not sure if she is going to follow Dr. Garcia or switch to another doctor at [facility].    Pt would like to schedule appt w/.  She has seen him in the past and wanted to schedule with him again.

## 2022-09-01 ENCOUNTER — CLINICAL SUPPORT (OUTPATIENT)
Dept: CARDIOLOGY | Facility: HOSPITAL | Age: 72
End: 2022-09-01
Payer: MEDICARE

## 2022-09-01 DIAGNOSIS — Z95.818 PRESENCE OF OTHER CARDIAC IMPLANTS AND GRAFTS: ICD-10-CM

## 2022-09-01 PROCEDURE — 93298 REM INTERROG DEV EVAL SCRMS: CPT | Mod: ,,, | Performed by: INTERNAL MEDICINE

## 2022-09-01 PROCEDURE — 93298 CARDIAC DEVICE CHECK - REMOTE: ICD-10-PCS | Mod: ,,, | Performed by: INTERNAL MEDICINE

## 2022-09-01 PROCEDURE — G2066 INTER DEVC REMOTE 30D: HCPCS | Mod: PO | Performed by: INTERNAL MEDICINE

## 2022-09-20 ENCOUNTER — OFFICE VISIT (OUTPATIENT)
Dept: FAMILY MEDICINE | Facility: CLINIC | Age: 72
End: 2022-09-20
Payer: MEDICARE

## 2022-09-20 VITALS
HEIGHT: 65 IN | RESPIRATION RATE: 16 BRPM | HEART RATE: 71 BPM | BODY MASS INDEX: 26.41 KG/M2 | SYSTOLIC BLOOD PRESSURE: 122 MMHG | OXYGEN SATURATION: 97 % | TEMPERATURE: 98 F | DIASTOLIC BLOOD PRESSURE: 76 MMHG | WEIGHT: 158.5 LBS

## 2022-09-20 DIAGNOSIS — E03.9 HYPOTHYROIDISM, UNSPECIFIED TYPE: ICD-10-CM

## 2022-09-20 DIAGNOSIS — I65.23 BILATERAL CAROTID ARTERY STENOSIS: ICD-10-CM

## 2022-09-20 DIAGNOSIS — M79.18 CERVICAL MYOFASCIAL PAIN SYNDROME: ICD-10-CM

## 2022-09-20 DIAGNOSIS — I48.0 PAF (PAROXYSMAL ATRIAL FIBRILLATION): Primary | ICD-10-CM

## 2022-09-20 DIAGNOSIS — M85.80 OSTEOPENIA, UNSPECIFIED LOCATION: ICD-10-CM

## 2022-09-20 DIAGNOSIS — F51.01 PRIMARY INSOMNIA: ICD-10-CM

## 2022-09-20 DIAGNOSIS — G31.84 MCI (MILD COGNITIVE IMPAIRMENT): ICD-10-CM

## 2022-09-20 DIAGNOSIS — E55.9 VITAMIN D DEFICIENCY: ICD-10-CM

## 2022-09-20 DIAGNOSIS — D50.9 IRON DEFICIENCY ANEMIA, UNSPECIFIED IRON DEFICIENCY ANEMIA TYPE: ICD-10-CM

## 2022-09-20 DIAGNOSIS — N39.46 URINARY INCONTINENCE, MIXED: ICD-10-CM

## 2022-09-20 DIAGNOSIS — I10 ESSENTIAL HYPERTENSION: ICD-10-CM

## 2022-09-20 DIAGNOSIS — I70.0 AORTIC ATHEROSCLEROSIS: ICD-10-CM

## 2022-09-20 DIAGNOSIS — I48.3 TYPICAL ATRIAL FLUTTER: ICD-10-CM

## 2022-09-20 DIAGNOSIS — F33.0 MAJOR DEPRESSIVE DISORDER, RECURRENT, MILD: ICD-10-CM

## 2022-09-20 DIAGNOSIS — E78.5 HYPERLIPIDEMIA, UNSPECIFIED HYPERLIPIDEMIA TYPE: ICD-10-CM

## 2022-09-20 DIAGNOSIS — Z23 NEED FOR IMMUNIZATION AGAINST INFLUENZA: ICD-10-CM

## 2022-09-20 DIAGNOSIS — E53.8 VITAMIN B12 DEFICIENCY: ICD-10-CM

## 2022-09-20 DIAGNOSIS — J30.9 CHRONIC ALLERGIC RHINITIS: ICD-10-CM

## 2022-09-20 DIAGNOSIS — E16.1 POSTPRANDIAL HYPOGLYCEMIA: ICD-10-CM

## 2022-09-20 DIAGNOSIS — F41.1 GAD (GENERALIZED ANXIETY DISORDER): ICD-10-CM

## 2022-09-20 DIAGNOSIS — I34.0 NONRHEUMATIC MITRAL VALVE REGURGITATION: ICD-10-CM

## 2022-09-20 PROCEDURE — 1159F PR MEDICATION LIST DOCUMENTED IN MEDICAL RECORD: ICD-10-PCS | Mod: CPTII,S$GLB,, | Performed by: INTERNAL MEDICINE

## 2022-09-20 PROCEDURE — 90694 FLU VACCINE - QUADRIVALENT - ADJUVANTED: ICD-10-PCS | Mod: S$GLB,,, | Performed by: INTERNAL MEDICINE

## 2022-09-20 PROCEDURE — 99214 OFFICE O/P EST MOD 30 MIN: CPT | Mod: 25,S$GLB,, | Performed by: INTERNAL MEDICINE

## 2022-09-20 PROCEDURE — 4010F ACE/ARB THERAPY RXD/TAKEN: CPT | Mod: CPTII,S$GLB,, | Performed by: INTERNAL MEDICINE

## 2022-09-20 PROCEDURE — 3074F SYST BP LT 130 MM HG: CPT | Mod: CPTII,S$GLB,, | Performed by: INTERNAL MEDICINE

## 2022-09-20 PROCEDURE — 3078F PR MOST RECENT DIASTOLIC BLOOD PRESSURE < 80 MM HG: ICD-10-PCS | Mod: CPTII,S$GLB,, | Performed by: INTERNAL MEDICINE

## 2022-09-20 PROCEDURE — 1125F AMNT PAIN NOTED PAIN PRSNT: CPT | Mod: CPTII,S$GLB,, | Performed by: INTERNAL MEDICINE

## 2022-09-20 PROCEDURE — 3044F PR MOST RECENT HEMOGLOBIN A1C LEVEL <7.0%: ICD-10-PCS | Mod: CPTII,S$GLB,, | Performed by: INTERNAL MEDICINE

## 2022-09-20 PROCEDURE — 3288F PR FALLS RISK ASSESSMENT DOCUMENTED: ICD-10-PCS | Mod: CPTII,S$GLB,, | Performed by: INTERNAL MEDICINE

## 2022-09-20 PROCEDURE — 3044F HG A1C LEVEL LT 7.0%: CPT | Mod: CPTII,S$GLB,, | Performed by: INTERNAL MEDICINE

## 2022-09-20 PROCEDURE — 90694 VACC AIIV4 NO PRSRV 0.5ML IM: CPT | Mod: S$GLB,,, | Performed by: INTERNAL MEDICINE

## 2022-09-20 PROCEDURE — 3074F PR MOST RECENT SYSTOLIC BLOOD PRESSURE < 130 MM HG: ICD-10-PCS | Mod: CPTII,S$GLB,, | Performed by: INTERNAL MEDICINE

## 2022-09-20 PROCEDURE — 1160F PR REVIEW ALL MEDS BY PRESCRIBER/CLIN PHARMACIST DOCUMENTED: ICD-10-PCS | Mod: CPTII,S$GLB,, | Performed by: INTERNAL MEDICINE

## 2022-09-20 PROCEDURE — 1101F PT FALLS ASSESS-DOCD LE1/YR: CPT | Mod: CPTII,S$GLB,, | Performed by: INTERNAL MEDICINE

## 2022-09-20 PROCEDURE — G0008 ADMIN INFLUENZA VIRUS VAC: HCPCS | Mod: S$GLB,,, | Performed by: INTERNAL MEDICINE

## 2022-09-20 PROCEDURE — G0008 FLU VACCINE - QUADRIVALENT - ADJUVANTED: ICD-10-PCS | Mod: S$GLB,,, | Performed by: INTERNAL MEDICINE

## 2022-09-20 PROCEDURE — 1125F PR PAIN SEVERITY QUANTIFIED, PAIN PRESENT: ICD-10-PCS | Mod: CPTII,S$GLB,, | Performed by: INTERNAL MEDICINE

## 2022-09-20 PROCEDURE — 1160F RVW MEDS BY RX/DR IN RCRD: CPT | Mod: CPTII,S$GLB,, | Performed by: INTERNAL MEDICINE

## 2022-09-20 PROCEDURE — 4010F PR ACE/ARB THEARPY RXD/TAKEN: ICD-10-PCS | Mod: CPTII,S$GLB,, | Performed by: INTERNAL MEDICINE

## 2022-09-20 PROCEDURE — 3008F PR BODY MASS INDEX (BMI) DOCUMENTED: ICD-10-PCS | Mod: CPTII,S$GLB,, | Performed by: INTERNAL MEDICINE

## 2022-09-20 PROCEDURE — 3288F FALL RISK ASSESSMENT DOCD: CPT | Mod: CPTII,S$GLB,, | Performed by: INTERNAL MEDICINE

## 2022-09-20 PROCEDURE — 1159F MED LIST DOCD IN RCRD: CPT | Mod: CPTII,S$GLB,, | Performed by: INTERNAL MEDICINE

## 2022-09-20 PROCEDURE — 1101F PR PT FALLS ASSESS DOC 0-1 FALLS W/OUT INJ PAST YR: ICD-10-PCS | Mod: CPTII,S$GLB,, | Performed by: INTERNAL MEDICINE

## 2022-09-20 PROCEDURE — 99214 PR OFFICE/OUTPT VISIT, EST, LEVL IV, 30-39 MIN: ICD-10-PCS | Mod: 25,S$GLB,, | Performed by: INTERNAL MEDICINE

## 2022-09-20 PROCEDURE — 3078F DIAST BP <80 MM HG: CPT | Mod: CPTII,S$GLB,, | Performed by: INTERNAL MEDICINE

## 2022-09-20 PROCEDURE — 3008F BODY MASS INDEX DOCD: CPT | Mod: CPTII,S$GLB,, | Performed by: INTERNAL MEDICINE

## 2022-09-20 RX ORDER — DONEPEZIL HYDROCHLORIDE 5 MG/1
TABLET, FILM COATED ORAL
COMMUNITY
Start: 2022-04-25 | End: 2022-09-20

## 2022-09-20 RX ORDER — COVID-19 MOLECULAR TEST ASSAY
KIT MISCELLANEOUS
COMMUNITY
Start: 2022-06-25 | End: 2022-10-20

## 2022-09-20 NOTE — PATIENT INSTRUCTIONS
Divalent covid booster after October 11th    Get shingrix #2 after Nov 11th         Buy pediatric antihistamines---zytrec or claritin
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
## 2022-10-01 ENCOUNTER — CLINICAL SUPPORT (OUTPATIENT)
Dept: CARDIOLOGY | Facility: HOSPITAL | Age: 72
End: 2022-10-01
Payer: MEDICARE

## 2022-10-19 ENCOUNTER — TELEPHONE (OUTPATIENT)
Dept: ENDOCRINOLOGY | Facility: CLINIC | Age: 72
End: 2022-10-19
Payer: MEDICARE

## 2022-10-19 NOTE — TELEPHONE ENCOUNTER
PA submitted through covermymeds for paroxetine 40 mg tablets. Qty 90 tabs/90 days. KEY [id number].

## 2022-10-20 ENCOUNTER — OFFICE VISIT (OUTPATIENT)
Dept: OTOLARYNGOLOGY | Facility: CLINIC | Age: 72
End: 2022-10-20
Payer: MEDICARE

## 2022-10-20 DIAGNOSIS — Z98.890 HISTORY OF TYMPANOPLASTY OF LEFT EAR: ICD-10-CM

## 2022-10-20 DIAGNOSIS — H91.90 SUBJECTIVE HEARING CHANGE: Primary | ICD-10-CM

## 2022-10-20 DIAGNOSIS — H93.13 TINNITUS, BILATERAL: ICD-10-CM

## 2022-10-20 PROCEDURE — 99202 PR OFFICE/OUTPT VISIT, NEW, LEVL II, 15-29 MIN: ICD-10-PCS | Mod: 95,,, | Performed by: PHYSICIAN ASSISTANT

## 2022-10-20 PROCEDURE — 3044F HG A1C LEVEL LT 7.0%: CPT | Mod: CPTII,95,, | Performed by: PHYSICIAN ASSISTANT

## 2022-10-20 PROCEDURE — 4010F ACE/ARB THERAPY RXD/TAKEN: CPT | Mod: CPTII,95,, | Performed by: PHYSICIAN ASSISTANT

## 2022-10-20 PROCEDURE — 4010F PR ACE/ARB THEARPY RXD/TAKEN: ICD-10-PCS | Mod: CPTII,95,, | Performed by: PHYSICIAN ASSISTANT

## 2022-10-20 PROCEDURE — 99202 OFFICE O/P NEW SF 15 MIN: CPT | Mod: 95,,, | Performed by: PHYSICIAN ASSISTANT

## 2022-10-20 PROCEDURE — 3044F PR MOST RECENT HEMOGLOBIN A1C LEVEL <7.0%: ICD-10-PCS | Mod: CPTII,95,, | Performed by: PHYSICIAN ASSISTANT

## 2022-10-20 NOTE — PROGRESS NOTES
Subjective:       Patient ID: Yara Whitehead is a 72 y.o. female.    Chief Complaint: Hearing Loss    The patient location is: [place], [place]  The chief complaint leading to consultation is: hearing loss    Visit type: audiovisual    Face to Face time with patient: 7 minutes  15 minutes of total time spent on the encounter, which includes face to face time and non-face to face time preparing to see the patient (eg, review of tests), Obtaining and/or reviewing separately obtained history, Documenting clinical information in the electronic or other health record, Independently interpreting results (not separately reported) and communicating results to the patient/family/caregiver, or Care coordination (not separately reported).         Each patient to whom he or she provides medical services by telemedicine is:  (1) informed of the relationship between the physician and patient and the respective role of any other health care provider with respect to management of the patient; and (2) notified that he or she may decline to receive medical services by telemedicine and may withdraw from such care at any time.    Notes:     Patient is a very pleasant 72 y.o. female with complaints of hearing loss.  She reports hearing loss that has been gradually progressing over the last 10+ years.  She has not noted any difference in hearing between the ears, with either ear being the better hearing ear but does favor her right ear when using the telephone.  She has noted occasional tinnitus in both ears.  She has not had any recent issues with ear pain or ear drainage.  She has had previous otologic surgery on left ear - at least two tympanoplasty with grafts which she reports failed (over 45 years ago).   She denies issues with dizziness.  She uses Flonase PRN (not daily).  She has not had recent audiogram.        Review of Systems   HENT:  Positive for postnasal drip and sinus pressure/congestion. Negative for ear discharge and ear  pain.    Respiratory:  Positive for cough.    Cardiovascular: Negative.    Gastrointestinal:  Positive for constipation and diarrhea.   Endocrine: Negative.    Musculoskeletal:  Positive for back pain and neck pain.   Integumentary:  Negative.   Neurological:  Positive for light-headedness.   Hematological:  Bruises/bleeds easily.   Psychiatric/Behavioral:  Positive for decreased concentration and sleep disturbance. The patient is nervous/anxious.        Objective:      Physical Exam  Constitutional:       Appearance: Normal appearance.   HENT:      Head: Normocephalic and atraumatic.      Right Ear: External ear normal.      Left Ear: External ear normal.      Nose: Nose normal.   Pulmonary:      Effort: Pulmonary effort is normal.   Neurological:      General: No focal deficit present.      Mental Status: She is alert.       Assessment:       Problem List Items Addressed This Visit    None  Visit Diagnoses       Subjective hearing change    -  Primary    Tinnitus, bilateral        History of tympanoplasty of left ear                  Plan:         Discussed limitations of getting an accurate ear exam during a virtual visit.  Recommend scheduling an audiogram for further evaluation.  Discussed that there are different types of hearing loss (neural and conductive and that she could have a mixed loss given her previous tympanoplasty/grafts x 2).   She prefers [place] or [place] location for testing.  I will place the order for audiogram and message my staff to try and facilitate scheduling.  We also discussed that tinnitus is most often caused by a hearing loss, and that as the hair cells are damaged, either genetic or as a result of loud noise exposure, they then cause tinnitus.  Some patients find that restricting the salt or caffeine in their diet helps, and there is also an OTC supplement, lipoflavinoids, that some people find to be effective though their benefit is not fully proven.  Tinnitus tends to be  louder in times of stress and fatigue, and may decrease with time.  Sound machines may also be an effective masking technique if needed at night.

## 2022-10-21 ENCOUNTER — TELEPHONE (OUTPATIENT)
Dept: FAMILY MEDICINE | Facility: CLINIC | Age: 72
End: 2022-10-21
Payer: MEDICARE

## 2022-10-25 ENCOUNTER — CLINICAL SUPPORT (OUTPATIENT)
Dept: AUDIOLOGY | Facility: CLINIC | Age: 72
End: 2022-10-25
Payer: MEDICARE

## 2022-10-25 ENCOUNTER — PROCEDURE VISIT (OUTPATIENT)
Dept: OTOLARYNGOLOGY | Facility: CLINIC | Age: 72
End: 2022-10-25
Payer: MEDICARE

## 2022-10-25 DIAGNOSIS — H91.91 HIGH FREQUENCY HEARING LOSS, RIGHT: ICD-10-CM

## 2022-10-25 DIAGNOSIS — H61.23 BILATERAL IMPACTED CERUMEN: Primary | ICD-10-CM

## 2022-10-25 DIAGNOSIS — H93.19 TINNITUS, UNSPECIFIED LATERALITY: ICD-10-CM

## 2022-10-25 DIAGNOSIS — H90.A32 MIXED CONDUCTIVE AND SENSORINEURAL HEARING LOSS OF LEFT EAR WITH RESTRICTED HEARING OF RIGHT EAR: Primary | ICD-10-CM

## 2022-10-25 PROCEDURE — 92557 COMPREHENSIVE HEARING TEST: CPT | Mod: S$GLB,,, | Performed by: AUDIOLOGIST

## 2022-10-25 PROCEDURE — 69210 EAR CERUMEN REMOVAL: ICD-10-PCS | Mod: S$GLB,,, | Performed by: NURSE PRACTITIONER

## 2022-10-25 PROCEDURE — 92567 PR TYMPA2METRY: ICD-10-PCS | Mod: S$GLB,,, | Performed by: AUDIOLOGIST

## 2022-10-25 PROCEDURE — 92557 PR COMPREHENSIVE HEARING TEST: ICD-10-PCS | Mod: S$GLB,,, | Performed by: AUDIOLOGIST

## 2022-10-25 PROCEDURE — 92567 TYMPANOMETRY: CPT | Mod: S$GLB,,, | Performed by: AUDIOLOGIST

## 2022-10-25 PROCEDURE — 69210 REMOVE IMPACTED EAR WAX UNI: CPT | Mod: S$GLB,,, | Performed by: NURSE PRACTITIONER

## 2022-10-28 ENCOUNTER — LAB VISIT (OUTPATIENT)
Dept: LAB | Facility: HOSPITAL | Age: 72
End: 2022-10-28
Attending: INTERNAL MEDICINE
Payer: MEDICARE

## 2022-10-28 DIAGNOSIS — Q24.4 SUBAORTIC MEMBRANE: ICD-10-CM

## 2022-10-28 DIAGNOSIS — I48.0 PAF (PAROXYSMAL ATRIAL FIBRILLATION): Chronic | ICD-10-CM

## 2022-10-28 DIAGNOSIS — E78.2 MIXED HYPERLIPIDEMIA: ICD-10-CM

## 2022-10-28 DIAGNOSIS — I45.10 RBBB: ICD-10-CM

## 2022-10-28 LAB
ALBUMIN SERPL BCP-MCNC: 4.3 G/DL (ref 3.5–5.2)
ALP SERPL-CCNC: 79 U/L (ref 55–135)
ALT SERPL W/O P-5'-P-CCNC: 37 U/L (ref 10–44)
ANION GAP SERPL CALC-SCNC: 13 MMOL/L (ref 8–16)
AST SERPL-CCNC: 49 U/L (ref 10–40)
BASOPHILS # BLD AUTO: 0.02 K/UL (ref 0–0.2)
BASOPHILS NFR BLD: 0.4 % (ref 0–1.9)
BILIRUB SERPL-MCNC: 0.7 MG/DL (ref 0.1–1)
BUN SERPL-MCNC: 19 MG/DL (ref 8–23)
CALCIUM SERPL-MCNC: 9.3 MG/DL (ref 8.7–10.5)
CHLORIDE SERPL-SCNC: 103 MMOL/L (ref 95–110)
CHOLEST SERPL-MCNC: 198 MG/DL (ref 120–199)
CHOLEST/HDLC SERPL: 1.8 {RATIO} (ref 2–5)
CO2 SERPL-SCNC: 26 MMOL/L (ref 23–29)
CREAT SERPL-MCNC: 0.7 MG/DL (ref 0.5–1.4)
DIFFERENTIAL METHOD: ABNORMAL
EOSINOPHIL # BLD AUTO: 0 K/UL (ref 0–0.5)
EOSINOPHIL NFR BLD: 0.5 % (ref 0–8)
ERYTHROCYTE [DISTWIDTH] IN BLOOD BY AUTOMATED COUNT: 13.9 % (ref 11.5–14.5)
EST. GFR  (NO RACE VARIABLE): >60 ML/MIN/1.73 M^2
GLUCOSE SERPL-MCNC: 110 MG/DL (ref 70–110)
HCT VFR BLD AUTO: 42 % (ref 37–48.5)
HDLC SERPL-MCNC: 109 MG/DL (ref 40–75)
HDLC SERPL: 55.1 % (ref 20–50)
HGB BLD-MCNC: 14.2 G/DL (ref 12–16)
IMM GRANULOCYTES # BLD AUTO: 0.01 K/UL (ref 0–0.04)
IMM GRANULOCYTES NFR BLD AUTO: 0.2 % (ref 0–0.5)
LDLC SERPL CALC-MCNC: 81.2 MG/DL (ref 63–159)
LYMPHOCYTES # BLD AUTO: 1.4 K/UL (ref 1–4.8)
LYMPHOCYTES NFR BLD: 24.9 % (ref 18–48)
MCH RBC QN AUTO: 35 PG (ref 27–31)
MCHC RBC AUTO-ENTMCNC: 33.8 G/DL (ref 32–36)
MCV RBC AUTO: 103 FL (ref 82–98)
MONOCYTES # BLD AUTO: 0.4 K/UL (ref 0.3–1)
MONOCYTES NFR BLD: 6.6 % (ref 4–15)
NEUTROPHILS # BLD AUTO: 3.8 K/UL (ref 1.8–7.7)
NEUTROPHILS NFR BLD: 67.4 % (ref 38–73)
NONHDLC SERPL-MCNC: 89 MG/DL
NRBC BLD-RTO: 0 /100 WBC
PLATELET # BLD AUTO: 231 K/UL (ref 150–450)
PMV BLD AUTO: 10.4 FL (ref 9.2–12.9)
POTASSIUM SERPL-SCNC: 4.5 MMOL/L (ref 3.5–5.1)
PROT SERPL-MCNC: 7.4 G/DL (ref 6–8.4)
RBC # BLD AUTO: 4.06 M/UL (ref 4–5.4)
SODIUM SERPL-SCNC: 142 MMOL/L (ref 136–145)
TRIGL SERPL-MCNC: 39 MG/DL (ref 30–150)
WBC # BLD AUTO: 5.62 K/UL (ref 3.9–12.7)

## 2022-10-28 PROCEDURE — 80061 LIPID PANEL: CPT | Performed by: INTERNAL MEDICINE

## 2022-10-28 PROCEDURE — 36415 COLL VENOUS BLD VENIPUNCTURE: CPT | Mod: PO | Performed by: INTERNAL MEDICINE

## 2022-10-28 PROCEDURE — 85025 COMPLETE CBC W/AUTO DIFF WBC: CPT | Performed by: INTERNAL MEDICINE

## 2022-10-28 PROCEDURE — 80053 COMPREHEN METABOLIC PANEL: CPT | Performed by: INTERNAL MEDICINE

## 2022-10-31 ENCOUNTER — CLINICAL SUPPORT (OUTPATIENT)
Dept: CARDIOLOGY | Facility: HOSPITAL | Age: 72
End: 2022-10-31
Payer: MEDICARE

## 2022-11-03 ENCOUNTER — OFFICE VISIT (OUTPATIENT)
Dept: OTOLARYNGOLOGY | Facility: CLINIC | Age: 72
End: 2022-11-03
Payer: MEDICARE

## 2022-11-03 VITALS — HEIGHT: 65 IN | WEIGHT: 161.63 LBS | BODY MASS INDEX: 26.93 KG/M2

## 2022-11-03 DIAGNOSIS — H90.72 MIXED CONDUCTIVE AND SENSORINEURAL HEARING LOSS OF LEFT EAR WITH UNRESTRICTED HEARING OF RIGHT EAR: Primary | ICD-10-CM

## 2022-11-03 DIAGNOSIS — Z98.890 HISTORY OF EAR SURGERY: ICD-10-CM

## 2022-11-03 PROCEDURE — 1126F AMNT PAIN NOTED NONE PRSNT: CPT | Mod: CPTII,S$GLB,, | Performed by: STUDENT IN AN ORGANIZED HEALTH CARE EDUCATION/TRAINING PROGRAM

## 2022-11-03 PROCEDURE — 3008F BODY MASS INDEX DOCD: CPT | Mod: CPTII,S$GLB,, | Performed by: STUDENT IN AN ORGANIZED HEALTH CARE EDUCATION/TRAINING PROGRAM

## 2022-11-03 PROCEDURE — 3288F FALL RISK ASSESSMENT DOCD: CPT | Mod: CPTII,S$GLB,, | Performed by: STUDENT IN AN ORGANIZED HEALTH CARE EDUCATION/TRAINING PROGRAM

## 2022-11-03 PROCEDURE — 99999 PR PBB SHADOW E&M-EST. PATIENT-LVL III: ICD-10-PCS | Mod: PBBFAC,,, | Performed by: STUDENT IN AN ORGANIZED HEALTH CARE EDUCATION/TRAINING PROGRAM

## 2022-11-03 PROCEDURE — 3044F HG A1C LEVEL LT 7.0%: CPT | Mod: CPTII,S$GLB,, | Performed by: STUDENT IN AN ORGANIZED HEALTH CARE EDUCATION/TRAINING PROGRAM

## 2022-11-03 PROCEDURE — 1101F PT FALLS ASSESS-DOCD LE1/YR: CPT | Mod: CPTII,S$GLB,, | Performed by: STUDENT IN AN ORGANIZED HEALTH CARE EDUCATION/TRAINING PROGRAM

## 2022-11-03 PROCEDURE — 99213 OFFICE O/P EST LOW 20 MIN: CPT | Mod: S$GLB,,, | Performed by: STUDENT IN AN ORGANIZED HEALTH CARE EDUCATION/TRAINING PROGRAM

## 2022-11-03 PROCEDURE — 4010F PR ACE/ARB THEARPY RXD/TAKEN: ICD-10-PCS | Mod: CPTII,S$GLB,, | Performed by: STUDENT IN AN ORGANIZED HEALTH CARE EDUCATION/TRAINING PROGRAM

## 2022-11-03 PROCEDURE — 99213 PR OFFICE/OUTPT VISIT, EST, LEVL III, 20-29 MIN: ICD-10-PCS | Mod: S$GLB,,, | Performed by: STUDENT IN AN ORGANIZED HEALTH CARE EDUCATION/TRAINING PROGRAM

## 2022-11-03 PROCEDURE — 1101F PR PT FALLS ASSESS DOC 0-1 FALLS W/OUT INJ PAST YR: ICD-10-PCS | Mod: CPTII,S$GLB,, | Performed by: STUDENT IN AN ORGANIZED HEALTH CARE EDUCATION/TRAINING PROGRAM

## 2022-11-03 PROCEDURE — 3044F PR MOST RECENT HEMOGLOBIN A1C LEVEL <7.0%: ICD-10-PCS | Mod: CPTII,S$GLB,, | Performed by: STUDENT IN AN ORGANIZED HEALTH CARE EDUCATION/TRAINING PROGRAM

## 2022-11-03 PROCEDURE — 1126F PR PAIN SEVERITY QUANTIFIED, NO PAIN PRESENT: ICD-10-PCS | Mod: CPTII,S$GLB,, | Performed by: STUDENT IN AN ORGANIZED HEALTH CARE EDUCATION/TRAINING PROGRAM

## 2022-11-03 PROCEDURE — 1159F MED LIST DOCD IN RCRD: CPT | Mod: CPTII,S$GLB,, | Performed by: STUDENT IN AN ORGANIZED HEALTH CARE EDUCATION/TRAINING PROGRAM

## 2022-11-03 PROCEDURE — 3008F PR BODY MASS INDEX (BMI) DOCUMENTED: ICD-10-PCS | Mod: CPTII,S$GLB,, | Performed by: STUDENT IN AN ORGANIZED HEALTH CARE EDUCATION/TRAINING PROGRAM

## 2022-11-03 PROCEDURE — 4010F ACE/ARB THERAPY RXD/TAKEN: CPT | Mod: CPTII,S$GLB,, | Performed by: STUDENT IN AN ORGANIZED HEALTH CARE EDUCATION/TRAINING PROGRAM

## 2022-11-03 PROCEDURE — 3288F PR FALLS RISK ASSESSMENT DOCUMENTED: ICD-10-PCS | Mod: CPTII,S$GLB,, | Performed by: STUDENT IN AN ORGANIZED HEALTH CARE EDUCATION/TRAINING PROGRAM

## 2022-11-03 PROCEDURE — 1159F PR MEDICATION LIST DOCUMENTED IN MEDICAL RECORD: ICD-10-PCS | Mod: CPTII,S$GLB,, | Performed by: STUDENT IN AN ORGANIZED HEALTH CARE EDUCATION/TRAINING PROGRAM

## 2022-11-03 PROCEDURE — 99999 PR PBB SHADOW E&M-EST. PATIENT-LVL III: CPT | Mod: PBBFAC,,, | Performed by: STUDENT IN AN ORGANIZED HEALTH CARE EDUCATION/TRAINING PROGRAM

## 2022-11-03 NOTE — PROGRESS NOTES
Otolaryngology Clinic Note    Subjective:       Patient ID: Yara Whitehead is a 72 y.o. female.    Chief Complaint: Hearing Loss      History of Present Illness: Yara Whitehead is a 72 y.o. female presenting with hearing loss. Audio 10/25. Mary removed wax. She has tympanoplasty x 3 decades ago in [place]. Has had worse hearing on left since maybe. Phone on the right ear. She is very loud. Used to have more issues with allergies and ears would suffer too. On and off ringing, less lately, not sure which ear.   Has noise exposure from music in younger years. Still listens loud in car. Thinks she has some ADD so has issues with lots of noise and talking to focus.   Has had issues equalizing in past. Still has issues. Saw Ent in past who would give her prednisone and sudafed, afrin.     Audio below. Discrim 100 AU, asymmetric hearing loss, worse AS, only 1 frequency with ABG          Past Surgical History:   Procedure Laterality Date    abdomenalplasty      ABLATION OF ARRHYTHMOGENIC FOCUS FOR ATRIAL FIBRILLATION N/A 4/15/2019    Procedure: Ablation atrial fibrillation;  Surgeon: Edmund Shah MD;  Location: [facility] EP LAB;  Service: Cardiology;  Laterality: N/A;  AF, PRISCILLA, PVI, Cryo, MARY, Gen, SK, 3 Prep    COLONOSCOPY  2012    normal, repeat in 5 years    COLONOSCOPY N/A 10/27/2020    Procedure: COLONOSCOPY;  Surgeon: Bud Valentin Jr., MD;  Location: [facility] ENDO;  Service: Endoscopy;  Laterality: N/A;    EXTRACORPOREAL SHOCK WAVE LITHOTRIPSY      [place]    FRACTURE SURGERY Left     has plates and screws in place.-shoulder left    GALLBLADDER SURGERY  1995    GASTRIC BYPASS  1997    HERNIA REPAIR  1998    HYSTERECTOMY  2013    due to vaginal prolpase with BSO - also bladder suspension at the same time - [facility]    INSERTION OF IMPLANTABLE LOOP RECORDER N/A 7/19/2019    Procedure: Insertion, Implantable Loop Recorder;  Surgeon: Neftali Hernandez MD;  Location: [facility] CATH;  Service: Cardiology;   Laterality: N/A;    REMOVAL OF IMPLANTABLE LOOP RECORDER N/A 7/19/2019    Procedure: REMOVAL, IMPLANTABLE LOOP RECORDER;  Surgeon: Neftali Hernandez MD;  Location: [facility];  Service: Cardiology;  Laterality: N/A;    TYMPANOPLASTY Left     replaced and then repair x2      Past Medical History:   Diagnosis Date    Allergy     Arrhythmia     Asthma     childhood-severe.    Closed fracture of proximal end of left humerus 5/29/2016    GERD (gastroesophageal reflux disease)     Hypertension     Hypoglycemia     post gastric bypass    Hypothyroidism     ALESSANDRA (iron deficiency anemia)     Insomnia     Nephrolithiasis     had [facility]    FABIANA on CPAP     PAF (paroxysmal atrial fibrillation)     Status post placement of implantable loop recorder 3/18/2016    Subaortic membrane     Subarachnoid hematoma 1/1/2019    Subdural hematoma     Vitamin B 12 deficiency     Vitamin D deficiency      Social Determinants of Health     Tobacco Use: Medium Risk    Smoking Tobacco Use: Former    Smokeless Tobacco Use: Never    Passive Exposure: Not on file   Alcohol Use: Not At Risk    Frequency of Alcohol Consumption: 4 or more times a week    Average Number of Drinks: 1 or 2    Frequency of Binge Drinking: Never   Financial Resource Strain: Low Risk     Difficulty of Paying Living Expenses: Not hard at all   Food Insecurity: No Food Insecurity    Worried About Running Out of Food in the Last Year: Never true    Ran Out of Food in the Last Year: Never true   Transportation Needs: No Transportation Needs    Lack of Transportation (Medical): No    Lack of Transportation (Non-Medical): No   Physical Activity: Insufficiently Active    Days of Exercise per Week: 1 day    Minutes of Exercise per Session: 30 min   Stress: No Stress Concern Present    Feeling of Stress : Only a little   Social Connections: Moderately Integrated    Frequency of Communication with Friends and Family: More than three times a week    Frequency of Social  Gatherings with Friends and Family: More than three times a week    Attends [employer] Services: More than 4 times per year    Active Member of Clubs or Organizations: Yes    Attends Club or Organization Meetings: More than 4 times per year    Marital Status:    Housing Stability: Low Risk     Unable to Pay for Housing in the Last Year: No    Number of Places Lived in the Last Year: 1    Unstable Housing in the Last Year: No   Depression: Low Risk     Last PHQ Score: 0     Review of patient's allergies indicates:  No Known Allergies  Current Outpatient Medications   Medication Instructions    acarbose (PRECOSE) 25 mg, Oral, Daily PRN, When eating high carb meal    albuterol (PROVENTIL/VENTOLIN HFA) 90 mcg/actuation inhaler 1-2 puffs, Inhalation, Every 6 hours PRN    ascorbic acid (vitamin C) (VITAMIN C) 1,000 mg, Oral, Daily    atorvastatin (LIPITOR) 10 MG tablet TAKE 1 TABLET BY MOUTH  DAILY    cetirizine (ZYRTEC) 10 mg, Oral, Daily PRN    cloNIDine (CATAPRES) 0.1 MG tablet TAKE 1 TABLET BY MOUTH EVERY DAY AT 9 PM FOR HIGH BLOOD PRESSURE    cyanocobalamin 1,000 mcg/mL injection INJECT 1 ML INTO THE MUSCLE EVERY 14 DAYS    diclofenac sodium (VOLTAREN) 2 g, Topical (Top), 3 times daily    donepeziL (ARICEPT) 10 mg, Oral, Nightly    estradioL (ESTRACE) 0.01 % (0.1 mg/gram) vaginal cream PLACE 1 GRAM VAGINALLY EVERY OTHER DAY    flash glucose scanning reader (FREESTYLE VICKY 2 READER) Misc To check glucose qac and qhs and PRN lows    flash glucose sensor (FREESTYLE VICKY 2 SENSOR) Kit To check glucose qac and qhs and PRN lows    glucagon (GVOKE) 1 mg/0.2 mL Soln Use for severe hypoglycemia.    glucose 4 GM chewable tablet Take 4 tablets when glucose from 51-70<BR>Take 6 tablets when glucose is less than 50    lancets Misc To check BG 1 times daily, to use with insurance preferred meter    levothyroxine (SYNTHROID) 88 MCG tablet TAKE 1 TABLET BY MOUTH EVERY DAY AT 6AM FOR THYROID HEALTH    MAGNESIUM ORAL Oral,  Daily    mirtazapine (REMERON) 30 mg, Oral, Nightly    oxybutynin (DITROPAN-XL) 5 mg, Oral, Daily    paroxetine (PAXIL) 40 MG tablet TAKE 1 TABLET BY MOUTH IN  THE MORNING    rivaroxaban (XARELTO) 20 mg, Oral, Nightly    telmisartan (MICARDIS) 40 mg, Oral, Daily    vitamin D (VITAMIN D3) 1,000 Units, Oral, Daily         14 point ROS below  Answers submitted by the patient for this visit:  Review of Symptoms Questionnaire  (Submitted on 11/2/2022)  None of these: Yes  hearing loss: Yes  sinus pressure : Yes  postnasal drip: Yes  Snoring?: Yes  Sleep Apnea?: Yes  cough: Yes  None of these : Yes  diarrhea: Yes  constipation: Yes  Urinating too frequently?: Yes  Muscle aches / pain?: Yes  back pain: Yes  neck pain: Yes  rash: Yes  Seasonal Allergies?: Yes  None of these : Yes  None of these: Yes  Bruises or bleeds easily: Yes  decreased concentration: Yes                Objective:      There were no vitals filed for this visit.    General: NAD, well appearing  Eyes: Normal conjunctiva and lids  Face: symmetric, nerve intact  Nose: The nose is without any evidence of any deformity. The nasal mucosa is moist. The septum is midline. There is no evidence of septal hematoma. The turbinates are without abnormality.   Ears: The ears are with normal-appearing pinna. Examination of the canals is normal appearing bilaterally. There is no drainage or erythema noted. The tympanic membrane normal on right. Left with attic appearing to have cartilage graft, umbo not normal, inferior TM normal. Hearing is grossly intact.  Perez to the left reliably. Rinne is negative though.   Mouth: No obvious abnormalities to the lips. The teeth are unremarkable. The gingivae are without any obvious evidence of infection or lesion. The oral mucosa is moist and pink. There are no obvious masses to the hard or soft palate.   Oropharynx: The uvula is midline.  The tongue is midline. The posterior pharynx is without erythema or exudate. The tonsils are  normal appearing.  Salivary glands: The salivary glands are symmetric and not enlarged, no masses  Neck: No lymphadenopathy, trachea midline, thryoid not enlarged.  Psych: Normal mood and affect.   Neuro: Grossly intact  Speech: fluent     Assessment and Plan:       1. Mixed conductive and sensorineural hearing loss of left ear with unrestricted hearing of right ear    2. History of ear surgery        Ana does not  agree with audio today, suspect larger conductive component that noted in  hearing test, however she does not notice a significant difference, is not interested in surgery and not ready for a hearing aid.   Recommend noise protection. She does with yard work.   Recommend semi-frequent hearing tests.     RTC: she will contact for hearing test in future.     Plan of care was discussed in detail with the patient, who agreed with the plan as above. All questions were answered in detail.     Veronica Beltran MD  Otolaryngology

## 2022-11-14 ENCOUNTER — HOSPITAL ENCOUNTER (OUTPATIENT)
Dept: RADIOLOGY | Facility: HOSPITAL | Age: 72
Discharge: HOME | End: 2022-11-14
Attending: INTERNAL MEDICINE
Payer: MEDICARE

## 2022-11-14 DIAGNOSIS — Z12.31 ENCOUNTER FOR SCREENING MAMMOGRAM FOR MALIGNANT NEOPLASM OF BREAST: ICD-10-CM

## 2022-11-14 DIAGNOSIS — Z78.0 ASYMPTOMATIC MENOPAUSAL STATE: ICD-10-CM

## 2022-11-14 PROCEDURE — 77067 SCR MAMMO BI INCL CAD: CPT | Mod: 26,,, | Performed by: RADIOLOGY

## 2022-11-14 PROCEDURE — 77080 DXA BONE DENSITY AXIAL: CPT | Mod: TC,PO

## 2022-11-14 PROCEDURE — 77067 MAMMO DIGITAL SCREENING BILAT WITH TOMO: ICD-10-PCS | Mod: 26,,, | Performed by: RADIOLOGY

## 2022-11-14 PROCEDURE — 77080 DXA BONE DENSITY AXIAL: CPT | Mod: 26,,, | Performed by: RADIOLOGY

## 2022-11-14 PROCEDURE — 77063 BREAST TOMOSYNTHESIS BI: CPT | Mod: 26,,, | Performed by: RADIOLOGY

## 2022-11-14 PROCEDURE — 77063 BREAST TOMOSYNTHESIS BI: CPT | Mod: TC,PO

## 2022-11-14 PROCEDURE — 77080 DEXA BONE DENSITY SPINE HIP: ICD-10-PCS | Mod: 26,,, | Performed by: RADIOLOGY

## 2022-11-14 PROCEDURE — 77063 MAMMO DIGITAL SCREENING BILAT WITH TOMO: ICD-10-PCS | Mod: 26,,, | Performed by: RADIOLOGY

## 2022-11-14 PROCEDURE — 77067 SCR MAMMO BI INCL CAD: CPT | Mod: TC,PO

## 2022-11-15 ENCOUNTER — CLINICAL SUPPORT (OUTPATIENT)
Dept: CARDIOLOGY | Facility: HOSPITAL | Age: 72
End: 2022-11-15
Attending: INTERNAL MEDICINE
Payer: MEDICARE

## 2022-11-15 VITALS — WEIGHT: 161 LBS | BODY MASS INDEX: 26.82 KG/M2 | HEART RATE: 71 BPM | HEIGHT: 65 IN

## 2022-11-15 DIAGNOSIS — Q24.4 SUBAORTIC MEMBRANE: ICD-10-CM

## 2022-11-15 DIAGNOSIS — I48.0 PAF (PAROXYSMAL ATRIAL FIBRILLATION): ICD-10-CM

## 2022-11-15 DIAGNOSIS — I45.10 RBBB: ICD-10-CM

## 2022-11-15 DIAGNOSIS — E78.2 MIXED HYPERLIPIDEMIA: ICD-10-CM

## 2022-11-15 LAB
ASCENDING AORTA: 2.05 CM
AV INDEX (PROSTH): 0.38
AV MEAN GRADIENT: 27 MMHG
AV PEAK GRADIENT: 61 MMHG
AV REGURGITATION PRESSURE HALF TIME: 341.71 MS
AV VALVE AREA: 1.16 CM2
AV VELOCITY RATIO: 0.3
BSA FOR ECHO PROCEDURE: 1.83 M2
CV ECHO LV RWT: 0.33 CM
DOP CALC AO PEAK VEL: 3.91 M/S
DOP CALC AO VTI: 75.6 CM
DOP CALC LVOT AREA: 3.1 CM2
DOP CALC LVOT DIAMETER: 1.98 CM
DOP CALC LVOT PEAK VEL: 1.17 M/S
DOP CALC LVOT STROKE VOLUME: 87.4 CM3
DOP CALCLVOT PEAK VEL VTI: 28.4 CM
E WAVE DECELERATION TIME: 338.84 MSEC
E/A RATIO: 1.41
E/E' RATIO: 17.09 M/S
ECHO LV POSTERIOR WALL: 0.87 CM (ref 0.6–1.1)
EJECTION FRACTION: 70 %
FRACTIONAL SHORTENING: 36 % (ref 28–44)
INTERVENTRICULAR SEPTUM: 0.78 CM (ref 0.6–1.1)
IVRT: 57.09 MSEC
LA MAJOR: 5.97 CM
LA MINOR: 6.12 CM
LA WIDTH: 4.1 CM
LEFT ATRIUM SIZE: 3.46 CM
LEFT ATRIUM VOLUME INDEX: 40.5 ML/M2
LEFT ATRIUM VOLUME: 72.88 CM3
LEFT INTERNAL DIMENSION IN SYSTOLE: 3.34 CM (ref 2.1–4)
LEFT VENTRICLE DIASTOLIC VOLUME INDEX: 72.67 ML/M2
LEFT VENTRICLE DIASTOLIC VOLUME: 130.8 ML
LEFT VENTRICLE MASS INDEX: 84 G/M2
LEFT VENTRICLE SYSTOLIC VOLUME INDEX: 25.3 ML/M2
LEFT VENTRICLE SYSTOLIC VOLUME: 45.46 ML
LEFT VENTRICULAR INTERNAL DIMENSION IN DIASTOLE: 5.22 CM (ref 3.5–6)
LEFT VENTRICULAR MASS: 152.03 G
LV LATERAL E/E' RATIO: 15.67 M/S
LV SEPTAL E/E' RATIO: 18.8 M/S
LVOT MG: 3.16 MMHG
LVOT MV: 0.84 CM/S
MV PEAK A VEL: 1.33 M/S
MV PEAK E VEL: 1.88 M/S
MV STENOSIS PRESSURE HALF TIME: 98.26 MS
MV VALVE AREA P 1/2 METHOD: 2.24 CM2
PISA AR MAX VEL: 4.19 M/S
PISA MRMAX VEL: 6.19 M/S
PISA TR MAX VEL: 3.01 M/S
PULM VEIN S/D RATIO: 0.71
PV PEAK D VEL: 1.12 M/S
PV PEAK S VEL: 0.8 M/S
RA MAJOR: 5.48 CM
RA PRESSURE: 3 MMHG
RIGHT VENTRICULAR END-DIASTOLIC DIMENSION: 3.99 CM
RIGHT VENTRICULAR LENGTH IN DIASTOLE (APICAL 4-CHAMBER VIEW): 4.83 CM
RV MID DIAMA: 26.89 CM
RV TISSUE DOPPLER FREE WALL SYSTOLIC VELOCITY 1 (APICAL 4 CHAMBER VIEW): 0.02 CM/S
SINUS: 2.55 CM
STJ: 1.52 CM
TDI LATERAL: 0.12 M/S
TDI SEPTAL: 0.1 M/S
TDI: 0.11 M/S
TR MAX PG: 36 MMHG
TRICUSPID ANNULAR PLANE SYSTOLIC EXCURSION: 2.46 CM
TV REST PULMONARY ARTERY PRESSURE: 39 MMHG

## 2022-11-15 PROCEDURE — 93306 TTE W/DOPPLER COMPLETE: CPT | Mod: 26,,, | Performed by: INTERNAL MEDICINE

## 2022-11-15 PROCEDURE — 93306 TTE W/DOPPLER COMPLETE: CPT | Mod: PO

## 2022-11-15 PROCEDURE — 93306 ECHO (CUPID ONLY): ICD-10-PCS | Mod: 26,,, | Performed by: INTERNAL MEDICINE

## 2022-11-19 DIAGNOSIS — E16.1 POSTPRANDIAL HYPOGLYCEMIA: ICD-10-CM

## 2022-11-19 NOTE — TELEPHONE ENCOUNTER
No new care gaps identified.  [facility] Embedded Care Gaps. Reference number: [id number]. 11/19/2022   12:48:20 PM CST

## 2022-11-21 RX ORDER — FLASH GLUCOSE SENSOR
KIT MISCELLANEOUS
OUTPATIENT
Start: 2022-11-21

## 2022-11-21 NOTE — TELEPHONE ENCOUNTER
Refill Decision Note   Yara Whitehead  is requesting a refill authorization.  Brief Assessment and Rationale for Refill:  Quick Discontinue     Medication Therapy Plan:  previous request routed to Ann Richards DO 11/21/22    Medication Reconciliation Completed: No   Comments:     No Care Gaps recommended.     Note composed:1:09 PM 11/21/2022

## 2022-11-23 ENCOUNTER — TELEPHONE (OUTPATIENT)
Dept: CARDIOLOGY | Facility: HOSPITAL | Age: 72
End: 2022-11-23
Payer: MEDICARE

## 2022-11-23 NOTE — TELEPHONE ENCOUNTER
Pt has upcoming appt w/Dr Garcia 12/12/22.  Pt has loop recorder  Request to transfer pt in [facility] to [facility]

## 2022-11-28 ENCOUNTER — OFFICE VISIT (OUTPATIENT)
Dept: OPTOMETRY | Facility: CLINIC | Age: 72
End: 2022-11-28
Payer: MEDICARE

## 2022-11-28 ENCOUNTER — TELEPHONE (OUTPATIENT)
Dept: OPHTHALMOLOGY | Facility: CLINIC | Age: 72
End: 2022-11-28
Payer: MEDICARE

## 2022-11-28 DIAGNOSIS — H35.363 DEGENERATIVE RETINAL DRUSEN OF BOTH EYES: ICD-10-CM

## 2022-11-28 DIAGNOSIS — H25.13 NUCLEAR SCLEROSIS OF BOTH EYES: Primary | ICD-10-CM

## 2022-11-28 PROCEDURE — 3044F HG A1C LEVEL LT 7.0%: CPT | Mod: CPTII,S$GLB,, | Performed by: OPTOMETRIST

## 2022-11-28 PROCEDURE — 3288F FALL RISK ASSESSMENT DOCD: CPT | Mod: CPTII,S$GLB,, | Performed by: OPTOMETRIST

## 2022-11-28 PROCEDURE — 92134 POSTERIOR SEGMENT OCT RETINA (OCULAR COHERENCE TOMOGRAPHY)-BOTH EYES: ICD-10-PCS | Mod: S$GLB,,, | Performed by: OPTOMETRIST

## 2022-11-28 PROCEDURE — 99999 PR PBB SHADOW E&M-EST. PATIENT-LVL III: ICD-10-PCS | Mod: PBBFAC,,, | Performed by: OPTOMETRIST

## 2022-11-28 PROCEDURE — 1101F PR PT FALLS ASSESS DOC 0-1 FALLS W/OUT INJ PAST YR: ICD-10-PCS | Mod: CPTII,S$GLB,, | Performed by: OPTOMETRIST

## 2022-11-28 PROCEDURE — 92014 COMPRE OPH EXAM EST PT 1/>: CPT | Mod: S$GLB,,, | Performed by: OPTOMETRIST

## 2022-11-28 PROCEDURE — 1126F AMNT PAIN NOTED NONE PRSNT: CPT | Mod: CPTII,S$GLB,, | Performed by: OPTOMETRIST

## 2022-11-28 PROCEDURE — 1159F PR MEDICATION LIST DOCUMENTED IN MEDICAL RECORD: ICD-10-PCS | Mod: CPTII,S$GLB,, | Performed by: OPTOMETRIST

## 2022-11-28 PROCEDURE — 4010F PR ACE/ARB THEARPY RXD/TAKEN: ICD-10-PCS | Mod: CPTII,S$GLB,, | Performed by: OPTOMETRIST

## 2022-11-28 PROCEDURE — 1101F PT FALLS ASSESS-DOCD LE1/YR: CPT | Mod: CPTII,S$GLB,, | Performed by: OPTOMETRIST

## 2022-11-28 PROCEDURE — 1126F PR PAIN SEVERITY QUANTIFIED, NO PAIN PRESENT: ICD-10-PCS | Mod: CPTII,S$GLB,, | Performed by: OPTOMETRIST

## 2022-11-28 PROCEDURE — 3044F PR MOST RECENT HEMOGLOBIN A1C LEVEL <7.0%: ICD-10-PCS | Mod: CPTII,S$GLB,, | Performed by: OPTOMETRIST

## 2022-11-28 PROCEDURE — 99999 PR PBB SHADOW E&M-EST. PATIENT-LVL III: CPT | Mod: PBBFAC,,, | Performed by: OPTOMETRIST

## 2022-11-28 PROCEDURE — 3288F PR FALLS RISK ASSESSMENT DOCUMENTED: ICD-10-PCS | Mod: CPTII,S$GLB,, | Performed by: OPTOMETRIST

## 2022-11-28 PROCEDURE — 1160F PR REVIEW ALL MEDS BY PRESCRIBER/CLIN PHARMACIST DOCUMENTED: ICD-10-PCS | Mod: CPTII,S$GLB,, | Performed by: OPTOMETRIST

## 2022-11-28 PROCEDURE — 4010F ACE/ARB THERAPY RXD/TAKEN: CPT | Mod: CPTII,S$GLB,, | Performed by: OPTOMETRIST

## 2022-11-28 PROCEDURE — 1160F RVW MEDS BY RX/DR IN RCRD: CPT | Mod: CPTII,S$GLB,, | Performed by: OPTOMETRIST

## 2022-11-28 PROCEDURE — 1159F MED LIST DOCD IN RCRD: CPT | Mod: CPTII,S$GLB,, | Performed by: OPTOMETRIST

## 2022-11-28 PROCEDURE — 92014 PR EYE EXAM, EST PATIENT,COMPREHESV: ICD-10-PCS | Mod: S$GLB,,, | Performed by: OPTOMETRIST

## 2022-11-28 PROCEDURE — 92134 CPTRZ OPH DX IMG PST SGM RTA: CPT | Mod: S$GLB,,, | Performed by: OPTOMETRIST

## 2022-11-28 NOTE — TELEPHONE ENCOUNTER
Spoke with pt appt has been schedule for 1/24/2022 with Dr. Chicas for Cataract Eval     ----- Message from Tamra Macias sent at 11/28/2022  3:45 PM CST -----  Hi, Can you please contact pt. For cataract eval per Dr. Eisenberg?  Thanks,  Tamra

## 2022-11-28 NOTE — PROGRESS NOTES
HPI    Pt here for annual DM eye exam DLS- 09/02/2020    Pt states her vision has been decreasing, OS>OD. Things seem really dim   even with specs. HTN is well comtrolled on meds.   Pt denies F/F and GTTS.  Last edited by Aisha Rees on 11/28/2022  2:57 PM.            Assessment /Plan     For exam results, see Encounter Report.    Nuclear sclerosis of both eyes    Degenerative retinal drusen of both eyes  -     OCT- Retina      No improvement with glasses, Refer to Dr. Chicas for cataract evaluation and possible removal.    2. OCT with drusen ou, Monitor condition. Patient to report any changes. RTC 1 year recheck.

## 2022-11-29 ENCOUNTER — TELEPHONE (OUTPATIENT)
Dept: CARDIOLOGY | Facility: HOSPITAL | Age: 72
End: 2022-11-29
Payer: MEDICARE

## 2022-12-30 ENCOUNTER — PES CALL (OUTPATIENT)
Dept: ADMINISTRATIVE | Facility: CLINIC | Age: 72
End: 2022-12-30
Payer: MEDICARE

## 2023-01-18 ENCOUNTER — LAB VISIT (OUTPATIENT)
Dept: LAB | Facility: HOSPITAL | Age: 73
End: 2023-01-18
Attending: INTERNAL MEDICINE
Payer: MEDICARE

## 2023-01-18 DIAGNOSIS — I10 ESSENTIAL HYPERTENSION: ICD-10-CM

## 2023-01-18 DIAGNOSIS — E53.8 VITAMIN B12 DEFICIENCY: ICD-10-CM

## 2023-01-18 LAB
ANION GAP SERPL CALC-SCNC: 11 MMOL/L (ref 8–16)
BASOPHILS # BLD AUTO: 0.02 K/UL (ref 0–0.2)
BASOPHILS NFR BLD: 0.5 % (ref 0–1.9)
BUN SERPL-MCNC: 12 MG/DL (ref 8–23)
CALCIUM SERPL-MCNC: 9.8 MG/DL (ref 8.7–10.5)
CHLORIDE SERPL-SCNC: 104 MMOL/L (ref 95–110)
CO2 SERPL-SCNC: 27 MMOL/L (ref 23–29)
CREAT SERPL-MCNC: 0.7 MG/DL (ref 0.5–1.4)
DIFFERENTIAL METHOD: ABNORMAL
EOSINOPHIL # BLD AUTO: 0.1 K/UL (ref 0–0.5)
EOSINOPHIL NFR BLD: 1.4 % (ref 0–8)
ERYTHROCYTE [DISTWIDTH] IN BLOOD BY AUTOMATED COUNT: 16.2 % (ref 11.5–14.5)
EST. GFR  (NO RACE VARIABLE): >60 ML/MIN/1.73 M^2
GLUCOSE SERPL-MCNC: 77 MG/DL (ref 70–110)
HCT VFR BLD AUTO: 40.9 % (ref 37–48.5)
HGB BLD-MCNC: 13.2 G/DL (ref 12–16)
IMM GRANULOCYTES # BLD AUTO: 0.02 K/UL (ref 0–0.04)
IMM GRANULOCYTES NFR BLD AUTO: 0.5 % (ref 0–0.5)
LYMPHOCYTES # BLD AUTO: 1.4 K/UL (ref 1–4.8)
LYMPHOCYTES NFR BLD: 34.3 % (ref 18–48)
MCH RBC QN AUTO: 34.3 PG (ref 27–31)
MCHC RBC AUTO-ENTMCNC: 32.3 G/DL (ref 32–36)
MCV RBC AUTO: 106 FL (ref 82–98)
MONOCYTES # BLD AUTO: 0.3 K/UL (ref 0.3–1)
MONOCYTES NFR BLD: 8.1 % (ref 4–15)
NEUTROPHILS # BLD AUTO: 2.3 K/UL (ref 1.8–7.7)
NEUTROPHILS NFR BLD: 55.2 % (ref 38–73)
NRBC BLD-RTO: 0 /100 WBC
PLATELET # BLD AUTO: 212 K/UL (ref 150–450)
PMV BLD AUTO: 10.6 FL (ref 9.2–12.9)
POTASSIUM SERPL-SCNC: 4.6 MMOL/L (ref 3.5–5.1)
RBC # BLD AUTO: 3.85 M/UL (ref 4–5.4)
SODIUM SERPL-SCNC: 142 MMOL/L (ref 136–145)
VIT B12 SERPL-MCNC: 507 PG/ML (ref 210–950)
WBC # BLD AUTO: 4.2 K/UL (ref 3.9–12.7)

## 2023-01-18 PROCEDURE — 80048 BASIC METABOLIC PNL TOTAL CA: CPT | Performed by: INTERNAL MEDICINE

## 2023-01-18 PROCEDURE — 82607 VITAMIN B-12: CPT | Performed by: INTERNAL MEDICINE

## 2023-01-18 PROCEDURE — 85025 COMPLETE CBC W/AUTO DIFF WBC: CPT | Performed by: INTERNAL MEDICINE

## 2023-01-18 PROCEDURE — 36415 COLL VENOUS BLD VENIPUNCTURE: CPT | Mod: PO | Performed by: INTERNAL MEDICINE

## 2023-01-21 ENCOUNTER — PATIENT MESSAGE (OUTPATIENT)
Dept: FAMILY MEDICINE | Facility: CLINIC | Age: 73
End: 2023-01-21
Payer: MEDICARE

## 2023-01-24 ENCOUNTER — OFFICE VISIT (OUTPATIENT)
Dept: FAMILY MEDICINE | Facility: CLINIC | Age: 73
End: 2023-01-24
Payer: MEDICARE

## 2023-01-24 VITALS
TEMPERATURE: 98 F | RESPIRATION RATE: 20 BRPM | OXYGEN SATURATION: 97 % | HEIGHT: 65 IN | WEIGHT: 154.13 LBS | BODY MASS INDEX: 25.68 KG/M2 | HEART RATE: 75 BPM | SYSTOLIC BLOOD PRESSURE: 128 MMHG | DIASTOLIC BLOOD PRESSURE: 68 MMHG

## 2023-01-24 DIAGNOSIS — I10 ESSENTIAL HYPERTENSION: ICD-10-CM

## 2023-01-24 DIAGNOSIS — N39.46 URINARY INCONTINENCE, MIXED: ICD-10-CM

## 2023-01-24 DIAGNOSIS — I65.23 BILATERAL CAROTID ARTERY STENOSIS: ICD-10-CM

## 2023-01-24 DIAGNOSIS — D70.9 NEUTROPENIA, UNSPECIFIED TYPE: ICD-10-CM

## 2023-01-24 DIAGNOSIS — I34.0 NONRHEUMATIC MITRAL VALVE REGURGITATION: ICD-10-CM

## 2023-01-24 DIAGNOSIS — E16.1 POSTPRANDIAL HYPOGLYCEMIA: ICD-10-CM

## 2023-01-24 DIAGNOSIS — J30.9 CHRONIC ALLERGIC RHINITIS: ICD-10-CM

## 2023-01-24 DIAGNOSIS — D50.9 IRON DEFICIENCY ANEMIA, UNSPECIFIED IRON DEFICIENCY ANEMIA TYPE: ICD-10-CM

## 2023-01-24 DIAGNOSIS — Q24.4 SUBAORTIC MEMBRANE: ICD-10-CM

## 2023-01-24 DIAGNOSIS — E55.9 VITAMIN D DEFICIENCY: ICD-10-CM

## 2023-01-24 DIAGNOSIS — I70.0 AORTIC ATHEROSCLEROSIS: ICD-10-CM

## 2023-01-24 DIAGNOSIS — I48.0 PAF (PAROXYSMAL ATRIAL FIBRILLATION): Primary | ICD-10-CM

## 2023-01-24 DIAGNOSIS — E53.8 VITAMIN B12 DEFICIENCY: ICD-10-CM

## 2023-01-24 DIAGNOSIS — E78.5 HYPERLIPIDEMIA, UNSPECIFIED HYPERLIPIDEMIA TYPE: ICD-10-CM

## 2023-01-24 DIAGNOSIS — M79.18 CERVICAL MYOFASCIAL PAIN SYNDROME: ICD-10-CM

## 2023-01-24 DIAGNOSIS — F33.0 MAJOR DEPRESSIVE DISORDER, RECURRENT, MILD: ICD-10-CM

## 2023-01-24 DIAGNOSIS — I48.3 TYPICAL ATRIAL FLUTTER: ICD-10-CM

## 2023-01-24 DIAGNOSIS — G31.84 MCI (MILD COGNITIVE IMPAIRMENT): ICD-10-CM

## 2023-01-24 DIAGNOSIS — E03.9 HYPOTHYROIDISM, UNSPECIFIED TYPE: ICD-10-CM

## 2023-01-24 DIAGNOSIS — M85.80 OSTEOPENIA, UNSPECIFIED LOCATION: ICD-10-CM

## 2023-01-24 DIAGNOSIS — F41.1 GAD (GENERALIZED ANXIETY DISORDER): ICD-10-CM

## 2023-01-24 DIAGNOSIS — F51.01 PRIMARY INSOMNIA: ICD-10-CM

## 2023-01-24 PROCEDURE — 1159F MED LIST DOCD IN RCRD: CPT | Mod: CPTII,S$GLB,, | Performed by: INTERNAL MEDICINE

## 2023-01-24 PROCEDURE — 3074F SYST BP LT 130 MM HG: CPT | Mod: CPTII,S$GLB,, | Performed by: INTERNAL MEDICINE

## 2023-01-24 PROCEDURE — 1160F PR REVIEW ALL MEDS BY PRESCRIBER/CLIN PHARMACIST DOCUMENTED: ICD-10-PCS | Mod: CPTII,S$GLB,, | Performed by: INTERNAL MEDICINE

## 2023-01-24 PROCEDURE — 1101F PT FALLS ASSESS-DOCD LE1/YR: CPT | Mod: CPTII,S$GLB,, | Performed by: INTERNAL MEDICINE

## 2023-01-24 PROCEDURE — 4010F PR ACE/ARB THEARPY RXD/TAKEN: ICD-10-PCS | Mod: CPTII,S$GLB,, | Performed by: INTERNAL MEDICINE

## 2023-01-24 PROCEDURE — 3288F PR FALLS RISK ASSESSMENT DOCUMENTED: ICD-10-PCS | Mod: CPTII,S$GLB,, | Performed by: INTERNAL MEDICINE

## 2023-01-24 PROCEDURE — 3008F BODY MASS INDEX DOCD: CPT | Mod: CPTII,S$GLB,, | Performed by: INTERNAL MEDICINE

## 2023-01-24 PROCEDURE — 3288F FALL RISK ASSESSMENT DOCD: CPT | Mod: CPTII,S$GLB,, | Performed by: INTERNAL MEDICINE

## 2023-01-24 PROCEDURE — 3008F PR BODY MASS INDEX (BMI) DOCUMENTED: ICD-10-PCS | Mod: CPTII,S$GLB,, | Performed by: INTERNAL MEDICINE

## 2023-01-24 PROCEDURE — 1126F PR PAIN SEVERITY QUANTIFIED, NO PAIN PRESENT: ICD-10-PCS | Mod: CPTII,S$GLB,, | Performed by: INTERNAL MEDICINE

## 2023-01-24 PROCEDURE — 3078F PR MOST RECENT DIASTOLIC BLOOD PRESSURE < 80 MM HG: ICD-10-PCS | Mod: CPTII,S$GLB,, | Performed by: INTERNAL MEDICINE

## 2023-01-24 PROCEDURE — 3074F PR MOST RECENT SYSTOLIC BLOOD PRESSURE < 130 MM HG: ICD-10-PCS | Mod: CPTII,S$GLB,, | Performed by: INTERNAL MEDICINE

## 2023-01-24 PROCEDURE — 1159F PR MEDICATION LIST DOCUMENTED IN MEDICAL RECORD: ICD-10-PCS | Mod: CPTII,S$GLB,, | Performed by: INTERNAL MEDICINE

## 2023-01-24 PROCEDURE — 99214 OFFICE O/P EST MOD 30 MIN: CPT | Mod: S$GLB,,, | Performed by: INTERNAL MEDICINE

## 2023-01-24 PROCEDURE — 1160F RVW MEDS BY RX/DR IN RCRD: CPT | Mod: CPTII,S$GLB,, | Performed by: INTERNAL MEDICINE

## 2023-01-24 PROCEDURE — 1101F PR PT FALLS ASSESS DOC 0-1 FALLS W/OUT INJ PAST YR: ICD-10-PCS | Mod: CPTII,S$GLB,, | Performed by: INTERNAL MEDICINE

## 2023-01-24 PROCEDURE — 1126F AMNT PAIN NOTED NONE PRSNT: CPT | Mod: CPTII,S$GLB,, | Performed by: INTERNAL MEDICINE

## 2023-01-24 PROCEDURE — 99214 PR OFFICE/OUTPT VISIT, EST, LEVL IV, 30-39 MIN: ICD-10-PCS | Mod: S$GLB,,, | Performed by: INTERNAL MEDICINE

## 2023-01-24 PROCEDURE — 3078F DIAST BP <80 MM HG: CPT | Mod: CPTII,S$GLB,, | Performed by: INTERNAL MEDICINE

## 2023-01-24 PROCEDURE — 4010F ACE/ARB THERAPY RXD/TAKEN: CPT | Mod: CPTII,S$GLB,, | Performed by: INTERNAL MEDICINE

## 2023-01-24 RX ORDER — SOLIFENACIN SUCCINATE 10 MG/1
10 TABLET, FILM COATED ORAL DAILY
Qty: 90 TABLET | Refills: 2 | Status: SHIPPED | OUTPATIENT
Start: 2023-01-24 | End: 2023-06-14

## 2023-01-24 RX ORDER — ACETAMINOPHEN 500 MG
500 TABLET ORAL EVERY 6 HOURS PRN
COMMUNITY

## 2023-01-24 NOTE — PROGRESS NOTES
Subjective:       Patient ID: Yara Whitehead is a 72 y.o. female.    Medication List with Changes/Refills   New Medications    SOLIFENACIN (VESICARE) 10 MG TABLET    Take 1 tablet (10 mg total) by mouth once daily.   Current Medications    ACARBOSE (PRECOSE) 25 MG TAB    Take 25 mg by mouth daily as needed. When eating high carb meal    ACETAMINOPHEN (TYLENOL) 500 MG TABLET    Take 500 mg by mouth every 6 (six) hours as needed for Pain.    ALBUTEROL (PROVENTIL/VENTOLIN HFA) 90 MCG/ACTUATION INHALER    Inhale 1-2 puffs into the lungs every 6 (six) hours as needed for Wheezing.    ASCORBIC ACID, VITAMIN C, (VITAMIN C) 1000 MG TABLET    Take 1,000 mg by mouth once daily.    ATORVASTATIN (LIPITOR) 10 MG TABLET    TAKE 1 TABLET BY MOUTH  DAILY    CETIRIZINE (ZYRTEC) 10 MG TABLET    Take 10 mg by mouth daily as needed.     CLONIDINE (CATAPRES) 0.1 MG TABLET    TAKE 1 TABLET BY MOUTH EVERY DAY AT 9 PM FOR HIGH BLOOD PRESSURE    CYANOCOBALAMIN 1,000 MCG/ML INJECTION    INJECT 1 ML INTO THE MUSCLE EVERY 14 DAYS    DICLOFENAC SODIUM (VOLTAREN) 1 % GEL    Apply 2 g topically 3 (three) times daily.    DONEPEZIL (ARICEPT) 10 MG TABLET    TAKE 1 TABLET(10 MG) BY MOUTH EVERY EVENING    ESTRADIOL (ESTRACE) 0.01 % (0.1 MG/GRAM) VAGINAL CREAM    PLACE 1 GRAM VAGINALLY EVERY OTHER DAY    FLASH GLUCOSE SCANNING READER (FREESTYLE VICKY 2 READER) [facility]    To check glucose qac and qhs and PRN lows    FLASH GLUCOSE SENSOR (FREESTYLE VICKY 2 SENSOR) KIT    USE ADIRECTED    GLUCAGON (GVOKE) 1 MG/0.2 ML SOLN    Use for severe hypoglycemia.    GLUCOSE 4 GM CHEWABLE TABLET    Take 4 tablets when glucose from 51-70  Take 6 tablets when glucose is less than 50    LANCETS MISC    To check BG 1 times daily, to use with insurance preferred meter    LEVOTHYROXINE (SYNTHROID) 88 MCG TABLET    TAKE 1 TABLET BY MOUTH EVERY DAY AT 6AM FOR THYROID HEALTH    MAGNESIUM ORAL    Take by mouth once daily at 6am.    MIRTAZAPINE (REMERON) 30 MG TABLET     "Take 1 tablet (30 mg total) by mouth nightly.    OXYBUTYNIN (DITROPAN-XL) 5 MG TR24    Take 1 tablet (5 mg total) by mouth once daily.    PAROXETINE (PAXIL) 40 MG TABLET    TAKE 1 TABLET BY MOUTH IN  THE MORNING    RIVAROXABAN (XARELTO) 20 MG TAB    Take 1 tablet (20 mg total) by mouth every evening.    TELMISARTAN (MICARDIS) 40 MG TAB    Take 1 tablet (40 mg total) by mouth once daily.    VITAMIN D (VITAMIN D3) 1000 UNITS TAB    Take 1,000 Units by mouth once daily.       Chief Complaint: Follow-up  She is here today to discuss chronic medical issues.  She is feeling good today without any complaints.      She has paroxysmal Afib s/p PVI ablation on 4/2019.   She is taking xarelto 20 mg qday.  She denies any recurrent palpitations or chest pain.  She had her loop recorder changed on 7/19/19.  She has metoprolol at home to use PRN palpitations. She has not had to use.  She was seen by cardiology on 12/2022 and no changes were made.       She had an echo on 11/2022 that showed cLVH, EF 70%, mild MR and AR, trileaflet aortic valve, subaortic membrane (gradient 27), PAP of 39.  Cardiology advised to monitor with yearly echo.  She denies any chest pain or shortness of breath.      She has hypertension and is taking telmisartan 40 mg qday. She has no known CAD.    She has hyperlipidemia controlled on atorvastatin 10 mg qday.  Her lipids on 10/2022 were 198/39/109/81.          She has a chronic PND due to allergies. She tried OTC antihistamines but they make her "too dry".  She denies any active symptoms.      She has hypothyroid that is controlled on levothyroxine 88 mcg qday. Her last TSH was normal on  8/2022.       She had reflux symptoms in the past and was taking pepcid. Since her Afib is rate controlled her reflux is gone.       She has malabsorption since bypass surgery and has a vitamin B 12 deficiency. She is taking vitamin B12 1000 mcg IM every 6 weeks.  Her vitamin B 12 level on 1/2023 was 507.  She has ALESSANDRA " and is taking oral iron supplementation.  Labs on 8/2022 show normal H+H.   She has a vitamin D deficiency and is on supplementation. Her last level was on 8/2021 was 44.      She was diagnosed with post gastric bypass hypoglycemic syndrome. She gets chronic diarrhea after eating large carbohydrate meal. She will get low glucose readings and has a CGM. She was seen by endocrine on 5/2022 and started on acarbose 25 mg tid with meals but she only uses when she goes out to eat or eats a large meal.  Most day she eats small meals and avoids carbohydrates. This has improved both the diarrhea and the hypoglycemia.      She has anxiety and depression for many years and on 2/2022 she had an episode of paranoia and arya triggered by urinary tract infection. She was admitted to [facility] unit for 2 weeks and her medications were adjusted. She continues on paxil 40 mg daily and remeron 15 mg nightly.  She is feeling very good at this time. She is sleeping well. She denies any anxiety or depression. She is tolerating her medications well.      Recall: She had a syncopal event on 1/1/2019 resulting inn a scalp hematoma, non displaced skull fx, coutrecoup injury with right frontal SDH, and left superior parietal SAH.  She has been followed by serial CT by NS and has done very well. She was seen by NS on 1/31/2019 with repeat CT showing complete resolution of her injury. She denies any headaches or vision changes.       She has incontinence of urine and tried on mybetriq without success. She was restarted on oxybutynin which worked for a couple months but now she is complaining of recurrent leakage.      She was noted to have a left abdominal wall hernia and was seen by surgery on 5/2019 and advised to just monitor. it continues to increase in size.      She has FABIANA but stopped using CPAP.  She does not have the sleep issues like she did in the past when her weight was up.      She does have some memory changes  and was seen by neuropsychiatry for evaluation on 6/2020. It was felt that her depression which was increased at the time of testing was uncontrolled and contributing to her memory issues. She reports since her depression is now stable and she is taking her vitamin B 12 regularly her memory is much improved. She is taking aricept 10 mg daily.      She has intermittent left sided neck pain that starts where her muscles insert to her neck and radiates down her neck. This limits her ROM on side bending and rotation.  She did PT which helped in the past. She continues to complain of increased pain on the right side of her neck. She uses tylenol, voltaren gel, and CBD oil. She had trigger point injections with pain clinic on 6/2022 with only minimal relief.      She lives alone and feels safe. She does not exercise but stays active with the [employer].  She denies any falls or balance issues.       Colonoscopy--10/2020 repeat in 5 years  Mammogram----11/2022 neg   DEXA-----11/2022 osteopenia with fracture risk  of 14%, hip fracture risk of 1.3% (Tv -1.9, Tf -0.8)  Pap----- KEIRY  Tdap--11/2016  Influenza vaccine---9/2022    Pneumovax 23----4/2017  Prevnar 13---- 2/2016  Shingles vaccine-----6/2022, 12/2022  Covid vaccine---4 doses       Review of Systems   Constitutional:  Negative for appetite change, fatigue, fever and unexpected weight change.   HENT:  Negative for congestion, ear pain, hearing loss, sore throat and trouble swallowing.    Eyes:  Negative for pain and visual disturbance.   Respiratory:  Negative for cough, chest tightness, shortness of breath and wheezing.    Cardiovascular:  Negative for chest pain, palpitations and leg swelling.   Gastrointestinal:  Negative for abdominal pain, blood in stool, constipation, diarrhea, nausea and vomiting.   Endocrine: Negative for polyuria.   Genitourinary:  Negative for dysuria and hematuria.   Musculoskeletal:  Negative for arthralgias, back pain and myalgias.   Skin:   "Negative for rash.   Neurological:  Negative for dizziness, weakness, numbness and headaches.   Hematological:  Does not bruise/bleed easily.   Psychiatric/Behavioral:  Negative for dysphoric mood, sleep disturbance and suicidal ideas. The patient is not nervous/anxious.      Objective:      Vitals:    01/24/23 0815   BP: 128/68   Pulse: 75   Resp: 20   Temp: 97.7 °F (36.5 °C)   SpO2: 97%   Weight: 69.9 kg (154 lb 1.6 oz)   Height: 5' 5" (1.651 m)     Body mass index is 25.64 kg/m².  Physical Exam    General appearance: No acute distress, cooperative  Eyes: PERRL, EOMI, conjunctiva clear  Ears: normal external ear and pinna, tm clear without drainage, canals clear  Nose: Normal mucosa without drainage  Throat: no exudates or erythema, tonsils not enlarged  Mouth: no sores or lesions, moist mucous membranes  Neck: FROM, soft, supple, no thyromegaly, no bruits  Lymph: no anterior or posterior cervical adenopathy  Heart::  Regular rate and rhythm, 2/6 systolic murmur  Lung: Clear to ascultation bilaterally, no wheezing, no rales, no rhonchi, no distress  Abdomen: Soft, nontender, no distention, no hepatosplenomegaly, bowel sounds normal, no guarding, no rebound, no peritoneal signs  Skin: no rashes, no lesions  Extremities: no edema, no cyanosis  Neuro: CN 2-12 intact, 5/5 muscle strength upper and lower extremity bilaterally, 2+ DTRs UE and LE bilaterally, normal gait  Peripheral pulses: 2+ pedal pulses bilaterally, good perfusion and color  Musculoskeletal: FROM, good strenth, no tenderness  Joint: normal appearance, no swelling, no warmth, no deformity in all joints    Assessment:       1. PAF (paroxysmal atrial fibrillation)    2. Typical atrial flutter    3. Essential hypertension    4. Nonrheumatic mitral valve regurgitation    5. Subaortic membrane    6. Hyperlipidemia, unspecified hyperlipidemia type    7. Aortic atherosclerosis    8. Bilateral carotid artery stenosis    9. Hypothyroidism, unspecified type  "   10. Chronic allergic rhinitis    11. Neutropenia, unspecified type    12. Postprandial hypoglycemia    13. Vitamin B12 deficiency    14. Iron deficiency anemia, unspecified iron deficiency anemia type    15. Urinary incontinence, mixed    16. Major depressive disorder, recurrent, mild    17. Primary insomnia    18. SMITA (generalized anxiety disorder)    19. MCI (mild cognitive impairment)    20. Vitamin D deficiency    21. Osteopenia, unspecified location    22. Cervical myofascial pain syndrome        Plan:       PAF (paroxysmal atrial fibrillation)/Typical atrial flutter  No active symptoms. Continue xarleto.     Essential hypertension  Well controlled and continue current regimen.   -     CBC Auto Differential; Future; Expected date: 01/24/2023  -     Lipid Panel; Future; Expected date: 01/24/2023  -     TSH; Future; Expected date: 01/24/2023  -     Comprehensive Metabolic Panel; Future; Expected date: 01/24/2023    Nonrheumatic mitral valve regurgitation  Mild and no change on recent echo    Subaortic membrane  STable and continue to monitor with annual echo    Hyperlipidemia, unspecified hyperlipidemia type  Good control on atorvastatin.     Aortic atherosclerosis  Continue statin.    Bilateral carotid artery stenosis  Continue statin.     Hypothyroidism, unspecified type  Good control on this dose of levothyroxine.    Chronic allergic rhinitis  Well controlled and continue current regimen.     Neutropenia, unspecified type  Improved on recent labs and continue to monitor.    Postprandial hypoglycemia  Good response to acarbose.   -     Hemoglobin A1C; Future; Expected date: 01/24/2023    Vitamin B12 deficiency  Good control IM supplementation.   -     Vitamin B12; Future; Expected date: 01/24/2023    Iron deficiency anemia, unspecified iron deficiency anemia type  Stable and doing well.    Urinary incontinence, mixed  Uncontrolled and will stop oxybutynin since she is not responding. Will try vesicare.   -      solifenacin (VESICARE) 10 MG tablet; Take 1 tablet (10 mg total) by mouth once daily.  Dispense: 90 tablet; Refill: 2    Major depressive disorder, recurrent, mild  Well controlled and continue current regimen.     Primary insomnia  Good control on remeron.    SMITA (generalized anxiety disorder)  She is doing well on this regimen.    MCI (mild cognitive impairment)  She is doing well on aricept    Vitamin D deficiency  Continue supplementation.   -     Vitamin D; Future; Expected date: 01/24/2023    Osteopenia, unspecified location  New and mild. Due to recheck DEXA in 11/2024    Cervical myofascial pain syndrome  No complaints of pain today.     Follow up in about 6 months (around 7/24/2023) for chronic medical issues.

## 2023-01-26 ENCOUNTER — PATIENT MESSAGE (OUTPATIENT)
Dept: ADMINISTRATIVE | Facility: CLINIC | Age: 73
End: 2023-01-26
Payer: MEDICARE

## 2023-01-26 NOTE — PROGRESS NOTES
The patient location is: [place]  The chief complaint leading to consultation is: Medicare Wellness Visit       Visit type: audiovisual     Face to Face time with patient: 24  60 minutes of total time spent on the encounter, which includes face to face time and non-face to face time preparing to see the patient (eg, review of tests), Obtaining and/or reviewing separately obtained history, Documenting clinical information in the electronic or other health record, Independently interpreting results (not separately reported) and communicating results to the patient/family/caregiver, or Care coordination (not separately reported).            Each patient to whom he or she provides medical services by telemedicine is:  (1) informed of the relationship between the physician and patient and the respective role of any other health care provider with respect to management of the patient; and (2) notified that he or she may decline to receive medical services by telemedicine and may withdraw from such care at any time.      Yara Whitehead presented for a  Medicare AWV and comprehensive Health Risk Assessment today. The following components were reviewed and updated:    Medical history  Family History  Social history  Allergies and Current Medications  Health Risk Assessment  Health Maintenance  Care Team         ** See Completed Assessments for Annual Wellness Visit within the encounter summary.**         The following assessments were completed:  Living Situation  CAGE  Depression Screening  Timed Get Up and Go  Whisper Test  Cognitive Function Screening - deferred due to known memory impairment, taking aricept  Nutrition Screening  ADL Screening  PAQ Screening      Review for Opioid Screening: Pt does not have Rx for Opioids      Review for Substance Use Disorders: Patient does not use substance        Current Outpatient Medications:     acarbose (PRECOSE) 25 MG Tab, Take 25 mg by mouth daily as needed. When eating high  carb meal, Disp: , Rfl:     acetaminophen (TYLENOL) 500 MG tablet, Take 500 mg by mouth every 6 (six) hours as needed for Pain., Disp: , Rfl:     albuterol (PROVENTIL/VENTOLIN HFA) 90 mcg/actuation inhaler, Inhale 1-2 puffs into the lungs every 6 (six) hours as needed for Wheezing., Disp: 18 g, Rfl: 6    ascorbic acid, vitamin C, (VITAMIN C) 1000 MG tablet, Take 1,000 mg by mouth once daily., Disp: , Rfl:     atorvastatin (LIPITOR) 10 MG tablet, TAKE 1 TABLET BY MOUTH  DAILY, Disp: 90 tablet, Rfl: 3    cetirizine (ZYRTEC) 10 MG tablet, Take 10 mg by mouth daily as needed. , Disp: , Rfl:     cloNIDine (CATAPRES) 0.1 MG tablet, TAKE 1 TABLET BY MOUTH EVERY DAY AT 9 PM FOR HIGH BLOOD PRESSURE, Disp: 90 tablet, Rfl: 3    cyanocobalamin 1,000 mcg/mL injection, INJECT 1 ML INTO THE MUSCLE EVERY 14 DAYS (Patient taking differently: Inject 1 ml every 30 days), Disp: 6 mL, Rfl: 3    diclofenac sodium (VOLTAREN) 1 % Gel, Apply 2 g topically 3 (three) times daily., Disp: 100 g, Rfl: 6    donepeziL (ARICEPT) 10 MG tablet, TAKE 1 TABLET(10 MG) BY MOUTH EVERY EVENING, Disp: 90 tablet, Rfl: 2    estradioL (ESTRACE) 0.01 % (0.1 mg/gram) vaginal cream, PLACE 1 GRAM VAGINALLY EVERY OTHER DAY, Disp: 42.5 g, Rfl: 11    flash glucose scanning reader (FREESTYLE VICKY 2 READER) [facility], To check glucose qac and qhs and PRN lows, Disp: 6 each, Rfl: 3    flash glucose sensor (FREESTYLE VICKY 2 SENSOR) Kit, USE ADIRECTED, Disp: 6 kit, Rfl: 3    glucose 4 GM chewable tablet, Take 4 tablets when glucose from 51-70 Take 6 tablets when glucose is less than 50, Disp: 120 tablet, Rfl: 12    lancets [facility], To check BG 1 times daily, to use with insurance preferred meter, Disp: 100 each, Rfl: 3    levothyroxine (SYNTHROID) 88 MCG tablet, TAKE 1 TABLET BY MOUTH EVERY DAY AT 6AM FOR THYROID HEALTH, Disp: 90 tablet, Rfl: 3    MAGNESIUM ORAL, Take by mouth once daily at 6am., Disp: , Rfl:     mirtazapine (REMERON) 30 MG tablet, Take 1 tablet (30 mg total)  "by mouth nightly. (Patient taking differently: Take 30 mg by mouth nightly. 0.5 tab nightly), Disp: 90 tablet, Rfl: 3    oxybutynin (DITROPAN-XL) 5 MG TR24, Take 1 tablet (5 mg total) by mouth once daily., Disp: 90 tablet, Rfl: 3    paroxetine (PAXIL) 40 MG tablet, TAKE 1 TABLET BY MOUTH IN  THE MORNING, Disp: 90 tablet, Rfl: 3    rivaroxaban (XARELTO) 20 mg Tab, Take 1 tablet (20 mg total) by mouth every evening., Disp: 90 tablet, Rfl: 4    telmisartan (MICARDIS) 40 MG Tab, Take 1 tablet (40 mg total) by mouth once daily., Disp: 90 tablet, Rfl: 3    vitamin D (VITAMIN D3) 1000 units Tab, Take 1,000 Units by mouth once daily., Disp: , Rfl:     glucagon (GVOKE) 1 mg/0.2 mL Soln, Use for severe hypoglycemia. (Patient not taking: Reported on 1/24/2023), Disp: 0.2 mL, Rfl: 3    solifenacin (VESICARE) 10 MG tablet, Take 1 tablet (10 mg total) by mouth once daily. (Patient not taking: Reported on 1/30/2023), Disp: 90 tablet, Rfl: 2         Vitals:    01/30/23 0757   Weight: 72.6 kg (160 lb)   Height: 5' 5" (1.651 m)   PainSc:   3   PainLoc: Neck      Physical Exam  Nursing note reviewed.   Constitutional:       General: She is not in acute distress.     Appearance: Normal appearance. She is not ill-appearing.   HENT:      Head: Normocephalic and atraumatic.   Eyes:      General: No scleral icterus.        Right eye: No discharge.         Left eye: No discharge.      Extraocular Movements: Extraocular movements intact.      Conjunctiva/sclera: Conjunctivae normal.   Pulmonary:      Effort: Pulmonary effort is normal. No respiratory distress.   Musculoskeletal:      Cervical back: Normal range of motion.   Neurological:      Mental Status: She is alert and oriented to person, place, and time.   Psychiatric:         Mood and Affect: Mood normal.         Behavior: Behavior normal.         Cognition and Memory: Cognition normal. Memory is impaired.             Diagnoses and health risks identified today and associated " recommendations/orders:    1. Encounter for preventive health examination  - Chart reviewed. Problem list updated. Discussed current medical diagnosis, current medications, medical/surgical/family/social history; updated provider list; documented vital signs; identified any cognitive impairment; and updated risk factor list. Addressed any outstanding health maintenance. Provided patient with personalized health advice. Continue to follow up with PCP and any specialists.     2. Mild episode of recurrent major depressive disorder  Chronic; stable on current treatment plan; follow up with PCP  - taking paxil    3. Adjustment disorder with anxious mood  Chronic; stable on current treatment plan; follow up with PCP  - taking paxil    4. Aortic atherosclerosis  Chronic; stable on current treatment plan; follow up with PCP  - taking statin    5. Bilateral carotid artery stenosis  Chronic; stable on current treatment plan; follow up with PCP  - taking statin    6. PAF (paroxysmal atrial fibrillation)  Chronic; stable on current treatment plan; follow up with PCP  - taking xarelto    7. Typical atrial flutter  Chronic; stable on current treatment plan; follow up with PCP  - taking xarelto    8. Memory impairment  Chronic; stable on current treatment plan; follow up with PCP  - taking aricept    9. Hyperlipidemia, unspecified hyperlipidemia type  Chronic; stable on current treatment plan; follow up with PCP  - taking statin     10. Essential hypertension  Chronic; stable on current treatment plan; follow up with PCP  - recommend low sodium diet     11. Other iron deficiency anemia  Chronic; stable on current treatment plan; follow up with PCP  - consider taking iron supplements     12. Acquired hypothyroidism  Chronic; stable on current treatment plan; follow up with PCP  - taking synthroid     13. Vitamin D deficiency  Chronic; stable on current treatment plan; follow up with PCP  - taking vit d supplements    14.  Gastroesophageal reflux disease without esophagitis  Chronic; stable on current treatment plan; follow up with PCP    15. Osteopenia, unspecified location  Chronic; stable on current treatment plan; follow up with PCP  - taking vit d supplements    16. Primary insomnia  Chronic; stable on current treatment plan; follow up with PCP  - taking remeron    17. BMI 26.0-26.9,adult  - Recommendation for healthy diet and increasing exercise as tolerated with goal of 150min/week . Recommend weight loss        Provided Yara with a 5-10 year written screening schedule and personal prevention plan. Recommendations were developed using the USPSTF age appropriate recommendations. Education, counseling, and referrals were provided as needed. After Visit Summary printed and given to patient which includes a list of additional screenings\tests needed.    Follow up in about 1 year (around 1/30/2024) for your next annual wellness visit.    Helga Frausto, FNP-C    Advance Care Planning     I offered to discuss advanced care planning, including how to pick a person who would make decisions for you if you were unable to make them for yourself, called a health care power of , and what kind of decisions you might make such as use of life sustaining treatments such as ventilators and tube feeding when faced with a life limiting illness recorded on a living will that they will need to know. (How you want to be cared for as you near the end of your natural life)     X  Patient has advanced directives on file, which we reviewed, and they do not wish to make changes.

## 2023-01-27 ENCOUNTER — TELEPHONE (OUTPATIENT)
Dept: ADMINISTRATIVE | Facility: CLINIC | Age: 73
End: 2023-01-27
Payer: MEDICARE

## 2023-01-30 ENCOUNTER — OFFICE VISIT (OUTPATIENT)
Dept: INTERNAL MEDICINE | Facility: CLINIC | Age: 73
End: 2023-01-30
Payer: MEDICARE

## 2023-01-30 ENCOUNTER — TELEPHONE (OUTPATIENT)
Dept: ADMINISTRATIVE | Facility: CLINIC | Age: 73
End: 2023-01-30
Payer: MEDICARE

## 2023-01-30 VITALS — HEIGHT: 65 IN | BODY MASS INDEX: 26.66 KG/M2 | WEIGHT: 160 LBS

## 2023-01-30 DIAGNOSIS — M85.80 OSTEOPENIA, UNSPECIFIED LOCATION: ICD-10-CM

## 2023-01-30 DIAGNOSIS — I70.0 AORTIC ATHEROSCLEROSIS: ICD-10-CM

## 2023-01-30 DIAGNOSIS — D50.8 OTHER IRON DEFICIENCY ANEMIA: ICD-10-CM

## 2023-01-30 DIAGNOSIS — I10 ESSENTIAL HYPERTENSION: Chronic | ICD-10-CM

## 2023-01-30 DIAGNOSIS — I48.0 PAF (PAROXYSMAL ATRIAL FIBRILLATION): Chronic | ICD-10-CM

## 2023-01-30 DIAGNOSIS — Z00.00 ENCOUNTER FOR PREVENTIVE HEALTH EXAMINATION: Primary | ICD-10-CM

## 2023-01-30 DIAGNOSIS — K21.9 GASTROESOPHAGEAL REFLUX DISEASE WITHOUT ESOPHAGITIS: ICD-10-CM

## 2023-01-30 DIAGNOSIS — I65.23 BILATERAL CAROTID ARTERY STENOSIS: ICD-10-CM

## 2023-01-30 DIAGNOSIS — F51.01 PRIMARY INSOMNIA: ICD-10-CM

## 2023-01-30 DIAGNOSIS — E55.9 VITAMIN D DEFICIENCY: ICD-10-CM

## 2023-01-30 DIAGNOSIS — F43.22 ADJUSTMENT DISORDER WITH ANXIOUS MOOD: ICD-10-CM

## 2023-01-30 DIAGNOSIS — I48.3 TYPICAL ATRIAL FLUTTER: ICD-10-CM

## 2023-01-30 DIAGNOSIS — E03.9 ACQUIRED HYPOTHYROIDISM: ICD-10-CM

## 2023-01-30 DIAGNOSIS — E78.5 HYPERLIPIDEMIA, UNSPECIFIED HYPERLIPIDEMIA TYPE: ICD-10-CM

## 2023-01-30 DIAGNOSIS — R41.3 MEMORY IMPAIRMENT: ICD-10-CM

## 2023-01-30 DIAGNOSIS — F33.0 MILD EPISODE OF RECURRENT MAJOR DEPRESSIVE DISORDER: ICD-10-CM

## 2023-01-30 PROBLEM — Z98.890 POST-OPERATIVE STATE: Status: RESOLVED | Noted: 2021-07-17 | Resolved: 2023-01-30

## 2023-01-30 PROCEDURE — 3008F PR BODY MASS INDEX (BMI) DOCUMENTED: ICD-10-PCS | Mod: CPTII,95,, | Performed by: NURSE PRACTITIONER

## 2023-01-30 PROCEDURE — 1159F MED LIST DOCD IN RCRD: CPT | Mod: CPTII,95,, | Performed by: NURSE PRACTITIONER

## 2023-01-30 PROCEDURE — 4010F ACE/ARB THERAPY RXD/TAKEN: CPT | Mod: CPTII,95,, | Performed by: NURSE PRACTITIONER

## 2023-01-30 PROCEDURE — 1159F PR MEDICATION LIST DOCUMENTED IN MEDICAL RECORD: ICD-10-PCS | Mod: CPTII,95,, | Performed by: NURSE PRACTITIONER

## 2023-01-30 PROCEDURE — 4010F PR ACE/ARB THEARPY RXD/TAKEN: ICD-10-PCS | Mod: CPTII,95,, | Performed by: NURSE PRACTITIONER

## 2023-01-30 PROCEDURE — 1125F AMNT PAIN NOTED PAIN PRSNT: CPT | Mod: CPTII,95,, | Performed by: NURSE PRACTITIONER

## 2023-01-30 PROCEDURE — 1160F PR REVIEW ALL MEDS BY PRESCRIBER/CLIN PHARMACIST DOCUMENTED: ICD-10-PCS | Mod: CPTII,95,, | Performed by: NURSE PRACTITIONER

## 2023-01-30 PROCEDURE — 1170F FXNL STATUS ASSESSED: CPT | Mod: CPTII,95,, | Performed by: NURSE PRACTITIONER

## 2023-01-30 PROCEDURE — 3288F PR FALLS RISK ASSESSMENT DOCUMENTED: ICD-10-PCS | Mod: CPTII,95,, | Performed by: NURSE PRACTITIONER

## 2023-01-30 PROCEDURE — 1160F RVW MEDS BY RX/DR IN RCRD: CPT | Mod: CPTII,95,, | Performed by: NURSE PRACTITIONER

## 2023-01-30 PROCEDURE — G0439 PPPS, SUBSEQ VISIT: HCPCS | Mod: 95,,, | Performed by: NURSE PRACTITIONER

## 2023-01-30 PROCEDURE — 1101F PR PT FALLS ASSESS DOC 0-1 FALLS W/OUT INJ PAST YR: ICD-10-PCS | Mod: CPTII,95,, | Performed by: NURSE PRACTITIONER

## 2023-01-30 PROCEDURE — 1125F PR PAIN SEVERITY QUANTIFIED, PAIN PRESENT: ICD-10-PCS | Mod: CPTII,95,, | Performed by: NURSE PRACTITIONER

## 2023-01-30 PROCEDURE — 3008F BODY MASS INDEX DOCD: CPT | Mod: CPTII,95,, | Performed by: NURSE PRACTITIONER

## 2023-01-30 PROCEDURE — G0439 PR MEDICARE ANNUAL WELLNESS SUBSEQUENT VISIT: ICD-10-PCS | Mod: 95,,, | Performed by: NURSE PRACTITIONER

## 2023-01-30 PROCEDURE — 3288F FALL RISK ASSESSMENT DOCD: CPT | Mod: CPTII,95,, | Performed by: NURSE PRACTITIONER

## 2023-01-30 PROCEDURE — 1170F PR FUNCTIONAL STATUS ASSESSED: ICD-10-PCS | Mod: CPTII,95,, | Performed by: NURSE PRACTITIONER

## 2023-01-30 PROCEDURE — 1101F PT FALLS ASSESS-DOCD LE1/YR: CPT | Mod: CPTII,95,, | Performed by: NURSE PRACTITIONER

## 2023-01-30 NOTE — TELEPHONE ENCOUNTER
Called pt; informed pt I was just making a reminder call for her virtual visit today at 8:00am and to see if she needed any help; pt stated she didn't need any help; pt informed to login 15 minutes prior to appt time

## 2023-01-30 NOTE — PATIENT INSTRUCTIONS
Counseling and Referral of Other Preventative  (Italic type indicates deductible and co-insurance are waived)    Patient Name: Yara Whitehead  Today's Date: 1/30/2023    Health Maintenance       Date Due Completion Date    Hemoglobin A1c (Prediabetes) 08/27/2023 8/27/2022    Lipid Panel 10/28/2023 10/28/2022    Mammogram 11/14/2023 11/14/2022    Colorectal Cancer Screening 10/27/2025 10/27/2020    DEXA Scan 11/14/2025 11/14/2022    TETANUS VACCINE 11/08/2026 11/8/2016    Override on 1/1/2012: Done        No orders of the defined types were placed in this encounter.    The following information is provided to all patients.  This information is to help you find resources for any of the problems found today that may be affecting your health:                Living healthy guide: www.[facility].[place].gov      Understanding Diabetes: www.diabetes.org      Eating healthy: www.cdc.gov/healthyweight      CDC home safety checklist: www.cdc.gov/steadi/patient.html      Agency on Aging: www.goea.[place].[facility]      Alcoholics anonymous (AA): www.aa.org      Physical Activity: www.minerva.nih.gov/ri9bxry      Tobacco use: www.quitwithusla.org

## 2023-02-07 ENCOUNTER — TELEPHONE (OUTPATIENT)
Dept: FAMILY MEDICINE | Facility: CLINIC | Age: 73
End: 2023-02-07
Payer: MEDICARE

## 2023-02-07 NOTE — TELEPHONE ENCOUNTER
PA sent to plan for approval via covermymeds.     Key: [id number] - PA Case ID: [id number] - Rx #: [id number]

## 2023-02-28 ENCOUNTER — OFFICE VISIT (OUTPATIENT)
Dept: OPHTHALMOLOGY | Facility: CLINIC | Age: 73
End: 2023-02-28
Payer: MEDICARE

## 2023-02-28 DIAGNOSIS — H25.11 NUCLEAR SCLEROTIC CATARACT OF RIGHT EYE: Primary | ICD-10-CM

## 2023-02-28 DIAGNOSIS — H35.363 DEGENERATIVE RETINAL DRUSEN OF BOTH EYES: ICD-10-CM

## 2023-02-28 DIAGNOSIS — H25.12 NUCLEAR SCLEROTIC CATARACT OF LEFT EYE: ICD-10-CM

## 2023-02-28 PROCEDURE — 1126F AMNT PAIN NOTED NONE PRSNT: CPT | Mod: CPTII,S$GLB,, | Performed by: OPHTHALMOLOGY

## 2023-02-28 PROCEDURE — 99999 PR PBB SHADOW E&M-EST. PATIENT-LVL III: CPT | Mod: PBBFAC,,, | Performed by: OPHTHALMOLOGY

## 2023-02-28 PROCEDURE — 1101F PT FALLS ASSESS-DOCD LE1/YR: CPT | Mod: CPTII,S$GLB,, | Performed by: OPHTHALMOLOGY

## 2023-02-28 PROCEDURE — 3288F PR FALLS RISK ASSESSMENT DOCUMENTED: ICD-10-PCS | Mod: CPTII,S$GLB,, | Performed by: OPHTHALMOLOGY

## 2023-02-28 PROCEDURE — 4010F ACE/ARB THERAPY RXD/TAKEN: CPT | Mod: CPTII,S$GLB,, | Performed by: OPHTHALMOLOGY

## 2023-02-28 PROCEDURE — 99204 PR OFFICE/OUTPT VISIT, NEW, LEVL IV, 45-59 MIN: ICD-10-PCS | Mod: S$GLB,,, | Performed by: OPHTHALMOLOGY

## 2023-02-28 PROCEDURE — 99999 PR PBB SHADOW E&M-EST. PATIENT-LVL III: ICD-10-PCS | Mod: PBBFAC,,, | Performed by: OPHTHALMOLOGY

## 2023-02-28 PROCEDURE — 99204 OFFICE O/P NEW MOD 45 MIN: CPT | Mod: S$GLB,,, | Performed by: OPHTHALMOLOGY

## 2023-02-28 PROCEDURE — 3288F FALL RISK ASSESSMENT DOCD: CPT | Mod: CPTII,S$GLB,, | Performed by: OPHTHALMOLOGY

## 2023-02-28 PROCEDURE — 1101F PR PT FALLS ASSESS DOC 0-1 FALLS W/OUT INJ PAST YR: ICD-10-PCS | Mod: CPTII,S$GLB,, | Performed by: OPHTHALMOLOGY

## 2023-02-28 PROCEDURE — 1126F PR PAIN SEVERITY QUANTIFIED, NO PAIN PRESENT: ICD-10-PCS | Mod: CPTII,S$GLB,, | Performed by: OPHTHALMOLOGY

## 2023-02-28 PROCEDURE — 92134 CPTRZ OPH DX IMG PST SGM RTA: CPT | Mod: S$GLB,,, | Performed by: OPHTHALMOLOGY

## 2023-02-28 PROCEDURE — 1159F PR MEDICATION LIST DOCUMENTED IN MEDICAL RECORD: ICD-10-PCS | Mod: CPTII,S$GLB,, | Performed by: OPHTHALMOLOGY

## 2023-02-28 PROCEDURE — 92136 OPHTHALMIC BIOMETRY: CPT | Mod: RT,S$GLB,, | Performed by: OPHTHALMOLOGY

## 2023-02-28 PROCEDURE — 1159F MED LIST DOCD IN RCRD: CPT | Mod: CPTII,S$GLB,, | Performed by: OPHTHALMOLOGY

## 2023-02-28 PROCEDURE — 92134 POSTERIOR SEGMENT OCT RETINA (OCULAR COHERENCE TOMOGRAPHY)-BOTH EYES: ICD-10-PCS | Mod: S$GLB,,, | Performed by: OPHTHALMOLOGY

## 2023-02-28 PROCEDURE — 92136 IOL MASTER - OU - BOTH EYES: ICD-10-PCS | Mod: RT,S$GLB,, | Performed by: OPHTHALMOLOGY

## 2023-02-28 PROCEDURE — 4010F PR ACE/ARB THEARPY RXD/TAKEN: ICD-10-PCS | Mod: CPTII,S$GLB,, | Performed by: OPHTHALMOLOGY

## 2023-02-28 NOTE — PROGRESS NOTES
HPI    Ref by Dr. Eisenberg    Ocular History: Nuclear sclerosis OU                           Degenerative retinal drusen OU    Pt is here for a cataract eval. Pt states that vision in os is blurry,   cloudy, and is sensitive to light/glare during the day and night. She   states that it always feels like something is in her eye and occasionally   dry. Pt states that she has difficulty reading in low light. Pt denies eye   pain.    Last edited by Rita Chicas MD on 2/28/2023 10:55 AM.            Assessment /Plan     For exam results, see Encounter Report.    Nuclear sclerotic cataract of right eye    Degenerative retinal drusen of both eyes  -     Ambulatory referral/consult to Ophthalmology  -     Posterior Segment OCT Retina-Both eyes    Nuclear sclerotic cataract of left eye  -     IOL Master - OU - Both Eyes      Visually significant nuclear sclerotic cataract   - Interfering with activities of daily living.  Pt desires cataract surgery for [facility] rehabilitation.   - R/B/A discussed and pt agrees to proceed with surgery.   - IOL options discussed according to patient's goals and concomitant ocular pathology; and pt content with monofocal lens.    - Target: plano.    Asg954 18.5 range OS  Ora    Patient has regular astigmatism that is visually significant and wishes to have an astigmatism correcting TORIC lens placed, and agrees to the out of pocket cost of $1500 per eye    (Diboo 18.5 OD)    Dry AMD  - jose d

## 2023-03-13 ENCOUNTER — TELEPHONE (OUTPATIENT)
Dept: OPHTHALMOLOGY | Facility: CLINIC | Age: 73
End: 2023-03-13
Payer: MEDICARE

## 2023-03-13 DIAGNOSIS — H25.12 NUCLEAR SCLEROTIC CATARACT OF LEFT EYE: Primary | ICD-10-CM

## 2023-03-16 ENCOUNTER — OFFICE VISIT (OUTPATIENT)
Dept: FAMILY MEDICINE | Facility: CLINIC | Age: 73
End: 2023-03-16
Payer: MEDICARE

## 2023-03-16 DIAGNOSIS — H25.12 NUCLEAR SCLEROTIC CATARACT OF LEFT EYE: Primary | ICD-10-CM

## 2023-03-16 DIAGNOSIS — L30.9 ECZEMA, UNSPECIFIED TYPE: Primary | ICD-10-CM

## 2023-03-16 PROCEDURE — 4010F PR ACE/ARB THEARPY RXD/TAKEN: ICD-10-PCS | Mod: CPTII,95,, | Performed by: INTERNAL MEDICINE

## 2023-03-16 PROCEDURE — 1160F RVW MEDS BY RX/DR IN RCRD: CPT | Mod: CPTII,95,, | Performed by: INTERNAL MEDICINE

## 2023-03-16 PROCEDURE — 4010F ACE/ARB THERAPY RXD/TAKEN: CPT | Mod: CPTII,95,, | Performed by: INTERNAL MEDICINE

## 2023-03-16 PROCEDURE — 99213 PR OFFICE/OUTPT VISIT, EST, LEVL III, 20-29 MIN: ICD-10-PCS | Mod: 95,,, | Performed by: INTERNAL MEDICINE

## 2023-03-16 PROCEDURE — 1159F MED LIST DOCD IN RCRD: CPT | Mod: CPTII,95,, | Performed by: INTERNAL MEDICINE

## 2023-03-16 PROCEDURE — 1160F PR REVIEW ALL MEDS BY PRESCRIBER/CLIN PHARMACIST DOCUMENTED: ICD-10-PCS | Mod: CPTII,95,, | Performed by: INTERNAL MEDICINE

## 2023-03-16 PROCEDURE — 99213 OFFICE O/P EST LOW 20 MIN: CPT | Mod: 95,,, | Performed by: INTERNAL MEDICINE

## 2023-03-16 PROCEDURE — 1159F PR MEDICATION LIST DOCUMENTED IN MEDICAL RECORD: ICD-10-PCS | Mod: CPTII,95,, | Performed by: INTERNAL MEDICINE

## 2023-03-16 RX ORDER — PREDNISOLONE ACETATE-GATIFLOXACIN-BROMFENAC .75; 5; 1 MG/ML; MG/ML; MG/ML
1 SUSPENSION/ DROPS OPHTHALMIC 3 TIMES DAILY
Qty: 5 ML | Refills: 3 | Status: SHIPPED | OUTPATIENT
Start: 2023-03-16 | End: 2023-04-05 | Stop reason: ALTCHOICE

## 2023-03-16 NOTE — PROGRESS NOTES
Subjective:       Patient ID: Yara Whitehead is a 72 y.o. female.    Medication List with Changes/Refills   Current Medications    ACARBOSE (PRECOSE) 25 MG TAB    Take 25 mg by mouth daily as needed. When eating high carb meal    ACETAMINOPHEN (TYLENOL) 500 MG TABLET    Take 500 mg by mouth every 6 (six) hours as needed for Pain.    ALBUTEROL (PROVENTIL/VENTOLIN HFA) 90 MCG/ACTUATION INHALER    Inhale 1-2 puffs into the lungs every 6 (six) hours as needed for Wheezing.    ASCORBIC ACID, VITAMIN C, (VITAMIN C) 1000 MG TABLET    Take 1,000 mg by mouth once daily.    ATORVASTATIN (LIPITOR) 10 MG TABLET    TAKE 1 TABLET BY MOUTH  DAILY    CETIRIZINE (ZYRTEC) 10 MG TABLET    Take 10 mg by mouth daily as needed.     CLONIDINE (CATAPRES) 0.1 MG TABLET    TAKE 1 TABLET BY MOUTH EVERY DAY AT 9 PM FOR HIGH BLOOD PRESSURE    CYANOCOBALAMIN 1,000 MCG/ML INJECTION    INJECT 1 ML INTO THE MUSCLE EVERY 14 DAYS    DICLOFENAC SODIUM (VOLTAREN) 1 % GEL    Apply 2 g topically 3 (three) times daily.    DONEPEZIL (ARICEPT) 10 MG TABLET    TAKE 1 TABLET(10 MG) BY MOUTH EVERY EVENING    ESTRADIOL (ESTRACE) 0.01 % (0.1 MG/GRAM) VAGINAL CREAM    PLACE 1 GRAM VAGINALLY EVERY OTHER DAY    FLASH GLUCOSE SCANNING READER (FREESTYLE VICKY 2 READER) [facility]    To check glucose qac and qhs and PRN lows    FLASH GLUCOSE SENSOR (FREESTYLE VICKY 2 SENSOR) KIT    USE ADIRECTED    GLUCAGON (GVOKE) 1 MG/0.2 ML SOLN    Use for severe hypoglycemia.    GLUCOSE 4 GM CHEWABLE TABLET    Take 4 tablets when glucose from 51-70  Take 6 tablets when glucose is less than 50    LANCETS MISC    To check BG 1 times daily, to use with insurance preferred meter    LEVOTHYROXINE (SYNTHROID) 88 MCG TABLET    TAKE 1 TABLET BY MOUTH EVERY DAY AT 6AM FOR THYROID HEALTH    MAGNESIUM ORAL    Take by mouth once daily at 6am.    MIRTAZAPINE (REMERON) 30 MG TABLET    Take 1 tablet (30 mg total) by mouth nightly.    OXYBUTYNIN (DITROPAN-XL) 5 MG TR24    Take 1 tablet (5 mg  total) by mouth once daily.    PAROXETINE (PAXIL) 40 MG TABLET    TAKE 1 TABLET BY MOUTH IN  THE MORNING    PREDNISOLON/GATIFLOX/BROMFENAC (PREDNISOL ACE-GATIFLOX-BROMFEN) 1-0.5-0.075 % DRPS    Apply 1 drop to eye 3 (three) times daily. One drop 3 times a day in surgical eye    RIVAROXABAN (XARELTO) 20 MG TAB    Take 1 tablet (20 mg total) by mouth every evening.    SOLIFENACIN (VESICARE) 10 MG TABLET    Take 1 tablet (10 mg total) by mouth once daily.    TELMISARTAN (MICARDIS) 40 MG TAB    Take 1 tablet (40 mg total) by mouth once daily.    VITAMIN D (VITAMIN D3) 1000 UNITS TAB    Take 1,000 Units by mouth once daily.       Chief Complaint: No chief complaint on file.  The patient location is: home   The chief complaint leading to consultation is: rash    Visit type: audiovisual    Face to Face time with patient: 12 minutes   12 minutes of total time spent on the encounter, which includes face to face time and non-face to face time preparing to see the patient (eg, review of tests), Obtaining and/or reviewing separately obtained history, Documenting clinical information in the electronic or other health record, Independently interpreting results (not separately reported) and communicating results to the patient/family/caregiver, or Care coordination (not separately reported).     Each patient to whom he or she provides medical services by telemedicine is:  (1) informed of the relationship between the physician and patient and the respective role of any other health care provider with respect to management of the patient; and (2) notified that he or she may decline to receive medical services by telemedicine and may withdraw from such care at any time.    Notes:   She presents today with complaint of eczema on her bilateral corners of her mouth and lower chin.  She has been using topical neopsorin without any relief. She tried elecon cream once which made the area red but did seem to help.  It is itching and  peeling. It is not getting better. No drainage. No scales. There is also some irritation under her nose.     She was having ear fullness due to allergies which she treated with zyrtec and flonase and a decongestive medication. This has improved her symptoms.     Review of Systems   Constitutional:  Negative for activity change, appetite change, chills, fatigue and fever.   HENT:  Negative for congestion, ear discharge, ear pain, mouth sores, postnasal drip, rhinorrhea, sinus pressure and sore throat.    Eyes:  Negative for pain, discharge and redness.   Respiratory:  Negative for cough, chest tightness, shortness of breath and wheezing.    Gastrointestinal:  Negative for abdominal pain, constipation, diarrhea, nausea and vomiting.   Genitourinary:  Negative for dysuria.   Musculoskeletal:  Negative for arthralgias, neck pain and neck stiffness.   Skin:  Positive for rash.   Neurological:  Negative for headaches.   Hematological:  Negative for adenopathy.     Objective:      There were no vitals filed for this visit.  There is no height or weight on file to calculate BMI.  Physical Exam    Alert, no distress   No respiratory distress  Erythematous patchy area on bilateral chin and corners of mouth  and lesion under her right nare.     Assessment:       1. Eczema, unspecified type        Plan:       Eczema, unspecified type  Rash consistent with eczema. Advised to avoid soaps to the area. Advised to start using aquaphor liberally once to twice a day. Okay to use hydrocortisone cream once a day for no more then a week to the inflamed areas.  No evidence of secondary infection at this time so stop using neosporin.     Follow up for already scheduled.

## 2023-03-29 NOTE — TELEPHONE ENCOUNTER
----- Message from Cary Swenson sent at 6/13/2017  1:30 PM CDT -----  Contact: Sakina From [facility] in [facility] [phone]   Call  Placed to pod Sakina called from [facility] in [facility] and asked what is the status this is the second request for a Prior Auth for the  [facility].    Patient presents for Firmagon injection today.     I am an RN. Does the patient have an active anti-cancer treatment plan? (oral, injection, or IV) No.    Reviewed and verified Advanced Directives: Yes: Advance Directive is in chart     Pre-Injection Information: Allergies reviewed as required for injection type., Pt states feeling well, no complaints. and Pt denies signs and symptoms of infection.    Refer to MAR (medication administration record) for type of injection and medication given.  Needle Size: See packaging  Patient tolerated well: Stable    Dr. Ojeda is supervising clinician today.    Future Appointments   Date Time Provider Department Center   3/30/2023 To Be Determined Hanna Talbot RN [facility]6 [facility]   4/4/2023 To Be Determined Julianne Ramirez RN [facility]   4/7/2023 To Be Determined Julianne Ramirez RN [facility]   4/11/2023 To Be Determined [facility]   4/13/2023 11:00 AM NANCY Ayers [facility] D   4/20/2023  1:30 PM Danial Ahn MD [facility]   4/25/2023  2:00 PM C NUTRITION SERVICES [facility] C   4/26/2023 12:15 PM [facility]2 ACL LAB [facility]   4/26/2023 12:45 PM [facility]   6/15/2023  1:30 PM Rupinder Almendarez MD [facility] SHAYLA   12/21/2023  1:30 PM MD ANAM Odonnell

## 2023-03-31 ENCOUNTER — OFFICE VISIT (OUTPATIENT)
Dept: FAMILY MEDICINE | Facility: CLINIC | Age: 73
End: 2023-03-31
Payer: MEDICARE

## 2023-03-31 VITALS
HEIGHT: 65 IN | RESPIRATION RATE: 18 BRPM | HEART RATE: 65 BPM | DIASTOLIC BLOOD PRESSURE: 68 MMHG | TEMPERATURE: 99 F | OXYGEN SATURATION: 98 % | WEIGHT: 160.69 LBS | SYSTOLIC BLOOD PRESSURE: 134 MMHG | BODY MASS INDEX: 26.77 KG/M2

## 2023-03-31 DIAGNOSIS — L03.211 CELLULITIS OF FACE: Primary | ICD-10-CM

## 2023-03-31 PROCEDURE — 3288F PR FALLS RISK ASSESSMENT DOCUMENTED: ICD-10-PCS | Mod: CPTII,S$GLB,, | Performed by: NURSE PRACTITIONER

## 2023-03-31 PROCEDURE — 3008F PR BODY MASS INDEX (BMI) DOCUMENTED: ICD-10-PCS | Mod: CPTII,S$GLB,, | Performed by: NURSE PRACTITIONER

## 2023-03-31 PROCEDURE — 3078F DIAST BP <80 MM HG: CPT | Mod: CPTII,S$GLB,, | Performed by: NURSE PRACTITIONER

## 2023-03-31 PROCEDURE — 3008F BODY MASS INDEX DOCD: CPT | Mod: CPTII,S$GLB,, | Performed by: NURSE PRACTITIONER

## 2023-03-31 PROCEDURE — 4010F PR ACE/ARB THEARPY RXD/TAKEN: ICD-10-PCS | Mod: CPTII,S$GLB,, | Performed by: NURSE PRACTITIONER

## 2023-03-31 PROCEDURE — 1101F PT FALLS ASSESS-DOCD LE1/YR: CPT | Mod: CPTII,S$GLB,, | Performed by: NURSE PRACTITIONER

## 2023-03-31 PROCEDURE — 1159F MED LIST DOCD IN RCRD: CPT | Mod: CPTII,S$GLB,, | Performed by: NURSE PRACTITIONER

## 2023-03-31 PROCEDURE — 99214 PR OFFICE/OUTPT VISIT, EST, LEVL IV, 30-39 MIN: ICD-10-PCS | Mod: S$GLB,,, | Performed by: NURSE PRACTITIONER

## 2023-03-31 PROCEDURE — 4010F ACE/ARB THERAPY RXD/TAKEN: CPT | Mod: CPTII,S$GLB,, | Performed by: NURSE PRACTITIONER

## 2023-03-31 PROCEDURE — 99214 OFFICE O/P EST MOD 30 MIN: CPT | Mod: S$GLB,,, | Performed by: NURSE PRACTITIONER

## 2023-03-31 PROCEDURE — 1101F PR PT FALLS ASSESS DOC 0-1 FALLS W/OUT INJ PAST YR: ICD-10-PCS | Mod: CPTII,S$GLB,, | Performed by: NURSE PRACTITIONER

## 2023-03-31 PROCEDURE — 3288F FALL RISK ASSESSMENT DOCD: CPT | Mod: CPTII,S$GLB,, | Performed by: NURSE PRACTITIONER

## 2023-03-31 PROCEDURE — 3075F PR MOST RECENT SYSTOLIC BLOOD PRESS GE 130-139MM HG: ICD-10-PCS | Mod: CPTII,S$GLB,, | Performed by: NURSE PRACTITIONER

## 2023-03-31 PROCEDURE — 3075F SYST BP GE 130 - 139MM HG: CPT | Mod: CPTII,S$GLB,, | Performed by: NURSE PRACTITIONER

## 2023-03-31 PROCEDURE — 3078F PR MOST RECENT DIASTOLIC BLOOD PRESSURE < 80 MM HG: ICD-10-PCS | Mod: CPTII,S$GLB,, | Performed by: NURSE PRACTITIONER

## 2023-03-31 PROCEDURE — 1159F PR MEDICATION LIST DOCUMENTED IN MEDICAL RECORD: ICD-10-PCS | Mod: CPTII,S$GLB,, | Performed by: NURSE PRACTITIONER

## 2023-03-31 RX ORDER — FLUCONAZOLE 150 MG/1
150 TABLET ORAL EVERY OTHER DAY
Qty: 5 TABLET | Refills: 0 | Status: SHIPPED | OUTPATIENT
Start: 2023-03-31 | End: 2023-04-09

## 2023-03-31 RX ORDER — CLINDAMYCIN HYDROCHLORIDE 300 MG/1
300 CAPSULE ORAL 3 TIMES DAILY
Qty: 21 CAPSULE | Refills: 0 | Status: SHIPPED | OUTPATIENT
Start: 2023-03-31 | End: 2023-04-07

## 2023-03-31 NOTE — PROGRESS NOTES
Answers submitted by the patient for this visit:  Back Pain Questionnaire (Submitted on 3/30/2023)  Chief Complaint: Back pain  Chronicity: new  Onset: in the past 7 days  Frequency: daily  Progression since onset: waxing and waning  Pain location: lumbar spine  Pain quality: aching, shooting, stabbing  Radiates to: does not radiate  Pain - numeric: 7/10  Pain is: worse during the day  Aggravated by: bending, position, sitting, standing, twisting  Stiffness is present: all day  abdominal pain: No  bladder incontinence: Yes  bowel incontinence: Yes  chest pain: No  dysuria: No  fever: No  headaches: No  leg pain: No  numbness: No  paresis: No  paresthesias: No  pelvic pain: No  perianal numbness: No  tingling: No  weakness: Yes  weight loss: No  genital pain: No  hematuria: No  Pain severity: moderate  Improvement on treatment: no relief

## 2023-04-01 NOTE — PROGRESS NOTES
"Subjective:       Patient ID: Yara Whitehead is a 72 y.o. female.    Chief Complaint: Rash    Rash  This is a recurrent problem. The current episode started 1 to 4 weeks ago. The problem has been gradually worsening since onset. Location: right chin area. The rash is characterized by redness, swelling and itchiness. She was exposed to nothing. Pertinent negatives include no eye pain, fever, shortness of breath or sore throat. Treatments tried: aquaphor and hydrocortisone. The treatment provided no relief.   Review of Systems   Constitutional:  Negative for appetite change, chills and fever.   HENT:  Negative for ear pain, postnasal drip and sore throat.    Eyes:  Negative for pain and itching.   Respiratory:  Negative for chest tightness and shortness of breath.    Cardiovascular:  Negative for chest pain.   Gastrointestinal:  Negative for abdominal distention and abdominal pain.   Endocrine: Negative for polydipsia and polyuria.   Genitourinary:  Negative for difficulty urinating, dysuria, flank pain, hematuria and pelvic pain.   Musculoskeletal:  Positive for back pain.   Skin:  Positive for rash. Negative for color change and pallor.   Neurological:  Negative for light-headedness, numbness and headaches.   Hematological:  Negative for adenopathy. Does not bruise/bleed easily.   Psychiatric/Behavioral:  Negative for agitation.      Past medical, surgical, family and social history reviewed.  Objective:     Vitals:    03/31/23 1439   BP: 134/68   Pulse: 65   Resp: 18   Temp: 99.2 °F (37.3 °C)   SpO2: 98%   Weight: 72.9 kg (160 lb 10.7 oz)   Height: 5' 5" (1.651 m)     Body mass index is 26.74 kg/m².     Physical Exam  Constitutional:       Appearance: She is well-developed.   HENT:      Head: Normocephalic and atraumatic.      Right Ear: External ear normal.      Left Ear: External ear normal.      Nose: Nose normal.   Eyes:      General: No scleral icterus.        Right eye: No discharge.         Left eye: " No discharge.      Conjunctiva/sclera: Conjunctivae normal.   Neck:      Trachea: No tracheal deviation.   Cardiovascular:      Rate and Rhythm: Normal rate and regular rhythm.      Heart sounds: Normal heart sounds. No murmur heard.    No friction rub.   Pulmonary:      Effort: Pulmonary effort is normal. No respiratory distress.      Breath sounds: Normal breath sounds. No stridor. No wheezing or rales.   Chest:      Chest wall: No tenderness.   Musculoskeletal:         General: Normal range of motion.      Cervical back: Normal range of motion and neck supple.   Lymphadenopathy:      Cervical: No cervical adenopathy.   Skin:     General: Skin is warm and dry.      Comments: There is a large area of raised erythema to chin area/warm to touch, small papules appreciated.   Neurological:      Mental Status: She is alert and oriented to person, place, and time.       Assessment:       1. Cellulitis of face        Plan:       Yara was seen today for fungus on face, tick removal and waist pain.    Diagnoses and all orders for this visit:    Cellulitis of face-worse since starting topical hydrocortisone, looks erythematous/raised-unsure if bacterial or yeast, will cover for both. Pt to notify me in 48 hours if not significantly improved.     Other orders  -     fluconazole (DIFLUCAN) 150 MG Tab; Take 1 tablet (150 mg total) by mouth every other day. May repeat in one week if needed for 5 doses  -     clindamycin (CLEOCIN) 300 MG capsule; Take 1 capsule (300 mg total) by mouth 3 (three) times daily. for 7 days    I spent 30 minutes on this encounter, time includes face-to-face, chart review, documentation, test review and orders.   v

## 2023-04-03 ENCOUNTER — PATIENT MESSAGE (OUTPATIENT)
Dept: FAMILY MEDICINE | Facility: CLINIC | Age: 73
End: 2023-04-03
Payer: MEDICARE

## 2023-04-03 DIAGNOSIS — R21 FACIAL RASH: Primary | ICD-10-CM

## 2023-04-05 ENCOUNTER — OFFICE VISIT (OUTPATIENT)
Dept: FAMILY MEDICINE | Facility: CLINIC | Age: 73
End: 2023-04-05
Payer: MEDICARE

## 2023-04-05 VITALS
HEIGHT: 65 IN | TEMPERATURE: 98 F | OXYGEN SATURATION: 99 % | DIASTOLIC BLOOD PRESSURE: 78 MMHG | RESPIRATION RATE: 18 BRPM | BODY MASS INDEX: 26.41 KG/M2 | SYSTOLIC BLOOD PRESSURE: 150 MMHG | WEIGHT: 158.5 LBS | HEART RATE: 70 BPM

## 2023-04-05 DIAGNOSIS — R10.9 FLANK PAIN: ICD-10-CM

## 2023-04-05 DIAGNOSIS — R10.9 ABDOMINAL SPASMS: Primary | ICD-10-CM

## 2023-04-05 LAB
BILIRUBIN, UA POC OHS: NEGATIVE
BLOOD, UA POC OHS: NEGATIVE
CLARITY, UA POC OHS: ABNORMAL
COLOR, UA POC OHS: YELLOW
GLUCOSE, UA POC OHS: NEGATIVE
KETONES, UA POC OHS: NEGATIVE
LEUKOCYTES, UA POC OHS: NEGATIVE
NITRITE, UA POC OHS: NEGATIVE
PH, UA POC OHS: 7
PROTEIN, UA POC OHS: NEGATIVE
SPECIFIC GRAVITY, UA POC OHS: 1.01
UROBILINOGEN, UA POC OHS: 1

## 2023-04-05 PROCEDURE — 99213 OFFICE O/P EST LOW 20 MIN: CPT | Mod: S$GLB,,, | Performed by: NURSE PRACTITIONER

## 2023-04-05 PROCEDURE — 4010F PR ACE/ARB THEARPY RXD/TAKEN: ICD-10-PCS | Mod: CPTII,S$GLB,, | Performed by: NURSE PRACTITIONER

## 2023-04-05 PROCEDURE — 3077F PR MOST RECENT SYSTOLIC BLOOD PRESSURE >= 140 MM HG: ICD-10-PCS | Mod: CPTII,S$GLB,, | Performed by: NURSE PRACTITIONER

## 2023-04-05 PROCEDURE — 3288F PR FALLS RISK ASSESSMENT DOCUMENTED: ICD-10-PCS | Mod: CPTII,S$GLB,, | Performed by: NURSE PRACTITIONER

## 2023-04-05 PROCEDURE — 4010F ACE/ARB THERAPY RXD/TAKEN: CPT | Mod: CPTII,S$GLB,, | Performed by: NURSE PRACTITIONER

## 2023-04-05 PROCEDURE — 81003 URINALYSIS AUTO W/O SCOPE: CPT | Mod: QW,S$GLB,, | Performed by: NURSE PRACTITIONER

## 2023-04-05 PROCEDURE — 3008F PR BODY MASS INDEX (BMI) DOCUMENTED: ICD-10-PCS | Mod: CPTII,S$GLB,, | Performed by: NURSE PRACTITIONER

## 2023-04-05 PROCEDURE — 3288F FALL RISK ASSESSMENT DOCD: CPT | Mod: CPTII,S$GLB,, | Performed by: NURSE PRACTITIONER

## 2023-04-05 PROCEDURE — 1101F PT FALLS ASSESS-DOCD LE1/YR: CPT | Mod: CPTII,S$GLB,, | Performed by: NURSE PRACTITIONER

## 2023-04-05 PROCEDURE — 3008F BODY MASS INDEX DOCD: CPT | Mod: CPTII,S$GLB,, | Performed by: NURSE PRACTITIONER

## 2023-04-05 PROCEDURE — 81003 POCT URINALYSIS(INSTRUMENT): ICD-10-PCS | Mod: QW,S$GLB,, | Performed by: NURSE PRACTITIONER

## 2023-04-05 PROCEDURE — 3078F DIAST BP <80 MM HG: CPT | Mod: CPTII,S$GLB,, | Performed by: NURSE PRACTITIONER

## 2023-04-05 PROCEDURE — 1101F PR PT FALLS ASSESS DOC 0-1 FALLS W/OUT INJ PAST YR: ICD-10-PCS | Mod: CPTII,S$GLB,, | Performed by: NURSE PRACTITIONER

## 2023-04-05 PROCEDURE — 3077F SYST BP >= 140 MM HG: CPT | Mod: CPTII,S$GLB,, | Performed by: NURSE PRACTITIONER

## 2023-04-05 PROCEDURE — 3078F PR MOST RECENT DIASTOLIC BLOOD PRESSURE < 80 MM HG: ICD-10-PCS | Mod: CPTII,S$GLB,, | Performed by: NURSE PRACTITIONER

## 2023-04-05 PROCEDURE — 99213 PR OFFICE/OUTPT VISIT, EST, LEVL III, 20-29 MIN: ICD-10-PCS | Mod: S$GLB,,, | Performed by: NURSE PRACTITIONER

## 2023-04-05 NOTE — PROGRESS NOTES
Answers submitted by the patient for this visit:  Back Pain Questionnaire (Submitted on 4/3/2023)  Chief Complaint: Back pain  Chronicity: recurrent  Onset: in the past 7 days  Frequency: daily  Progression since onset: waxing and waning  Pain location: costovertebral angle  Pain quality: cramping, shooting  Radiates to: does not radiate  Pain - numeric: 9/10  Pain is: worse during the day  Aggravated by: bending, position, sitting, standing, twisting  Stiffness is present: all day  abdominal pain: No  bladder incontinence: No  bowel incontinence: No  chest pain: No  dysuria: No  fever: No  headaches: Yes  leg pain: No  numbness: No  paresis: No  paresthesias: No  pelvic pain: No  perianal numbness: No  tingling: No  weakness: No  weight loss: No  genital pain: No  hematuria: No  Pain severity: moderate  Improvement on treatment: no relief    Subjective:       Patient ID: Yara Whitehead is a 72 y.o. female.    Chief Complaint: reoccuring twinge    Back Pain  This is a recurrent problem. The current episode started in the past 7 days. The problem occurs daily. The problem has been waxing and waning since onset. The pain is present in the costovertebral angle. The quality of the pain is described as cramping and shooting. The pain does not radiate. The pain is at a severity of 9/10. The pain is moderate. The pain is Worse during the day. The symptoms are aggravated by bending, position, sitting, standing and twisting. Stiffness is present All day. Pertinent negatives include no abdominal pain, bladder incontinence, bowel incontinence, chest pain, dysuria, fever, headaches, leg pain, numbness, paresis, paresthesias, perianal numbness, tingling, weakness or weight loss. The treatment provided no relief.     Right above the hip, below ribs on the right side. Not consistent. Onset about a week now. Worse with movement. States started after bending over for a long time cutting her toe nails. States it feels like a  muscle spasm. She took some tylenol arthritis. No urinary changes. States seems to be better. No discomfort with rubbing the area. No rash.     Rash around her mouth is improving. Dax.     See ROS.    The following portion of the patients history was reviewed and updated as appropriate: allergies, current medications, past medical and surgical history. Past social history and problem list reviewed. Family PMH and Past social history reviewed. Tobacco, Illicit drug use reviewed.      Review of patient's allergies indicates:  No Known Allergies      Current Outpatient Medications:     acarbose (PRECOSE) 25 MG Tab, Take 25 mg by mouth daily as needed. When eating high carb meal, Disp: , Rfl:     acetaminophen (TYLENOL) 500 MG tablet, Take 500 mg by mouth every 6 (six) hours as needed for Pain., Disp: , Rfl:     albuterol (PROVENTIL/VENTOLIN HFA) 90 mcg/actuation inhaler, Inhale 1-2 puffs into the lungs every 6 (six) hours as needed for Wheezing., Disp: 18 g, Rfl: 6    ascorbic acid, vitamin C, (VITAMIN C) 1000 MG tablet, Take 1,000 mg by mouth once daily., Disp: , Rfl:     atorvastatin (LIPITOR) 10 MG tablet, TAKE 1 TABLET BY MOUTH  DAILY, Disp: 90 tablet, Rfl: 3    cetirizine (ZYRTEC) 10 MG tablet, Take 10 mg by mouth daily as needed. , Disp: , Rfl:     clindamycin (CLEOCIN) 300 MG capsule, Take 1 capsule (300 mg total) by mouth 3 (three) times daily. for 7 days, Disp: 21 capsule, Rfl: 0    cloNIDine (CATAPRES) 0.1 MG tablet, TAKE 1 TABLET BY MOUTH EVERY DAY AT 9 PM FOR HIGH BLOOD PRESSURE, Disp: 90 tablet, Rfl: 3    cyanocobalamin 1,000 mcg/mL injection, INJECT 1 ML INTO THE MUSCLE EVERY 14 DAYS (Patient taking differently: Inject 1 ml every 30 days), Disp: 6 mL, Rfl: 3    diclofenac sodium (VOLTAREN) 1 % Gel, Apply 2 g topically 3 (three) times daily., Disp: 100 g, Rfl: 6    donepeziL (ARICEPT) 10 MG tablet, TAKE 1 TABLET(10 MG) BY MOUTH EVERY EVENING, Disp: 90 tablet, Rfl: 2    estradioL (ESTRACE) 0.01 % (0.1  mg/gram) vaginal cream, PLACE 1 GRAM VAGINALLY EVERY OTHER DAY (Patient not taking: Reported on 3/31/2023), Disp: 42.5 g, Rfl: 11    flash glucose scanning reader (FREESTYLE VICKY 2 READER) Misc, To check glucose qac and qhs and PRN lows (Patient not taking: Reported on 3/31/2023), Disp: 6 each, Rfl: 3    flash glucose sensor (FREESTYLE VICKY 2 SENSOR) Kit, USE ADIRECTED, Disp: 6 kit, Rfl: 3    fluconazole (DIFLUCAN) 150 MG Tab, Take 1 tablet (150 mg total) by mouth every other day. May repeat in one week if needed for 5 doses, Disp: 5 tablet, Rfl: 0    glucose 4 GM chewable tablet, Take 4 tablets when glucose from 51-70 Take 6 tablets when glucose is less than 50, Disp: 120 tablet, Rfl: 12    lancets Mis, To check BG 1 times daily, to use with insurance preferred meter, Disp: 100 each, Rfl: 3    levothyroxine (SYNTHROID) 88 MCG tablet, TAKE 1 TABLET BY MOUTH EVERY DAY AT 6AM FOR THYROID HEALTH, Disp: 90 tablet, Rfl: 3    MAGNESIUM ORAL, Take by mouth once daily at 6am., Disp: , Rfl:     mirtazapine (REMERON) 30 MG tablet, Take 1 tablet (30 mg total) by mouth nightly. (Patient taking differently: Take 30 mg by mouth nightly. 0.5 tab nightly), Disp: 90 tablet, Rfl: 3    oxybutynin (DITROPAN-XL) 5 MG TR24, Take 1 tablet (5 mg total) by mouth once daily. (Patient not taking: Reported on 3/31/2023), Disp: 90 tablet, Rfl: 3    paroxetine (PAXIL) 40 MG tablet, TAKE 1 TABLET BY MOUTH IN  THE MORNING, Disp: 90 tablet, Rfl: 3    prednisolon/gatiflox/bromfenac (PREDNISOL ACE-GATIFLOX-BROMFEN) 1-0.5-0.075 % DrpS, Apply 1 drop to eye 3 (three) times daily. One drop 3 times a day in surgical eye (Patient not taking: Reported on 3/31/2023), Disp: 5 mL, Rfl: 3    rivaroxaban (XARELTO) 20 mg Tab, Take 1 tablet (20 mg total) by mouth every evening., Disp: 90 tablet, Rfl: 4    solifenacin (VESICARE) 10 MG tablet, Take 1 tablet (10 mg total) by mouth once daily., Disp: 90 tablet, Rfl: 2    telmisartan (MICARDIS) 40 MG Tab, Take 1  tablet (40 mg total) by mouth once daily., Disp: 90 tablet, Rfl: 3    vitamin D (VITAMIN D3) 1000 units Tab, Take 1,000 Units by mouth once daily., Disp: , Rfl:     Past Medical History:   Diagnosis Date    Allergy     Arrhythmia     Asthma     childhood-severe.    Closed fracture of proximal end of left humerus 05/29/2016    GERD (gastroesophageal reflux disease)     Hypertension     Hypoglycemia     post gastric bypass    Hypothyroidism     ALESSANDRA (iron deficiency anemia)     Insomnia     [facility]    FABIANA on CPAP     PAF (paroxysmal atrial fibrillation)     Status post placement of implantable loop recorder 03/18/2016    Subaortic membrane     Subarachnoid hematoma 01/01/2019    Subdural hematoma     Vitamin B 12 deficiency     Vitamin D deficiency        Past Surgical History:   Procedure Laterality Date    abdomenalplasty      ABLATION OF ARRHYTHMOGENIC FOCUS FOR ATRIAL FIBRILLATION N/A 4/15/2019    Procedure: Ablation atrial fibrillation;  Surgeon: Edmund Shah MD;  Location: [facility] EP LAB;  Service: Cardiology;  Laterality: N/A;  AF, PRISCILLA, PVI, Cryo, MARY, Gen, SK, 3 Prep    COLONOSCOPY  2012    normal, repeat in 5 years    COLONOSCOPY N/A 10/27/2020    Procedure: COLONOSCOPY;  Surgeon: Bud Valentin Jr., MD;  Location: [facility] ENDO;  Service: Endoscopy;  Laterality: N/A;    EXTRACORPOREAL SHOCK WAVE LITHOTRIPSY      [place]    FRACTURE SURGERY Left     has plates and screws in place.-shoulder left    GALLBLADDER SURGERY  1995    GASTRIC BYPASS  1997    HERNIA REPAIR  1998    HYSTERECTOMY  2013    due to vaginal prolpase with BSO - also bladder suspension at the same time - [facility]    INSERTION OF IMPLANTABLE LOOP RECORDER N/A 7/19/2019    Procedure: Insertion, Implantable Loop Recorder;  Surgeon: Neftali Hernandez MD;  Location: [facility] CATH;  Service: Cardiology;  Laterality: N/A;    REMOVAL OF IMPLANTABLE LOOP RECORDER N/A 7/19/2019    Procedure: REMOVAL, IMPLANTABLE LOOP  RECORDER;  Surgeon: Neftali Hernandez MD;  Location: [facility];  Service: Cardiology;  Laterality: N/A;    TYMPANOPLASTY Left     replaced and then repair x2        Social History     Socioeconomic History    Marital status:     Number of children: 2   Occupational History    Occupation: artist    Occupation: retired   Tobacco Use    Smoking status: Former     Packs/day: 2.00     Years: 20.00     Pack years: 40.00     Types: Cigarettes     Quit date: 1995     Years since quittin.4    Smokeless tobacco: Never   Substance and Sexual Activity    Alcohol use: Yes     Alcohol/week: 7.0 standard drinks     Types: 7 Glasses of wine per week     Comment: one glass wine nightly    Drug use: No    Sexual activity: Not Currently     Social Determinants of Health     Financial Resource Strain: Low Risk     Difficulty of Paying Living Expenses: Not hard at all   Food Insecurity: No Food Insecurity    Worried About Running Out of Food in the Last Year: Never true    Ran Out of Food in the Last Year: Never true   Transportation Needs: No Transportation Needs    Lack of Transportation (Medical): No    Lack of Transportation (Non-Medical): No   Physical Activity: Insufficiently Active    Days of Exercise per Week: 1 day    Minutes of Exercise per Session: 30 min   Stress: No Stress Concern Present    Feeling of Stress : Only a little   Social Connections: Moderately Integrated    Frequency of Communication with Friends and Family: More than three times a week    Frequency of Social Gatherings with Friends and Family: More than three times a week    Attends Sikh Services: More than 4 times per year    Active Member of Clubs or Organizations: Yes    Attends Club or Organization Meetings: More than 4 times per year    Marital Status:    Housing Stability: Low Risk     Unable to Pay for Housing in the Last Year: No    Number of Places Lived in the Last Year: 1    Unstable Housing in the Last Year: No  "      Review of Systems   Constitutional:  Negative for fatigue, fever and weight loss.   Respiratory:  Negative for cough, chest tightness, shortness of breath and wheezing.    Cardiovascular:  Negative for chest pain, palpitations and leg swelling.   Gastrointestinal:  Negative for abdominal pain, bowel incontinence, constipation, diarrhea, nausea and vomiting.   Genitourinary:  Negative for bladder incontinence, dysuria, frequency and urgency.   Musculoskeletal:  Positive for back pain (right side muscle spasms). Negative for arthralgias and gait problem.   Skin:  Positive for rash (around her mouth. see previous visit notes).   Neurological:  Negative for tingling, weakness, numbness, headaches and paresthesias.     Objective:      BP (!) 150/78 (BP Location: Left arm, Patient Position: Sitting)   Pulse 70   Temp 98.2 °F (36.8 °C)   Resp 18   Ht 5' 5" (1.651 m)   Wt 71.9 kg (158 lb 8.2 oz)   LMP  (LMP Unknown) Comment: total  SpO2 99%   BMI 26.38 kg/m²      Physical Exam  Vitals reviewed.   Constitutional:       General: She is not in acute distress.     Appearance: Normal appearance. She is well-developed and normal weight.   HENT:      Head: Normocephalic.      Mouth/Throat:      Mouth: Mucous membranes are moist.      Pharynx: Oropharynx is clear.   Eyes:      Conjunctiva/sclera: Conjunctivae normal.      Pupils: Pupils are equal, round, and reactive to light.   Neck:      Thyroid: No thyromegaly.      Trachea: Trachea normal. No tracheal tenderness or tracheal deviation.   Cardiovascular:      Rate and Rhythm: Normal rate and regular rhythm.      Pulses: Normal pulses.           Carotid pulses are 2+ on the right side and 2+ on the left side.       Radial pulses are 2+ on the right side and 2+ on the left side.      Heart sounds: Normal heart sounds. No murmur heard.    No gallop.   Pulmonary:      Effort: Pulmonary effort is normal. No respiratory distress.      Breath sounds: Normal breath sounds. " No stridor. No wheezing, rhonchi or rales.   Abdominal:      General: Bowel sounds are normal.      Palpations: Abdomen is soft. There is no splenomegaly or mass.      Tenderness: There is no abdominal tenderness. There is no guarding or rebound.       Musculoskeletal:         General: Normal range of motion.      Cervical back: Full passive range of motion without pain, normal range of motion and neck supple.      Thoracic back: Spasms (right side) present.      Right lower leg: No edema.      Left lower leg: No edema.      Comments: Gait and coordination normal.  strong, equal. Upper and lower extremity strength normal.    Skin:     General: Skin is warm and dry.      Capillary Refill: Capillary refill takes less than 2 seconds.      Findings: No lesion or rash.   Neurological:      General: No focal deficit present.      Mental Status: She is alert and oriented to person, place, and time.      Gait: Gait normal.   Psychiatric:         Attention and Perception: Attention and perception normal.         Mood and Affect: Mood and affect normal.         Speech: Speech normal.         Behavior: Behavior normal.       Assessment:       1. Abdominal spasms    2. Flank pain        Plan:       Abdominal spasms: presents as muscle spasms. States improving. Will follow up if not improving    Flank pain: urine was clear.   -     POCT Urinalysis(Instrument)       Continue current medication  Take medications only as prescribed  Healthy diet, exercise  Adequate rest  Adequate hydration  Avoid allergens  Avoid excessive caffeine      Follow up if not improving

## 2023-04-10 RX ORDER — PREDNISONE 20 MG/1
20 TABLET ORAL DAILY
Qty: 7 TABLET | Refills: 0 | Status: SHIPPED | OUTPATIENT
Start: 2023-04-10 | End: 2023-04-17

## 2023-04-11 DIAGNOSIS — H25.12 NUCLEAR SCLEROTIC CATARACT OF LEFT EYE: Primary | ICD-10-CM

## 2023-04-12 RX ORDER — PREDNISOLONE ACETATE-GATIFLOXACIN-BROMFENAC .75; 5; 1 MG/ML; MG/ML; MG/ML
1 SUSPENSION/ DROPS OPHTHALMIC 3 TIMES DAILY
Qty: 5 ML | Refills: 3 | Status: SHIPPED | OUTPATIENT
Start: 2023-04-12 | End: 2023-06-20

## 2023-04-13 ENCOUNTER — TELEPHONE (OUTPATIENT)
Dept: OPHTHALMOLOGY | Facility: CLINIC | Age: 73
End: 2023-04-13
Payer: MEDICARE

## 2023-04-18 ENCOUNTER — TELEPHONE (OUTPATIENT)
Dept: OPHTHALMOLOGY | Facility: CLINIC | Age: 73
End: 2023-04-18
Payer: MEDICARE

## 2023-04-18 NOTE — TELEPHONE ENCOUNTER
----- Message from Fiona Browne sent at 4/18/2023  3:14 PM CDT -----  Contact: patient  Type: Needs Medical Advice  Who Called:  patient  Best Call Back Number:[phone]  Additional Information: patient requesting a call back from Min

## 2023-04-21 ENCOUNTER — TELEPHONE (OUTPATIENT)
Dept: OPHTHALMOLOGY | Facility: CLINIC | Age: 73
End: 2023-04-21
Payer: MEDICARE

## 2023-04-21 DIAGNOSIS — H25.11 NUCLEAR SCLEROTIC CATARACT OF RIGHT EYE: Primary | ICD-10-CM

## 2023-04-22 ENCOUNTER — NURSE TRIAGE (OUTPATIENT)
Dept: ADMINISTRATIVE | Facility: CLINIC | Age: 73
End: 2023-04-22
Payer: MEDICARE

## 2023-04-22 NOTE — TELEPHONE ENCOUNTER
"Pts daughter calling, not with pt, states she thinks her mother is having a mental health crisis. States that she had to be committed last year and thinks this is the same kind of situation. States last year there was a gun involved but currently her mother wont say anything about a gun but did say " if you call someone ill do that thing". States that her mother is hyper Anabaptist and making statements about her TV talking to her but states she tells her that she wont hurt herself, states " my mother is very Evangelical so I dont think she will do something to harm herself intentionally." States " my concern is that whatever triggered her wont stop and then she wont be able to pull herself out." States she has made a comment recently about making a u-turn in the road and hitting someone. Offered to call pt on the line to triage, states she is unsure if she will participate in the call and if it will trigger her. Advised she can call 911 for a wellness check as well. verbalized understanding, states she is going to speak with her brother who is also not in [place] and will determine what is the best thing for her and will call back. verbalized understanding. Provided with OOC number. verbalized understanding. Denies any further questions or concerns at this time, advised to call back if they have any that come up.       Reason for Disposition   Health Information question, no triage required and triager able to answer question    Additional Information   [1] Caller is not with the adult (patient) AND [2] reporting urgent symptoms    Protocols used: Information Only Call - No Triage-A-    "

## 2023-04-22 NOTE — TELEPHONE ENCOUNTER
Daughter is in [place] and pt is in [place]. Daughter Mercedes Parsons calling concerned that pt maybe having a mental health crisis. States that mom is hyper Spiritism and home alone. Pt called on conference call by daughter. Pt on line confused. Pt tearful and confused. States that she is supposed to be helping at a  and praying for her dead  and mom. Denies being suicidal. 911 called by another triage nurse per protocol.Daughter states that mom may have gun; PD made aware. Nurse on call and daughter remained on line until EMS/PD arrival. PD arrived. Encounter routed to provider.      Daughter Mercedes callback. States that PD is at pt's home and thanked nurse for calling. Daughter requesting callback from Dr. Richards office on Monday. Encounter routed to provider.       Reason for Disposition   [1] Patient is very confused (disoriented, slurred speech) AND [2] no other adult (e.g., friend or family member) available    Additional Information   Negative: Patient attempted suicide   Negative: Patient is threatening suicide now   Negative: Violent behavior, or threatening to physically hurt or kill someone    Protocols used: Bipolar Disorder (Manic Depression)-A-

## 2023-04-24 ENCOUNTER — TELEPHONE (OUTPATIENT)
Dept: SURGERY | Facility: HOSPITAL | Age: 73
End: 2023-04-24
Payer: MEDICARE

## 2023-04-24 ENCOUNTER — TELEPHONE (OUTPATIENT)
Dept: OPHTHALMOLOGY | Facility: CLINIC | Age: 73
End: 2023-04-24
Payer: MEDICARE

## 2023-04-24 NOTE — TELEPHONE ENCOUNTER
----- Message from Maryann Washington sent at 4/24/2023  3:17 PM CDT -----  Contact: Pt's daughter/ Mercedes @ [phone]  Type:  Patient Returning Call    Who Called:Pt's daughter/ Mercedes  Who Left Message for Patient:Evy  Does the patient know what this is regarding?:yes  Would the patient rather a call back or a response via [facility]? call  Best Call Back Number:[phone]  Additional Information: Pt's daughter/ Mercedes is returning Evy's call. Please call pt's daughter back to advise.

## 2023-04-24 NOTE — TELEPHONE ENCOUNTER
Pt was scheduled for left eye procedure today, 4/24/23. Pt did not show up for her procedure. Attempted to call her home and cell numbers. No answer on either. Notes from 4/22/23 suggested patient was taken via ambulance in response to her daughter calling with concerns. No other documentation since. Unable to reach patient. Please reach out to patient when appropriate.

## 2023-04-25 NOTE — TELEPHONE ENCOUNTER
Called her daughter yesterday    She is doing better on Monday.  Her son and daughter are involved.  Her daughter is coming down in June for an extended visit.  This will help determine how she is doing living alone.

## 2023-04-28 ENCOUNTER — PATIENT OUTREACH (OUTPATIENT)
Dept: ADMINISTRATIVE | Facility: HOSPITAL | Age: 73
End: 2023-04-28
Payer: MEDICARE

## 2023-04-28 NOTE — PROGRESS NOTES
OHP Report:    Hypertension Control: LAST BP WAS ELEVATED 4/12/23 WITH CARDIO.  NEED REMOTE BP OR NURSE VISIT READING.     LEFT MESSAGE TO RETURN CALL

## 2023-05-01 ENCOUNTER — TELEPHONE (OUTPATIENT)
Dept: FAMILY MEDICINE | Facility: CLINIC | Age: 73
End: 2023-05-01
Payer: MEDICARE

## 2023-05-01 NOTE — TELEPHONE ENCOUNTER
----- Message from Raza Das sent at 4/28/2023  8:39 AM CDT -----  Regarding: Return Call  Contact: Patient  Type:  Patient Returning Call    Who Called:Patient  Who Left Message for Patient:office staff please call  Does the patient know what this is regarding?:When she had a UTI and clearance  Would the patient rather a call back or a response via [facility]? call  Best Call Back Number:[place] [facility]  Additional Information: Patient called regarding her last date of UTI.

## 2023-05-08 ENCOUNTER — TELEPHONE (OUTPATIENT)
Dept: FAMILY MEDICINE | Facility: CLINIC | Age: 73
End: 2023-05-08
Payer: MEDICARE

## 2023-05-08 ENCOUNTER — TELEPHONE (OUTPATIENT)
Dept: OPHTHALMOLOGY | Facility: CLINIC | Age: 73
End: 2023-05-08
Payer: MEDICARE

## 2023-05-08 NOTE — TELEPHONE ENCOUNTER
----- Message from Gosia Eli sent at 5/5/2023  2:29 PM CDT -----  Contact: Katina from [facility]  Type:  Apoointment Request    Caller is requesting a sooner appointment.  Caller declined first available appointment listed below.  Caller will not accept being placed on the waitlist and is requesting a message be sent to doctor.    Name of Caller:  Katina from [facility]  When is the first available appointment?  N/A    Would the patient rather a call back or a response via [facility]?  Call back  Best Call Back Number:  [phone] ([place])    Additional Information: States patient will be discharged on Monday and needs a 1 week hospital follow up - please call to advise - thank you

## 2023-05-14 ENCOUNTER — NURSE TRIAGE (OUTPATIENT)
Dept: ADMINISTRATIVE | Facility: CLINIC | Age: 73
End: 2023-05-14
Payer: MEDICARE

## 2023-05-14 NOTE — TELEPHONE ENCOUNTER
Reason for Disposition   Sounds like a life-threatening emergency to the triager    Additional Information   Negative: Violent behavior, or threatening to physically hurt or kill someone   Negative: Drug overdose suspected   Negative: Seeing, hearing, or feeling things that are not there (i.e., visual, auditory, or tactile hallucinations)   Negative: Followed a head injury   Negative: Difficulty breathing or bluish lips   Negative: Shock suspected (e.g., cold/pale/clammy skin, too weak to stand, low BP, rapid pulse)   Negative: [1] Loss of speech or garbled speech AND [2] new-onset   Negative: [1] Weakness of the face, arm, or leg on one side of the body AND [2] new-onset   Negative: [1] Numbness of the face, arm, or leg on one side of the body AND [2] new-onset   Negative: [1] Difficult to awaken or acting confused (e.g., disoriented, slurred speech) AND [2] present now AND [3] new-onset   Negative: [1] Difficult to awaken or acting confused (e.g., disoriented, slurred speech) AND [2] present now AND [3] diabetes mellitus    Protocols used: Confusion - Delirium-A-AH  Daughter called re pt sees dr kuhn. Pt was admitted to [facility] released tues after commitment. Got call today from [facility] friends today that pt in Manic intrusive state and friends fearful for her. Psych nurse and therapist at [facility] well [facility]. Caller states she is in [place] and pt in [place]. Sib is also out of state. Friend said pt not taking meds as they were not filled. Rec EMS. Caller states pt did the same thing a few weeks ago. Last spoke with pt last pm. Caller states pt with 'word vomit' x 1 hour. Caller fears calling 911 staying concern is pt will assault officer and go back back to [place]. Pt due to have cataract surg. pt is at another [facility] now and not at home and caller states no way to get in touch with pt. Pts friend is going to check on the pt later and see if she is still wound up. may decide then about calling 911. Caller notified pt  has appt suyapa with PCP at 0900.

## 2023-05-15 ENCOUNTER — OFFICE VISIT (OUTPATIENT)
Dept: FAMILY MEDICINE | Facility: CLINIC | Age: 73
End: 2023-05-15
Payer: MEDICARE

## 2023-05-15 ENCOUNTER — TELEPHONE (OUTPATIENT)
Dept: FAMILY MEDICINE | Facility: CLINIC | Age: 73
End: 2023-05-15

## 2023-05-15 VITALS
BODY MASS INDEX: 25.92 KG/M2 | HEIGHT: 65 IN | DIASTOLIC BLOOD PRESSURE: 78 MMHG | WEIGHT: 155.56 LBS | SYSTOLIC BLOOD PRESSURE: 142 MMHG | TEMPERATURE: 98 F | HEART RATE: 73 BPM | OXYGEN SATURATION: 99 %

## 2023-05-15 DIAGNOSIS — I10 ESSENTIAL HYPERTENSION: ICD-10-CM

## 2023-05-15 DIAGNOSIS — F31.12 BIPOLAR 1 DISORDER WITH MODERATE MANIA: Primary | ICD-10-CM

## 2023-05-15 PROCEDURE — 99213 OFFICE O/P EST LOW 20 MIN: CPT | Mod: S$GLB,,, | Performed by: INTERNAL MEDICINE

## 2023-05-15 PROCEDURE — 3288F PR FALLS RISK ASSESSMENT DOCUMENTED: ICD-10-PCS | Mod: CPTII,S$GLB,, | Performed by: INTERNAL MEDICINE

## 2023-05-15 PROCEDURE — 3288F FALL RISK ASSESSMENT DOCD: CPT | Mod: CPTII,S$GLB,, | Performed by: INTERNAL MEDICINE

## 2023-05-15 PROCEDURE — 1101F PR PT FALLS ASSESS DOC 0-1 FALLS W/OUT INJ PAST YR: ICD-10-PCS | Mod: CPTII,S$GLB,, | Performed by: INTERNAL MEDICINE

## 2023-05-15 PROCEDURE — 1160F PR REVIEW ALL MEDS BY PRESCRIBER/CLIN PHARMACIST DOCUMENTED: ICD-10-PCS | Mod: CPTII,S$GLB,, | Performed by: INTERNAL MEDICINE

## 2023-05-15 PROCEDURE — 3078F PR MOST RECENT DIASTOLIC BLOOD PRESSURE < 80 MM HG: ICD-10-PCS | Mod: CPTII,S$GLB,, | Performed by: INTERNAL MEDICINE

## 2023-05-15 PROCEDURE — 3077F SYST BP >= 140 MM HG: CPT | Mod: CPTII,S$GLB,, | Performed by: INTERNAL MEDICINE

## 2023-05-15 PROCEDURE — 3078F DIAST BP <80 MM HG: CPT | Mod: CPTII,S$GLB,, | Performed by: INTERNAL MEDICINE

## 2023-05-15 PROCEDURE — 3077F PR MOST RECENT SYSTOLIC BLOOD PRESSURE >= 140 MM HG: ICD-10-PCS | Mod: CPTII,S$GLB,, | Performed by: INTERNAL MEDICINE

## 2023-05-15 PROCEDURE — 4010F ACE/ARB THERAPY RXD/TAKEN: CPT | Mod: CPTII,S$GLB,, | Performed by: INTERNAL MEDICINE

## 2023-05-15 PROCEDURE — 1159F MED LIST DOCD IN RCRD: CPT | Mod: CPTII,S$GLB,, | Performed by: INTERNAL MEDICINE

## 2023-05-15 PROCEDURE — 3008F BODY MASS INDEX DOCD: CPT | Mod: CPTII,S$GLB,, | Performed by: INTERNAL MEDICINE

## 2023-05-15 PROCEDURE — 1101F PT FALLS ASSESS-DOCD LE1/YR: CPT | Mod: CPTII,S$GLB,, | Performed by: INTERNAL MEDICINE

## 2023-05-15 PROCEDURE — 99213 PR OFFICE/OUTPT VISIT, EST, LEVL III, 20-29 MIN: ICD-10-PCS | Mod: S$GLB,,, | Performed by: INTERNAL MEDICINE

## 2023-05-15 PROCEDURE — 1160F RVW MEDS BY RX/DR IN RCRD: CPT | Mod: CPTII,S$GLB,, | Performed by: INTERNAL MEDICINE

## 2023-05-15 PROCEDURE — 3008F PR BODY MASS INDEX (BMI) DOCUMENTED: ICD-10-PCS | Mod: CPTII,S$GLB,, | Performed by: INTERNAL MEDICINE

## 2023-05-15 PROCEDURE — 4010F PR ACE/ARB THEARPY RXD/TAKEN: ICD-10-PCS | Mod: CPTII,S$GLB,, | Performed by: INTERNAL MEDICINE

## 2023-05-15 PROCEDURE — 1159F PR MEDICATION LIST DOCUMENTED IN MEDICAL RECORD: ICD-10-PCS | Mod: CPTII,S$GLB,, | Performed by: INTERNAL MEDICINE

## 2023-05-15 RX ORDER — DIVALPROEX SODIUM 500 MG/1
500 TABLET, FILM COATED, EXTENDED RELEASE ORAL DAILY
Qty: 90 TABLET | Refills: 3 | Status: SHIPPED | OUTPATIENT
Start: 2023-05-15 | End: 2023-06-20 | Stop reason: SDUPTHER

## 2023-05-15 NOTE — TELEPHONE ENCOUNTER
----- Message from Anne Bullock sent at 5/15/2023  8:14 AM CDT -----  Type: Needs Medical Advice  Who Called:  Pts daughter Jose  Best Call Back Number: [phone] - Jose  Additional Information: Daughter would like to know would it be okay for her to be on the phone and ask questions while at her appt, please call bk and advise Thanks

## 2023-05-15 NOTE — PROGRESS NOTES
Subjective:       Patient ID: Yara Whitehead is a 72 y.o. female.    Medication List with Changes/Refills   New Medications    DIVALPROEX 500 MG TB24    Take 1 tablet (500 mg total) by mouth once daily.   Current Medications    ACARBOSE (PRECOSE) 25 MG TAB    Take 25 mg by mouth daily as needed. When eating high carb meal    ACETAMINOPHEN (TYLENOL) 500 MG TABLET    Take 500 mg by mouth every 6 (six) hours as needed for Pain.    ALBUTEROL (PROVENTIL/VENTOLIN HFA) 90 MCG/ACTUATION INHALER    Inhale 1-2 puffs into the lungs every 6 (six) hours as needed for Wheezing.    ASCORBIC ACID, VITAMIN C, (VITAMIN C) 1000 MG TABLET    Take 1,000 mg by mouth once daily.    ATORVASTATIN (LIPITOR) 10 MG TABLET    TAKE 1 TABLET BY MOUTH  DAILY    CETIRIZINE (ZYRTEC) 10 MG TABLET    Take 10 mg by mouth daily as needed.     CLONIDINE (CATAPRES) 0.1 MG TABLET    TAKE 1 TABLET BY MOUTH EVERY DAY AT 9 PM FOR HIGH BLOOD PRESSURE    CYANOCOBALAMIN 1,000 MCG/ML INJECTION    INJECT 1 ML INTO THE MUSCLE EVERY 14 DAYS    DICLOFENAC SODIUM (VOLTAREN) 1 % GEL    Apply 2 g topically 3 (three) times daily.    DONEPEZIL (ARICEPT) 10 MG TABLET    TAKE 1 TABLET(10 MG) BY MOUTH EVERY EVENING    GLUCOSE 4 GM CHEWABLE TABLET    Take 4 tablets when glucose from 51-70  Take 6 tablets when glucose is less than 50    LEVOTHYROXINE (SYNTHROID) 88 MCG TABLET    TAKE 1 TABLET BY MOUTH EVERY DAY AT 6AM FOR THYROID HEALTH    MAGNESIUM ORAL    Take by mouth once daily at 6am.    MIRTAZAPINE (REMERON) 30 MG TABLET    Take 1 tablet (30 mg total) by mouth nightly.    PAROXETINE (PAXIL) 40 MG TABLET    TAKE 1 TABLET BY MOUTH IN  THE MORNING    PREDNISOLON/GATIFLOX/BROMFENAC (PREDNISOL ACE-GATIFLOX-BROMFEN) 1-0.5-0.075 % DRPS    Apply 1 drop to eye 3 (three) times daily. One drop 3 times a day in surgical eye    RIVAROXABAN (XARELTO) 20 MG TAB    Take 1 tablet (20 mg total) by mouth every evening.    SOLIFENACIN (VESICARE) 10 MG TABLET    Take 1 tablet (10 mg  "total) by mouth once daily.    TELMISARTAN (MICARDIS) 40 MG TAB    Take 1 tablet (40 mg total) by mouth once daily.    VITAMIN D (VITAMIN D3) 1000 UNITS TAB    Take 1,000 Units by mouth once daily.       Chief Complaint: Follow-up  She is here today to f/u on hospitalization to [facility]. She does not have records.     She reports on 4/24/2023 she was taken to ER restrained in an ambulance with police escort.  "Someone called 911". She was admitted for 2 weeks with diagnosis of biopolar arya (per patient).  She was started on depakote 500 mg bid and continued on paxil 40 mg daily.  She was given vistaril for sleep.  She was d/c on 5/10/2023 and her medication was sent to the wrong pharmacy so she has been off depakote now for 5 days.  She presents today "jacked up" and happy.  She denies any depression or suicidal ideations. No hallucinations but she does have visions.  No homicidal ideations. No injurious behaviors.  No confusion but she is forgetful due to underlying dementia. Her [employer] friends reached out to our clinic with concern. She has "vomiting words for one hour straight" and acting manic.  Her daughter is on the phone in today's visit with similar concerns. She was called by scheduling from [facility] in [facility] who tried to schedule pickup for her group therapy that was initiated by [facility]  She had difficulty making that appt and understanding the nature of that appt.  She is willing to go to therapy and establish with psychiatry.      She has hypertension and paroxysmal Afib. Her ARB was changed from telmisartan to losartan while hospitalized. Since being home with is taking telmisartan 40 mg daily and is taking her xarelto. No chest pain, shortness of breath or palpitations.     Review of Systems   Constitutional:  Negative for activity change, appetite change, chills, fatigue, fever and unexpected weight change.   HENT:  Negative for congestion, ear " "discharge, ear pain, hearing loss, mouth sores, postnasal drip, rhinorrhea, sinus pressure, sore throat and trouble swallowing.    Eyes:  Negative for pain, discharge, redness and visual disturbance.   Respiratory:  Negative for cough, chest tightness, shortness of breath and wheezing.    Cardiovascular:  Negative for chest pain, palpitations and leg swelling.   Gastrointestinal:  Negative for abdominal pain, blood in stool, constipation, diarrhea, nausea and vomiting.   Endocrine: Negative for polyuria.   Genitourinary:  Negative for dysuria and hematuria.   Musculoskeletal:  Negative for arthralgias, back pain, myalgias and neck stiffness.   Skin:  Negative for rash.   Neurological:  Negative for dizziness, weakness, numbness and headaches.   Hematological:  Negative for adenopathy. Does not bruise/bleed easily.   Psychiatric/Behavioral:  Negative for agitation, confusion, dysphoric mood, hallucinations, sleep disturbance and suicidal ideas. The patient is not nervous/anxious.      Objective:      Vitals:    05/15/23 0914   BP: (!) 142/78   BP Location: Left arm   Patient Position: Sitting   Pulse: 73   Temp: 98.2 °F (36.8 °C)   SpO2: 99%   Weight: 70.5 kg (155 lb 8.6 oz)   Height: 5' 5" (1.651 m)     Body mass index is 25.88 kg/m².  Physical Exam    General appearance: No acute distress, cooperative, good eye contact, racing thoughts, pressed speech, easily distractable, sweating   Neck: FROM, soft, supple, no thyromegaly, no bruits  Lymph: no anterior or posterior cervical adenopathy  Heart::  Regular rate and rhythm, 3/6 systolic murmur  Lung: Clear to ascultation bilaterally, no wheezing, no rales, no rhonchi, no distress  Abdomen: Soft, nontender, no distention, no hepatosplenomegaly, bowel sounds normal, no guarding, no rebound, no peritoneal signs  Skin: no rashes, no lesions  Extremities: no edema, no cyanosis  Neuro: no focal abnormalities, strength 5/5 b/l UE and LE, 2+ DTRs b/l UE and LE, normal " gait  Peripheral pulses: 2+ pedal pulses bilaterally, good perfusion and color    Assessment:       1. Bipolar 1 disorder with moderate arya    2. Essential hypertension        Plan:       Bipolar 1 disorder with moderate arya  Uncontrolled. Continue paxil but will restart her depakote. Will start extended release 500 mg daily. Recheck in one week and titrate up if needed. Appt made with [facility] in [facility] for this week.   -     divalproex 500 MG Tb24; Take 1 tablet (500 mg total) by mouth once daily.  Dispense: 90 tablet; Refill: 3    Essential hypertension  Uncontrolled and will recheck in one week with her medications.     Follow up in about 1 week (around 5/22/2023) for recheck arya unless established with psychiatry .

## 2023-05-17 ENCOUNTER — TELEPHONE (OUTPATIENT)
Dept: FAMILY MEDICINE | Facility: CLINIC | Age: 73
End: 2023-05-17
Payer: MEDICARE

## 2023-05-17 DIAGNOSIS — F31.12 BIPOLAR 1 DISORDER WITH MODERATE MANIA: Primary | ICD-10-CM

## 2023-05-17 NOTE — TELEPHONE ENCOUNTER
----- Message from Candacejuvenal Trevinory sent at 5/17/2023  1:48 PM CDT -----  Regarding: pt call back  Name of Who is Calling: DEYANIRA CAO [[id number]]        What is the request in detail: Pt says there has been a mess up with her medications and would like a call back to discuss, please advise.         Can the clinic reply by [facility]: no           [facility]: [phone]

## 2023-05-17 NOTE — TELEPHONE ENCOUNTER
Contacted pt and gave clarification on depakote prescription per Dr. Richards as follows:     The overall goal is to get up to depakote xr 500 mg bid but because she had been out of medication for over a week after d/c we need to start at depakote xr 500 mg once a day. We will adjust the dose at her f/u.    Pt verbalized understanding. Gave phone number for signed referral for pt to contact to schedule an appt.

## 2023-05-17 NOTE — TELEPHONE ENCOUNTER
"Contacted pt to gather specific information. Pt reports that she thinks there's been a "mix up on my medicine. I was supposed to be getting 1000mg of Depakote extended release, to take at night, but when I got to my pharmacy, they told me that it was only 500 daily. I thought it was supposed to be 1000mg. I told the pharmacy to keep it until I get this mix up sorted out, because I don't want to have to pay for something i'm not going to use." Please advise.    Pt denies Psychiatrist with [facility] will be taking over her psychiatric medications and care, as she reports that she was notified today that [facility] doesn't accept her insurance, and therefore is discontinuing treatment and counseling there. Pt requests a referral to psychiatry through [facility]. Please see pended referral for review.  "

## 2023-05-17 NOTE — TELEPHONE ENCOUNTER
The overall goal is to get up to depakote xr 500 mg bid but because she had been out of medication for over a week after d/c we need to start at depakote xr 500 mg once a day.     We will adjust the dose at her f/u    Thanks

## 2023-05-19 ENCOUNTER — PATIENT MESSAGE (OUTPATIENT)
Dept: PSYCHIATRY | Facility: CLINIC | Age: 73
End: 2023-05-19
Payer: MEDICARE

## 2023-05-22 ENCOUNTER — OFFICE VISIT (OUTPATIENT)
Dept: FAMILY MEDICINE | Facility: CLINIC | Age: 73
End: 2023-05-22
Payer: MEDICARE

## 2023-05-22 VITALS
OXYGEN SATURATION: 99 % | WEIGHT: 152.25 LBS | HEART RATE: 81 BPM | SYSTOLIC BLOOD PRESSURE: 130 MMHG | BODY MASS INDEX: 25.37 KG/M2 | DIASTOLIC BLOOD PRESSURE: 82 MMHG | TEMPERATURE: 98 F | HEIGHT: 65 IN

## 2023-05-22 DIAGNOSIS — F31.2 BIPOLAR AFFECTIVE DISORDER, CURRENT EPISODE MANIC WITH PSYCHOTIC SYMPTOMS: Primary | ICD-10-CM

## 2023-05-22 DIAGNOSIS — R30.0 DYSURIA: ICD-10-CM

## 2023-05-22 DIAGNOSIS — I48.0 PAF (PAROXYSMAL ATRIAL FIBRILLATION): ICD-10-CM

## 2023-05-22 DIAGNOSIS — I10 PRIMARY HYPERTENSION: ICD-10-CM

## 2023-05-22 LAB
BILIRUBIN, UA POC OHS: NEGATIVE
BLOOD, UA POC OHS: ABNORMAL
CLARITY, UA POC OHS: ABNORMAL
COLOR, UA POC OHS: ABNORMAL
GLUCOSE, UA POC OHS: NEGATIVE
KETONES, UA POC OHS: NEGATIVE
LEUKOCYTES, UA POC OHS: ABNORMAL
NITRITE, UA POC OHS: NEGATIVE
PH, UA POC OHS: 7
PROTEIN, UA POC OHS: NEGATIVE
SPECIFIC GRAVITY, UA POC OHS: 1.02
UROBILINOGEN, UA POC OHS: 0.2

## 2023-05-22 PROCEDURE — 3075F SYST BP GE 130 - 139MM HG: CPT | Mod: CPTII,S$GLB,, | Performed by: INTERNAL MEDICINE

## 2023-05-22 PROCEDURE — 1101F PR PT FALLS ASSESS DOC 0-1 FALLS W/OUT INJ PAST YR: ICD-10-PCS | Mod: CPTII,S$GLB,, | Performed by: INTERNAL MEDICINE

## 2023-05-22 PROCEDURE — 3288F PR FALLS RISK ASSESSMENT DOCUMENTED: ICD-10-PCS | Mod: CPTII,S$GLB,, | Performed by: INTERNAL MEDICINE

## 2023-05-22 PROCEDURE — 81003 POCT URINALYSIS(INSTRUMENT): ICD-10-PCS | Mod: QW,S$GLB,, | Performed by: INTERNAL MEDICINE

## 2023-05-22 PROCEDURE — 3008F PR BODY MASS INDEX (BMI) DOCUMENTED: ICD-10-PCS | Mod: CPTII,S$GLB,, | Performed by: INTERNAL MEDICINE

## 2023-05-22 PROCEDURE — 1159F PR MEDICATION LIST DOCUMENTED IN MEDICAL RECORD: ICD-10-PCS | Mod: CPTII,S$GLB,, | Performed by: INTERNAL MEDICINE

## 2023-05-22 PROCEDURE — 87077 CULTURE AEROBIC IDENTIFY: CPT | Performed by: INTERNAL MEDICINE

## 2023-05-22 PROCEDURE — 1101F PT FALLS ASSESS-DOCD LE1/YR: CPT | Mod: CPTII,S$GLB,, | Performed by: INTERNAL MEDICINE

## 2023-05-22 PROCEDURE — 3079F DIAST BP 80-89 MM HG: CPT | Mod: CPTII,S$GLB,, | Performed by: INTERNAL MEDICINE

## 2023-05-22 PROCEDURE — 1160F PR REVIEW ALL MEDS BY PRESCRIBER/CLIN PHARMACIST DOCUMENTED: ICD-10-PCS | Mod: CPTII,S$GLB,, | Performed by: INTERNAL MEDICINE

## 2023-05-22 PROCEDURE — 1160F RVW MEDS BY RX/DR IN RCRD: CPT | Mod: CPTII,S$GLB,, | Performed by: INTERNAL MEDICINE

## 2023-05-22 PROCEDURE — 3075F PR MOST RECENT SYSTOLIC BLOOD PRESS GE 130-139MM HG: ICD-10-PCS | Mod: CPTII,S$GLB,, | Performed by: INTERNAL MEDICINE

## 2023-05-22 PROCEDURE — 81003 URINALYSIS AUTO W/O SCOPE: CPT | Mod: QW,S$GLB,, | Performed by: INTERNAL MEDICINE

## 2023-05-22 PROCEDURE — 3079F PR MOST RECENT DIASTOLIC BLOOD PRESSURE 80-89 MM HG: ICD-10-PCS | Mod: CPTII,S$GLB,, | Performed by: INTERNAL MEDICINE

## 2023-05-22 PROCEDURE — 87186 SC STD MICRODIL/AGAR DIL: CPT | Performed by: INTERNAL MEDICINE

## 2023-05-22 PROCEDURE — 99215 OFFICE O/P EST HI 40 MIN: CPT | Mod: S$GLB,,, | Performed by: INTERNAL MEDICINE

## 2023-05-22 PROCEDURE — 87088 URINE BACTERIA CULTURE: CPT | Performed by: INTERNAL MEDICINE

## 2023-05-22 PROCEDURE — 99215 PR OFFICE/OUTPT VISIT, EST, LEVL V, 40-54 MIN: ICD-10-PCS | Mod: S$GLB,,, | Performed by: INTERNAL MEDICINE

## 2023-05-22 PROCEDURE — 3008F BODY MASS INDEX DOCD: CPT | Mod: CPTII,S$GLB,, | Performed by: INTERNAL MEDICINE

## 2023-05-22 PROCEDURE — 4010F ACE/ARB THERAPY RXD/TAKEN: CPT | Mod: CPTII,S$GLB,, | Performed by: INTERNAL MEDICINE

## 2023-05-22 PROCEDURE — 3288F FALL RISK ASSESSMENT DOCD: CPT | Mod: CPTII,S$GLB,, | Performed by: INTERNAL MEDICINE

## 2023-05-22 PROCEDURE — 4010F PR ACE/ARB THEARPY RXD/TAKEN: ICD-10-PCS | Mod: CPTII,S$GLB,, | Performed by: INTERNAL MEDICINE

## 2023-05-22 PROCEDURE — 1159F MED LIST DOCD IN RCRD: CPT | Mod: CPTII,S$GLB,, | Performed by: INTERNAL MEDICINE

## 2023-05-22 PROCEDURE — 87086 URINE CULTURE/COLONY COUNT: CPT | Performed by: INTERNAL MEDICINE

## 2023-05-22 RX ORDER — TELMISARTAN 40 MG/1
40 TABLET ORAL DAILY
Qty: 90 TABLET | Refills: 3 | Status: SHIPPED | OUTPATIENT
Start: 2023-05-22

## 2023-05-22 RX ORDER — CEFDINIR 300 MG/1
300 CAPSULE ORAL 2 TIMES DAILY
Qty: 20 CAPSULE | Refills: 0 | Status: SHIPPED | OUTPATIENT
Start: 2023-05-22 | End: 2023-05-25

## 2023-05-22 NOTE — PROGRESS NOTES
Subjective:       Patient ID: Yara Whitehead is a 72 y.o. female.    Medication List with Changes/Refills   New Medications    CEFDINIR (OMNICEF) 300 MG CAPSULE    Take 1 capsule (300 mg total) by mouth 2 (two) times daily. for 10 days   Current Medications    ACARBOSE (PRECOSE) 25 MG TAB    Take 25 mg by mouth daily as needed. When eating high carb meal    ACETAMINOPHEN (TYLENOL) 500 MG TABLET    Take 500 mg by mouth every 6 (six) hours as needed for Pain.    ALBUTEROL (PROVENTIL/VENTOLIN HFA) 90 MCG/ACTUATION INHALER    Inhale 1-2 puffs into the lungs every 6 (six) hours as needed for Wheezing.    ASCORBIC ACID, VITAMIN C, (VITAMIN C) 1000 MG TABLET    Take 1,000 mg by mouth once daily.    ATORVASTATIN (LIPITOR) 10 MG TABLET    TAKE 1 TABLET BY MOUTH  DAILY    CETIRIZINE (ZYRTEC) 10 MG TABLET    Take 10 mg by mouth daily as needed.     CLONIDINE (CATAPRES) 0.1 MG TABLET    TAKE 1 TABLET BY MOUTH EVERY DAY AT 9 PM FOR HIGH BLOOD PRESSURE    CYANOCOBALAMIN 1,000 MCG/ML INJECTION    INJECT 1 ML INTO THE MUSCLE EVERY 14 DAYS    DICLOFENAC SODIUM (VOLTAREN) 1 % GEL    Apply 2 g topically 3 (three) times daily.    DIVALPROEX 500 MG TB24    Take 1 tablet (500 mg total) by mouth once daily.    DONEPEZIL (ARICEPT) 10 MG TABLET    TAKE 1 TABLET(10 MG) BY MOUTH EVERY EVENING    GLUCOSE 4 GM CHEWABLE TABLET    Take 4 tablets when glucose from 51-70  Take 6 tablets when glucose is less than 50    LEVOTHYROXINE (SYNTHROID) 88 MCG TABLET    TAKE 1 TABLET BY MOUTH EVERY DAY AT 6AM FOR THYROID HEALTH    MAGNESIUM ORAL    Take by mouth once daily at 6am.    MIRTAZAPINE (REMERON) 30 MG TABLET    Take 1 tablet (30 mg total) by mouth nightly.    PAROXETINE (PAXIL) 40 MG TABLET    TAKE 1 TABLET BY MOUTH IN  THE MORNING    PREDNISOLON/GATIFLOX/BROMFENAC (PREDNISOL ACE-GATIFLOX-BROMFEN) 1-0.5-0.075 % DRPS    Apply 1 drop to eye 3 (three) times daily. One drop 3 times a day in surgical eye    SOLIFENACIN (VESICARE) 10 MG TABLET     "Take 1 tablet (10 mg total) by mouth once daily.    VITAMIN D (VITAMIN D3) 1000 UNITS TAB    Take 1,000 Units by mouth once daily.   Changed and/or Refilled Medications    Modified Medication Previous Medication    RIVAROXABAN (XARELTO) 20 MG TAB rivaroxaban (XARELTO) 20 mg Tab       Take 1 tablet (20 mg total) by mouth every evening.    Take 1 tablet (20 mg total) by mouth every evening.    TELMISARTAN (MICARDIS) 40 MG TAB telmisartan (MICARDIS) 40 MG Tab       Take 1 tablet (40 mg total) by mouth once daily.    Take 1 tablet (40 mg total) by mouth once daily.       Chief Complaint: Referral  She is here today to f/u from her appt one week ago. She did bring her medication in today.     Recall: She reports on 4/24/2023 she was taken to ER restrained in an ambulance with police escort.  "Someone called 911". She was admitted for 2 weeks with diagnosis of biopolar arya (per patient).  She was started on depakote 500 mg bid and continued on paxil 40 mg daily.  She was given vistaril for sleep.  She was d/c on 5/10/2023 and her medication was sent to the wrong pharmacy so she has been off depakote now for 5 days.  She presents today "jacked up" and happy.  She denies any depression or suicidal ideations. No hallucinations but she does have visions.  No homicidal ideations. No injurious behaviors.  No confusion but she is forgetful due to underlying dementia. Her [employer] friends reached out to our clinic with concern. She has "vomiting words for one hour straight" and acting manic.  Her daughter is on the phone in today's visit with similar concerns. She was called by scheduling from [facility] in \[facility]\"" who tried to schedule pickup for her group therapy that was initiated by [facility].  She had difficulty making that appt and understanding the nature of that appt.  She is willing to go to therapy and establish with psychiatry.      At her appt I restarted her depakote  mg daily and " continued paxil 40 mg daily. She did bring in her medication today and is taking correctly.  She has mirtazepine for sleep but is not using.  She did go to 2 sessions at [facility] in [place] but they can not see her due to insurance. Today she reports that depakote does make her sleepy. She has disorganized thoughts. She is difficult to keep on topic. She expressed concern that her daughter should not be involved in her care. She expressed concern that the devil is involved in her care. She would like to see a psychiatrist who uses bible teachings to lead his care. She denies depression. She denies suicidal or homicidal ideations.      She does complain of 2 days of pain with urination. No blood in urine. No fevers. No back pain.     She lives alone. She lost her phone 5 days ago but does not know where--she thinks she left at the neighbor's house and the dog ate it.  She is getting a new phone today.     Review of Systems   Constitutional:  Negative for appetite change, fatigue, fever and unexpected weight change.   HENT:  Negative for congestion, ear pain, hearing loss, sore throat and trouble swallowing.    Eyes:  Negative for pain and visual disturbance.   Respiratory:  Negative for cough, chest tightness, shortness of breath and wheezing.    Cardiovascular:  Negative for chest pain, palpitations and leg swelling.   Gastrointestinal:  Negative for abdominal pain, blood in stool, constipation, diarrhea, nausea and vomiting.   Endocrine: Negative for polyuria.   Genitourinary:  Positive for dysuria and frequency. Negative for hematuria and urgency.   Musculoskeletal:  Negative for arthralgias, back pain and myalgias.   Skin:  Negative for rash.   Neurological:  Negative for dizziness, weakness, numbness and headaches.   Hematological:  Does not bruise/bleed easily.   Psychiatric/Behavioral:  Positive for agitation. Negative for dysphoric mood, hallucinations, sleep disturbance and suicidal ideas. The  "patient is not nervous/anxious.      Objective:      Vitals:    05/22/23 1145   BP: 130/82   BP Location: Left arm   Patient Position: Sitting   Pulse: 81   Temp: 98.2 °F (36.8 °C)   SpO2: 99%   Weight: 69 kg (152 lb 3.6 oz)   Height: 5' 5" (1.651 m)     Body mass index is 25.33 kg/m².  Physical Exam    Alert, no distress  Difficult to keep on topic, sweating at times  Good eye contact, easily angered but no aggressive behaviors  Agitated and racing thoughts.  Lability in emotions---smiling to anger very quickly     Assessment:       1. Bipolar affective disorder, current episode manic with psychotic symptoms    2. Dysuria    3. Primary hypertension    4. PAF (paroxysmal atrial fibrillation)        Plan:       Bipolar affective disorder, current episode manic with psychotic symptoms  Uncontrolled. I reached out to psychiatry who will see her this week. Continue current medication regimen.     Dysuria  Will start treatment with cefdinir. Will send urine for culture.   -     POCT Urinalysis(Instrument)  -     cefdinir (OMNICEF) 300 MG capsule; Take 1 capsule (300 mg total) by mouth 2 (two) times daily. for 10 days  Dispense: 20 capsule; Refill: 0    Primary hypertension  Well controlled and continue current regimen.   -     telmisartan (MICARDIS) 40 MG Tab; Take 1 tablet (40 mg total) by mouth once daily.  Dispense: 90 tablet; Refill: 3    PAF (paroxysmal atrial fibrillation)  -     rivaroxaban (XARELTO) 20 mg Tab; Take 1 tablet (20 mg total) by mouth every evening.  Dispense: 90 tablet; Refill: 4    Follow up for with psychiatry this week .     45 minutes of total time spent on the encounter, which includes face to face time and non-face to face time preparing to see the patient (eg, review of tests), Obtaining and/or reviewing separately obtained history, documenting clinical information in the electronic or other health record, independently interpreting results (not separately reported) and communicating results " to the patient/family/caregiver, or Care coordination (not separately reported).

## 2023-05-24 ENCOUNTER — TELEPHONE (OUTPATIENT)
Dept: FAMILY MEDICINE | Facility: CLINIC | Age: 73
End: 2023-05-24
Payer: MEDICARE

## 2023-05-24 NOTE — TELEPHONE ENCOUNTER
----- Message from Lynnette Ordonez sent at 5/24/2023  8:56 AM CDT -----  Contact: pt  Type:  Needs Medical Advice    Who Called: Pt  Would the patient rather a call back or a response via [facility]? call  Best Call Back Number: [phone]  Additional Information: Pt states that she need a callback as soon as possible. States that she has a new number and would like to speak with nurse. Please advise thank you

## 2023-05-24 NOTE — TELEPHONE ENCOUNTER
Returned call to patient.  She was advised that she left medications here.  She states that she got a new phone and her phone number is the same.  Pt states she would like to follow up regarding her urine results, psychiatric medications and she advised that she is currently not able to access her portal.

## 2023-05-25 ENCOUNTER — TELEPHONE (OUTPATIENT)
Dept: FAMILY MEDICINE | Facility: CLINIC | Age: 73
End: 2023-05-25

## 2023-05-25 LAB — BACTERIA UR CULT: ABNORMAL

## 2023-05-25 RX ORDER — AMOXICILLIN 875 MG/1
875 TABLET, FILM COATED ORAL 2 TIMES DAILY
Qty: 20 TABLET | Refills: 0 | Status: SHIPPED | OUTPATIENT
Start: 2023-05-25 | End: 2023-06-14

## 2023-05-25 NOTE — TELEPHONE ENCOUNTER
Culture came back this am.     I would like to put her on amoxicillin because it works better for her current UTI.    Stop cefdinir    Thanks

## 2023-05-25 NOTE — TELEPHONE ENCOUNTER
----- Message from Candace Waters sent at 5/25/2023  8:26 AM CDT -----  Regarding: pt call back  Name of Who is Calling: DEYANIRA CAO [[id number]]        What is the request in detail: Pt needs a call back asap from provider, Please advise.         Can the clinic reply by [facility]: no           What Number to [facility] Back [facility] not [facility]: [phone]      
Spoke w/ pt , pt is aware that urine culture results are in . We are waiting for Dr Richards to review them and we will call back with recommendations.--lp  
normal...

## 2023-05-26 ENCOUNTER — TELEPHONE (OUTPATIENT)
Dept: PSYCHIATRY | Facility: CLINIC | Age: 73
End: 2023-05-26
Payer: MEDICARE

## 2023-05-26 NOTE — TELEPHONE ENCOUNTER
Noted. Patient missed this morning's intake appointment with this writer, so clinic staff provided resources for IOP (intensive outpatient program). It was reported to this writer by clinic staff that pt also said she was expecting to establish care with someone who would also manage her medical medications, which is not done by this writer.     If pt is psychiatrically unstable, then pt should go to the nearest ER.

## 2023-05-26 NOTE — TELEPHONE ENCOUNTER
"Patient called screaming and yelling that [facility] has jerked her around and made life hell for her. Patient seems manic, tangential some profanity used while on the phone. Patient said she waited over a year for an appointment and she went to [place] and two [facility] looking for an appointment. Stated no one seemed to be able to help her and provided he a name of a provider she has never heard of. After reviewing chart informed patient that she had an appt scheduled with Dr. Amaya at 9 am today. Patient stated when she finally made it to the appropriate location she was told she can't be seen because she was late. Patient said she has never been through so much disorganization in her life stated that Ron is causing her life to be a living hell, then she proceeded to go on and on about a $75,000 hospital bill that she says is [facility]'s fault as well as mentioning a "horrible in patient stay after she was arrested. Very hard for patient to stay on topic. Nurse told patient she would reach out to [facility] to see if she can get rescheduled. Patient said no need to because she no longer wants any affiliation with [employer], may not answer their call, but needs someone to look at her meds immediately. Patient was encouraged to reschedule an appointment but she declined. Nurse offered additional resources outside of [facility] but patient refused those as well stating she no longer wants to find help.     "

## 2023-05-26 NOTE — TELEPHONE ENCOUNTER
"Patient had arrived to her new patient appointment over 20 minutes late. I advised patient that the provider would be unable to see her today and we would need to get her rescheduled. Per provider advice I tried offering her the number for [facility] to help her out in the interim. She stated that she was already going to [facility] and on her 2nd visit she was informed that they do not take her insurance ([facility]). I then provided her the number to [facility] and for her to call to inquire about their IOP. Patient stated "I don't know why ya'll didn't call me to say ya'll were not going to see me and have me drive all the way over here for no reason". I advised the patient that we would have been able to see her for her 9:00 appt but due to the fact she was over 20 minutes late the provider would not be able to see her and give her proper care in the limited time she has left of her appointment timeframe. She then stated "What am I suppose to do? I brought all my medications with me and I need him to go over them all and fix them. When I was at [facility] they didn't have my BP medication and they gave me something else. They took all my medication away and gave me portions of the medications I was taking and they are not right." I advised her that Dr. Amaya is a Psychiatrist and he would not be taking over all of her medications just her psychiatric ones. Her primary care and/or other specialist depending on the medication would continue to prescribe those medications and follow up with those providers. Patient stated "Well I guess it's a good thing I was late because Dr. Amaya is obviously not the doctor for me if he's not going to do that. I told "them"? I wanted a doctor who would be able to do ALL my medications and I don't want any psychotropics because I want to go the homeopathic route and don't want all that stuff in me." I reiterated for her to call [facility] with the number provided to inquire about the IOP program with them due to her insurance. She then asked "So you're telling me [facility] in [place] won't take my insurance?" I advised her that she informed me that [facility] in [place] told her they don't take her insurance and it's the same company so the [place] location wouldn't be able to take it either. The only IOP that I know for sure that will is through [facility]. The patient then got very irate as she was leaving and told me "I don't know why you are throwing all these terms at me like I'm suppose to know what they mean. I'm not a medical person." I apologized to the patient as I had explained the IOP program with [facility] as I handed her the brochure with their contact information and she was already doing IOP in [place]. Patient then left the clinic.   "

## 2023-05-30 ENCOUNTER — TELEPHONE (OUTPATIENT)
Dept: OPHTHALMOLOGY | Facility: CLINIC | Age: 73
End: 2023-05-30
Payer: MEDICARE

## 2023-05-30 NOTE — TELEPHONE ENCOUNTER
----- Message from Ingrid Nix sent at 5/30/2023  2:19 PM CDT -----  Regarding: advice  Contact: patient  Type: Needs Medical Advice  Who Called:  patient  Symptoms (please be specific):    How long has patient had these symptoms:    Pharmacy name and phone #:    Best Call Back Number: [phone]  Additional Information: Patient would like to speak to someone in the office.  Not Billing!  Please call patient to advise.  Thanks!

## 2023-06-02 ENCOUNTER — TELEPHONE (OUTPATIENT)
Dept: PSYCHIATRY | Facility: CLINIC | Age: 73
End: 2023-06-02

## 2023-06-02 NOTE — TELEPHONE ENCOUNTER
----- Message from Jina Piper RN sent at 6/2/2023  8:38 AM CDT -----  Regarding: Rehab  Hello,    This Patient reached out to [facility] on 6/1 asking for help getting into rehab. Patient states [place] does not take her insurance. Patient was hard to hold a conversation with and could not keep her on topic as to what she actually needed help with. She did state that she wanted to do outpatient rehab. I will be glad to help just not sure what is recommended for patient.     Thanks,     Jina Piper, RN  Transitional Care  [phone]

## 2023-06-05 RX ORDER — LEVOTHYROXINE SODIUM 88 UG/1
TABLET ORAL
Qty: 90 TABLET | Refills: 0 | Status: SHIPPED | OUTPATIENT
Start: 2023-06-05 | End: 2023-08-21 | Stop reason: SDUPTHER

## 2023-06-05 NOTE — TELEPHONE ENCOUNTER
No care due was identified.  [facility] Embedded Care Due Messages. Reference number: [id number].   6/05/2023 3:53:18 AM CDT

## 2023-06-05 NOTE — TELEPHONE ENCOUNTER
Refill Decision Note   Yara Whitehead  is requesting a refill authorization.  Brief Assessment and Rationale for Refill:  Approve     Medication Therapy Plan:  TSH-WNL      Comments:     Note composed:11:02 AM 06/05/2023             Appointments     Last Visit   5/22/2023 Ann Richards, DO   Next Visit   7/31/2023 Ann Richards, DO

## 2023-06-12 ENCOUNTER — TELEPHONE (OUTPATIENT)
Dept: FAMILY MEDICINE | Facility: CLINIC | Age: 73
End: 2023-06-12
Payer: MEDICARE

## 2023-06-12 DIAGNOSIS — I10 ESSENTIAL HYPERTENSION: Primary | ICD-10-CM

## 2023-06-12 DIAGNOSIS — R30.0 DYSURIA: ICD-10-CM

## 2023-06-12 NOTE — TELEPHONE ENCOUNTER
Lvm with call back number.  Discussed with Dr Richards.  If pt would like to be seen, she has hospital follow up on 6/20 that can be used.

## 2023-06-12 NOTE — TELEPHONE ENCOUNTER
Pt was referring to hospital release from a month ago.  She would like to have her medications reviewed, see about having labs, have urine rechecked.  Pt reports that she is currently only taking Levothyroxine and Telmisartan and has not taken any other medications since release.  States she wants to start from scratch with medications.  Went ahead and scheduled for the 20th at 11:30 am.  Pt would like to have labs prior to her appt.  Pt was advised to bring in all of her medications.  She would like to know if this is to include all of her essential oils too or just prescribed meds.  Please advise.

## 2023-06-12 NOTE — TELEPHONE ENCOUNTER
----- Message from Yusuf Peters sent at 6/12/2023 12:16 PM CDT -----  Contact: pt [phone]  Type: Needs Medical Advice  Who Called:  pt  Best Call Back Number: [phone]    Additional Information: Pt is calling the office returning missed call.Please call back and advise.  Please leave he a message.

## 2023-06-12 NOTE — TELEPHONE ENCOUNTER
----- Message from Amanda Swift sent at 6/9/2023  2:28 PM CDT -----  Regarding: sooner apt  Contact: patient  Type:  Sooner Appointment Request    Caller is requesting a sooner appointment.  Caller declined first available appointment listed below.  Caller will not accept being placed on the waitlist and is requesting a message be sent to doctor.    Name of Caller:  Patient  When is the first available appointment?    Symptoms:  [facility] [place] f/u  Best Call Back Number: [phone]    Additional Information:  She would like to be seen as soon as possible thanks! Willing to give a sample call to advise.

## 2023-06-14 NOTE — TELEPHONE ENCOUNTER
Please have her bring just her prescribed medication.     Please let her know that I will not be able to manage her psychiatric issues. I had made an appt with psychiatry at her last visit and she did not show for that appt.    I am happy to manage her medical issues but I can not be responsible for her psychiatric medications.     She can get labs and urine before her appt     Thanks

## 2023-06-15 ENCOUNTER — LAB VISIT (OUTPATIENT)
Dept: LAB | Facility: HOSPITAL | Age: 73
End: 2023-06-15
Attending: INTERNAL MEDICINE
Payer: MEDICARE

## 2023-06-15 ENCOUNTER — PATIENT MESSAGE (OUTPATIENT)
Dept: FAMILY MEDICINE | Facility: CLINIC | Age: 73
End: 2023-06-15
Payer: MEDICARE

## 2023-06-15 DIAGNOSIS — I10 ESSENTIAL HYPERTENSION: ICD-10-CM

## 2023-06-15 LAB
ALBUMIN SERPL BCP-MCNC: 3.8 G/DL (ref 3.5–5.2)
ALP SERPL-CCNC: 77 U/L (ref 55–135)
ALT SERPL W/O P-5'-P-CCNC: 15 U/L (ref 10–44)
ANION GAP SERPL CALC-SCNC: 9 MMOL/L (ref 8–16)
AST SERPL-CCNC: 23 U/L (ref 10–40)
BASOPHILS # BLD AUTO: 0.01 K/UL (ref 0–0.2)
BASOPHILS NFR BLD: 0.2 % (ref 0–1.9)
BILIRUB SERPL-MCNC: 0.6 MG/DL (ref 0.1–1)
BUN SERPL-MCNC: 12 MG/DL (ref 8–23)
CALCIUM SERPL-MCNC: 9.4 MG/DL (ref 8.7–10.5)
CHLORIDE SERPL-SCNC: 104 MMOL/L (ref 95–110)
CO2 SERPL-SCNC: 28 MMOL/L (ref 23–29)
CREAT SERPL-MCNC: 0.7 MG/DL (ref 0.5–1.4)
DIFFERENTIAL METHOD: ABNORMAL
EOSINOPHIL # BLD AUTO: 0 K/UL (ref 0–0.5)
EOSINOPHIL NFR BLD: 0.7 % (ref 0–8)
ERYTHROCYTE [DISTWIDTH] IN BLOOD BY AUTOMATED COUNT: 13.2 % (ref 11.5–14.5)
EST. GFR  (NO RACE VARIABLE): >60 ML/MIN/1.73 M^2
GLUCOSE SERPL-MCNC: 98 MG/DL (ref 70–110)
HCT VFR BLD AUTO: 42.3 % (ref 37–48.5)
HGB BLD-MCNC: 13.7 G/DL (ref 12–16)
IMM GRANULOCYTES # BLD AUTO: 0.02 K/UL (ref 0–0.04)
IMM GRANULOCYTES NFR BLD AUTO: 0.4 % (ref 0–0.5)
LYMPHOCYTES # BLD AUTO: 1.2 K/UL (ref 1–4.8)
LYMPHOCYTES NFR BLD: 27.1 % (ref 18–48)
MCH RBC QN AUTO: 34 PG (ref 27–31)
MCHC RBC AUTO-ENTMCNC: 32.4 G/DL (ref 32–36)
MCV RBC AUTO: 105 FL (ref 82–98)
MONOCYTES # BLD AUTO: 0.3 K/UL (ref 0.3–1)
MONOCYTES NFR BLD: 6.3 % (ref 4–15)
NEUTROPHILS # BLD AUTO: 3 K/UL (ref 1.8–7.7)
NEUTROPHILS NFR BLD: 65.3 % (ref 38–73)
NRBC BLD-RTO: 0 /100 WBC
PLATELET # BLD AUTO: 258 K/UL (ref 150–450)
PMV BLD AUTO: 11 FL (ref 9.2–12.9)
POTASSIUM SERPL-SCNC: 4.5 MMOL/L (ref 3.5–5.1)
PROT SERPL-MCNC: 7 G/DL (ref 6–8.4)
RBC # BLD AUTO: 4.03 M/UL (ref 4–5.4)
SODIUM SERPL-SCNC: 141 MMOL/L (ref 136–145)
TSH SERPL DL<=0.005 MIU/L-ACNC: 2.22 UIU/ML (ref 0.4–4)
WBC # BLD AUTO: 4.58 K/UL (ref 3.9–12.7)

## 2023-06-15 PROCEDURE — 80053 COMPREHEN METABOLIC PANEL: CPT | Performed by: INTERNAL MEDICINE

## 2023-06-15 PROCEDURE — 36415 COLL VENOUS BLD VENIPUNCTURE: CPT | Mod: PO | Performed by: INTERNAL MEDICINE

## 2023-06-15 PROCEDURE — 85025 COMPLETE CBC W/AUTO DIFF WBC: CPT | Performed by: INTERNAL MEDICINE

## 2023-06-15 PROCEDURE — 84443 ASSAY THYROID STIM HORMONE: CPT | Performed by: INTERNAL MEDICINE

## 2023-06-15 RX ORDER — MIRTAZAPINE 30 MG/1
30 TABLET, FILM COATED ORAL NIGHTLY
Qty: 90 TABLET | Refills: 3 | Status: SHIPPED | OUTPATIENT
Start: 2023-06-15

## 2023-06-15 NOTE — TELEPHONE ENCOUNTER
No care due was identified.  [facility] Embedded Care Due Messages. Reference number: [id number].   6/15/2023 9:39:46 AM CDT

## 2023-06-16 ENCOUNTER — PATIENT MESSAGE (OUTPATIENT)
Dept: FAMILY MEDICINE | Facility: CLINIC | Age: 73
End: 2023-06-16
Payer: MEDICARE

## 2023-06-16 RX ORDER — AMOXICILLIN 875 MG/1
875 TABLET, FILM COATED ORAL 2 TIMES DAILY
Qty: 20 TABLET | Refills: 0 | Status: SHIPPED | OUTPATIENT
Start: 2023-06-16 | End: 2023-09-29

## 2023-06-16 NOTE — TELEPHONE ENCOUNTER
Please let her know that her urine has infection. I am sending amoxicillin to take bid for 10 days to the pharm.    Her other labs look good and we will discuss at her upcoming appt    Thanks

## 2023-06-20 ENCOUNTER — OFFICE VISIT (OUTPATIENT)
Dept: FAMILY MEDICINE | Facility: CLINIC | Age: 73
End: 2023-06-20
Payer: MEDICARE

## 2023-06-20 VITALS
DIASTOLIC BLOOD PRESSURE: 72 MMHG | HEART RATE: 73 BPM | TEMPERATURE: 98 F | SYSTOLIC BLOOD PRESSURE: 126 MMHG | HEIGHT: 65 IN | OXYGEN SATURATION: 98 % | WEIGHT: 154.56 LBS | BODY MASS INDEX: 25.75 KG/M2

## 2023-06-20 DIAGNOSIS — E78.5 HYPERLIPIDEMIA, UNSPECIFIED HYPERLIPIDEMIA TYPE: ICD-10-CM

## 2023-06-20 DIAGNOSIS — N30.01 ACUTE CYSTITIS WITH HEMATURIA: ICD-10-CM

## 2023-06-20 DIAGNOSIS — F51.01 PRIMARY INSOMNIA: ICD-10-CM

## 2023-06-20 DIAGNOSIS — F31.12 BIPOLAR 1 DISORDER WITH MODERATE MANIA: Primary | ICD-10-CM

## 2023-06-20 LAB
BILIRUBIN, UA POC OHS: NEGATIVE
BLOOD, UA POC OHS: ABNORMAL
CLARITY, UA POC OHS: CLEAR
COLOR, UA POC OHS: YELLOW
GLUCOSE, UA POC OHS: NEGATIVE
KETONES, UA POC OHS: NEGATIVE
LEUKOCYTES, UA POC OHS: NEGATIVE
NITRITE, UA POC OHS: NEGATIVE
PH, UA POC OHS: 6.5
PROTEIN, UA POC OHS: NEGATIVE
SPECIFIC GRAVITY, UA POC OHS: 1.02
UROBILINOGEN, UA POC OHS: 0.2

## 2023-06-20 PROCEDURE — 1159F PR MEDICATION LIST DOCUMENTED IN MEDICAL RECORD: ICD-10-PCS | Mod: CPTII,S$GLB,, | Performed by: INTERNAL MEDICINE

## 2023-06-20 PROCEDURE — 81003 POCT URINALYSIS(INSTRUMENT): ICD-10-PCS | Mod: QW,S$GLB,, | Performed by: INTERNAL MEDICINE

## 2023-06-20 PROCEDURE — 3288F FALL RISK ASSESSMENT DOCD: CPT | Mod: CPTII,S$GLB,, | Performed by: INTERNAL MEDICINE

## 2023-06-20 PROCEDURE — 3074F SYST BP LT 130 MM HG: CPT | Mod: CPTII,S$GLB,, | Performed by: INTERNAL MEDICINE

## 2023-06-20 PROCEDURE — 1101F PT FALLS ASSESS-DOCD LE1/YR: CPT | Mod: CPTII,S$GLB,, | Performed by: INTERNAL MEDICINE

## 2023-06-20 PROCEDURE — 4010F ACE/ARB THERAPY RXD/TAKEN: CPT | Mod: CPTII,S$GLB,, | Performed by: INTERNAL MEDICINE

## 2023-06-20 PROCEDURE — 1126F AMNT PAIN NOTED NONE PRSNT: CPT | Mod: CPTII,S$GLB,, | Performed by: INTERNAL MEDICINE

## 2023-06-20 PROCEDURE — 3008F PR BODY MASS INDEX (BMI) DOCUMENTED: ICD-10-PCS | Mod: CPTII,S$GLB,, | Performed by: INTERNAL MEDICINE

## 2023-06-20 PROCEDURE — 3074F PR MOST RECENT SYSTOLIC BLOOD PRESSURE < 130 MM HG: ICD-10-PCS | Mod: CPTII,S$GLB,, | Performed by: INTERNAL MEDICINE

## 2023-06-20 PROCEDURE — 1101F PR PT FALLS ASSESS DOC 0-1 FALLS W/OUT INJ PAST YR: ICD-10-PCS | Mod: CPTII,S$GLB,, | Performed by: INTERNAL MEDICINE

## 2023-06-20 PROCEDURE — 3078F DIAST BP <80 MM HG: CPT | Mod: CPTII,S$GLB,, | Performed by: INTERNAL MEDICINE

## 2023-06-20 PROCEDURE — 99214 OFFICE O/P EST MOD 30 MIN: CPT | Mod: S$GLB,,, | Performed by: INTERNAL MEDICINE

## 2023-06-20 PROCEDURE — 3288F PR FALLS RISK ASSESSMENT DOCUMENTED: ICD-10-PCS | Mod: CPTII,S$GLB,, | Performed by: INTERNAL MEDICINE

## 2023-06-20 PROCEDURE — 1126F PR PAIN SEVERITY QUANTIFIED, NO PAIN PRESENT: ICD-10-PCS | Mod: CPTII,S$GLB,, | Performed by: INTERNAL MEDICINE

## 2023-06-20 PROCEDURE — 99214 PR OFFICE/OUTPT VISIT, EST, LEVL IV, 30-39 MIN: ICD-10-PCS | Mod: S$GLB,,, | Performed by: INTERNAL MEDICINE

## 2023-06-20 PROCEDURE — 4010F PR ACE/ARB THEARPY RXD/TAKEN: ICD-10-PCS | Mod: CPTII,S$GLB,, | Performed by: INTERNAL MEDICINE

## 2023-06-20 PROCEDURE — 3078F PR MOST RECENT DIASTOLIC BLOOD PRESSURE < 80 MM HG: ICD-10-PCS | Mod: CPTII,S$GLB,, | Performed by: INTERNAL MEDICINE

## 2023-06-20 PROCEDURE — 1159F MED LIST DOCD IN RCRD: CPT | Mod: CPTII,S$GLB,, | Performed by: INTERNAL MEDICINE

## 2023-06-20 PROCEDURE — 3008F BODY MASS INDEX DOCD: CPT | Mod: CPTII,S$GLB,, | Performed by: INTERNAL MEDICINE

## 2023-06-20 PROCEDURE — 87086 URINE CULTURE/COLONY COUNT: CPT | Performed by: INTERNAL MEDICINE

## 2023-06-20 PROCEDURE — 1160F PR REVIEW ALL MEDS BY PRESCRIBER/CLIN PHARMACIST DOCUMENTED: ICD-10-PCS | Mod: CPTII,S$GLB,, | Performed by: INTERNAL MEDICINE

## 2023-06-20 PROCEDURE — 81003 URINALYSIS AUTO W/O SCOPE: CPT | Mod: QW,S$GLB,, | Performed by: INTERNAL MEDICINE

## 2023-06-20 PROCEDURE — 1160F RVW MEDS BY RX/DR IN RCRD: CPT | Mod: CPTII,S$GLB,, | Performed by: INTERNAL MEDICINE

## 2023-06-20 RX ORDER — ATORVASTATIN CALCIUM 10 MG/1
TABLET, FILM COATED ORAL
Qty: 90 TABLET | Refills: 3 | Status: SHIPPED | OUTPATIENT
Start: 2023-06-20

## 2023-06-20 RX ORDER — PAROXETINE HYDROCHLORIDE 20 MG/1
20 TABLET, FILM COATED ORAL EVERY MORNING
Qty: 90 TABLET | Refills: 3 | Status: SHIPPED | OUTPATIENT
Start: 2023-06-20 | End: 2023-10-06 | Stop reason: DRUGHIGH

## 2023-06-20 RX ORDER — DIVALPROEX SODIUM 500 MG/1
500 TABLET, FILM COATED, EXTENDED RELEASE ORAL DAILY
Qty: 90 TABLET | Refills: 3 | Status: SHIPPED | OUTPATIENT
Start: 2023-06-20 | End: 2023-10-05 | Stop reason: ALTCHOICE

## 2023-06-20 NOTE — PATIENT INSTRUCTIONS
Check urine culture 3 days after stopping antibiotics. Drop urine off at the lab on June 30th (Friday)         Meds to restart  Atorvastatin 10 mg daily  Co Q10 once a day  Depakote 500 mg once a day    Changing  Paroxetine (paxil) 20 mg once a day.

## 2023-06-20 NOTE — PROGRESS NOTES
Subjective:       Patient ID: Yara Whitehead is a 73 y.o. female.    Medication List with Changes/Refills   New Medications    PAROXETINE (PAXIL) 20 MG TABLET    Take 1 tablet (20 mg total) by mouth every morning.   Current Medications    ACARBOSE (PRECOSE) 25 MG TAB    Take 25 mg by mouth daily as needed. When eating high carb meal    ACETAMINOPHEN (TYLENOL) 500 MG TABLET    Take 500 mg by mouth every 6 (six) hours as needed for Pain.    ALBUTEROL (PROVENTIL/VENTOLIN HFA) 90 MCG/ACTUATION INHALER    Inhale 1-2 puffs into the lungs every 6 (six) hours as needed for Wheezing.    AMOXICILLIN (AMOXIL) 875 MG TABLET    Take 1 tablet (875 mg total) by mouth 2 (two) times daily.    ASCORBIC ACID, VITAMIN C, (VITAMIN C) 1000 MG TABLET    Take 1,000 mg by mouth once daily.    CETIRIZINE (ZYRTEC) 10 MG TABLET    Take 10 mg by mouth daily as needed.     CLONIDINE (CATAPRES) 0.1 MG TABLET    TAKE 1 TABLET BY MOUTH EVERY DAY AT 9 PM FOR HIGH BLOOD PRESSURE    CYANOCOBALAMIN 1,000 MCG/ML INJECTION    INJECT 1 ML INTO THE MUSCLE EVERY 14 DAYS    GLUCOSE 4 GM CHEWABLE TABLET    Take 4 tablets when glucose from 51-70  Take 6 tablets when glucose is less than 50    LEVOTHYROXINE (SYNTHROID) 88 MCG TABLET    TAKE 1 TABLET BY MOUTH EVERYDAY AT 6AM FOR THYROID HEALTH    MAGNESIUM ORAL    Take by mouth once daily at 6am.    MIRTAZAPINE (REMERON) 30 MG TABLET    Take 1 tablet (30 mg total) by mouth nightly.    RIVAROXABAN (XARELTO) 20 MG TAB    Take 1 tablet (20 mg total) by mouth every evening.    TELMISARTAN (MICARDIS) 40 MG TAB    Take 1 tablet (40 mg total) by mouth once daily.    VITAMIN D (VITAMIN D3) 1000 UNITS TAB    Take 1,000 Units by mouth once daily.   Changed and/or Refilled Medications    Modified Medication Previous Medication    ATORVASTATIN (LIPITOR) 10 MG TABLET atorvastatin (LIPITOR) 10 MG tablet       TAKE 1 TABLET BY MOUTH  DAILY    TAKE 1 TABLET BY MOUTH  DAILY    DIVALPROEX 500 MG TB24 divalproex 500 MG Tb24  "      Take 1 tablet (500 mg total) by mouth once daily.    Take 1 tablet (500 mg total) by mouth once daily.   Discontinued Medications    DICLOFENAC SODIUM (VOLTAREN) 1 % GEL    Apply 2 g topically 3 (three) times daily.    DONEPEZIL (ARICEPT) 10 MG TABLET    TAKE 1 TABLET(10 MG) BY MOUTH EVERY EVENING    PAROXETINE (PAXIL) 40 MG TABLET    TAKE 1 TABLET BY MOUTH IN  THE MORNING    PREDNISOLON/GATIFLOX/BROMFENAC (PREDNISOL ACE-GATIFLOX-BROMFEN) 1-0.5-0.075 % DRPS    Apply 1 drop to eye 3 (three) times daily. One drop 3 times a day in surgical eye       Chief Complaint: Follow-up (Medication review and recheck urine)  She is here today to f/u on multiple issues.     She was diagnosed with enteroccous UTI on 5/22/2023 treated with amoxicillin and then a second UTI strep infection on 6/15/2023 treated with amoxicillin.  She is doing better and denies any pain with urination or blood in urine. She becomes very agitated and delirius with manic behaviors associated with her UTIs.  Now that she is on treatment she is feeling better.     Recall: She reports on 4/24/2023 she was taken to ER restrained in an ambulance with police escort.  "Someone called 911". She was admitted for 2 weeks with diagnosis of biopolar arya (per patient).  She was started on depakote 500 mg bid and continued on paxil 40 mg daily.  She was given vistaril for sleep.  She was d/c on 5/10/2023 and her medication was sent to the wrong pharmacy so she has been off depakote now for 5 days.  She presents today "jacked up" and happy.  She denies any depression or suicidal ideations. No hallucinations but she does have visions.  No homicidal ideations. No injurious behaviors.  No confusion but she is forgetful due to underlying dementia. Her [employer] friends reached out to our clinic with concern. She has "vomiting words for one hour straight" and acting manic.  Her daughter is on the phone in today's visit with similar concerns. She was called by " scheduling from [facility] in [facility] who tried to schedule pickup for her group therapy that was initiated by [facility].  She had difficulty making that appt and understanding the nature of that appt.  She is willing to go to therapy and establish with psychiatry.   She is currently off all psychiatry medication. She stopped taking depkote and paxil. She stopped aricept for dementia.  She is doing better. She denies any further manic episodes. No pressured speech or difficulty staying on task. Her daughter has been visiting and left yesterday. She did well with her daughter here and reports her daughter had no concerns during her stay.  She does have some depression but denies suicidal ideations. She has anxiety and insomnia. She has remeron and clonidine that she uses PRN for sleep.     Review of Systems   Constitutional:  Negative for appetite change, fatigue, fever and unexpected weight change.   HENT:  Negative for congestion, ear pain, hearing loss, sore throat and trouble swallowing.    Eyes:  Negative for pain and visual disturbance.   Respiratory:  Negative for cough, chest tightness, shortness of breath and wheezing.    Cardiovascular:  Negative for chest pain, palpitations and leg swelling.   Gastrointestinal:  Negative for abdominal pain, blood in stool, constipation, diarrhea, nausea and vomiting.   Endocrine: Negative for polyuria.   Genitourinary:  Negative for dysuria and hematuria.   Musculoskeletal:  Negative for arthralgias, back pain and myalgias.   Skin:  Negative for rash.   Neurological:  Negative for dizziness, weakness, numbness and headaches.   Hematological:  Does not bruise/bleed easily.   Psychiatric/Behavioral:  Negative for dysphoric mood, sleep disturbance and suicidal ideas. The patient is nervous/anxious.      Objective:      Vitals:    06/20/23 1112   BP: 126/72   BP Location: Left arm   Patient Position: Sitting   BP Method: Medium (Manual)   Pulse: 73  "  Temp: 98.2 °F (36.8 °C)   SpO2: 98%   Weight: 70.1 kg (154 lb 8.7 oz)   Height: 5' 5" (1.651 m)     Body mass index is 25.72 kg/m².  Physical Exam    General appearance: No acute distress, cooperative, alert, calm   Neck: FROM, soft, supple, no thyromegaly, no bruits  Lymph: no anterior or posterior cervical adenopathy  Heart::  Regular rate and rhythm, no murmur  Lung: Clear to ascultation bilaterally, no wheezing, no rales, no rhonchi, no distress  Abdomen: Soft, nontender, no distention, no hepatosplenomegaly, bowel sounds normal, no guarding, no rebound, no peritoneal signs  Skin: no rashes, no lesions  Extremities: no edema, no cyanosis  Peripheral pulses: 2+ pedal pulses bilaterally, good perfusion and color      Assessment:       1. Bipolar 1 disorder with moderate arya    2. Primary insomnia    3. Acute cystitis with hematuria    4. Hyperlipidemia, unspecified hyperlipidemia type        Plan:       Bipolar 1 disorder with moderate arya  She is doing better today but is off treatment. I do feel her UTI played a role in the severity of her symptoms. Will restart paxil at 20 mg daily and depakote 500 mg daily. Recheck in 3 weeks.   -     paroxetine (PAXIL) 20 MG tablet; Take 1 tablet (20 mg total) by mouth every morning.  Dispense: 90 tablet; Refill: 3  -     divalproex 500 MG Tb24; Take 1 tablet (500 mg total) by mouth once daily.  Dispense: 90 tablet; Refill: 3    Primary insomnia  Stable on mirtazapine and clonidine nightly    Acute cystitis with hematuria  She is currently on her second course of treatment. Will recheck urine studies 3 days after completion of abx for clearance.   -     Urine culture; Future; Expected date: 06/20/2023  -     POCT Urinalysis(Instrument)  -     CULTURE, URINE; Future; Expected date: 07/04/2023    Hyperlipidemia, unspecified hyperlipidemia type  Uncontrolled and advised to restart atorvastatin.   -     atorvastatin (LIPITOR) 10 MG tablet; TAKE 1 TABLET BY MOUTH  DAILY  " Dispense: 90 tablet; Refill: 3      Follow up in about 3 weeks (around 7/11/2023) for recheck bipolar symptoms .

## 2023-06-21 LAB — BACTERIA UR CULT: NORMAL

## 2023-06-30 ENCOUNTER — LAB VISIT (OUTPATIENT)
Dept: LAB | Facility: HOSPITAL | Age: 73
End: 2023-06-30
Attending: INTERNAL MEDICINE
Payer: MEDICARE

## 2023-06-30 DIAGNOSIS — N30.01 ACUTE CYSTITIS WITH HEMATURIA: ICD-10-CM

## 2023-06-30 PROCEDURE — 87086 URINE CULTURE/COLONY COUNT: CPT | Performed by: INTERNAL MEDICINE

## 2023-06-30 PROCEDURE — 87077 CULTURE AEROBIC IDENTIFY: CPT | Mod: 59 | Performed by: INTERNAL MEDICINE

## 2023-06-30 PROCEDURE — 87088 URINE BACTERIA CULTURE: CPT | Performed by: INTERNAL MEDICINE

## 2023-06-30 PROCEDURE — 87186 SC STD MICRODIL/AGAR DIL: CPT | Mod: 59 | Performed by: INTERNAL MEDICINE

## 2023-07-03 ENCOUNTER — PATIENT MESSAGE (OUTPATIENT)
Dept: FAMILY MEDICINE | Facility: CLINIC | Age: 73
End: 2023-07-03
Payer: MEDICARE

## 2023-07-03 LAB
BACTERIA UR CULT: ABNORMAL
BACTERIA UR CULT: ABNORMAL

## 2023-07-03 RX ORDER — CIPROFLOXACIN 500 MG/1
500 TABLET ORAL EVERY 12 HOURS
Qty: 20 TABLET | Refills: 0 | Status: SHIPPED | OUTPATIENT
Start: 2023-07-03 | End: 2023-09-29

## 2023-07-03 RX ORDER — OXYBUTYNIN CHLORIDE 5 MG/1
5 TABLET, EXTENDED RELEASE ORAL DAILY
Qty: 90 TABLET | Refills: 1 | Status: SHIPPED | OUTPATIENT
Start: 2023-07-03 | End: 2023-11-14

## 2023-07-03 NOTE — TELEPHONE ENCOUNTER
Please let her know that she has not cleared her infection.     I am sending cipro to take bid for 10 days to the pharm    Thanks

## 2023-07-03 NOTE — TELEPHONE ENCOUNTER
"Contacted pt to notify of un-cleared UTI. Notified pt Dr. Richards sent cipro to take bid for 10 days to the pharmacy. Pt verbalized understanding.    Pt is requesting a rx for oxybutynin 5mg. She states "I am having a time, and if this would help, I'd really appreciate it." She reports she is experiencing urgency, frequency, and a loss of bladder control. Please see pended rx for review.  "

## 2023-07-03 NOTE — TELEPHONE ENCOUNTER
No care due was identified.  [facility] Embedded Care Due Messages. Reference number: [id number].   7/03/2023 8:49:12 AM CDT

## 2023-07-05 ENCOUNTER — PATIENT MESSAGE (OUTPATIENT)
Dept: FAMILY MEDICINE | Facility: CLINIC | Age: 73
End: 2023-07-05
Payer: MEDICARE

## 2023-07-05 DIAGNOSIS — N30.00 ACUTE CYSTITIS WITHOUT HEMATURIA: Primary | ICD-10-CM

## 2023-07-19 ENCOUNTER — LAB VISIT (OUTPATIENT)
Dept: LAB | Facility: HOSPITAL | Age: 73
End: 2023-07-19
Attending: INTERNAL MEDICINE
Payer: MEDICARE

## 2023-07-19 DIAGNOSIS — N30.00 ACUTE CYSTITIS WITHOUT HEMATURIA: ICD-10-CM

## 2023-07-19 LAB
BILIRUB UR QL STRIP: NEGATIVE
CLARITY UR REFRACT.AUTO: CLEAR
COLOR UR AUTO: YELLOW
GLUCOSE UR QL STRIP: NEGATIVE
HGB UR QL STRIP: NEGATIVE
KETONES UR QL STRIP: ABNORMAL
LEUKOCYTE ESTERASE UR QL STRIP: NEGATIVE
NITRITE UR QL STRIP: NEGATIVE
PH UR STRIP: 6 [PH] (ref 5–8)
PROT UR QL STRIP: NEGATIVE
SP GR UR STRIP: 1 (ref 1–1.03)
URN SPEC COLLECT METH UR: ABNORMAL

## 2023-07-19 PROCEDURE — 81003 URINALYSIS AUTO W/O SCOPE: CPT | Performed by: INTERNAL MEDICINE

## 2023-07-19 PROCEDURE — 87086 URINE CULTURE/COLONY COUNT: CPT | Performed by: INTERNAL MEDICINE

## 2023-07-20 LAB
BACTERIA UR CULT: NORMAL
BACTERIA UR CULT: NORMAL

## 2023-07-22 ENCOUNTER — PATIENT MESSAGE (OUTPATIENT)
Dept: FAMILY MEDICINE | Facility: CLINIC | Age: 73
End: 2023-07-22
Payer: MEDICARE

## 2023-07-25 ENCOUNTER — OFFICE VISIT (OUTPATIENT)
Dept: OPHTHALMOLOGY | Facility: CLINIC | Age: 73
End: 2023-07-25
Payer: MEDICARE

## 2023-07-25 DIAGNOSIS — H25.11 NUCLEAR SCLEROTIC CATARACT OF RIGHT EYE: Primary | ICD-10-CM

## 2023-07-25 DIAGNOSIS — H25.12 NUCLEAR SCLEROTIC CATARACT OF LEFT EYE: ICD-10-CM

## 2023-07-25 PROCEDURE — 1126F AMNT PAIN NOTED NONE PRSNT: CPT | Mod: CPTII,S$GLB,, | Performed by: OPHTHALMOLOGY

## 2023-07-25 PROCEDURE — 1101F PR PT FALLS ASSESS DOC 0-1 FALLS W/OUT INJ PAST YR: ICD-10-PCS | Mod: CPTII,S$GLB,, | Performed by: OPHTHALMOLOGY

## 2023-07-25 PROCEDURE — 92136 OPHTHALMIC BIOMETRY: CPT | Mod: LT,S$GLB,, | Performed by: OPHTHALMOLOGY

## 2023-07-25 PROCEDURE — 1126F PR PAIN SEVERITY QUANTIFIED, NO PAIN PRESENT: ICD-10-PCS | Mod: CPTII,S$GLB,, | Performed by: OPHTHALMOLOGY

## 2023-07-25 PROCEDURE — 1101F PT FALLS ASSESS-DOCD LE1/YR: CPT | Mod: CPTII,S$GLB,, | Performed by: OPHTHALMOLOGY

## 2023-07-25 PROCEDURE — 3288F PR FALLS RISK ASSESSMENT DOCUMENTED: ICD-10-PCS | Mod: CPTII,S$GLB,, | Performed by: OPHTHALMOLOGY

## 2023-07-25 PROCEDURE — 99999 PR PBB SHADOW E&M-EST. PATIENT-LVL II: CPT | Mod: PBBFAC,,, | Performed by: OPHTHALMOLOGY

## 2023-07-25 PROCEDURE — 99215 OFFICE O/P EST HI 40 MIN: CPT | Mod: S$GLB,,, | Performed by: OPHTHALMOLOGY

## 2023-07-25 PROCEDURE — 4010F PR ACE/ARB THEARPY RXD/TAKEN: ICD-10-PCS | Mod: CPTII,S$GLB,, | Performed by: OPHTHALMOLOGY

## 2023-07-25 PROCEDURE — 92136 IOL MASTER - OU - BOTH EYES: ICD-10-PCS | Mod: LT,S$GLB,, | Performed by: OPHTHALMOLOGY

## 2023-07-25 PROCEDURE — 4010F ACE/ARB THERAPY RXD/TAKEN: CPT | Mod: CPTII,S$GLB,, | Performed by: OPHTHALMOLOGY

## 2023-07-25 PROCEDURE — 1159F PR MEDICATION LIST DOCUMENTED IN MEDICAL RECORD: ICD-10-PCS | Mod: CPTII,S$GLB,, | Performed by: OPHTHALMOLOGY

## 2023-07-25 PROCEDURE — 99999 PR PBB SHADOW E&M-EST. PATIENT-LVL II: ICD-10-PCS | Mod: PBBFAC,,, | Performed by: OPHTHALMOLOGY

## 2023-07-25 PROCEDURE — 3288F FALL RISK ASSESSMENT DOCD: CPT | Mod: CPTII,S$GLB,, | Performed by: OPHTHALMOLOGY

## 2023-07-25 PROCEDURE — 99215 PR OFFICE/OUTPT VISIT, EST, LEVL V, 40-54 MIN: ICD-10-PCS | Mod: S$GLB,,, | Performed by: OPHTHALMOLOGY

## 2023-07-25 PROCEDURE — 1159F MED LIST DOCD IN RCRD: CPT | Mod: CPTII,S$GLB,, | Performed by: OPHTHALMOLOGY

## 2023-07-25 NOTE — PROGRESS NOTES
HPI    Ref by Dr. Eisenberg    Ocular History: Nuclear sclerosis OU                           Degenerative retinal drusen OU    Patient is here for a cataract eval. Pt states that vision in is blurry,   cloudy, and is sensitive to light/glare during the day and night. Pt.   States vision is dim and needs more lighting to see. Pt. Denies floaters,   flashes, dry eye, tearing, headaches, pain or discomfort.   Last edited by Lorene Solis on 7/25/2023  8:30 AM.            Assessment /Plan     For exam results, see Encounter Report.    Nuclear sclerotic cataract of right eye    Nuclear sclerotic cataract of left eye  -     IOL Master - OU - Both Eyes        Visually significant nuclear sclerotic cataract   - Interfering with activities of daily living.  Pt desires cataract surgery for [facility] rehabilitation.   - R/B/A discussed and pt agrees to proceed with surgery.   - IOL options discussed according to patient's goals and concomitant ocular pathology; and pt content with monofocal lens.    - Target: plano.    [id number] 19.0 range OS -- alpesh to ask les if pt already paid??  Ora    Patient has regular astigmatism that is visually significant and wishes to have an astigmatism correcting TORIC lens placed, and agrees to the out of pocket cost of $1500 per eye    ([id number] 19.0 OD)    Dry AMD  - amsler  - start AREDS

## 2023-08-21 NOTE — TELEPHONE ENCOUNTER
Care Due:                  Date            Visit Type   Department     Provider  --------------------------------------------------------------------------------                                EP -                              PRIMARY      [facility]  Last Visit: 06-      CARE ([facility])   MEDICINE       Ann Richards  Next Visit: None Scheduled  None         None Found                                                            Last  Test          Frequency    Reason                     Performed    Due Date  --------------------------------------------------------------------------------    Lipid Panel.  12 months..  atorvastatin, mirtazapine  10-   10-    [facility] Embedded Care Due Messages. Reference number: [id number].   8/21/2023 9:24:59 AM CDT

## 2023-08-23 RX ORDER — LEVOTHYROXINE SODIUM 88 UG/1
88 TABLET ORAL
Qty: 90 TABLET | Refills: 2 | Status: SHIPPED | OUTPATIENT
Start: 2023-08-23

## 2023-09-26 ENCOUNTER — TELEPHONE (OUTPATIENT)
Dept: OPHTHALMOLOGY | Facility: CLINIC | Age: 73
End: 2023-09-26
Payer: MEDICARE

## 2023-09-26 DIAGNOSIS — H25.12 NUCLEAR SCLEROTIC CATARACT OF LEFT EYE: Primary | ICD-10-CM

## 2023-09-26 RX ORDER — PREDNISOLONE ACETATE-GATIFLOXACIN-BROMFENAC .75; 5; 1 MG/ML; MG/ML; MG/ML
1 SUSPENSION/ DROPS OPHTHALMIC 3 TIMES DAILY
Qty: 5 ML | Refills: 3 | Status: SHIPPED | OUTPATIENT
Start: 2023-09-26

## 2023-09-29 ENCOUNTER — OFFICE VISIT (OUTPATIENT)
Dept: FAMILY MEDICINE | Facility: CLINIC | Age: 73
End: 2023-09-29
Payer: MEDICARE

## 2023-09-29 ENCOUNTER — LAB VISIT (OUTPATIENT)
Dept: LAB | Facility: HOSPITAL | Age: 73
End: 2023-09-29
Attending: FAMILY MEDICINE
Payer: MEDICARE

## 2023-09-29 VITALS
OXYGEN SATURATION: 99 % | BODY MASS INDEX: 25.83 KG/M2 | WEIGHT: 155 LBS | HEIGHT: 65 IN | SYSTOLIC BLOOD PRESSURE: 130 MMHG | HEART RATE: 96 BPM | DIASTOLIC BLOOD PRESSURE: 74 MMHG

## 2023-09-29 DIAGNOSIS — N30.00 ACUTE INFECTIVE CYSTITIS: ICD-10-CM

## 2023-09-29 DIAGNOSIS — N30.00 ACUTE INFECTIVE CYSTITIS: Primary | ICD-10-CM

## 2023-09-29 PROCEDURE — 1126F AMNT PAIN NOTED NONE PRSNT: CPT | Mod: CPTII,S$GLB,, | Performed by: FAMILY MEDICINE

## 2023-09-29 PROCEDURE — 1101F PR PT FALLS ASSESS DOC 0-1 FALLS W/OUT INJ PAST YR: ICD-10-PCS | Mod: CPTII,S$GLB,, | Performed by: FAMILY MEDICINE

## 2023-09-29 PROCEDURE — 3288F PR FALLS RISK ASSESSMENT DOCUMENTED: ICD-10-PCS | Mod: CPTII,S$GLB,, | Performed by: FAMILY MEDICINE

## 2023-09-29 PROCEDURE — 4010F ACE/ARB THERAPY RXD/TAKEN: CPT | Mod: CPTII,S$GLB,, | Performed by: FAMILY MEDICINE

## 2023-09-29 PROCEDURE — 1159F PR MEDICATION LIST DOCUMENTED IN MEDICAL RECORD: ICD-10-PCS | Mod: CPTII,S$GLB,, | Performed by: FAMILY MEDICINE

## 2023-09-29 PROCEDURE — 3078F DIAST BP <80 MM HG: CPT | Mod: CPTII,S$GLB,, | Performed by: FAMILY MEDICINE

## 2023-09-29 PROCEDURE — 3075F SYST BP GE 130 - 139MM HG: CPT | Mod: CPTII,S$GLB,, | Performed by: FAMILY MEDICINE

## 2023-09-29 PROCEDURE — 99999 PR PBB SHADOW E&M-EST. PATIENT-LVL IV: ICD-10-PCS | Mod: PBBFAC,,, | Performed by: FAMILY MEDICINE

## 2023-09-29 PROCEDURE — 99214 PR OFFICE/OUTPT VISIT, EST, LEVL IV, 30-39 MIN: ICD-10-PCS | Mod: S$GLB,,, | Performed by: FAMILY MEDICINE

## 2023-09-29 PROCEDURE — 3008F BODY MASS INDEX DOCD: CPT | Mod: CPTII,S$GLB,, | Performed by: FAMILY MEDICINE

## 2023-09-29 PROCEDURE — 99999 PR PBB SHADOW E&M-EST. PATIENT-LVL IV: CPT | Mod: PBBFAC,,, | Performed by: FAMILY MEDICINE

## 2023-09-29 PROCEDURE — 1101F PT FALLS ASSESS-DOCD LE1/YR: CPT | Mod: CPTII,S$GLB,, | Performed by: FAMILY MEDICINE

## 2023-09-29 PROCEDURE — 87086 URINE CULTURE/COLONY COUNT: CPT | Performed by: FAMILY MEDICINE

## 2023-09-29 PROCEDURE — 3075F PR MOST RECENT SYSTOLIC BLOOD PRESS GE 130-139MM HG: ICD-10-PCS | Mod: CPTII,S$GLB,, | Performed by: FAMILY MEDICINE

## 2023-09-29 PROCEDURE — 99214 OFFICE O/P EST MOD 30 MIN: CPT | Mod: S$GLB,,, | Performed by: FAMILY MEDICINE

## 2023-09-29 PROCEDURE — 4010F PR ACE/ARB THEARPY RXD/TAKEN: ICD-10-PCS | Mod: CPTII,S$GLB,, | Performed by: FAMILY MEDICINE

## 2023-09-29 PROCEDURE — 3008F PR BODY MASS INDEX (BMI) DOCUMENTED: ICD-10-PCS | Mod: CPTII,S$GLB,, | Performed by: FAMILY MEDICINE

## 2023-09-29 PROCEDURE — 1159F MED LIST DOCD IN RCRD: CPT | Mod: CPTII,S$GLB,, | Performed by: FAMILY MEDICINE

## 2023-09-29 PROCEDURE — 3288F FALL RISK ASSESSMENT DOCD: CPT | Mod: CPTII,S$GLB,, | Performed by: FAMILY MEDICINE

## 2023-09-29 PROCEDURE — 1126F PR PAIN SEVERITY QUANTIFIED, NO PAIN PRESENT: ICD-10-PCS | Mod: CPTII,S$GLB,, | Performed by: FAMILY MEDICINE

## 2023-09-29 PROCEDURE — 3078F PR MOST RECENT DIASTOLIC BLOOD PRESSURE < 80 MM HG: ICD-10-PCS | Mod: CPTII,S$GLB,, | Performed by: FAMILY MEDICINE

## 2023-09-29 RX ORDER — CIPROFLOXACIN 500 MG/1
500 TABLET ORAL 2 TIMES DAILY
Qty: 10 TABLET | Refills: 0 | Status: SHIPPED | OUTPATIENT
Start: 2023-09-29 | End: 2023-10-05 | Stop reason: ALTCHOICE

## 2023-09-29 NOTE — PROGRESS NOTES
THIS DOCUMENT WAS MADE IN PART WITH VOICE RECOGNITION SOFTWARE.  OCCASIONALLY THIS SOFTWARE WILL MISINTERPRET WORDS OR PHRASES.    Assessment and Plan:    1. Acute infective cystitis  ciprofloxacin HCl (CIPRO) 500 MG tablet    Urine culture          PLAN    New medications ciprofloxacin  Urine culture ordered   Continue D mannose   Follow-up PCP pbyron.  Encouraged hydration      ______________________________________________________________________  Subjective:    Chief Complaint:  Chief Complaint   Patient presents with    Urinary Tract Infection        HPI:  Yara is a 73 y.o. year old       Concerns of UTI   Has history of prior UTIs resulting in confusion, agitation   Reports recent confusion over the last few days, concerned about a UTI   Denies any lower urinary tract symptoms   Urine dipstick positive for blood, leukocytes, ketones    Past Medical History:  Past Medical History:   Diagnosis Date    Allergy     Arrhythmia     Asthma     childhood-severe.    Closed fracture of proximal end of left humerus 05/29/2016    GERD (gastroesophageal reflux disease)     Hypertension     Hypoglycemia     post gastric bypass    Hypothyroidism     ALESSANDRA (iron deficiency anemia)     Insomnia     Nephrolithiasis     had [facility]    FABIANA on CPAP     PAF (paroxysmal atrial fibrillation)     Status post placement of implantable loop recorder 03/18/2016    Subaortic membrane     Subarachnoid hematoma 01/01/2019    Subdural hematoma     Vitamin B 12 deficiency     Vitamin D deficiency        Past Surgical History:  Past Surgical History:   Procedure Laterality Date    abdomenalplasty      ABLATION OF ARRHYTHMOGENIC FOCUS FOR ATRIAL FIBRILLATION N/A 4/15/2019    Procedure: Ablation atrial fibrillation;  Surgeon: Edmund Shah MD;  Location: [facility] EP LAB;  Service: Cardiology;  Laterality: N/A;  AF, PRISCILLA, PVI, Cryo, MARY, Gen, SK, 3 Prep    COLONOSCOPY  2012    normal, repeat in 5 years    COLONOSCOPY N/A 10/27/2020     Procedure: COLONOSCOPY;  Surgeon: Bud Valentin Jr., MD;  Location: [facility] ENDO;  Service: Endoscopy;  Laterality: N/A;    EXTRACORPOREAL SHOCK WAVE LITHOTRIPSY      [place]    FRACTURE SURGERY Left     has plates and screws in place.-shoulder left    GALLBLADDER SURGERY  1995    GASTRIC BYPASS  1997    HERNIA REPAIR  1998    HYSTERECTOMY  2013    due to vaginal prolpase with BSO - also bladder suspension at the same time - [facility]    INSERTION OF IMPLANTABLE LOOP RECORDER N/A 7/19/2019    Procedure: Insertion, Implantable Loop Recorder;  Surgeon: Neftali Hernandez MD;  Location: [facility] CATH;  Service: Cardiology;  Laterality: N/A;    REMOVAL OF IMPLANTABLE LOOP RECORDER N/A 7/19/2019    Procedure: REMOVAL, IMPLANTABLE LOOP RECORDER;  Surgeon: Neftali Hernandez MD;  Location: [facility] CATH;  Service: Cardiology;  Laterality: N/A;    TYMPANOPLASTY Left     replaced and then repair x2        Family History:  Family History   Problem Relation Age of Onset    Aneurysm Mother     Hypertension Mother     Cancer Mother         uterine     Glaucoma Mother     Macular degeneration Mother     Alzheimer's disease Father     Diabetes Father     Mental illness Sister     No Known Problems Brother     No Known Problems Daughter     No Known Problems Son     Macular degeneration Maternal Grandmother     Stroke Maternal Grandfather     Glaucoma Maternal Grandfather     COPD Paternal Grandmother     Cancer Paternal Grandmother         colon    Alzheimer's disease Paternal Grandfather     Heart disease Paternal Grandfather     Nephrolithiasis Neg Hx        Social History:  Social History     Socioeconomic History    Marital status:     Number of children: 2   Occupational History    Occupation: artist    Occupation: retired   Tobacco Use    Smoking status: Former     Current packs/day: 0.00     Average packs/day: 2.0 packs/day for 20.0 years (40.0 ttl pk-yrs)     Types: Cigarettes     Start date: 11/17/1975     Quit  date: 1995     Years since quittin.8    Smokeless tobacco: Never   Substance and Sexual Activity    Alcohol use: Yes     Alcohol/week: 7.0 standard drinks of alcohol     Types: 7 Glasses of wine per week     Comment: one glass wine nightly    Drug use: Yes     Types: Other-see comments     Comment: THC rub on neck/back/knees ocasionally    Sexual activity: Not Currently     Social Determinants of Health     Financial Resource Strain: Low Risk  (2023)    Overall Financial Resource Strain (CARDIA)     Difficulty of Paying Living Expenses: Not hard at all   Food Insecurity: No Food Insecurity (2023)    Hunger Vital Sign     Worried About Running Out of Food in the Last Year: Never true     Ran Out of Food in the Last Year: Never true   Transportation Needs: No Transportation Needs (2023)    PRAPARE - Transportation     Lack of Transportation (Medical): No     Lack of Transportation (Non-Medical): No   Physical Activity: Insufficiently Active (2023)    Exercise Vital Sign     Days of Exercise per Week: 1 day     Minutes of Exercise per Session: 30 min   Stress: No Stress Concern Present (2023)    [employer] - Occupational Stress Questionnaire     Feeling of Stress : Only a little   Social Connections: Moderately Integrated (2023)    Social Connection and Isolation Panel [NHANES]     Frequency of Communication with Friends and Family: More than three times a week     Frequency of Social Gatherings with Friends and Family: More than three times a week     Attends Druze Services: More than 4 times per year     Active Member of Clubs or Organizations: Yes     Attends Club or Organization Meetings: More than 4 times per year     Marital Status:    Housing Stability: Low Risk  (2023)    Housing Stability Vital Sign     Unable to Pay for Housing in the Last Year: No     Number of Places Lived in the Last Year: 1     Unstable Housing in the  Last Year: No       Medications:  Current Outpatient Medications on File Prior to Visit   Medication Sig Dispense Refill    acarbose (PRECOSE) 25 MG Tab Take 25 mg by mouth daily as needed. When eating high carb meal      acetaminophen (TYLENOL) 500 MG tablet Take 500 mg by mouth every 6 (six) hours as needed for Pain.      albuterol (PROVENTIL/VENTOLIN HFA) 90 mcg/actuation inhaler Inhale 1-2 puffs into the lungs every 6 (six) hours as needed for Wheezing. 18 g 6    atorvastatin (LIPITOR) 10 MG tablet TAKE 1 TABLET BY MOUTH  DAILY 90 tablet 3    cetirizine (ZYRTEC) 10 MG tablet Take 10 mg by mouth daily as needed.       cyanocobalamin 1,000 mcg/mL injection INJECT 1 ML INTO THE MUSCLE EVERY 14 DAYS 6 mL 3    glucose 4 GM chewable tablet Take 4 tablets when glucose from 51-70  Take 6 tablets when glucose is less than 50 120 tablet 12    levothyroxine (SYNTHROID) 88 MCG tablet Take 1 tablet (88 mcg total) by mouth before breakfast. 90 tablet 2    mirtazapine (REMERON) 30 MG tablet Take 1 tablet (30 mg total) by mouth nightly. 90 tablet 3    oxybutynin (DITROPAN-XL) 5 MG TR24 Take 1 tablet (5 mg total) by mouth once daily. 90 tablet 1    paroxetine (PAXIL) 20 MG tablet Take 1 tablet (20 mg total) by mouth every morning. 90 tablet 3    rivaroxaban (XARELTO) 20 mg Tab Take 1 tablet (20 mg total) by mouth every evening. 90 tablet 4    telmisartan (MICARDIS) 40 MG Tab Take 1 tablet (40 mg total) by mouth once daily. 90 tablet 3    vitamin D (VITAMIN D3) 1000 units Tab Take 1,000 Units by mouth once daily.      amoxicillin (AMOXIL) 875 MG tablet Take 1 tablet (875 mg total) by mouth 2 (two) times daily. (Patient not taking: Reported on 9/29/2023) 20 tablet 0    ascorbic acid, vitamin C, (VITAMIN C) 1000 MG tablet Take 1,000 mg by mouth once daily.      ciprofloxacin HCl (CIPRO) 500 MG tablet Take 1 tablet (500 mg total) by mouth every 12 (twelve) hours. (Patient not taking: Reported on 9/29/2023) 20 tablet 0    cloNIDine  "(CATAPRES) 0.1 MG tablet TAKE 1 TABLET BY MOUTH EVERY DAY AT 9 PM FOR HIGH BLOOD PRESSURE (Patient not taking: Reported on 9/29/2023) 90 tablet 3    divalproex 500 MG Tb24 Take 1 tablet (500 mg total) by mouth once daily. (Patient not taking: Reported on 9/29/2023) 90 tablet 3    MAGNESIUM ORAL Take by mouth once daily at 6am.      prednisolon/gatiflox/bromfenac (PREDNISOL ACE-GATIFLOX-BROMFEN) 1-0.5-0.075 % DrpS Apply 1 drop to eye 3 (three) times daily. 5 mL 3     No current facility-administered medications on file prior to visit.       Allergies:  Patient has no known allergies.    Immunizations:  Immunization History   Administered Date(s) Administered    COVID-19, MRNA, LN-S, PF (Pfizer) (Purple Cap) 02/26/2021, 03/19/2021, 11/20/2021    COVID-19, mRNA, LNP-S, bivalent booster, PF (PFIZER OMICRON) 11/30/2022    Influenza 10/19/2015, 11/09/2019, 11/20/2021    Influenza (FLUAD) - Quadrivalent - Adjuvanted - PF *Preferred* (65+) 09/28/2020, 09/20/2022    Influenza (FLUAD) - Trivalent - Adjuvanted - PF (65+) 11/12/2019    Influenza - High Dose - PF (65 years and older) 11/08/2016, 02/02/2018, 12/04/2018    Influenza - Quadrivalent - High Dose - PF (65 years and older) 11/20/2021    Pneumococcal Conjugate - 13 Valent 02/16/2016    Pneumococcal Polysaccharide - 23 Valent 04/04/2017    Tdap 11/08/2016    Zoster Recombinant 06/08/2022, 12/06/2022       Review of Systems:  Review of Systems   All other systems reviewed and are negative.      Objective:    Vitals:  Vitals:    09/29/23 1445   BP: 130/74   Pulse: 96   SpO2: 99%   Weight: 70.3 kg (155 lb)   Height: 5' 5" (1.651 m)   PainSc: 0-No pain       Physical Exam  Vitals reviewed.   Constitutional:       General: She is not in acute distress.     Appearance: She is well-developed.   HENT:      Head: Normocephalic and atraumatic.   Pulmonary:      Effort: Pulmonary effort is normal. No respiratory distress.   Musculoskeletal:      Cervical back: Normal range of " motion.   Psychiatric:         Behavior: Behavior normal.         Thought Content: Thought content normal.         Judgment: Judgment normal.             Hector Whitten MD  Family Medicine

## 2023-09-30 LAB
BACTERIA UR CULT: NORMAL
BACTERIA UR CULT: NORMAL

## 2023-10-05 ENCOUNTER — OFFICE VISIT (OUTPATIENT)
Dept: FAMILY MEDICINE | Facility: CLINIC | Age: 73
End: 2023-10-05
Payer: MEDICARE

## 2023-10-05 ENCOUNTER — TELEPHONE (OUTPATIENT)
Dept: OPHTHALMOLOGY | Facility: CLINIC | Age: 73
End: 2023-10-05
Payer: MEDICARE

## 2023-10-05 VITALS
HEART RATE: 88 BPM | WEIGHT: 150.44 LBS | DIASTOLIC BLOOD PRESSURE: 66 MMHG | TEMPERATURE: 98 F | OXYGEN SATURATION: 97 % | BODY MASS INDEX: 25.07 KG/M2 | HEIGHT: 65 IN | SYSTOLIC BLOOD PRESSURE: 126 MMHG

## 2023-10-05 DIAGNOSIS — N39.0 URINARY TRACT INFECTION WITHOUT HEMATURIA, SITE UNSPECIFIED: Primary | ICD-10-CM

## 2023-10-05 DIAGNOSIS — R41.3 MEMORY IMPAIRMENT: ICD-10-CM

## 2023-10-05 DIAGNOSIS — H25.11 NUCLEAR SCLEROTIC CATARACT OF RIGHT EYE: Primary | ICD-10-CM

## 2023-10-05 DIAGNOSIS — F43.22 ADJUSTMENT DISORDER WITH ANXIOUS MOOD: ICD-10-CM

## 2023-10-05 DIAGNOSIS — F33.0 MILD EPISODE OF RECURRENT MAJOR DEPRESSIVE DISORDER: ICD-10-CM

## 2023-10-05 LAB
BILIRUBIN, UA POC OHS: NEGATIVE
BLOOD, UA POC OHS: NEGATIVE
CLARITY, UA POC OHS: ABNORMAL
COLOR, UA POC OHS: ABNORMAL
GLUCOSE, UA POC OHS: NEGATIVE
KETONES, UA POC OHS: NEGATIVE
LEUKOCYTES, UA POC OHS: NEGATIVE
NITRITE, UA POC OHS: NEGATIVE
PH, UA POC OHS: 5.5
PROTEIN, UA POC OHS: 30
SPECIFIC GRAVITY, UA POC OHS: >=1.03
UROBILINOGEN, UA POC OHS: 0.2

## 2023-10-05 PROCEDURE — 1159F PR MEDICATION LIST DOCUMENTED IN MEDICAL RECORD: ICD-10-PCS | Mod: CPTII,S$GLB,, | Performed by: NURSE PRACTITIONER

## 2023-10-05 PROCEDURE — 3078F PR MOST RECENT DIASTOLIC BLOOD PRESSURE < 80 MM HG: ICD-10-PCS | Mod: CPTII,S$GLB,, | Performed by: NURSE PRACTITIONER

## 2023-10-05 PROCEDURE — 1101F PR PT FALLS ASSESS DOC 0-1 FALLS W/OUT INJ PAST YR: ICD-10-PCS | Mod: CPTII,S$GLB,, | Performed by: NURSE PRACTITIONER

## 2023-10-05 PROCEDURE — 3074F SYST BP LT 130 MM HG: CPT | Mod: CPTII,S$GLB,, | Performed by: NURSE PRACTITIONER

## 2023-10-05 PROCEDURE — 1159F MED LIST DOCD IN RCRD: CPT | Mod: CPTII,S$GLB,, | Performed by: NURSE PRACTITIONER

## 2023-10-05 PROCEDURE — 3008F BODY MASS INDEX DOCD: CPT | Mod: CPTII,S$GLB,, | Performed by: NURSE PRACTITIONER

## 2023-10-05 PROCEDURE — 99214 PR OFFICE/OUTPT VISIT, EST, LEVL IV, 30-39 MIN: ICD-10-PCS | Mod: S$GLB,,, | Performed by: NURSE PRACTITIONER

## 2023-10-05 PROCEDURE — 1160F PR REVIEW ALL MEDS BY PRESCRIBER/CLIN PHARMACIST DOCUMENTED: ICD-10-PCS | Mod: CPTII,S$GLB,, | Performed by: NURSE PRACTITIONER

## 2023-10-05 PROCEDURE — 3288F FALL RISK ASSESSMENT DOCD: CPT | Mod: CPTII,S$GLB,, | Performed by: NURSE PRACTITIONER

## 2023-10-05 PROCEDURE — 81003 POCT URINALYSIS(INSTRUMENT): ICD-10-PCS | Mod: QW,S$GLB,, | Performed by: NURSE PRACTITIONER

## 2023-10-05 PROCEDURE — 1101F PT FALLS ASSESS-DOCD LE1/YR: CPT | Mod: CPTII,S$GLB,, | Performed by: NURSE PRACTITIONER

## 2023-10-05 PROCEDURE — 81003 URINALYSIS AUTO W/O SCOPE: CPT | Mod: QW,S$GLB,, | Performed by: NURSE PRACTITIONER

## 2023-10-05 PROCEDURE — 3074F PR MOST RECENT SYSTOLIC BLOOD PRESSURE < 130 MM HG: ICD-10-PCS | Mod: CPTII,S$GLB,, | Performed by: NURSE PRACTITIONER

## 2023-10-05 PROCEDURE — 4010F ACE/ARB THERAPY RXD/TAKEN: CPT | Mod: CPTII,S$GLB,, | Performed by: NURSE PRACTITIONER

## 2023-10-05 PROCEDURE — 99214 OFFICE O/P EST MOD 30 MIN: CPT | Mod: S$GLB,,, | Performed by: NURSE PRACTITIONER

## 2023-10-05 PROCEDURE — 3288F PR FALLS RISK ASSESSMENT DOCUMENTED: ICD-10-PCS | Mod: CPTII,S$GLB,, | Performed by: NURSE PRACTITIONER

## 2023-10-05 PROCEDURE — 4010F PR ACE/ARB THEARPY RXD/TAKEN: ICD-10-PCS | Mod: CPTII,S$GLB,, | Performed by: NURSE PRACTITIONER

## 2023-10-05 PROCEDURE — 1160F RVW MEDS BY RX/DR IN RCRD: CPT | Mod: CPTII,S$GLB,, | Performed by: NURSE PRACTITIONER

## 2023-10-05 PROCEDURE — 1126F PR PAIN SEVERITY QUANTIFIED, NO PAIN PRESENT: ICD-10-PCS | Mod: CPTII,S$GLB,, | Performed by: NURSE PRACTITIONER

## 2023-10-05 PROCEDURE — 1126F AMNT PAIN NOTED NONE PRSNT: CPT | Mod: CPTII,S$GLB,, | Performed by: NURSE PRACTITIONER

## 2023-10-05 PROCEDURE — 3008F PR BODY MASS INDEX (BMI) DOCUMENTED: ICD-10-PCS | Mod: CPTII,S$GLB,, | Performed by: NURSE PRACTITIONER

## 2023-10-05 PROCEDURE — 3078F DIAST BP <80 MM HG: CPT | Mod: CPTII,S$GLB,, | Performed by: NURSE PRACTITIONER

## 2023-10-05 RX ORDER — PREDNISOLONE ACETATE-GATIFLOXACIN-BROMFENAC .75; 5; 1 MG/ML; MG/ML; MG/ML
1 SUSPENSION/ DROPS OPHTHALMIC 3 TIMES DAILY
Qty: 5 ML | Refills: 3 | Status: SHIPPED | OUTPATIENT
Start: 2023-10-05

## 2023-10-05 RX ORDER — DONEPEZIL HYDROCHLORIDE 10 MG/1
10 TABLET, FILM COATED ORAL NIGHTLY
Qty: 90 TABLET | Refills: 2
Start: 2023-10-05 | End: 2024-01-31

## 2023-10-05 RX ORDER — ACYCLOVIR 200 MG/1
200 CAPSULE ORAL
COMMUNITY
Start: 2023-05-10

## 2023-10-05 NOTE — PROGRESS NOTES
Answers submitted by the patient for this visit:  Review of Systems Questionnaire (Submitted on 10/2/2023)  activity change: Yes  unexpected weight change: Yes  neck pain: Yes  hearing loss: Yes  rhinorrhea: Yes  trouble swallowing: No  eye discharge: No  visual disturbance: Yes  chest tightness: No  wheezing: No  chest pain: No  palpitations: No  blood in stool: No  constipation: No  vomiting: No  diarrhea: No  polydipsia: No  polyuria: Yes  difficulty urinating: No  hematuria: No  menstrual problem: No  dysuria: No  joint swelling: No  arthralgias: No  headaches: No  weakness: No  confusion: Yes  dysphoric mood: Yes    Subjective:       Patient ID: Yara Whitehead is a 73 y.o. female.    Chief Complaint: Urinary Tract Infection (Pt will like to retest and make sure infection is gone, pt reports of having no symptoms of UTI) and Constipation    Urinary Tract Infection   Associated symptoms include constipation (chronic issue). Pertinent negatives include no frequency, hematuria or vomiting.   Constipation  Pertinent negatives include no diarrhea, difficulty urinating, fever or vomiting.     New patient to me, states she was diagnosed with UTI on 9/29. She has completed antibiotics. She wants to have urine rechecked to make sure infection is resolved. She is having no UTI symptoms.     She wants to start back on Aricept and increase her Paxil back to 40 mg.  She requests that I speak with Dr. Mujica's about this and let her know what she recommends. States she was having anxiety, stress the past few days and that usually happens when she has UTI. She wants to make sure that the infection is all cleared up. She feels that she would be better if she could increase the paxil back to the higher dose. Denies feeling depressed.  No thoughts of SI/HI.     See ROS    The following portion of the patients history was reviewed and updated as appropriate: allergies, current medications, past medical and surgical history.  Past social history and problem list reviewed. Family PMH and Past social history reviewed. Tobacco, Illicit drug use reviewed.      Review of patient's allergies indicates:  No Known Allergies      Current Outpatient Medications:     acarbose (PRECOSE) 25 MG Tab, Take 25 mg by mouth daily as needed. When eating high carb meal, Disp: , Rfl:     acetaminophen (TYLENOL) 500 MG tablet, Take 500 mg by mouth every 6 (six) hours as needed for Pain., Disp: , Rfl:     albuterol (PROVENTIL/VENTOLIN HFA) 90 mcg/actuation inhaler, Inhale 1-2 puffs into the lungs every 6 (six) hours as needed for Wheezing., Disp: 18 g, Rfl: 6    ascorbic acid, vitamin C, (VITAMIN C) 1000 MG tablet, Take 1,000 mg by mouth once daily., Disp: , Rfl:     atorvastatin (LIPITOR) 10 MG tablet, TAKE 1 TABLET BY MOUTH  DAILY, Disp: 90 tablet, Rfl: 3    cetirizine (ZYRTEC) 10 MG tablet, Take 10 mg by mouth daily as needed. , Disp: , Rfl:     ciprofloxacin HCl (CIPRO) 500 MG tablet, Take 1 tablet (500 mg total) by mouth 2 (two) times daily., Disp: 10 tablet, Rfl: 0    cyanocobalamin 1,000 mcg/mL injection, INJECT 1 ML INTO THE MUSCLE EVERY 14 DAYS, Disp: 6 mL, Rfl: 3    glucose 4 GM chewable tablet, Take 4 tablets when glucose from 51-70 Take 6 tablets when glucose is less than 50, Disp: 120 tablet, Rfl: 12    levothyroxine (SYNTHROID) 88 MCG tablet, Take 1 tablet (88 mcg total) by mouth before breakfast., Disp: 90 tablet, Rfl: 2    MAGNESIUM ORAL, Take by mouth once daily at 6am., Disp: , Rfl:     mirtazapine (REMERON) 30 MG tablet, Take 1 tablet (30 mg total) by mouth nightly., Disp: 90 tablet, Rfl: 3    oxybutynin (DITROPAN-XL) 5 MG TR24, Take 1 tablet (5 mg total) by mouth once daily., Disp: 90 tablet, Rfl: 1    paroxetine (PAXIL) 20 MG tablet, Take 1 tablet (20 mg total) by mouth every morning., Disp: 90 tablet, Rfl: 3    prednisolon/gatiflox/bromfenac (PREDNISOL ACE-GATIFLOX-BROMFEN) 1-0.5-0.075 % DrpS, Apply 1 drop to eye 3 (three) times daily.,  Disp: 5 mL, Rfl: 3    prednisolon/gatiflox/bromfenac (PREDNISOL ACE-GATIFLOX-BROMFEN) 1-0.5-0.075 % DrpS, Apply 1 drop to eye 3 (three) times daily., Disp: 5 mL, Rfl: 3    rivaroxaban (XARELTO) 20 mg Tab, Take 1 tablet (20 mg total) by mouth every evening., Disp: 90 tablet, Rfl: 4    telmisartan (MICARDIS) 40 MG Tab, Take 1 tablet (40 mg total) by mouth once daily., Disp: 90 tablet, Rfl: 3    vitamin D (VITAMIN D3) 1000 units Tab, Take 1,000 Units by mouth once daily., Disp: , Rfl:     acyclovir (ZOVIRAX) 200 MG capsule, Take 200 mg by mouth., Disp: , Rfl:     cloNIDine (CATAPRES) 0.1 MG tablet, TAKE 1 TABLET BY MOUTH EVERY DAY AT 9 PM FOR HIGH BLOOD PRESSURE (Patient not taking: Reported on 9/29/2023), Disp: 90 tablet, Rfl: 3    divalproex 500 MG Tb24, Take 1 tablet (500 mg total) by mouth once daily. (Patient not taking: Reported on 9/29/2023), Disp: 90 tablet, Rfl: 3    Past Medical History:   Diagnosis Date    Allergy     Arrhythmia     Asthma     childhood-severe.    Closed fracture of proximal end of left humerus 05/29/2016    GERD (gastroesophageal reflux disease)     Hypertension     Hypoglycemia     post gastric bypass    Hypothyroidism     ALESSANDRA (iron deficiency anemia)     Insomnia     Nephrolithiasis     had [facility] - [place]    FABIANA on CPAP     PAF (paroxysmal atrial fibrillation)     Status post placement of implantable loop recorder 03/18/2016    Subaortic membrane     Subarachnoid hematoma 01/01/2019    Subdural hematoma     Vitamin B 12 deficiency     Vitamin D deficiency        Past Surgical History:   Procedure Laterality Date    abdomenalplasty      ABLATION OF ARRHYTHMOGENIC FOCUS FOR ATRIAL FIBRILLATION N/A 4/15/2019    Procedure: Ablation atrial fibrillation;  Surgeon: Edmund Shah MD;  Location: [facility];  Service: Cardiology;  Laterality: N/A;  AF, PRISCILLA, PVI, Cryo, MARY, Gen, SK, 3 Prep    COLONOSCOPY  2012    normal, repeat in 5 years    COLONOSCOPY N/A 10/27/2020    Procedure: COLONOSCOPY;   Surgeon: Bud Valentin Jr., MD;  Location: [facility] ENDO;  Service: Endoscopy;  Laterality: N/A;    EXTRACORPOREAL SHOCK WAVE LITHOTRIPSY      [place]    FRACTURE SURGERY Left     has plates and screws in place.-shoulder left    GALLBLADDER SURGERY      GASTRIC BYPASS      HERNIA REPAIR      HYSTERECTOMY      due to vaginal prolpase with BSO - also bladder suspension at the same time - [facility]    INSERTION OF IMPLANTABLE LOOP RECORDER N/A 2019    Procedure: Insertion, Implantable Loop Recorder;  Surgeon: Neftali Hernandez MD;  Location: [facility] CATH;  Service: Cardiology;  Laterality: N/A;    REMOVAL OF IMPLANTABLE LOOP RECORDER N/A 2019    Procedure: REMOVAL, IMPLANTABLE LOOP RECORDER;  Surgeon: Neftali Hernandez MD;  Location: [facility] CATH;  Service: Cardiology;  Laterality: N/A;    TYMPANOPLASTY Left     replaced and then repair x2        Social History     Socioeconomic History    Marital status:     Number of children: 2   Occupational History    Occupation: artist    Occupation: retired   Tobacco Use    Smoking status: Former     Current packs/day: 0.00     Average packs/day: 2.0 packs/day for 20.0 years (40.0 ttl pk-yrs)     Types: Cigarettes     Start date: 1975     Quit date: 1995     Years since quittin.9    Smokeless tobacco: Never   Substance and Sexual Activity    Alcohol use: Yes     Alcohol/week: 7.0 standard drinks of alcohol     Types: 7 Glasses of wine per week     Comment: one glass wine nightly    Drug use: Yes     Types: Other-see comments     Comment: THC rub on neck/back/knees ocasionally    Sexual activity: Not Currently     Social Determinants of Health     Financial Resource Strain: Low Risk  (2023)    Overall Financial Resource Strain (CARDIA)     Difficulty of Paying Living Expenses: Not hard at all   Food Insecurity: No Food Insecurity (2023)    Hunger Vital Sign     Worried About Running Out of Food in the Last Year:  Never true     Ran Out of Food in the Last Year: Never true   Transportation Needs: No Transportation Needs (1/30/2023)    PRAPARE - Transportation     Lack of Transportation (Medical): No     Lack of Transportation (Non-Medical): No   Physical Activity: Insufficiently Active (1/30/2023)    Exercise Vital Sign     Days of Exercise per Week: 1 day     Minutes of Exercise per Session: 30 min   Stress: No Stress Concern Present (1/30/2023)    [employer] - Occupational Stress Questionnaire     Feeling of Stress : Only a little   Social Connections: Moderately Integrated (1/30/2023)    Social Connection and Isolation Panel [NHANES]     Frequency of Communication with Friends and Family: More than three times a week     Frequency of Social Gatherings with Friends and Family: More than three times a week     Attends Orthodoxy Services: More than 4 times per year     Active Member of Clubs or Organizations: Yes     Attends Club or Organization Meetings: More than 4 times per year     Marital Status:    Housing Stability: Low Risk  (1/30/2023)    Housing Stability Vital Sign     Unable to Pay for Housing in the Last Year: No     Number of Places Lived in the Last Year: 1     Unstable Housing in the Last Year: No     Review of Systems   Constitutional:  Negative for fatigue and fever.   HENT:  Positive for hearing loss and rhinorrhea. Negative for trouble swallowing.    Eyes:  Negative for discharge and visual disturbance.   Respiratory:  Negative for cough, chest tightness, shortness of breath and wheezing.    Cardiovascular:  Negative for chest pain and palpitations.   Gastrointestinal:  Positive for constipation (chronic issue). Negative for blood in stool, diarrhea and vomiting.   Endocrine: Negative for polydipsia and polyuria.   Genitourinary:  Negative for difficulty urinating, dysuria, frequency, hematuria and menstrual problem.   Musculoskeletal:  Negative for arthralgias, joint  "swelling and neck pain.   Neurological:  Negative for weakness and headaches.   Psychiatric/Behavioral:  Negative for confusion and dysphoric mood. The patient is nervous/anxious.        Objective:      /66 (BP Location: Left arm, Patient Position: Sitting, BP Method: Medium (Manual))   Pulse 88   Temp 98.4 °F (36.9 °C)   Ht 5' 5" (1.651 m)   Wt 68.2 kg (150 lb 7.4 oz)   LMP  (LMP Unknown) Comment: total  SpO2 97%   BMI 25.04 kg/m²      Physical Exam  Constitutional:       Appearance: Normal appearance. She is normal weight.   HENT:      Head: Normocephalic.   Neck:      Thyroid: No thyromegaly.   Cardiovascular:      Rate and Rhythm: Normal rate and regular rhythm.      Pulses: Normal pulses.      Heart sounds: Normal heart sounds. No murmur heard.  Pulmonary:      Effort: Pulmonary effort is normal.      Breath sounds: Normal breath sounds. No wheezing.   Abdominal:      General: Bowel sounds are normal.      Tenderness: There is no abdominal tenderness.   Musculoskeletal:      Cervical back: Normal range of motion.      Right lower leg: No edema.      Left lower leg: No edema.      Comments: Gait normal.  strong, equal   Skin:     General: Skin is warm and dry.      Capillary Refill: Capillary refill takes less than 2 seconds.   Neurological:      General: No focal deficit present.      Mental Status: She is alert.   Psychiatric:         Attention and Perception: Attention and perception normal.         Mood and Affect: Affect normal. Mood is anxious.         Speech: Speech is rapid and pressured.         Behavior: Behavior normal.         Assessment:       1. Urinary tract infection without hematuria, site unspecified    2. Mild episode of recurrent major depressive disorder    3. Adjustment disorder with anxious mood    4. Memory impairment        Plan:       Urinary tract infection without hematuria, site unspecified: urine sample is clear   -     POCT Urinalysis(Instrument)    Mild episode " of recurrent major depressive disorder: spoke with Dr. Hurtado.  Recommends to increase Paxil to 30mg.     Adjustment disorder with anxious mood: increase paxil to 30 mg    Memory impairment: ok to restart Aricept.     Other orders  -     donepeziL (ARICEPT) 10 MG tablet; Take 1 tablet (10 mg total) by mouth every evening.  Dispense: 90 tablet; Refill: 2  -     paroxetine (PAXIL) 30 MG tablet; Take 1 tablet (30 mg total) by mouth every morning.  Dispense: 90 tablet; Refill: 0    I spent a total of 30 minutes on the day of the visit.     This includes face to face time with the patient, as well as non-face to face time preparing for and completing the visit (review of prior diagnostic testing and clinical notes, obtaining or reviewing history, documenting clinical information in the EMR, independently interpreting and communicating results to the patient/family and coordinating ongoing care).         Continue current medication  Take medications only as prescribed  Healthy diet, exercise  Adequate rest  Adequate hydration  Avoid allergens  Avoid excessive caffeine      Keep follow up with PCP as scheduled.

## 2023-10-05 NOTE — Clinical Note
Please let her know that I spoke with Dr. Hurtado this morning and she wants her to increase the Paxil to 30mg.  I sent the 30mg dose to [facility]. She also said getting back on the Aricept was fine and that she could take Prevagen.

## 2023-10-06 ENCOUNTER — TELEPHONE (OUTPATIENT)
Dept: FAMILY MEDICINE | Facility: CLINIC | Age: 73
End: 2023-10-06
Payer: MEDICARE

## 2023-10-06 RX ORDER — PAROXETINE 30 MG/1
30 TABLET, FILM COATED ORAL EVERY MORNING
Qty: 90 TABLET | Refills: 0 | Status: SHIPPED | OUTPATIENT
Start: 2023-10-06 | End: 2024-01-10

## 2023-10-06 NOTE — TELEPHONE ENCOUNTER
Spoke to pt and informed her Isabelle said she spoke with Dr. Hurtado this morning and she wants her to increase the Paxil to 30mg, which Isabelle sent to the pharmacy.  She also said getting back on the Aricept was fine and that she could take Prevagen. Pt verbalizes understanding.

## 2023-10-06 NOTE — TELEPHONE ENCOUNTER
----- Message from Isabelle Pascual NP sent at 10/6/2023  9:02 AM CDT -----  Please let her know that I spoke with Dr. Hurtado this morning and she wants her to increase the Paxil to 30mg.  I sent the 30mg dose to [facility]. She also said getting back on the Aricept was fine and that she could take Prevagen.

## 2023-10-25 ENCOUNTER — TELEPHONE (OUTPATIENT)
Dept: SURGERY | Facility: HOSPITAL | Age: 73
End: 2023-10-25
Payer: MEDICARE

## 2023-10-25 ENCOUNTER — TELEPHONE (OUTPATIENT)
Dept: OPHTHALMOLOGY | Facility: CLINIC | Age: 73
End: 2023-10-25
Payer: MEDICARE

## 2023-10-25 NOTE — TELEPHONE ENCOUNTER
Good morning,     Ms. Whitehead states she has already started using her pre-op eye drops. Usually I believe instructions state to start these 3 days prior to surgery date. Her cataract procedure is Monday, 10/30. Please contact Ms. Whitehead to confirm when she should begin using her eye drops.     Thank you!

## 2023-10-27 ENCOUNTER — ANESTHESIA EVENT (OUTPATIENT)
Dept: SURGERY | Facility: HOSPITAL | Age: 73
End: 2023-10-27
Payer: MEDICARE

## 2023-10-27 NOTE — H&P
History    Chief complaint:  Painless progressive vision loss    Present Ilness/Diagnosis: Nuclear sclerotic Cataract    Past Medical History:  has a past medical history of Allergy, Arrhythmia, Asthma, Closed fracture of proximal end of left humerus (05/29/2016), GERD (gastroesophageal reflux disease), Hypertension, Hypoglycemia, Hypothyroidism, ALESSANDRA (iron deficiency anemia), Insomnia, Nephrolithiasis, FABIANA on CPAP, PAF (paroxysmal atrial fibrillation), Status post placement of implantable loop recorder (03/18/2016), Subaortic membrane, Subarachnoid hematoma (01/01/2019), Subdural hematoma, Vitamin B 12 deficiency, and Vitamin D deficiency.    Family History/Social History: refer to chart    Allergies: Review of patient's allergies indicates:  No Known Allergies    Current Medications: No current facility-administered medications for this encounter.    Current Outpatient Medications:     acetaminophen (TYLENOL) 500 MG tablet, Take 500 mg by mouth every 6 (six) hours as needed for Pain., Disp: , Rfl:     atorvastatin (LIPITOR) 10 MG tablet, TAKE 1 TABLET BY MOUTH  DAILY, Disp: 90 tablet, Rfl: 3    cetirizine (ZYRTEC) 10 MG tablet, Take 10 mg by mouth daily as needed. , Disp: , Rfl:     cyanocobalamin 1,000 mcg/mL injection, INJECT 1 ML INTO THE MUSCLE EVERY 14 DAYS, Disp: 6 mL, Rfl: 3    donepeziL (ARICEPT) 10 MG tablet, Take 1 tablet (10 mg total) by mouth every evening. (Patient taking differently: Take 10 mg by mouth once daily.), Disp: 90 tablet, Rfl: 2    levothyroxine (SYNTHROID) 88 MCG tablet, Take 1 tablet (88 mcg total) by mouth before breakfast., Disp: 90 tablet, Rfl: 2    mirtazapine (REMERON) 30 MG tablet, Take 1 tablet (30 mg total) by mouth nightly., Disp: 90 tablet, Rfl: 3    paroxetine (PAXIL) 30 MG tablet, Take 1 tablet (30 mg total) by mouth every morning. (Patient taking differently: Take 30 mg by mouth once daily.), Disp: 90 tablet, Rfl: 0    rivaroxaban (XARELTO) 20 mg Tab, Take 1 tablet (20 mg  total) by mouth every evening. (Patient taking differently: Take 20 mg by mouth once daily.), Disp: 90 tablet, Rfl: 4    telmisartan (MICARDIS) 40 MG Tab, Take 1 tablet (40 mg total) by mouth once daily., Disp: 90 tablet, Rfl: 3    acarbose (PRECOSE) 25 MG Tab, Take 25 mg by mouth daily as needed. When eating high carb meal, Disp: , Rfl:     acyclovir (ZOVIRAX) 200 MG capsule, Take 200 mg by mouth., Disp: , Rfl:     albuterol (PROVENTIL/VENTOLIN HFA) 90 mcg/actuation inhaler, Inhale 1-2 puffs into the lungs every 6 (six) hours as needed for Wheezing., Disp: 18 g, Rfl: 6    ascorbic acid, vitamin C, (VITAMIN C) 1000 MG tablet, Take 1,000 mg by mouth once daily., Disp: , Rfl:     glucose 4 GM chewable tablet, Take 4 tablets when glucose from 51-70 Take 6 tablets when glucose is less than 50, Disp: 120 tablet, Rfl: 12    MAGNESIUM ORAL, Take by mouth once daily at 6am., Disp: , Rfl:     oxybutynin (DITROPAN-XL) 5 MG TR24, Take 1 tablet (5 mg total) by mouth once daily., Disp: 90 tablet, Rfl: 1    prednisolon/gatiflox/bromfenac (PREDNISOL ACE-GATIFLOX-BROMFEN) 1-0.5-0.075 % DrpS, Apply 1 drop to eye 3 (three) times daily., Disp: 5 mL, Rfl: 3    prednisolon/gatiflox/bromfenac (PREDNISOL ACE-GATIFLOX-BROMFEN) 1-0.5-0.075 % DrpS, Apply 1 drop to eye 3 (three) times daily., Disp: 5 mL, Rfl: 3    vitamin D (VITAMIN D3) 1000 units Tab, Take 1,000 Units by mouth once daily., Disp: , Rfl:     Physical Exam    BP: Vital signs stable  General: No apparent distress  HEENT: nuclear sclerotic cataract  Lungs: adequate respirations  Heart: + pulses  Abdomen: soft  Rectal/pelvic: deferred    Impression: Visually significant Cataract.    See previous clinic notes for surgical indications.    Plan: Phacoemulsification with implantation of Intraocular lens

## 2023-10-30 ENCOUNTER — ANESTHESIA (OUTPATIENT)
Dept: SURGERY | Facility: HOSPITAL | Age: 73
End: 2023-10-30
Payer: MEDICARE

## 2023-10-30 ENCOUNTER — HOSPITAL ENCOUNTER (OUTPATIENT)
Facility: HOSPITAL | Age: 73
Discharge: HOME | End: 2023-10-30
Attending: OPHTHALMOLOGY | Admitting: OPHTHALMOLOGY
Payer: MEDICARE

## 2023-10-30 VITALS
TEMPERATURE: 98 F | HEART RATE: 62 BPM | SYSTOLIC BLOOD PRESSURE: 93 MMHG | HEIGHT: 65 IN | OXYGEN SATURATION: 96 % | BODY MASS INDEX: 22.99 KG/M2 | DIASTOLIC BLOOD PRESSURE: 52 MMHG | WEIGHT: 138 LBS | RESPIRATION RATE: 16 BRPM

## 2023-10-30 DIAGNOSIS — H25.12 AGE-RELATED NUCLEAR CATARACT, LEFT: ICD-10-CM

## 2023-10-30 DIAGNOSIS — H25.12 NUCLEAR SCLEROTIC CATARACT OF LEFT EYE: Primary | ICD-10-CM

## 2023-10-30 PROCEDURE — 66999 PR FEMTO TORIC: ICD-10-PCS | Mod: CSM,LT,, | Performed by: OPHTHALMOLOGY

## 2023-10-30 PROCEDURE — 25000003 PHARM REV CODE 250: Mod: PO

## 2023-10-30 PROCEDURE — D9220A PRA ANESTHESIA: ICD-10-PCS | Mod: CRNA,,, | Performed by: NURSE ANESTHETIST, CERTIFIED REGISTERED

## 2023-10-30 PROCEDURE — D9220A PRA ANESTHESIA: Mod: ANES,,, | Performed by: ANESTHESIOLOGY

## 2023-10-30 PROCEDURE — 71000015 HC POSTOP RECOV 1ST HR: Mod: PO | Performed by: OPHTHALMOLOGY

## 2023-10-30 PROCEDURE — 66984 XCAPSL CTRC RMVL W/O ECP: CPT | Mod: LT,,, | Performed by: OPHTHALMOLOGY

## 2023-10-30 PROCEDURE — D9220A PRA ANESTHESIA: ICD-10-PCS | Mod: ANES,,, | Performed by: ANESTHESIOLOGY

## 2023-10-30 PROCEDURE — 36000707: Mod: PO | Performed by: OPHTHALMOLOGY

## 2023-10-30 PROCEDURE — V2787 ASTIGMATISM-CORRECT FUNCTION: HCPCS | Mod: PO | Performed by: OPHTHALMOLOGY

## 2023-10-30 PROCEDURE — 37000008 HC ANESTHESIA 1ST 15 MINUTES: Mod: PO | Performed by: OPHTHALMOLOGY

## 2023-10-30 PROCEDURE — 25000003 PHARM REV CODE 250: Mod: PO | Performed by: OPHTHALMOLOGY

## 2023-10-30 PROCEDURE — 66999 UNLISTED PX ANT SEGMENT EYE: CPT | Mod: CSM,LT,, | Performed by: OPHTHALMOLOGY

## 2023-10-30 PROCEDURE — 66984 PR REMOVAL, CATARACT, W/INSRT INTRAOC LENS, W/O ENDO CYCLO: ICD-10-PCS | Mod: LT,,, | Performed by: OPHTHALMOLOGY

## 2023-10-30 PROCEDURE — 63600175 PHARM REV CODE 636 W HCPCS: Mod: PO | Performed by: ANESTHESIOLOGY

## 2023-10-30 PROCEDURE — 36000706: Mod: PO | Performed by: OPHTHALMOLOGY

## 2023-10-30 PROCEDURE — 37000009 HC ANESTHESIA EA ADD 15 MINS: Mod: PO | Performed by: OPHTHALMOLOGY

## 2023-10-30 PROCEDURE — 63600175 PHARM REV CODE 636 W HCPCS: Mod: PO | Performed by: NURSE ANESTHETIST, CERTIFIED REGISTERED

## 2023-10-30 PROCEDURE — D9220A PRA ANESTHESIA: Mod: CRNA,,, | Performed by: NURSE ANESTHETIST, CERTIFIED REGISTERED

## 2023-10-30 DEVICE — IMPLANTABLE DEVICE: Type: IMPLANTABLE DEVICE | Site: EYE | Status: FUNCTIONAL

## 2023-10-30 RX ORDER — ONDANSETRON 2 MG/ML
INJECTION INTRAMUSCULAR; INTRAVENOUS
Status: DISCONTINUED | OUTPATIENT
Start: 2023-10-30 | End: 2023-10-30

## 2023-10-30 RX ORDER — ONDANSETRON 2 MG/ML
4 INJECTION INTRAMUSCULAR; INTRAVENOUS DAILY PRN
Status: DISCONTINUED | OUTPATIENT
Start: 2023-10-30 | End: 2023-10-30 | Stop reason: HOSPADM

## 2023-10-30 RX ORDER — SODIUM CHLORIDE 0.9 % (FLUSH) 0.9 %
2 SYRINGE (ML) INJECTION
Status: DISCONTINUED | OUTPATIENT
Start: 2023-10-30 | End: 2023-10-30 | Stop reason: HOSPADM

## 2023-10-30 RX ORDER — MOXIFLOXACIN 5 MG/ML
1 SOLUTION/ DROPS OPHTHALMIC
Status: COMPLETED | OUTPATIENT
Start: 2023-10-30 | End: 2023-10-30

## 2023-10-30 RX ORDER — SODIUM CHLORIDE, SODIUM LACTATE, POTASSIUM CHLORIDE, CALCIUM CHLORIDE 600; 310; 30; 20 MG/100ML; MG/100ML; MG/100ML; MG/100ML
125 INJECTION, SOLUTION INTRAVENOUS CONTINUOUS
Status: DISCONTINUED | OUTPATIENT
Start: 2023-10-30 | End: 2023-10-30 | Stop reason: HOSPADM

## 2023-10-30 RX ORDER — PHENYLEPHRINE HYDROCHLORIDE 100 MG/ML
1 SOLUTION/ DROPS OPHTHALMIC
Status: DISCONTINUED | OUTPATIENT
Start: 2023-10-30 | End: 2023-10-30 | Stop reason: HOSPADM

## 2023-10-30 RX ORDER — MIDAZOLAM HYDROCHLORIDE 1 MG/ML
INJECTION INTRAMUSCULAR; INTRAVENOUS
Status: DISCONTINUED | OUTPATIENT
Start: 2023-10-30 | End: 2023-10-30

## 2023-10-30 RX ORDER — LIDOCAINE HYDROCHLORIDE 10 MG/ML
1 INJECTION, SOLUTION EPIDURAL; INFILTRATION; INTRACAUDAL; PERINEURAL ONCE
Status: DISCONTINUED | OUTPATIENT
Start: 2023-10-30 | End: 2023-10-30 | Stop reason: HOSPADM

## 2023-10-30 RX ORDER — ACETAMINOPHEN 325 MG/1
650 TABLET ORAL EVERY 4 HOURS PRN
Status: DISCONTINUED | OUTPATIENT
Start: 2023-10-30 | End: 2023-10-30 | Stop reason: HOSPADM

## 2023-10-30 RX ORDER — LIDOCAINE HYDROCHLORIDE 10 MG/ML
INJECTION, SOLUTION EPIDURAL; INFILTRATION; INTRACAUDAL; PERINEURAL
Status: DISCONTINUED | OUTPATIENT
Start: 2023-10-30 | End: 2023-10-30 | Stop reason: HOSPADM

## 2023-10-30 RX ORDER — TETRACAINE HYDROCHLORIDE 5 MG/ML
1 SOLUTION OPHTHALMIC
Status: DISCONTINUED | OUTPATIENT
Start: 2023-10-30 | End: 2023-10-30 | Stop reason: HOSPADM

## 2023-10-30 RX ORDER — PREDNISOLONE ACETATE 10 MG/ML
SUSPENSION/ DROPS OPHTHALMIC
Status: DISCONTINUED | OUTPATIENT
Start: 2023-10-30 | End: 2023-10-30 | Stop reason: HOSPADM

## 2023-10-30 RX ORDER — PROPARACAINE HYDROCHLORIDE 5 MG/ML
1 SOLUTION/ DROPS OPHTHALMIC
Status: DISCONTINUED | OUTPATIENT
Start: 2023-10-30 | End: 2023-10-30 | Stop reason: HOSPADM

## 2023-10-30 RX ORDER — SODIUM CHLORIDE, SODIUM LACTATE, POTASSIUM CHLORIDE, CALCIUM CHLORIDE 600; 310; 30; 20 MG/100ML; MG/100ML; MG/100ML; MG/100ML
INJECTION, SOLUTION INTRAVENOUS CONTINUOUS
Status: DISCONTINUED | OUTPATIENT
Start: 2023-10-30 | End: 2023-10-30 | Stop reason: HOSPADM

## 2023-10-30 RX ORDER — CYCLOP/TROP/PROPA/PHEN/KET/WAT 1-1-0.1%
1 DROPS (EA) OPHTHALMIC (EYE)
Status: COMPLETED | OUTPATIENT
Start: 2023-10-30 | End: 2023-10-30

## 2023-10-30 RX ORDER — MOXIFLOXACIN 5 MG/ML
SOLUTION/ DROPS OPHTHALMIC
Status: DISCONTINUED | OUTPATIENT
Start: 2023-10-30 | End: 2023-10-30 | Stop reason: HOSPADM

## 2023-10-30 RX ADMIN — Medication 1 DROP: at 07:10

## 2023-10-30 RX ADMIN — MOXIFLOXACIN 1 DROP: 5 SOLUTION/ DROPS OPHTHALMIC at 07:10

## 2023-10-30 RX ADMIN — ONDANSETRON 4 MG: 2 INJECTION, SOLUTION INTRAMUSCULAR; INTRAVENOUS at 08:10

## 2023-10-30 RX ADMIN — MIDAZOLAM HYDROCHLORIDE 2 MG: 1 INJECTION, SOLUTION INTRAMUSCULAR; INTRAVENOUS at 08:10

## 2023-10-30 RX ADMIN — SODIUM CHLORIDE, POTASSIUM CHLORIDE, SODIUM LACTATE AND CALCIUM CHLORIDE: 600; 310; 30; 20 INJECTION, SOLUTION INTRAVENOUS at 07:10

## 2023-10-30 RX ADMIN — PROPARACAINE HYDROCHLORIDE 1 DROP: 5 SOLUTION/ DROPS OPHTHALMIC at 07:10

## 2023-10-30 NOTE — BRIEF OP NOTE
BRIEF DISCHARGE NOTE:    Date of discharge: 10/30/2023    Reason for hospitalization -  Cataract surgery     Final Diagnosis - Visually significant Cataract    Procedures and treatment provided - Status post phacoemulsification with placement of intraocular lens     Diet - Advance to regular as tolerated    Activity - as tolerated    Disposition at the end of the case - Good.    Discharge: to home    The patient tolerated the procedure well and knows to follow up with me tomorrow morning in the eye clinic, sooner if needed.    Patient and family instructions (as appropriate) - Given to patient on discharge    Rita Chicas MD

## 2023-10-30 NOTE — TRANSFER OF CARE
"Anesthesia Transfer of Care Note    Patient: Yara Whitehead    Procedure(s) Performed: Procedure(s) (LRB):  EXTRACTION, CATARACT, WITH IOL INSERTION (Left)    Patient location: PACU    Anesthesia Type: MAC    Transport from OR: Transported from OR on room air with adequate spontaneous ventilation    Post pain: adequate analgesia    Post assessment: no apparent anesthetic complications    Post vital signs: stable    Level of consciousness: awake    Nausea/Vomiting: no nausea/vomiting    Complications: none    Transfer of care protocol was followed      Last vitals:   Visit Vitals  BP (!) 112/58 (BP Location: Right arm, Patient Position: Lying)   Pulse 62   Temp 36.4 °C (97.6 °F)   Resp 16   Ht 5' 5" (1.651 m)   Wt 62.6 kg (138 lb)   LMP  (LMP Unknown)   SpO2 96%   Breastfeeding No   BMI 22.96 kg/m²     "

## 2023-10-30 NOTE — OP NOTE
DATE OF PROCEDURE: 10/30/2023    SURGEON: DM LEMA MD    PREOPERATIVE DIAGNOSIS:  1. Senile nuclear sclerotic cataract left eye. 2. Astigmatism left eye.    POSTOPERATIVE DIAGNOSIS: Senile nuclear sclerotic cataract left eye. 2. Astigmatism left eye.    PROCEDURE PERFORMED:  Phacoemulsification with placement of TORIC intraocular lens, left eye.    IMPLANT:  [id number] POWER 19.0    ANESTHESIA:  Topical and MAC    COMPLICATIONS: none    ESTIMATED BLOOD LOSS: <1cc    SPECIMENS: none    INDICATIONS FOR PROCEDURE:   The patient has a history of painless progressive vision loss.  The patient has described difficulties with activities of daily living, which specifically include driving, which is secondary to cataract formation and progression. After we had a thorough discussion about risks, benefits, and alternatives to cataract surgery, the patient agreed to proceed with phacoemulsification and implantation of a lens in the left eye.  These risks include, but are not limited to, hemorrhage, pain, infection, need for additional surgery, need for glasses or contacts, loss of vision, or even loss of the eye.  A TORIC IOL was decided upon to mitigate the patient's pre-existing astigmatism.     PROCEDURE IN DETAIL:  The patient was met in the preop holding area.  Consent was confirmed to be signed.  The operative site was marked.  The patient was brought into the operating room by the anesthesia team and placed under monitored anesthesia care.  The left eye was marked at 3 and 9 o'clock with the patient upright prior to prepping the patient. Then the left eye was prepped and draped in a sterile ophthalmic fashion.  A Jenny speculum was placed into the left eye.  The appropriate axis was marked at the corneal limbus for the TORIC IOL lens position.  A paracentesis site was made and 1% preservative-free lidocaine was injected into the anterior chamber.  Viscoelastic  material was injected into the anterior chamber.  A  keratome blade was used to make a clear corneal incision.  A cystotome was used to initiate the continuous curvilinear capsulorrhexis which was completed with Utrata forceps.  BSS on a chin cannula was used to perform hydrodissection.  The phacoemulsification tip was introduced into the eye and the nucleus was removed in a standard divide-and-conquer fashion.  Remaining cortical material was removed from the eye using irrigation-aspiration.  The capsular bag was filled with viscoelastic material and the intraocular lens was injected and positioned into place, aligning the correct axis.. Remaining viscoelastic material was removed from the eye using irrigation and aspiration.  The corneal wounds were hydrated.  The eye was filled to physiologic pressure. The wounds were found to be watertight. Drops of Vigamox and prednisilone were placed into the eye.  The eye was washed, dried, and shielded.  The patient tolerated the procedure well and knows to follow up with me tomorrow morning, sooner if needed.    Intraoperative aberrometer (ORA) was used to confirm calculations.

## 2023-10-30 NOTE — ANESTHESIA PREPROCEDURE EVALUATION
10/30/2023  Yara Whitehead is a 73 y.o., female.      Pre-op Assessment    I have reviewed the Patient Summary Reports.     I have reviewed the Nursing Notes. I have reviewed the NPO Status.   I have reviewed the Medications.     Review of Systems  Anesthesia Hx:  No problems with previous Anesthesia    Social:  Smoker    Cardiovascular:   Hypertension Dysrhythmias atrial fibrillation    Pulmonary:   Asthma mild Sleep Apnea    Renal/:   Chronic Renal Disease    Hepatic/GI:   GERD    Neurological:  Neurology Normal    Endocrine:   Hypothyroidism    Psych:   Psychiatric History          Physical Exam  General: Well nourished, Cooperative, Alert and Oriented    Airway:  Mallampati: II   Mouth Opening: Normal  TM Distance: Normal  Neck ROM: Normal ROM        Anesthesia Plan  Type of Anesthesia, risks & benefits discussed:    Anesthesia Type: Gen ETT, Gen Supraglottic Airway, Gen Natural Airway, MAC  Intra-op Monitoring Plan: Standard ASA Monitors  Post Op Pain Control Plan: multimodal analgesia  Induction:  IV  Airway Plan: Direct, Video and Fiberoptic, Post-Induction  Informed Consent: Informed consent signed with the Patient and all parties understand the risks and agree with anesthesia plan.  All questions answered.   ASA Score: 3    Ready For Surgery From Anesthesia Perspective.     .

## 2023-10-30 NOTE — DISCHARGE INSTRUCTIONS
Cataract Surgery     Post-Operative Instructions       Resume eye drops three times daily into operative eye.     Bring your eye drops to your follow-up appointment.     Wear protective sunglasses during the day. Wear eye shield when going to bed for 1 week.     Resume moderate activity.     Bathe/shower/wash your face as normal.     Do not rub your eye.   NO HEAVY LIFTING or bending.    Do not apply makeup around the operative eye for 1 week.        You should expect:     Blurry vision and halos for 24-48 hours.     Dilated pupil for 24-48 hours.     Scratchy feeling in the eye for 1-2 days.     Curved shadow in your peripheral vision for 2-3 weeks.     Occasional flickering of lights for up to 1 week.        If you experience severe pain or nausea, please call Dr. Chicas or the on-call doctor at [phone].        Plan to see Dr. Chicas tomorrow at ___________.     [facility] [address] [place]. [address]     2nd floor, Entrance # 1     Most patients can drive the next morning. If you do not feel comfortable driving, please arrange for transportation.

## 2023-10-30 NOTE — ANESTHESIA POSTPROCEDURE EVALUATION
Anesthesia Post Evaluation    Patient: Yara Whitehead    Procedure(s) Performed: Procedure(s) (LRB):  EXTRACTION, CATARACT, WITH IOL INSERTION (Left)    Final Anesthesia Type: MAC      Patient location during evaluation: PACU  Patient participation: Yes- Able to Participate  Level of consciousness: awake and alert and oriented  Post-procedure vital signs: reviewed and stable  Pain management: adequate  Airway patency: patent    PONV status at discharge: No PONV  Anesthetic complications: no      Cardiovascular status: blood pressure returned to baseline and stable  Respiratory status: unassisted and spontaneous ventilation  Hydration status: euvolemic  Follow-up not needed.          Vitals Value Taken Time   BP 93/52 10/30/23 0840   Temp   10/30/23 1132   Pulse   10/30/23 1132   Resp   10/30/23 1132   SpO2   10/30/23 1132         Event Time   Out of Recovery 08:55:00         Pain/Fernando Score: Fernando Score: 10 (10/30/2023  8:40 AM)

## 2023-10-31 ENCOUNTER — OFFICE VISIT (OUTPATIENT)
Dept: OPHTHALMOLOGY | Facility: CLINIC | Age: 73
End: 2023-10-31
Payer: MEDICARE

## 2023-10-31 DIAGNOSIS — Z96.1 STATUS POST CATARACT EXTRACTION AND INSERTION OF INTRAOCULAR LENS, LEFT: Primary | ICD-10-CM

## 2023-10-31 DIAGNOSIS — H25.11 NUCLEAR SCLEROTIC CATARACT OF RIGHT EYE: ICD-10-CM

## 2023-10-31 DIAGNOSIS — Z98.42 STATUS POST CATARACT EXTRACTION AND INSERTION OF INTRAOCULAR LENS, LEFT: Primary | ICD-10-CM

## 2023-10-31 PROCEDURE — 99999 PR PBB SHADOW E&M-EST. PATIENT-LVL III: ICD-10-PCS | Mod: PBBFAC,,, | Performed by: OPHTHALMOLOGY

## 2023-10-31 PROCEDURE — 3288F FALL RISK ASSESSMENT DOCD: CPT | Mod: CPTII,S$GLB,, | Performed by: OPHTHALMOLOGY

## 2023-10-31 PROCEDURE — 4010F PR ACE/ARB THEARPY RXD/TAKEN: ICD-10-PCS | Mod: CPTII,S$GLB,, | Performed by: OPHTHALMOLOGY

## 2023-10-31 PROCEDURE — 4010F ACE/ARB THERAPY RXD/TAKEN: CPT | Mod: CPTII,S$GLB,, | Performed by: OPHTHALMOLOGY

## 2023-10-31 PROCEDURE — 92136 IOL MASTER - OD - RIGHT EYE: ICD-10-PCS | Mod: 26,RT,S$GLB, | Performed by: OPHTHALMOLOGY

## 2023-10-31 PROCEDURE — 1101F PR PT FALLS ASSESS DOC 0-1 FALLS W/OUT INJ PAST YR: ICD-10-PCS | Mod: CPTII,S$GLB,, | Performed by: OPHTHALMOLOGY

## 2023-10-31 PROCEDURE — 3288F PR FALLS RISK ASSESSMENT DOCUMENTED: ICD-10-PCS | Mod: CPTII,S$GLB,, | Performed by: OPHTHALMOLOGY

## 2023-10-31 PROCEDURE — 1159F PR MEDICATION LIST DOCUMENTED IN MEDICAL RECORD: ICD-10-PCS | Mod: CPTII,S$GLB,, | Performed by: OPHTHALMOLOGY

## 2023-10-31 PROCEDURE — 1126F AMNT PAIN NOTED NONE PRSNT: CPT | Mod: CPTII,S$GLB,, | Performed by: OPHTHALMOLOGY

## 2023-10-31 PROCEDURE — 99024 POSTOP FOLLOW-UP VISIT: CPT | Mod: S$GLB,,, | Performed by: OPHTHALMOLOGY

## 2023-10-31 PROCEDURE — 1101F PT FALLS ASSESS-DOCD LE1/YR: CPT | Mod: CPTII,S$GLB,, | Performed by: OPHTHALMOLOGY

## 2023-10-31 PROCEDURE — 92136 OPHTHALMIC BIOMETRY: CPT | Mod: 26,RT,S$GLB, | Performed by: OPHTHALMOLOGY

## 2023-10-31 PROCEDURE — 99024 PR POST-OP FOLLOW-UP VISIT: ICD-10-PCS | Mod: S$GLB,,, | Performed by: OPHTHALMOLOGY

## 2023-10-31 PROCEDURE — 99999 PR PBB SHADOW E&M-EST. PATIENT-LVL III: CPT | Mod: PBBFAC,,, | Performed by: OPHTHALMOLOGY

## 2023-10-31 PROCEDURE — 1126F PR PAIN SEVERITY QUANTIFIED, NO PAIN PRESENT: ICD-10-PCS | Mod: CPTII,S$GLB,, | Performed by: OPHTHALMOLOGY

## 2023-10-31 PROCEDURE — 1159F MED LIST DOCD IN RCRD: CPT | Mod: CPTII,S$GLB,, | Performed by: OPHTHALMOLOGY

## 2023-10-31 RX ORDER — HYDROXYZINE PAMOATE 25 MG/1
25 CAPSULE ORAL DAILY PRN
COMMUNITY
Start: 2023-05-10

## 2023-10-31 RX ORDER — SOLIFENACIN SUCCINATE 5 MG/1
5 TABLET, FILM COATED ORAL
COMMUNITY
Start: 2023-05-10 | End: 2023-11-14

## 2023-10-31 RX ORDER — DIVALPROEX SODIUM 500 MG/1
500 TABLET, DELAYED RELEASE ORAL 2 TIMES DAILY
COMMUNITY
Start: 2023-05-10 | End: 2023-11-12

## 2023-10-31 RX ORDER — LOSARTAN POTASSIUM 50 MG/1
50 TABLET ORAL
COMMUNITY
Start: 2023-05-10 | End: 2023-11-14

## 2023-10-31 NOTE — PROGRESS NOTES
HPI    Ref by Dr. Eisenberg    S/p Phaco w/IOL OS - 10/30/23 - toric  Ocular History: Nuclear sclerosis OD                           Degenerative retinal drusen OU    Patient states the left eye is feeling well today. No pain. No discharge.     Triple gtts TID OS        Last edited by Rita Chicas MD on 10/31/2023  1:48 PM.            Assessment /Plan     For exam results, see Encounter Report.    Status post cataract extraction and insertion of intraocular lens, left    Nuclear sclerotic cataract of right eye  -     IOL Master - OD - Right Eye      Slit Lamp Exam  L/L - normal  C/s - quiet  Cornea - clear  A/C - 1+ cell  Lens - PCIOL    POD #1 s/p phaco/IOL  - doing well  - continue the following drops:    vigamox or ocuflox TID x 1 wk then stop  Pred forte TID x  4 wks  Ketorolac TID until runs out    Versus:    Combination drop - 1 drop TID x total of 1 month    Appropriate precautions and post op medications reviewed.  Patient instructed to call or come in if symptoms of redness, decreased vision, or pain are experienced.    -f/up 1-2 wks, sooner PRN. Or 4 wks with [facility] for mrx if needed.      Visually significant nuclear sclerotic cataract   - Interfering with activities of daily living.  Pt desires cataract surgery for [facility] rehabilitation.   - R/B/A discussed and pt agrees to proceed with surgery.   - IOL options discussed according to patient's goals and concomitant ocular pathology; and pt content with monofocal lens.    - Target: plano.       Patient has regular astigmatism that is visually significant and wishes to have an astigmatism correcting TORIC lens placed, and agrees to the out of pocket cost of $1500 per eye    ([id number] 19.0 OD) range  ora    Dry AMD  - jose d

## 2023-11-08 ENCOUNTER — OFFICE VISIT (OUTPATIENT)
Dept: OPTOMETRY | Facility: CLINIC | Age: 73
End: 2023-11-08
Payer: MEDICARE

## 2023-11-08 DIAGNOSIS — Z98.890 POST-OPERATIVE STATE: Primary | ICD-10-CM

## 2023-11-08 PROCEDURE — 99024 PR POST-OP FOLLOW-UP VISIT: ICD-10-PCS | Mod: S$GLB,,, | Performed by: OPTOMETRIST

## 2023-11-08 PROCEDURE — 1126F PR PAIN SEVERITY QUANTIFIED, NO PAIN PRESENT: ICD-10-PCS | Mod: CPTII,S$GLB,, | Performed by: OPTOMETRIST

## 2023-11-08 PROCEDURE — 1101F PT FALLS ASSESS-DOCD LE1/YR: CPT | Mod: CPTII,S$GLB,, | Performed by: OPTOMETRIST

## 2023-11-08 PROCEDURE — 99999 PR PBB SHADOW E&M-EST. PATIENT-LVL III: ICD-10-PCS | Mod: PBBFAC,,, | Performed by: OPTOMETRIST

## 2023-11-08 PROCEDURE — 1159F MED LIST DOCD IN RCRD: CPT | Mod: CPTII,S$GLB,, | Performed by: OPTOMETRIST

## 2023-11-08 PROCEDURE — 3288F PR FALLS RISK ASSESSMENT DOCUMENTED: ICD-10-PCS | Mod: CPTII,S$GLB,, | Performed by: OPTOMETRIST

## 2023-11-08 PROCEDURE — 1160F RVW MEDS BY RX/DR IN RCRD: CPT | Mod: CPTII,S$GLB,, | Performed by: OPTOMETRIST

## 2023-11-08 PROCEDURE — 1160F PR REVIEW ALL MEDS BY PRESCRIBER/CLIN PHARMACIST DOCUMENTED: ICD-10-PCS | Mod: CPTII,S$GLB,, | Performed by: OPTOMETRIST

## 2023-11-08 PROCEDURE — 3288F FALL RISK ASSESSMENT DOCD: CPT | Mod: CPTII,S$GLB,, | Performed by: OPTOMETRIST

## 2023-11-08 PROCEDURE — 99024 POSTOP FOLLOW-UP VISIT: CPT | Mod: S$GLB,,, | Performed by: OPTOMETRIST

## 2023-11-08 PROCEDURE — 4010F PR ACE/ARB THEARPY RXD/TAKEN: ICD-10-PCS | Mod: CPTII,S$GLB,, | Performed by: OPTOMETRIST

## 2023-11-08 PROCEDURE — 4010F ACE/ARB THERAPY RXD/TAKEN: CPT | Mod: CPTII,S$GLB,, | Performed by: OPTOMETRIST

## 2023-11-08 PROCEDURE — 99999 PR PBB SHADOW E&M-EST. PATIENT-LVL III: CPT | Mod: PBBFAC,,, | Performed by: OPTOMETRIST

## 2023-11-08 PROCEDURE — 1101F PR PT FALLS ASSESS DOC 0-1 FALLS W/OUT INJ PAST YR: ICD-10-PCS | Mod: CPTII,S$GLB,, | Performed by: OPTOMETRIST

## 2023-11-08 PROCEDURE — 1159F PR MEDICATION LIST DOCUMENTED IN MEDICAL RECORD: ICD-10-PCS | Mod: CPTII,S$GLB,, | Performed by: OPTOMETRIST

## 2023-11-08 PROCEDURE — 1126F AMNT PAIN NOTED NONE PRSNT: CPT | Mod: CPTII,S$GLB,, | Performed by: OPTOMETRIST

## 2023-11-08 NOTE — PROGRESS NOTES
HPI    Pt here for a 1 week post op ERNIE 10/31/23      Pt here for post op visit. Pt denies any pain or discomfort. Pt taking   prednisolon/gatiflox/bromfenac OS 2-3xs a day. Pt denies floaters and   flashes  Last edited by Leticia Davis on 11/8/2023  9:10 AM.            Assessment /Plan     For exam results, see Encounter Report.    Post-operative state      Doing well post op left eye cataract, cont drops, iop fine , pt happy with vision, RTC with [facility] as scheduled

## 2023-11-13 ENCOUNTER — TELEPHONE (OUTPATIENT)
Dept: OPHTHALMOLOGY | Facility: CLINIC | Age: 73
End: 2023-11-13
Payer: MEDICARE

## 2023-11-13 NOTE — TELEPHONE ENCOUNTER
Called to speak to pt. Pt wrote the wrong day on her calendar for her surgery. I let her know her surgery date is going to be 11/27/2023

## 2023-11-14 ENCOUNTER — TELEPHONE (OUTPATIENT)
Dept: FAMILY MEDICINE | Facility: CLINIC | Age: 73
End: 2023-11-14

## 2023-11-14 ENCOUNTER — OFFICE VISIT (OUTPATIENT)
Dept: FAMILY MEDICINE | Facility: CLINIC | Age: 73
End: 2023-11-14
Payer: MEDICARE

## 2023-11-14 VITALS
DIASTOLIC BLOOD PRESSURE: 84 MMHG | OXYGEN SATURATION: 100 % | WEIGHT: 153.69 LBS | TEMPERATURE: 98 F | BODY MASS INDEX: 25.61 KG/M2 | HEART RATE: 72 BPM | HEIGHT: 65 IN | SYSTOLIC BLOOD PRESSURE: 120 MMHG

## 2023-11-14 DIAGNOSIS — R41.3 MEMORY IMPAIRMENT: ICD-10-CM

## 2023-11-14 DIAGNOSIS — I34.0 NONRHEUMATIC MITRAL VALVE REGURGITATION: ICD-10-CM

## 2023-11-14 DIAGNOSIS — Z87.891 PERSONAL HISTORY OF TOBACCO USE: ICD-10-CM

## 2023-11-14 DIAGNOSIS — E78.5 HYPERLIPIDEMIA, UNSPECIFIED HYPERLIPIDEMIA TYPE: ICD-10-CM

## 2023-11-14 DIAGNOSIS — Z23 NEED FOR COVID-19 VACCINE: ICD-10-CM

## 2023-11-14 DIAGNOSIS — I10 ESSENTIAL HYPERTENSION: ICD-10-CM

## 2023-11-14 DIAGNOSIS — E53.8 VITAMIN B12 DEFICIENCY: ICD-10-CM

## 2023-11-14 DIAGNOSIS — J30.9 CHRONIC ALLERGIC RHINITIS: ICD-10-CM

## 2023-11-14 DIAGNOSIS — E16.1 POSTPRANDIAL HYPOGLYCEMIA: ICD-10-CM

## 2023-11-14 DIAGNOSIS — I65.23 BILATERAL CAROTID ARTERY STENOSIS: ICD-10-CM

## 2023-11-14 DIAGNOSIS — I48.0 PAF (PAROXYSMAL ATRIAL FIBRILLATION): Primary | ICD-10-CM

## 2023-11-14 DIAGNOSIS — N39.490 OVERFLOW INCONTINENCE OF URINE: ICD-10-CM

## 2023-11-14 DIAGNOSIS — Z12.31 ENCOUNTER FOR SCREENING MAMMOGRAM FOR MALIGNANT NEOPLASM OF BREAST: ICD-10-CM

## 2023-11-14 DIAGNOSIS — Q24.4 SUBAORTIC MEMBRANE: ICD-10-CM

## 2023-11-14 DIAGNOSIS — F51.01 PRIMARY INSOMNIA: ICD-10-CM

## 2023-11-14 DIAGNOSIS — Z23 NEED FOR VACCINATION: ICD-10-CM

## 2023-11-14 DIAGNOSIS — D50.9 IRON DEFICIENCY ANEMIA, UNSPECIFIED IRON DEFICIENCY ANEMIA TYPE: ICD-10-CM

## 2023-11-14 DIAGNOSIS — F33.0 MILD EPISODE OF RECURRENT MAJOR DEPRESSIVE DISORDER: ICD-10-CM

## 2023-11-14 DIAGNOSIS — I70.0 AORTIC ATHEROSCLEROSIS: ICD-10-CM

## 2023-11-14 DIAGNOSIS — E03.9 HYPOTHYROIDISM, UNSPECIFIED TYPE: ICD-10-CM

## 2023-11-14 LAB
ANION GAP SERPL CALC-SCNC: 9 MMOL/L (ref 8–16)
BUN SERPL-MCNC: 14 MG/DL (ref 8–23)
CALCIUM SERPL-MCNC: 9.2 MG/DL (ref 8.7–10.5)
CHLORIDE SERPL-SCNC: 105 MMOL/L (ref 95–110)
CHOLEST SERPL-MCNC: 191 MG/DL (ref 120–199)
CHOLEST/HDLC SERPL: 1.9 {RATIO} (ref 2–5)
CO2 SERPL-SCNC: 27 MMOL/L (ref 23–29)
CREAT SERPL-MCNC: 0.6 MG/DL (ref 0.5–1.4)
EST. GFR  (NO RACE VARIABLE): >60 ML/MIN/1.73 M^2
GLUCOSE SERPL-MCNC: 84 MG/DL (ref 70–110)
HDLC SERPL-MCNC: 101 MG/DL (ref 40–75)
HDLC SERPL: 52.9 % (ref 20–50)
LDLC SERPL CALC-MCNC: 83.8 MG/DL (ref 63–159)
NONHDLC SERPL-MCNC: 90 MG/DL
POTASSIUM SERPL-SCNC: 4.2 MMOL/L (ref 3.5–5.1)
SODIUM SERPL-SCNC: 141 MMOL/L (ref 136–145)
TRIGL SERPL-MCNC: 31 MG/DL (ref 30–150)
VIT B12 SERPL-MCNC: 297 PG/ML (ref 210–950)

## 2023-11-14 PROCEDURE — G0008 FLU VACCINE - QUADRIVALENT - ADJUVANTED: ICD-10-PCS | Mod: 59,S$GLB,, | Performed by: INTERNAL MEDICINE

## 2023-11-14 PROCEDURE — 3079F DIAST BP 80-89 MM HG: CPT | Mod: CPTII,S$GLB,, | Performed by: INTERNAL MEDICINE

## 2023-11-14 PROCEDURE — G0008 ADMIN INFLUENZA VIRUS VAC: HCPCS | Mod: 59,S$GLB,, | Performed by: INTERNAL MEDICINE

## 2023-11-14 PROCEDURE — 90694 VACC AIIV4 NO PRSRV 0.5ML IM: CPT | Mod: S$GLB,,, | Performed by: INTERNAL MEDICINE

## 2023-11-14 PROCEDURE — 99214 PR OFFICE/OUTPT VISIT, EST, LEVL IV, 30-39 MIN: ICD-10-PCS | Mod: 25,S$GLB,, | Performed by: INTERNAL MEDICINE

## 2023-11-14 PROCEDURE — 1159F MED LIST DOCD IN RCRD: CPT | Mod: CPTII,S$GLB,, | Performed by: INTERNAL MEDICINE

## 2023-11-14 PROCEDURE — 1160F PR REVIEW ALL MEDS BY PRESCRIBER/CLIN PHARMACIST DOCUMENTED: ICD-10-PCS | Mod: CPTII,S$GLB,, | Performed by: INTERNAL MEDICINE

## 2023-11-14 PROCEDURE — 4010F ACE/ARB THERAPY RXD/TAKEN: CPT | Mod: CPTII,S$GLB,, | Performed by: INTERNAL MEDICINE

## 2023-11-14 PROCEDURE — 3288F PR FALLS RISK ASSESSMENT DOCUMENTED: ICD-10-PCS | Mod: CPTII,S$GLB,, | Performed by: INTERNAL MEDICINE

## 2023-11-14 PROCEDURE — 91322 SARSCOV2 VAC 50 MCG/0.5ML IM: CPT | Mod: S$GLB,,, | Performed by: INTERNAL MEDICINE

## 2023-11-14 PROCEDURE — 3008F PR BODY MASS INDEX (BMI) DOCUMENTED: ICD-10-PCS | Mod: CPTII,S$GLB,, | Performed by: INTERNAL MEDICINE

## 2023-11-14 PROCEDURE — 90480 COVID-19 VAC, MRNA 2023 (MODERNA)(PF) 50 MCG/0.5 ML IM SUSR (12+): ICD-10-PCS | Mod: S$GLB,,, | Performed by: INTERNAL MEDICINE

## 2023-11-14 PROCEDURE — 4010F PR ACE/ARB THEARPY RXD/TAKEN: ICD-10-PCS | Mod: CPTII,S$GLB,, | Performed by: INTERNAL MEDICINE

## 2023-11-14 PROCEDURE — 1126F AMNT PAIN NOTED NONE PRSNT: CPT | Mod: CPTII,S$GLB,, | Performed by: INTERNAL MEDICINE

## 2023-11-14 PROCEDURE — 80048 BASIC METABOLIC PNL TOTAL CA: CPT | Performed by: INTERNAL MEDICINE

## 2023-11-14 PROCEDURE — 90694 FLU VACCINE - QUADRIVALENT - ADJUVANTED: ICD-10-PCS | Mod: S$GLB,,, | Performed by: INTERNAL MEDICINE

## 2023-11-14 PROCEDURE — 3074F PR MOST RECENT SYSTOLIC BLOOD PRESSURE < 130 MM HG: ICD-10-PCS | Mod: CPTII,S$GLB,, | Performed by: INTERNAL MEDICINE

## 2023-11-14 PROCEDURE — 3079F PR MOST RECENT DIASTOLIC BLOOD PRESSURE 80-89 MM HG: ICD-10-PCS | Mod: CPTII,S$GLB,, | Performed by: INTERNAL MEDICINE

## 2023-11-14 PROCEDURE — 1160F RVW MEDS BY RX/DR IN RCRD: CPT | Mod: CPTII,S$GLB,, | Performed by: INTERNAL MEDICINE

## 2023-11-14 PROCEDURE — 1126F PR PAIN SEVERITY QUANTIFIED, NO PAIN PRESENT: ICD-10-PCS | Mod: CPTII,S$GLB,, | Performed by: INTERNAL MEDICINE

## 2023-11-14 PROCEDURE — 82607 VITAMIN B-12: CPT | Performed by: INTERNAL MEDICINE

## 2023-11-14 PROCEDURE — 1159F PR MEDICATION LIST DOCUMENTED IN MEDICAL RECORD: ICD-10-PCS | Mod: CPTII,S$GLB,, | Performed by: INTERNAL MEDICINE

## 2023-11-14 PROCEDURE — 80061 LIPID PANEL: CPT | Performed by: INTERNAL MEDICINE

## 2023-11-14 PROCEDURE — 3008F BODY MASS INDEX DOCD: CPT | Mod: CPTII,S$GLB,, | Performed by: INTERNAL MEDICINE

## 2023-11-14 PROCEDURE — 1101F PR PT FALLS ASSESS DOC 0-1 FALLS W/OUT INJ PAST YR: ICD-10-PCS | Mod: CPTII,S$GLB,, | Performed by: INTERNAL MEDICINE

## 2023-11-14 PROCEDURE — 1101F PT FALLS ASSESS-DOCD LE1/YR: CPT | Mod: CPTII,S$GLB,, | Performed by: INTERNAL MEDICINE

## 2023-11-14 PROCEDURE — 3074F SYST BP LT 130 MM HG: CPT | Mod: CPTII,S$GLB,, | Performed by: INTERNAL MEDICINE

## 2023-11-14 PROCEDURE — 91322 COVID-19 VAC, MRNA 2023 (MODERNA)(PF) 50 MCG/0.5 ML IM SUSR (12+): ICD-10-PCS | Mod: S$GLB,,, | Performed by: INTERNAL MEDICINE

## 2023-11-14 PROCEDURE — 3288F FALL RISK ASSESSMENT DOCD: CPT | Mod: CPTII,S$GLB,, | Performed by: INTERNAL MEDICINE

## 2023-11-14 PROCEDURE — 90480 ADMN SARSCOV2 VAC 1/ONLY CMP: CPT | Mod: S$GLB,,, | Performed by: INTERNAL MEDICINE

## 2023-11-14 PROCEDURE — 99214 OFFICE O/P EST MOD 30 MIN: CPT | Mod: 25,S$GLB,, | Performed by: INTERNAL MEDICINE

## 2023-11-14 RX ORDER — MIRABEGRON 25 MG/1
25 TABLET, FILM COATED, EXTENDED RELEASE ORAL DAILY
Qty: 90 TABLET | Refills: 3 | Status: SHIPPED | OUTPATIENT
Start: 2023-11-14 | End: 2024-11-13

## 2023-11-14 NOTE — PROGRESS NOTES
Subjective:       Patient ID: Yara Whitehead is a 73 y.o. female.    Medication List with Changes/Refills   New Medications    MIRABEGRON (MYRBETRIQ) 25 MG TB24 ER TABLET    Take 1 tablet (25 mg total) by mouth once daily.   Current Medications    ACARBOSE (PRECOSE) 25 MG TAB    Take 25 mg by mouth daily as needed. When eating high carb meal    ACETAMINOPHEN (TYLENOL) 500 MG TABLET    Take 500 mg by mouth every 6 (six) hours as needed for Pain.    ACYCLOVIR (ZOVIRAX) 200 MG CAPSULE    Take 200 mg by mouth.    ALBUTEROL (PROVENTIL/VENTOLIN HFA) 90 MCG/ACTUATION INHALER    Inhale 1-2 puffs into the lungs every 6 (six) hours as needed for Wheezing.    ASCORBIC ACID, VITAMIN C, (VITAMIN C) 1000 MG TABLET    Take 1,000 mg by mouth once daily.    ATORVASTATIN (LIPITOR) 10 MG TABLET    TAKE 1 TABLET BY MOUTH  DAILY    CETIRIZINE (ZYRTEC) 10 MG TABLET    Take 10 mg by mouth daily as needed.     CYANOCOBALAMIN 1,000 MCG/ML INJECTION    INJECT 1 ML INTO THE MUSCLE EVERY 14 DAYS    DONEPEZIL (ARICEPT) 10 MG TABLET    Take 1 tablet (10 mg total) by mouth every evening.    GLUCOSE 4 GM CHEWABLE TABLET    Take 4 tablets when glucose from 51-70  Take 6 tablets when glucose is less than 50    HYDROXYZINE PAMOATE (VISTARIL) 25 MG CAP    Take 25 mg by mouth daily as needed.    LEVOTHYROXINE (SYNTHROID) 88 MCG TABLET    Take 1 tablet (88 mcg total) by mouth before breakfast.    MAGNESIUM ORAL    Take by mouth once daily at 6am.    MIRTAZAPINE (REMERON) 30 MG TABLET    Take 1 tablet (30 mg total) by mouth nightly.    PAROXETINE (PAXIL) 30 MG TABLET    Take 1 tablet (30 mg total) by mouth every morning.    PREDNISOLON/GATIFLOX/BROMFENAC (PREDNISOL ACE-GATIFLOX-BROMFEN) 1-0.5-0.075 % DRPS    Apply 1 drop to eye 3 (three) times daily.    PREDNISOLON/GATIFLOX/BROMFENAC (PREDNISOL ACE-GATIFLOX-BROMFEN) 1-0.5-0.075 % DRPS    Apply 1 drop to eye 3 (three) times daily.    RIVAROXABAN (XARELTO) 20 MG TAB    Take 1 tablet (20 mg total) by  "mouth every evening.    TELMISARTAN (MICARDIS) 40 MG TAB    Take 1 tablet (40 mg total) by mouth once daily.    VITAMIN D (VITAMIN D3) 1000 UNITS TAB    Take 1,000 Units by mouth once daily.   Discontinued Medications    DIVALPROEX (DEPAKOTE) 500 MG TBEC    Take 500 mg by mouth 2 (two) times daily.    LOSARTAN (COZAAR) 50 MG TABLET    Take 50 mg by mouth.    OXYBUTYNIN (DITROPAN-XL) 5 MG TR24    Take 1 tablet (5 mg total) by mouth once daily.    SOLIFENACIN (VESICARE) 5 MG TABLET    Take 5 mg by mouth.       Chief Complaint: Follow-up  She is here today to discuss chronic medical issues.       She has paroxysmal Afib s/p PVI ablation on 4/2019.   She is taking xarelto 20 mg qday.  She denies any recurrent palpitations or chest pain.  She had her loop recorder changed on 7/19/19.  She has metoprolol at home to use PRN palpitations. She has not had to use.  She was seen by cardiology on 12/2022 and no changes were made.       She had an echo on 11/2022 that showed cLVH, EF 70%, mild MR and AR, trileaflet aortic valve, subaortic membrane (gradient 27), PAP of 39.  Cardiology advised to monitor with yearly echo which is scheduled.  She denies any chest pain or shortness of breath.      She has hypertension and is taking telmisartan 40 mg qday. She has no known CAD.     She has hyperlipidemia controlled on atorvastatin 10 mg qday.  Her lipids on 10/2022 were 198/39/109/81.          She has a chronic PND due to allergies. She tried OTC antihistamines but they make her "too dry".  She denies any active symptoms.      She has hypothyroid that is controlled on levothyroxine 88 mcg qday. Her last TSH was normal on  6/2023.       She had reflux symptoms in the past and was taking pepcid. Since her Afib is rate controlled her reflux is gone.       She has malabsorption since bypass surgery and has a vitamin B 12 deficiency. She is taking vitamin B12 1000 mcg IM every 4 weeks.  Her vitamin B 12 level on 1/2023 was 507.  She has " ALESSANDRA and is taking oral iron supplementation.  Labs on 6/2023 show normal H+H.    She has a vitamin D deficiency and is on supplementation. Her last level was on 8/2021 was 44.      She was diagnosed with post gastric bypass hypoglycemic syndrome. She gets chronic diarrhea after eating large carbohydrate meal. She will get low glucose readings and has a CGM. She was seen by endocrine on 5/2022 and started on acarbose 25 mg tid with meals but she only uses when she goes out to eat or eats a large meal.  Most day she eats small meals and avoids carbohydrates. This has improved both the diarrhea and the hypoglycemia.      She has anxiety and depression for many years and has had multiple episodes of  paranoia and arya triggered by urinary tract infection with her last flare lasting from 4/2023 to 6/2023. She is doing much better now and continues on paxil 40 mg daily and remeron 15 mg nightly.  She is feeling very good at this time. She is sleeping well. She denies any anxiety or depression. She is tolerating her medications well.      Recall: She had a syncopal event on 1/1/2019 resulting inn a scalp hematoma, non displaced skull fx, coutrecoup injury with right frontal SDH, and left superior parietal SAH.  She has been followed by serial CT by NS and has done very well. She was seen by NS on 1/31/2019 with repeat CT showing complete resolution of her injury. She denies any headaches or vision changes.       She has incontinence of urine and tried on mybetriq without success. She was restarted on oxybutynin but she ran out of medication.      She was noted to have a left abdominal wall hernia and was seen by surgery on 5/2019 and advised to just monitor.      She has FABIANA but stopped using CPAP.  She does not have the sleep issues like she did in the past when her weight was up.      She does have some memory changes and was seen by neuropsychiatry for evaluation on 6/2020. It was felt that her depression which was  increased at the time of testing was uncontrolled and contributing to her memory issues. She reports since her depression is now stable and she is taking her vitamin B 12 regularly her memory is much improved. She is taking aricept 10 mg daily.      She has intermittent left sided neck pain that starts where her muscles insert to her neck and radiates down her neck. This limits her ROM on side bending and rotation.  She did PT which helped in the past. She continues to complain of increased pain on the right side of her neck. She uses THC with good success.       She lives alone and feels safe. She does not exercise but stays active with the [employer].  She denies any falls or balance issues.       Colonoscopy--10/2020 repeat in 5 years  Mammogram----11/2022 neg   DEXA-----11/2022 osteopenia with fracture risk  of 14%, hip fracture risk of 1.3% (Tv -1.9, Tf -0.8)  Pap----- KEIRY  Tdap--11/2016  Influenza vaccine---9/2022    Pneumovax 23----4/2017  Prevnar 13---- 2/2016  Shingles vaccine-----6/2022, 12/2022  Covid vaccine---4 doses        Review of Systems   Constitutional:  Negative for appetite change, fatigue, fever and unexpected weight change.   HENT:  Negative for congestion, ear pain, hearing loss, sore throat and trouble swallowing.    Eyes:  Negative for pain and visual disturbance.   Respiratory:  Negative for cough, chest tightness, shortness of breath and wheezing.    Cardiovascular:  Negative for chest pain, palpitations and leg swelling.   Gastrointestinal:  Negative for abdominal pain, blood in stool, constipation, diarrhea, nausea and vomiting.   Endocrine: Negative for polyuria.   Genitourinary:  Positive for frequency. Negative for dysuria and hematuria.   Musculoskeletal:  Positive for neck pain. Negative for arthralgias, back pain and myalgias.   Skin:  Negative for rash.   Neurological:  Negative for dizziness, weakness, numbness and headaches.   Hematological:  Does not bruise/bleed easily.  "  Psychiatric/Behavioral:  Positive for dysphoric mood. Negative for sleep disturbance and suicidal ideas. The patient is not nervous/anxious.        Objective:      Vitals:    11/14/23 0744   BP: 120/84   BP Location: Left arm   Patient Position: Sitting   Pulse: 72   Temp: 98.1 °F (36.7 °C)   SpO2: 100%   Weight: 69.7 kg (153 lb 10.6 oz)   Height: 5' 5" (1.651 m)     Body mass index is 25.57 kg/m².  Physical Exam    General appearance: No acute distress, cooperative  Eyes: PERRL, EOMI, conjunctiva clear  Ears: normal external ear and pinna, tm clear without drainage, canals clear  Nose: Normal mucosa without drainage  Throat: no exudates or erythema, tonsils not enlarged  Mouth: no sores or lesions, moist mucous membranes  Neck: FROM, soft, supple, no thyromegaly, no bruits  Lymph: no anterior or posterior cervical adenopathy  Heart::  Regular rate and rhythm, 4/6 systolic murmur radiates to carotids   Lung: Clear to ascultation bilaterally, no wheezing, no rales, no rhonchi, no distress  Abdomen: Soft, nontender, no distention, no hepatosplenomegaly, bowel sounds normal, no guarding, no rebound, no peritoneal signs  Skin: no rashes, no lesions  Extremities: no edema, no cyanosis  Neuro: CN 2-12 intact, 5/5 muscle strength upper and lower extremity bilaterally, 2+ DTRs UE and LE bilaterally, normal gait  Peripheral pulses: 2+ pedal pulses bilaterally, good perfusion and color  Musculoskeletal: FROM, good strenth, no tenderness  Joint: normal appearance, no swelling, no warmth, no deformity in all joints    Assessment:       1. PAF (paroxysmal atrial fibrillation)    2. Essential hypertension    3. Hyperlipidemia, unspecified hyperlipidemia type    4. Nonrheumatic mitral valve regurgitation    5. Subaortic membrane    6. Bilateral carotid artery stenosis    7. Aortic atherosclerosis    8. Hypothyroidism, unspecified type    9. Chronic allergic rhinitis    10. Postprandial hypoglycemia    11. Vitamin B12 deficiency  "   12. Iron deficiency anemia, unspecified iron deficiency anemia type    13. Overflow incontinence of urine    14. Memory impairment    15. Mild episode of recurrent major depressive disorder    16. Primary insomnia    17. Personal history of tobacco use    18. Encounter for screening mammogram for malignant neoplasm of breast    19. Need for COVID-19 vaccine    20. Need for vaccination        Plan:       PAF (paroxysmal atrial fibrillation)  NSR today on exam. She continues on xarelto and metoprolol PRN    Essential hypertension  Well controlled and continue current regimen.   -     Basic Metabolic Panel  -     Lipid Panel    Hyperlipidemia, unspecified hyperlipidemia type  Good control on atorvastatin.     Nonrheumatic mitral valve regurgitation with Subaortic membrane  Scheduled for echo this month and then f/u with cardiology    Bilateral carotid artery stenosis  Continue atorvastatin and xarelto    Aortic atherosclerosis  Continue statin and xarelto    Hypothyroidism, unspecified type  Good control on this dose of levothyroxine    Chronic allergic rhinitis  Well controlled and continue current regimen.     Postprandial hypoglycemia  She is doing better with dietary changes.     Vitamin B12 deficiency  Continue supplementation.   -     Vitamin B12    Iron deficiency anemia, unspecified iron deficiency anemia type  Stable and continue to monitor    Overflow incontinence of urine  Uncontrolled and will retry mybetriq for her symptoms.   -     mirabegron (MYRBETRIQ) 25 mg Tb24 ER tablet; Take 1 tablet (25 mg total) by mouth once daily.  Dispense: 90 tablet; Refill: 3    Memory impairment  She is doing better on aricept    Mild episode of recurrent major depressive disorder  Well controlled and continue current regimen.     Primary insomnia  Doing well on remeron as needed.     Personal history of tobacco use  Patient was seen today for counseling on low dose CT of lungs for lung cancer screening.  She/He has been  smoking for 20 pack years and is between the ages of 56 to 78 years old.  She/he is either a current smoker or former smoker who quit less than 15 years ago and is asymptomatic.  Through shared decision making, we discussed the risk vs benefits of screening including over diagnosis, need for follow up imaging, false positives, and total radiation exposure. Importance of adherence to annual lung cancer screening and impact of comorbidities on screening were discussed.  Counseling on the importance of maintaining cigarette smoking abstinence if a former smoker or importance of smoking cessation if a current smoker was addressed.     -     CT Chest Lung Screening Low Dose; Future; Expected date: 11/14/2023    Encounter for screening mammogram for malignant neoplasm of breast  -     Mammo Digital Screening Bilat w/ [place]; Future; Expected date: 11/15/2023    Need for COVID-19 vaccine  -     COVID-19 VAC, MRNA 2023 (MODERNA)(PF) 50 MCG/0.5 ML IM SUSR (12 YRS AND UP)    Need for vaccination  -     Influenza - Quadrivalent (Adjuvanted)    Follow up in about 6 months (around 5/14/2024) for chronic medical issues.

## 2023-11-14 NOTE — PROGRESS NOTES
Venipuncture performed with 21 gauge butterfly, x's 2 attempt,  to R Antecubital vein.  Specimens collected per orders.      Pressure dressing applied to site, instructed patient to remove dressing in 10-15 minutes, OK to re-adjust dressing if pressure causing any discomfort, to observe closely for numbness and/or discoloration to hand or fingers, and to notify provider if bleeding persists after applying constant pressure lasting 30 minutes.

## 2023-11-14 NOTE — TELEPHONE ENCOUNTER
Called pt to assist with scheduling labs - not able to leave a message. Sending my chart message.

## 2023-11-14 NOTE — H&P
History    Chief complaint:  Painless progressive vision loss    Present Ilness/Diagnosis: Nuclear sclerotic Cataract    Past Medical History:  has a past medical history of Allergy, Arrhythmia, Asthma, Closed fracture of proximal end of left humerus (05/29/2016), GERD (gastroesophageal reflux disease), Hypertension, Hypoglycemia, Hypothyroidism, ALESSANDRA (iron deficiency anemia), Insomnia, Nephrolithiasis, FABIANA on CPAP, PAF (paroxysmal atrial fibrillation), Status post placement of implantable loop recorder (03/18/2016), Subaortic membrane, Subarachnoid hematoma (01/01/2019), Subdural hematoma, Vitamin B 12 deficiency, and Vitamin D deficiency.    Family History/Social History: refer to chart    Allergies: Review of patient's allergies indicates:  No Known Allergies    Current Medications: No current facility-administered medications for this encounter.    Current Outpatient Medications:     acarbose (PRECOSE) 25 MG Tab, Take 25 mg by mouth daily as needed. When eating high carb meal, Disp: , Rfl:     acetaminophen (TYLENOL) 500 MG tablet, Take 500 mg by mouth every 6 (six) hours as needed for Pain., Disp: , Rfl:     acyclovir (ZOVIRAX) 200 MG capsule, Take 200 mg by mouth., Disp: , Rfl:     albuterol (PROVENTIL/VENTOLIN HFA) 90 mcg/actuation inhaler, Inhale 1-2 puffs into the lungs every 6 (six) hours as needed for Wheezing., Disp: 18 g, Rfl: 6    ascorbic acid, vitamin C, (VITAMIN C) 1000 MG tablet, Take 1,000 mg by mouth once daily., Disp: , Rfl:     atorvastatin (LIPITOR) 10 MG tablet, TAKE 1 TABLET BY MOUTH  DAILY, Disp: 90 tablet, Rfl: 3    cetirizine (ZYRTEC) 10 MG tablet, Take 10 mg by mouth daily as needed. , Disp: , Rfl:     cyanocobalamin 1,000 mcg/mL injection, INJECT 1 ML INTO THE MUSCLE EVERY 14 DAYS, Disp: 6 mL, Rfl: 3    donepeziL (ARICEPT) 10 MG tablet, Take 1 tablet (10 mg total) by mouth every evening. (Patient taking differently: Take 10 mg by mouth once daily.), Disp: 90 tablet, Rfl: 2    glucose 4  GM chewable tablet, Take 4 tablets when glucose from 51-70 Take 6 tablets when glucose is less than 50, Disp: 120 tablet, Rfl: 12    hydrOXYzine pamoate (VISTARIL) 25 MG Cap, Take 25 mg by mouth daily as needed., Disp: , Rfl:     levothyroxine (SYNTHROID) 88 MCG tablet, Take 1 tablet (88 mcg total) by mouth before breakfast., Disp: 90 tablet, Rfl: 2    MAGNESIUM ORAL, Take by mouth once daily at 6am., Disp: , Rfl:     mirabegron (MYRBETRIQ) 25 mg Tb24 ER tablet, Take 1 tablet (25 mg total) by mouth once daily., Disp: 90 tablet, Rfl: 3    mirtazapine (REMERON) 30 MG tablet, Take 1 tablet (30 mg total) by mouth nightly., Disp: 90 tablet, Rfl: 3    paroxetine (PAXIL) 30 MG tablet, Take 1 tablet (30 mg total) by mouth every morning. (Patient taking differently: Take 30 mg by mouth once daily.), Disp: 90 tablet, Rfl: 0    prednisolon/gatiflox/bromfenac (PREDNISOL ACE-GATIFLOX-BROMFEN) 1-0.5-0.075 % DrpS, Apply 1 drop to eye 3 (three) times daily., Disp: 5 mL, Rfl: 3    prednisolon/gatiflox/bromfenac (PREDNISOL ACE-GATIFLOX-BROMFEN) 1-0.5-0.075 % DrpS, Apply 1 drop to eye 3 (three) times daily. (Patient not taking: Reported on 11/14/2023), Disp: 5 mL, Rfl: 3    rivaroxaban (XARELTO) 20 mg Tab, Take 1 tablet (20 mg total) by mouth every evening. (Patient taking differently: Take 20 mg by mouth once daily.), Disp: 90 tablet, Rfl: 4    telmisartan (MICARDIS) 40 MG Tab, Take 1 tablet (40 mg total) by mouth once daily., Disp: 90 tablet, Rfl: 3    vitamin D (VITAMIN D3) 1000 units Tab, Take 1,000 Units by mouth once daily., Disp: , Rfl:     Physical Exam    BP: Vital signs stable  General: No apparent distress  HEENT: nuclear sclerotic cataract  Lungs: adequate respirations  Heart: + pulses  Abdomen: soft  Rectal/pelvic: deferred    Impression: Visually significant Cataract.    See previous clinic notes for surgical indications.    Plan: Phacoemulsification with implantation of Intraocular lens

## 2023-11-15 ENCOUNTER — PATIENT MESSAGE (OUTPATIENT)
Dept: FAMILY MEDICINE | Facility: CLINIC | Age: 73
End: 2023-11-15
Payer: MEDICARE

## 2023-11-24 ENCOUNTER — ANESTHESIA EVENT (OUTPATIENT)
Dept: SURGERY | Facility: HOSPITAL | Age: 73
End: 2023-11-24
Payer: MEDICARE

## 2023-11-27 ENCOUNTER — HOSPITAL ENCOUNTER (OUTPATIENT)
Facility: HOSPITAL | Age: 73
Discharge: HOME | End: 2023-11-27
Attending: OPHTHALMOLOGY | Admitting: OPHTHALMOLOGY
Payer: MEDICARE

## 2023-11-27 ENCOUNTER — ANESTHESIA (OUTPATIENT)
Dept: SURGERY | Facility: HOSPITAL | Age: 73
End: 2023-11-27
Payer: MEDICARE

## 2023-11-27 VITALS
OXYGEN SATURATION: 98 % | HEIGHT: 65 IN | RESPIRATION RATE: 18 BRPM | BODY MASS INDEX: 25.49 KG/M2 | HEART RATE: 68 BPM | DIASTOLIC BLOOD PRESSURE: 70 MMHG | SYSTOLIC BLOOD PRESSURE: 137 MMHG | TEMPERATURE: 98 F | WEIGHT: 153 LBS

## 2023-11-27 DIAGNOSIS — H25.10 AGE-RELATED NUCLEAR CATARACT: ICD-10-CM

## 2023-11-27 DIAGNOSIS — H25.13 NUCLEAR SCLEROTIC CATARACT OF BOTH EYES: Primary | ICD-10-CM

## 2023-11-27 PROCEDURE — 63600175 PHARM REV CODE 636 W HCPCS: Mod: PO | Performed by: ANESTHESIOLOGY

## 2023-11-27 PROCEDURE — 66984 XCAPSL CTRC RMVL W/O ECP: CPT | Mod: 79,RT,, | Performed by: OPHTHALMOLOGY

## 2023-11-27 PROCEDURE — 66984 PR REMOVAL, CATARACT, W/INSRT INTRAOC LENS, W/O ENDO CYCLO: ICD-10-PCS | Mod: 79,RT,, | Performed by: OPHTHALMOLOGY

## 2023-11-27 PROCEDURE — 63600175 PHARM REV CODE 636 W HCPCS: Mod: PO | Performed by: NURSE ANESTHETIST, CERTIFIED REGISTERED

## 2023-11-27 PROCEDURE — 71000015 HC POSTOP RECOV 1ST HR: Mod: PO | Performed by: OPHTHALMOLOGY

## 2023-11-27 PROCEDURE — 66999 UNLISTED PX ANT SEGMENT EYE: CPT | Mod: CSM,RT,, | Performed by: OPHTHALMOLOGY

## 2023-11-27 PROCEDURE — 37000008 HC ANESTHESIA 1ST 15 MINUTES: Mod: PO | Performed by: OPHTHALMOLOGY

## 2023-11-27 PROCEDURE — 36000707: Mod: PO | Performed by: OPHTHALMOLOGY

## 2023-11-27 PROCEDURE — D9220A PRA ANESTHESIA: ICD-10-PCS | Mod: CRNA,,, | Performed by: NURSE ANESTHETIST, CERTIFIED REGISTERED

## 2023-11-27 PROCEDURE — V2787 ASTIGMATISM-CORRECT FUNCTION: HCPCS | Mod: PO | Performed by: OPHTHALMOLOGY

## 2023-11-27 PROCEDURE — 66999 PR FEMTO TORIC: ICD-10-PCS | Mod: CSM,RT,, | Performed by: OPHTHALMOLOGY

## 2023-11-27 PROCEDURE — 25000003 PHARM REV CODE 250: Mod: PO

## 2023-11-27 PROCEDURE — D9220A PRA ANESTHESIA: Mod: CRNA,,, | Performed by: NURSE ANESTHETIST, CERTIFIED REGISTERED

## 2023-11-27 PROCEDURE — 36000706: Mod: PO | Performed by: OPHTHALMOLOGY

## 2023-11-27 PROCEDURE — 25000003 PHARM REV CODE 250: Mod: PO | Performed by: NURSE ANESTHETIST, CERTIFIED REGISTERED

## 2023-11-27 PROCEDURE — 37000009 HC ANESTHESIA EA ADD 15 MINS: Mod: PO | Performed by: OPHTHALMOLOGY

## 2023-11-27 PROCEDURE — D9220A PRA ANESTHESIA: ICD-10-PCS | Mod: ANES,,, | Performed by: ANESTHESIOLOGY

## 2023-11-27 PROCEDURE — 25000003 PHARM REV CODE 250: Mod: PO | Performed by: OPHTHALMOLOGY

## 2023-11-27 PROCEDURE — D9220A PRA ANESTHESIA: Mod: ANES,,, | Performed by: ANESTHESIOLOGY

## 2023-11-27 DEVICE — IMPLANTABLE DEVICE: Type: IMPLANTABLE DEVICE | Site: EYE | Status: FUNCTIONAL

## 2023-11-27 RX ORDER — SODIUM CHLORIDE 0.9 % (FLUSH) 0.9 %
2 SYRINGE (ML) INJECTION
Status: SHIPPED | OUTPATIENT
Start: 2023-11-27

## 2023-11-27 RX ORDER — MOXIFLOXACIN 5 MG/ML
1 SOLUTION/ DROPS OPHTHALMIC
Status: COMPLETED | OUTPATIENT
Start: 2023-11-27 | End: 2023-11-27

## 2023-11-27 RX ORDER — PHENYLEPHRINE HYDROCHLORIDE 100 MG/ML
1 SOLUTION/ DROPS OPHTHALMIC
Status: ACTIVE | OUTPATIENT
Start: 2023-11-27

## 2023-11-27 RX ORDER — CYCLOP/TROP/PROPA/PHEN/KET/WAT 1-1-0.1%
1 DROPS (EA) OPHTHALMIC (EYE)
Status: COMPLETED | OUTPATIENT
Start: 2023-11-27 | End: 2023-11-27

## 2023-11-27 RX ORDER — LIDOCAINE HYDROCHLORIDE 10 MG/ML
1 INJECTION, SOLUTION EPIDURAL; INFILTRATION; INTRACAUDAL; PERINEURAL ONCE
Status: DISCONTINUED | OUTPATIENT
Start: 2023-11-27 | End: 2023-11-27 | Stop reason: HOSPADM

## 2023-11-27 RX ORDER — MIDAZOLAM HYDROCHLORIDE 1 MG/ML
INJECTION INTRAMUSCULAR; INTRAVENOUS
Status: DISCONTINUED | OUTPATIENT
Start: 2023-11-27 | End: 2023-11-27

## 2023-11-27 RX ORDER — MOXIFLOXACIN 5 MG/ML
SOLUTION/ DROPS OPHTHALMIC
Status: DISCONTINUED | OUTPATIENT
Start: 2023-11-27 | End: 2023-11-27 | Stop reason: HOSPADM

## 2023-11-27 RX ORDER — HALOPERIDOL 5 MG/ML
0.5 INJECTION INTRAMUSCULAR EVERY 10 MIN PRN
Status: DISCONTINUED | OUTPATIENT
Start: 2023-11-27 | End: 2023-11-27 | Stop reason: HOSPADM

## 2023-11-27 RX ORDER — OXYCODONE HYDROCHLORIDE 5 MG/1
5 TABLET ORAL
Status: DISCONTINUED | OUTPATIENT
Start: 2023-11-27 | End: 2023-11-27 | Stop reason: HOSPADM

## 2023-11-27 RX ORDER — HYDROMORPHONE HYDROCHLORIDE 2 MG/ML
0.2 INJECTION, SOLUTION INTRAMUSCULAR; INTRAVENOUS; SUBCUTANEOUS EVERY 5 MIN PRN
Status: DISCONTINUED | OUTPATIENT
Start: 2023-11-27 | End: 2023-11-27 | Stop reason: HOSPADM

## 2023-11-27 RX ORDER — ONDANSETRON 2 MG/ML
4 INJECTION INTRAMUSCULAR; INTRAVENOUS DAILY PRN
Status: DISCONTINUED | OUTPATIENT
Start: 2023-11-27 | End: 2023-11-27 | Stop reason: HOSPADM

## 2023-11-27 RX ORDER — PROPARACAINE HYDROCHLORIDE 5 MG/ML
1 SOLUTION/ DROPS OPHTHALMIC DAILY PRN
Status: ACTIVE | OUTPATIENT
Start: 2023-11-27

## 2023-11-27 RX ORDER — DIPHENHYDRAMINE HYDROCHLORIDE 50 MG/ML
12.5 INJECTION INTRAMUSCULAR; INTRAVENOUS EVERY 6 HOURS PRN
Status: DISCONTINUED | OUTPATIENT
Start: 2023-11-27 | End: 2023-11-27 | Stop reason: HOSPADM

## 2023-11-27 RX ORDER — LIDOCAINE HYDROCHLORIDE 10 MG/ML
INJECTION, SOLUTION EPIDURAL; INFILTRATION; INTRACAUDAL; PERINEURAL
Status: DISCONTINUED | OUTPATIENT
Start: 2023-11-27 | End: 2023-11-27 | Stop reason: HOSPADM

## 2023-11-27 RX ORDER — LIDOCAINE HYDROCHLORIDE 40 MG/ML
INJECTION, SOLUTION RETROBULBAR
Status: DISCONTINUED | OUTPATIENT
Start: 2023-11-27 | End: 2023-11-27

## 2023-11-27 RX ORDER — FENTANYL CITRATE 50 UG/ML
25 INJECTION, SOLUTION INTRAMUSCULAR; INTRAVENOUS EVERY 5 MIN PRN
Status: DISCONTINUED | OUTPATIENT
Start: 2023-11-27 | End: 2023-11-27 | Stop reason: HOSPADM

## 2023-11-27 RX ORDER — MEPERIDINE HYDROCHLORIDE 50 MG/ML
12.5 INJECTION INTRAMUSCULAR; INTRAVENOUS; SUBCUTANEOUS ONCE AS NEEDED
Status: DISCONTINUED | OUTPATIENT
Start: 2023-11-27 | End: 2023-11-27 | Stop reason: HOSPADM

## 2023-11-27 RX ORDER — PREDNISOLONE ACETATE 10 MG/ML
SUSPENSION/ DROPS OPHTHALMIC
Status: DISCONTINUED | OUTPATIENT
Start: 2023-11-27 | End: 2023-11-27 | Stop reason: HOSPADM

## 2023-11-27 RX ORDER — TETRACAINE HYDROCHLORIDE 5 MG/ML
1 SOLUTION OPHTHALMIC
Status: SHIPPED | OUTPATIENT
Start: 2023-11-27

## 2023-11-27 RX ORDER — SODIUM CHLORIDE, SODIUM LACTATE, POTASSIUM CHLORIDE, CALCIUM CHLORIDE 600; 310; 30; 20 MG/100ML; MG/100ML; MG/100ML; MG/100ML
INJECTION, SOLUTION INTRAVENOUS CONTINUOUS
Status: DISCONTINUED | OUTPATIENT
Start: 2023-11-27 | End: 2023-11-27 | Stop reason: HOSPADM

## 2023-11-27 RX ORDER — ONDANSETRON HYDROCHLORIDE 2 MG/ML
INJECTION, SOLUTION INTRAMUSCULAR; INTRAVENOUS
Status: DISCONTINUED | OUTPATIENT
Start: 2023-11-27 | End: 2023-11-27

## 2023-11-27 RX ORDER — ACETAMINOPHEN 325 MG/1
650 TABLET ORAL EVERY 4 HOURS PRN
Status: DISCONTINUED | OUTPATIENT
Start: 2023-11-27 | End: 2023-11-27 | Stop reason: HOSPADM

## 2023-11-27 RX ORDER — LIDOCAINE HYDROCHLORIDE 40 MG/ML
INJECTION, SOLUTION RETROBULBAR
Status: DISCONTINUED | OUTPATIENT
Start: 2023-11-27 | End: 2023-11-27 | Stop reason: HOSPADM

## 2023-11-27 RX ADMIN — MOXIFLOXACIN 1 DROP: 5 SOLUTION/ DROPS OPHTHALMIC at 07:11

## 2023-11-27 RX ADMIN — LIDOCAINE HYDROCHLORIDE 0.2 ML: 40 SOLUTION RETROBULBAR; TOPICAL at 08:11

## 2023-11-27 RX ADMIN — Medication 1 DROP: at 07:11

## 2023-11-27 RX ADMIN — ONDANSETRON 4 MG: 2 INJECTION INTRAMUSCULAR; INTRAVENOUS at 08:11

## 2023-11-27 RX ADMIN — PROPARACAINE HYDROCHLORIDE 1 DROP: 5 SOLUTION/ DROPS OPHTHALMIC at 07:11

## 2023-11-27 RX ADMIN — SODIUM CHLORIDE, POTASSIUM CHLORIDE, SODIUM LACTATE AND CALCIUM CHLORIDE: 600; 310; 30; 20 INJECTION, SOLUTION INTRAVENOUS at 07:11

## 2023-11-27 RX ADMIN — MIDAZOLAM HYDROCHLORIDE 1 MG: 1 INJECTION, SOLUTION INTRAMUSCULAR; INTRAVENOUS at 08:11

## 2023-11-27 NOTE — ANESTHESIA POSTPROCEDURE EVALUATION
Anesthesia Post Evaluation    Patient: aYra Whitehead    Procedure(s) Performed: Procedure(s) (LRB):  EXTRACTION, CATARACT, WITH IOL INSERTION (Right)    Final Anesthesia Type: MAC      Patient location during evaluation: PACU  Patient participation: Yes- Able to Participate  Level of consciousness: awake  Post-procedure vital signs: reviewed and stable  Pain management: adequate  Airway patency: patent    PONV status at discharge: No PONV  Anesthetic complications: no      Cardiovascular status: blood pressure returned to baseline  Respiratory status: spontaneous ventilation  Hydration status: euvolemic  Follow-up not needed.          Vitals Value Taken Time   /70 11/27/23 0919   Temp 36.4 °C (97.5 °F) 11/27/23 0909   Pulse 68 11/27/23 0919   Resp 18 11/27/23 0919   SpO2 98 % 11/27/23 0919         Event Time   Out of Recovery 09:09:07         Pain/Fernando Score: Fernando Score: 10 (11/27/2023  9:08 AM)

## 2023-11-27 NOTE — DISCHARGE INSTRUCTIONS
Cataract Surgery     Post-Operative Instructions       Resume eye drops three times daily into operative eye.     Bring your eye drops to your follow-up appointment.     Wear protective sunglasses during the day. Wear eye shield when going to bed for 1 week.     Resume moderate activity.     Bathe/shower/wash your face as normal.     Do not rub your eye.   NO HEAVY LIFTING or bending.    Do not apply makeup around the operative eye for 1 week.        You should expect:     Blurry vision and halos for 24-48 hours.     Dilated pupil for 24-48 hours.     Scratchy feeling in the eye for 1-2 days.     Curved shadow in your peripheral vision for 2-3 weeks.     Occasional flickering of lights for up to 1 week.        If you experience severe pain or nausea, please call Dr. Chicas or the on-call doctor at [phone].        Plan to see Dr. Chicas tomorrow at ___________.     [facility] [address] [place] [address]     2nd floor, Entrance # 1     Most patients can drive the next morning. If you do not feel comfortable driving, please arrange for transportation.

## 2023-11-27 NOTE — TRANSFER OF CARE
"Anesthesia Transfer of Care Note    Patient: Yara Whitehead    Procedure(s) Performed: Procedure(s) (LRB):  EXTRACTION, CATARACT, WITH IOL INSERTION (Right)    Patient location: PACU    Anesthesia Type: MAC    Transport from OR: Transported from OR on room air with adequate spontaneous ventilation    Post pain: adequate analgesia    Post assessment: no apparent anesthetic complications and tolerated procedure well    Post vital signs: stable    Level of consciousness: awake, alert and oriented    Nausea/Vomiting: no nausea/vomiting    Complications: none    Transfer of care protocol was followed    Last vitals: Visit Vitals  BP (!) 166/72   Pulse 65   Temp 36.7 °C (98 °F) (Skin)   Resp 18   Ht 5' 5" (1.651 m)   Wt 69.4 kg (153 lb)   LMP  (LMP Unknown)   SpO2 97%   Breastfeeding No   BMI 25.46 kg/m²     "

## 2023-11-27 NOTE — ANESTHESIA PREPROCEDURE EVALUATION
11/27/2023  Yara Whitehead is a 73 y.o., female.      Pre-op Assessment    I have reviewed the Patient Summary Reports.     I have reviewed the Nursing Notes. I have reviewed the NPO Status.   I have reviewed the Medications.     Review of Systems  Anesthesia Hx:  No problems with previous Anesthesia                Social:  Smoker       EENT/Dental:   cataract          Cardiovascular:     Hypertension    Dysrhythmias atrial fibrillation                                   Pulmonary:    Asthma mild   Sleep Apnea                Renal/:  Chronic Renal Disease                Hepatic/GI:     GERD             Neurological:  Neurology Normal                                      Endocrine:   Hypothyroidism          Psych:  Psychiatric History                  Physical Exam  General: Well nourished, Cooperative, Alert and Oriented    Airway:  Mallampati: II   Mouth Opening: Normal  TM Distance: Normal  Neck ROM: Normal ROM        Anesthesia Plan  Type of Anesthesia, risks & benefits discussed:    Anesthesia Type: MAC  Intra-op Monitoring Plan: Standard ASA Monitors  Post Op Pain Control Plan: multimodal analgesia and IV/PO Opioids PRN  Induction:  IV  Informed Consent: Informed consent signed with the Patient and all parties understand the risks and agree with anesthesia plan.  All questions answered.   ASA Score: 3    Ready For Surgery From Anesthesia Perspective.     .

## 2023-11-27 NOTE — BRIEF OP NOTE
BRIEF DISCHARGE NOTE:    Date of discharge: 11/27/2023    Reason for hospitalization -  Cataract surgery     Final Diagnosis - Visually significant Cataract    Procedures and treatment provided - Status post phacoemulsification with placement of intraocular lens     Diet - Advance to regular as tolerated    Activity - as tolerated    Disposition at the end of the case - Good.    Discharge: to home    The patient tolerated the procedure well and knows to follow up with me tomorrow morning in the eye clinic, sooner if needed.    Patient and family instructions (as appropriate) - Given to patient on discharge    Rita Chicas MD

## 2023-11-27 NOTE — OP NOTE
DATE OF PROCEDURE: 11/27/2023    SURGEON: DM LEMA MD    PREOPERATIVE DIAGNOSIS:  1. Senile nuclear sclerotic cataract right eye. 2. Astigmatism right eye.    POSTOPERATIVE DIAGNOSIS: 1. Senile nuclear sclerotic cataract right eye. 2. Astigmatism right eye.    PROCEDURE PERFORMED:  Phacoemulsification with placement of TORIC intraocular lens, right eye.    IMPLANT:  QAM134 POWER 19.0    ANESTHESIA:  Topical and MAC    COMPLICATIONS: none    ESTIMATED BLOOD LOSS: <1cc    SPECIMENS: none    INDICATIONS FOR PROCEDURE:   The patient has a history of painless progressive vision loss.  The patient has described difficulties with activities of daily living, which specifically include driving, which is secondary to cataract formation and progression. After we had a thorough discussion about risks, benefits, and alternatives to cataract surgery, the patient agreed to proceed with phacoemulsification and implantation of a lens in the right eye.  These risks include, but are not limited to, hemorrhage, pain, infection, need for additional surgery, need for glasses or contacts, loss of vision, or even loss of the eye.  A TORIC IOL was decided upon to mitigate the patient's pre-existing astigmatism.     PROCEDURE IN DETAIL:  The patient was met in the preop holding area.  Consent was confirmed to be signed.  The operative site was marked.  The patient was brought into the operating room by the anesthesia team and placed under monitored anesthesia care.  The right eye was marked at 3 and 9 o'clock with the patient upright prior to prepping the patient. Then the right eye was prepped and draped in a sterile ophthalmic fashion.  A Jenny speculum was placed into the right eye.  The appropriate axis was marked at the corneal limbus for the TORIC IOL lens position.  A paracentesis site was made and 1% preservative-free lidocaine was injected into the anterior chamber.  Viscoelastic  material was injected into the anterior  chamber.  A keratome blade was used to make a clear corneal incision.  A cystotome was used to initiate the continuous curvilinear capsulorrhexis which was completed with Utrata forceps.  BSS on a chin cannula was used to perform hydrodissection.  The phacoemulsification tip was introduced into the eye and the nucleus was removed in a standard divide-and-conquer fashion.  Remaining cortical material was removed from the eye using irrigation-aspiration.  The capsular bag was filled with viscoelastic material and the intraocular lens was injected and positioned into place, aligning the correct axis.. Remaining viscoelastic material was removed from the eye using irrigation and aspiration.  The corneal wounds were hydrated.  The eye was filled to physiologic pressure. The wounds were found to be watertight. Drops of Vigamox and prednisilone were placed into the eye.  The eye was washed, dried, and shielded.  The patient tolerated the procedure well and knows to follow up with me tomorrow morning, sooner if needed.    Intraoperative aberrometer (ORA) was used to confirm calculations.

## 2023-11-28 ENCOUNTER — OFFICE VISIT (OUTPATIENT)
Dept: OPHTHALMOLOGY | Facility: CLINIC | Age: 73
End: 2023-11-28
Payer: MEDICARE

## 2023-11-28 DIAGNOSIS — Z98.42 STATUS POST CATARACT EXTRACTION AND INSERTION OF INTRAOCULAR LENS, LEFT: ICD-10-CM

## 2023-11-28 DIAGNOSIS — Z96.1 STATUS POST CATARACT EXTRACTION AND INSERTION OF INTRAOCULAR LENS, LEFT: ICD-10-CM

## 2023-11-28 DIAGNOSIS — Z98.41 STATUS POST CATARACT EXTRACTION AND INSERTION OF INTRAOCULAR LENS, RIGHT: Primary | ICD-10-CM

## 2023-11-28 DIAGNOSIS — Z96.1 STATUS POST CATARACT EXTRACTION AND INSERTION OF INTRAOCULAR LENS, RIGHT: Primary | ICD-10-CM

## 2023-11-28 PROCEDURE — 3288F FALL RISK ASSESSMENT DOCD: CPT | Mod: CPTII,S$GLB,, | Performed by: OPHTHALMOLOGY

## 2023-11-28 PROCEDURE — 99999 PR PBB SHADOW E&M-EST. PATIENT-LVL III: ICD-10-PCS | Mod: PBBFAC,,, | Performed by: OPHTHALMOLOGY

## 2023-11-28 PROCEDURE — 3288F PR FALLS RISK ASSESSMENT DOCUMENTED: ICD-10-PCS | Mod: CPTII,S$GLB,, | Performed by: OPHTHALMOLOGY

## 2023-11-28 PROCEDURE — 4010F ACE/ARB THERAPY RXD/TAKEN: CPT | Mod: CPTII,S$GLB,, | Performed by: OPHTHALMOLOGY

## 2023-11-28 PROCEDURE — 99024 PR POST-OP FOLLOW-UP VISIT: ICD-10-PCS | Mod: S$GLB,,, | Performed by: OPHTHALMOLOGY

## 2023-11-28 PROCEDURE — 1159F MED LIST DOCD IN RCRD: CPT | Mod: CPTII,S$GLB,, | Performed by: OPHTHALMOLOGY

## 2023-11-28 PROCEDURE — 99999 PR PBB SHADOW E&M-EST. PATIENT-LVL III: CPT | Mod: PBBFAC,,, | Performed by: OPHTHALMOLOGY

## 2023-11-28 PROCEDURE — 1101F PR PT FALLS ASSESS DOC 0-1 FALLS W/OUT INJ PAST YR: ICD-10-PCS | Mod: CPTII,S$GLB,, | Performed by: OPHTHALMOLOGY

## 2023-11-28 PROCEDURE — 1159F PR MEDICATION LIST DOCUMENTED IN MEDICAL RECORD: ICD-10-PCS | Mod: CPTII,S$GLB,, | Performed by: OPHTHALMOLOGY

## 2023-11-28 PROCEDURE — 4010F PR ACE/ARB THEARPY RXD/TAKEN: ICD-10-PCS | Mod: CPTII,S$GLB,, | Performed by: OPHTHALMOLOGY

## 2023-11-28 PROCEDURE — 99024 POSTOP FOLLOW-UP VISIT: CPT | Mod: S$GLB,,, | Performed by: OPHTHALMOLOGY

## 2023-11-28 PROCEDURE — 1126F PR PAIN SEVERITY QUANTIFIED, NO PAIN PRESENT: ICD-10-PCS | Mod: CPTII,S$GLB,, | Performed by: OPHTHALMOLOGY

## 2023-11-28 PROCEDURE — 1101F PT FALLS ASSESS-DOCD LE1/YR: CPT | Mod: CPTII,S$GLB,, | Performed by: OPHTHALMOLOGY

## 2023-11-28 PROCEDURE — 1126F AMNT PAIN NOTED NONE PRSNT: CPT | Mod: CPTII,S$GLB,, | Performed by: OPHTHALMOLOGY

## 2023-11-28 NOTE — PROGRESS NOTES
HPI    Ref by Dr. Eisenberg    S/p Phaco w/IOL OS - 10/30/23 - toric  S/P Phaco w/ IOL OD - 11/27/2023    /Ocular History:                             Degenerative retinal drusen OU    Patient states the left eye is feeling well today. No pain. No discharge.     EYE MEDS: Combo gtts TID OD     Pt here for 1 day Cataract sx postop in the Right eye. Pt states vision is   stable, no complaints following sx.         Last edited by Linda Charlton on 11/28/2023  2:44 PM.            Assessment /Plan     For exam results, see Encounter Report.    Status post cataract extraction and insertion of intraocular lens, right    Status post cataract extraction and insertion of intraocular lens, left      Slit Lamp Exam  L/L - normal  C/s - quiet  Cornea - clear  A/C - 1+ cell  Lens - PCIOL    POD #1 s/p phaco/IOL  - doing well  - continue the following drops:    vigamox or ocuflox TID x 1 wk then stop  Pred forte TID x  4 wks  Ketorolac TID until runs out    Versus:    Combination drop - 1 drop TID x total of 1 month    Appropriate precautions and post op medications reviewed.  Patient instructed to call or come in if symptoms of redness, decreased vision, or pain are experienced.    -f/up 1-2 wks, sooner PRN. Or 4 wks with optom for mrx if needed.

## 2023-11-30 ENCOUNTER — HOSPITAL ENCOUNTER (OUTPATIENT)
Dept: RADIOLOGY | Facility: HOSPITAL | Age: 73
Discharge: HOME | End: 2023-11-30
Attending: INTERNAL MEDICINE
Payer: MEDICARE

## 2023-11-30 DIAGNOSIS — Z87.891 PERSONAL HISTORY OF TOBACCO USE: ICD-10-CM

## 2023-11-30 PROCEDURE — 71271 CT THORAX LUNG CANCER SCR C-: CPT | Mod: TC,PO

## 2023-11-30 PROCEDURE — 71271 CT CHEST LUNG SCREENING LOW DOSE: ICD-10-PCS | Mod: 26,,, | Performed by: RADIOLOGY

## 2023-11-30 PROCEDURE — 71271 CT THORAX LUNG CANCER SCR C-: CPT | Mod: 26,,, | Performed by: RADIOLOGY

## 2023-12-01 ENCOUNTER — HOSPITAL ENCOUNTER (OUTPATIENT)
Dept: RADIOLOGY | Facility: HOSPITAL | Age: 73
Discharge: HOME | End: 2023-12-01
Attending: INTERNAL MEDICINE
Payer: MEDICARE

## 2023-12-01 ENCOUNTER — PATIENT MESSAGE (OUTPATIENT)
Dept: FAMILY MEDICINE | Facility: CLINIC | Age: 73
End: 2023-12-01
Payer: MEDICARE

## 2023-12-01 DIAGNOSIS — Z12.31 ENCOUNTER FOR SCREENING MAMMOGRAM FOR MALIGNANT NEOPLASM OF BREAST: ICD-10-CM

## 2023-12-01 PROCEDURE — 77067 MAMMO DIGITAL SCREENING BILAT WITH TOMO: ICD-10-PCS | Mod: 26,,, | Performed by: RADIOLOGY

## 2023-12-01 PROCEDURE — 77063 BREAST TOMOSYNTHESIS BI: CPT | Mod: 26,,, | Performed by: RADIOLOGY

## 2023-12-01 PROCEDURE — 77067 SCR MAMMO BI INCL CAD: CPT | Mod: TC,PO

## 2023-12-01 PROCEDURE — 77063 MAMMO DIGITAL SCREENING BILAT WITH TOMO: ICD-10-PCS | Mod: 26,,, | Performed by: RADIOLOGY

## 2023-12-01 PROCEDURE — 77067 SCR MAMMO BI INCL CAD: CPT | Mod: 26,,, | Performed by: RADIOLOGY

## 2024-01-09 NOTE — TELEPHONE ENCOUNTER
----- Message from Onel Bullock sent at 1/9/2024 12:57 PM CST -----  Regarding: advise  Contact: patient  Type:  RX Refill Request    Who Called:  Patient   Refill or New Rx:  refill  RX Name and Strength:  paroxetine (PAXIL) 30 MG [id number] - Route: Take 1 tablet (30 mg total) by mouth every morning. - OralPatient taking differently: Take 30 mg by mouth once daily.Sent to pharmacy as: paroxetine (PAXIL) 30 MG tabletE-Prescribing Status: Receipt confirmed by pharmacy (10/6/2023  9:00 AM CDT)     How is the patient currently taking it? (ex. 1XDay):  see above   Is this a 30 day or 90 day RX:  see above   Preferred Pharmacy with phone number:    [id number] - [facility] [id number] [place] [address] AT [facility] [address]  [address] [place] [id number]  Phone: [phone] Fax: [phone]    Local or Mail Order:  local  Ordering Provider:  Samara Carter Call Back Number:  [phone]  Additional Information:  Pt is having trouble getting this medication from pharm. Thanks

## 2024-01-10 RX ORDER — PAROXETINE 30 MG/1
TABLET, FILM COATED ORAL
Qty: 90 TABLET | Refills: 1 | Status: SHIPPED | OUTPATIENT
Start: 2024-01-10

## 2024-01-10 NOTE — TELEPHONE ENCOUNTER
Refill Routing Note   Medication(s) are not appropriate for processing by [facility] for the following reason(s):      No active prescription written by provider    [facility] action(s):  Defer Care Due:  None identified            Appointments  past 12m or future 3m with PCP    Date Provider   Last Visit   11/14/2023 Ann Richards DO   Next Visit   5/21/2024 Ann Richards,    ED visits in past 90 days: 0        Note composed:6:10 PM 01/09/2024

## 2024-01-13 DIAGNOSIS — E53.8 VITAMIN B 12 DEFICIENCY: ICD-10-CM

## 2024-01-16 RX ORDER — CYANOCOBALAMIN 1000 UG/ML
1000 INJECTION, SOLUTION INTRAMUSCULAR; SUBCUTANEOUS
Qty: 6 ML | Refills: 3 | Status: SHIPPED | OUTPATIENT
Start: 2024-01-16 | End: 2024-05-18

## 2024-01-16 NOTE — TELEPHONE ENCOUNTER
Refill Routing Note   Medication(s) are not appropriate for processing by [facility] for the following reason(s):        Outside of protocol: non-delegated    [facility] action(s):  Route      Medication Therapy Plan:         Appointments  past 12m or future 3m with PCP    Date Provider   Last Visit   11/14/2023 Ann Richards, DO   Next Visit   5/21/2024 Ann Richards, DO   ED visits in past 90 days: 0        Note composed:12:01 PM 01/16/2024

## 2024-01-30 NOTE — TELEPHONE ENCOUNTER
Refill Routing Note   Medication(s) are not appropriate for processing by [facility] for the following reason(s):        Outside of protocol    [facility] action(s):  Route               Appointments  past 12m or future 3m with PCP    Date Provider   Last Visit   11/14/2023 Ann Richards, DO   Next Visit   5/21/2024 Ann Richards, DO   ED visits in past 90 days: 0        Note composed:1:28 PM 01/30/2024

## 2024-01-31 RX ORDER — DONEPEZIL HYDROCHLORIDE 10 MG/1
10 TABLET, FILM COATED ORAL NIGHTLY
Qty: 90 TABLET | Refills: 2 | Status: SHIPPED | OUTPATIENT
Start: 2024-01-31 | End: 2024-06-04

## 2024-02-27 DIAGNOSIS — Z00.00 ENCOUNTER FOR MEDICARE ANNUAL WELLNESS EXAM: ICD-10-CM

## 2024-04-10 ENCOUNTER — TELEPHONE (OUTPATIENT)
Dept: FAMILY MEDICINE | Facility: CLINIC | Age: 74
End: 2024-04-10
Payer: MEDICARE

## 2024-04-10 NOTE — TELEPHONE ENCOUNTER
Attempted to contact pt again at both numbers listed to set up an ER follow up for 4/17 @ 9 am. Pt not answering and unable to leave a message at either number. Will attempt to contact pt through a MCM.

## 2024-04-10 NOTE — TELEPHONE ENCOUNTER
Pts daughter,called concerned about mother. States she was sent to ER after an incident at a neighbors home. Daughter state mother has hx of Bipolar- had 2 psych holds in the past year and will not go to group or take recommended medication. States she does know that her mother owns a gun and has been exhibiting irrational behavior at times.   Message relayed to Dr. Richards - states if pts daughter feels she is a threat to herself or others she needs to call the police to go out to the home. Pt has had psych evals scheduled in the past but will not go to the appointment. Suggest daughter come down and bring pt to these appointments to ensure she gets help.

## 2024-04-10 NOTE — TELEPHONE ENCOUNTER
----- Message from Lidya Muro sent at 4/10/2024  9:25 AM CDT -----  Regarding: Call back  Type:  Needs Medical Advice    Who Called: Pt Daughter    Would the patient rather a call back or a response via [employer]? Call back    Best Call Back Number: [phone]    Additional Information: Daughter is concern about her mom the pt , about her behavior. Daughter sts she have been getting complaints about pt behavior and would like to speak with the Dr. Thank you

## 2024-04-10 NOTE — TELEPHONE ENCOUNTER
Attempted to contact pt to scheduled ER follow up. Called both phone # s - one the VM was full and the other never get you to a VM.   Spoke with pts daughter again - relayed  to her that if she feels her mother is a threat to herself or others she needs to call the police to go out to the home.Pts children live out of state. The daughter will contact her brother and discuss coming in town in June to get mom to a psych appointment - she will call back to office once she speaks with him.

## 2024-05-14 ENCOUNTER — NURSE TRIAGE (OUTPATIENT)
Dept: ADMINISTRATIVE | Facility: CLINIC | Age: 74
End: 2024-05-14
Payer: MEDICARE

## 2024-05-14 ENCOUNTER — TELEPHONE (OUTPATIENT)
Dept: FAMILY MEDICINE | Facility: CLINIC | Age: 74
End: 2024-05-14
Payer: MEDICARE

## 2024-05-14 NOTE — TELEPHONE ENCOUNTER
"Spoke with pt - states she is having difficulties with finances and feeling overwhelmed with this. She would like to speak with Dr. Richards about her medications. Requesting an appointment on Friday due to availability of transportation. Would like to sign an involvement in care when she comes in to allow her children access to her records. States she is also working on a Power of  with them. Pt reiterated that she does not feel her mood has anything to do with having a UTI - has checked her urine several times with a home dip test. Pt is scheduled for an appointment on Friday as requested. Pt asked if she needs anything else at this time and states "no, I just needed an appointment to go over all of this with Dr. Richards.' Pt laughing during the conversation myself. Neighbor / friend talking in background confirming she will give her a ride to the appointment. Pt instructed to call for any further needs.   "

## 2024-05-14 NOTE — TELEPHONE ENCOUNTER
Pt calling from neighbor's phone [phone]. Pt c/o feeling depressed. States that she is negative for a UTI. Pt states that she is overwhelmed, lonely and behind on bills. Pt is with neighbor who is a counselor and using her phone. Pt states that loneliness is getting worst and requesting appt for today or Friday. Denies being suicidal. Advised to go to ED per protocol. Pt refusing ED dispo and requesting appt. Pt states that she would like to speak with PCP. Encounter routed to provider.         Reason for Disposition   Depression and unable to do any of normal activities (e.g., self care, school, work; in comparison to baseline).    Additional Information   Negative: Patient attempted suicide   Negative: Patient is threatening suicide now   Negative: Violent behavior, or threatening to physically hurt or kill someone   Negative: Patient is very confused (disoriented, slurred speech) and no other adult (e.g., friend or family member) available   Negative: Difficult to awaken or acting very confused (disoriented, slurred speech) and new-onset   Negative: Sounds like a life-threatening emergency to the triager    Protocols used: Depression-A-OH

## 2024-05-17 ENCOUNTER — OFFICE VISIT (OUTPATIENT)
Dept: FAMILY MEDICINE | Facility: CLINIC | Age: 74
End: 2024-05-17
Payer: MEDICARE

## 2024-05-17 VITALS
HEIGHT: 65 IN | WEIGHT: 147.81 LBS | HEART RATE: 66 BPM | OXYGEN SATURATION: 99 % | RESPIRATION RATE: 20 BRPM | TEMPERATURE: 97 F | BODY MASS INDEX: 24.63 KG/M2 | DIASTOLIC BLOOD PRESSURE: 84 MMHG | SYSTOLIC BLOOD PRESSURE: 142 MMHG

## 2024-05-17 DIAGNOSIS — R41.3 MEMORY IMPAIRMENT: ICD-10-CM

## 2024-05-17 DIAGNOSIS — I34.0 NONRHEUMATIC MITRAL VALVE REGURGITATION: ICD-10-CM

## 2024-05-17 DIAGNOSIS — I70.0 AORTIC ATHEROSCLEROSIS: ICD-10-CM

## 2024-05-17 DIAGNOSIS — F33.1 MODERATE EPISODE OF RECURRENT MAJOR DEPRESSIVE DISORDER: ICD-10-CM

## 2024-05-17 DIAGNOSIS — Z87.891 PERSONAL HISTORY OF TOBACCO USE: ICD-10-CM

## 2024-05-17 DIAGNOSIS — I48.0 PAF (PAROXYSMAL ATRIAL FIBRILLATION): Primary | ICD-10-CM

## 2024-05-17 DIAGNOSIS — I10 ESSENTIAL HYPERTENSION: ICD-10-CM

## 2024-05-17 DIAGNOSIS — I65.23 BILATERAL CAROTID ARTERY STENOSIS: ICD-10-CM

## 2024-05-17 DIAGNOSIS — J30.9 CHRONIC ALLERGIC RHINITIS: ICD-10-CM

## 2024-05-17 DIAGNOSIS — E03.9 HYPOTHYROIDISM, UNSPECIFIED TYPE: ICD-10-CM

## 2024-05-17 DIAGNOSIS — E78.5 HYPERLIPIDEMIA, UNSPECIFIED HYPERLIPIDEMIA TYPE: ICD-10-CM

## 2024-05-17 DIAGNOSIS — D50.9 IRON DEFICIENCY ANEMIA, UNSPECIFIED IRON DEFICIENCY ANEMIA TYPE: ICD-10-CM

## 2024-05-17 DIAGNOSIS — F42.3 HOARDING BEHAVIOR: ICD-10-CM

## 2024-05-17 DIAGNOSIS — E53.8 VITAMIN B12 DEFICIENCY: ICD-10-CM

## 2024-05-17 DIAGNOSIS — N39.490 OVERFLOW INCONTINENCE OF URINE: ICD-10-CM

## 2024-05-17 DIAGNOSIS — F51.01 PRIMARY INSOMNIA: ICD-10-CM

## 2024-05-17 DIAGNOSIS — Q24.4 SUBAORTIC MEMBRANE: ICD-10-CM

## 2024-05-17 DIAGNOSIS — E16.1 POSTPRANDIAL HYPOGLYCEMIA: ICD-10-CM

## 2024-05-17 DIAGNOSIS — Z59.9 FINANCIAL DIFFICULTIES: ICD-10-CM

## 2024-05-17 LAB
ALBUMIN SERPL BCP-MCNC: 3.9 G/DL (ref 3.5–5.2)
ALP SERPL-CCNC: 91 U/L (ref 55–135)
ALT SERPL W/O P-5'-P-CCNC: 20 U/L (ref 10–44)
ANION GAP SERPL CALC-SCNC: 10 MMOL/L (ref 8–16)
AST SERPL-CCNC: 23 U/L (ref 10–40)
BASOPHILS # BLD AUTO: 0.02 K/UL (ref 0–0.2)
BASOPHILS NFR BLD: 0.5 % (ref 0–1.9)
BILIRUB SERPL-MCNC: 0.4 MG/DL (ref 0.1–1)
BILIRUBIN, UA POC OHS: NEGATIVE
BLOOD, UA POC OHS: NEGATIVE
BUN SERPL-MCNC: 20 MG/DL (ref 8–23)
CALCIUM SERPL-MCNC: 9.4 MG/DL (ref 8.7–10.5)
CHLORIDE SERPL-SCNC: 105 MMOL/L (ref 95–110)
CHOLEST SERPL-MCNC: 191 MG/DL (ref 120–199)
CHOLEST/HDLC SERPL: 2.4 {RATIO} (ref 2–5)
CLARITY, UA POC OHS: CLEAR
CO2 SERPL-SCNC: 26 MMOL/L (ref 23–29)
COLOR, UA POC OHS: ABNORMAL
CREAT SERPL-MCNC: 0.6 MG/DL (ref 0.5–1.4)
DIFFERENTIAL METHOD BLD: ABNORMAL
EOSINOPHIL # BLD AUTO: 0.1 K/UL (ref 0–0.5)
EOSINOPHIL NFR BLD: 2.1 % (ref 0–8)
ERYTHROCYTE [DISTWIDTH] IN BLOOD BY AUTOMATED COUNT: 16.2 % (ref 11.5–14.5)
EST. GFR  (NO RACE VARIABLE): >60 ML/MIN/1.73 M^2
ESTIMATED AVG GLUCOSE: 111 MG/DL (ref 68–131)
FERRITIN SERPL-MCNC: 11 NG/ML (ref 20–300)
GLUCOSE SERPL-MCNC: 98 MG/DL (ref 70–110)
GLUCOSE, UA POC OHS: NEGATIVE
HBA1C MFR BLD: 5.5 % (ref 4–5.6)
HCT VFR BLD AUTO: 38.2 % (ref 37–48.5)
HDLC SERPL-MCNC: 79 MG/DL (ref 40–75)
HDLC SERPL: 41.4 % (ref 20–50)
HGB BLD-MCNC: 11.6 G/DL (ref 12–16)
IMM GRANULOCYTES # BLD AUTO: 0.01 K/UL (ref 0–0.04)
IMM GRANULOCYTES NFR BLD AUTO: 0.2 % (ref 0–0.5)
IRON SERPL-MCNC: 39 UG/DL (ref 30–160)
KETONES, UA POC OHS: NEGATIVE
LDLC SERPL CALC-MCNC: 99.2 MG/DL (ref 63–159)
LEUKOCYTES, UA POC OHS: NEGATIVE
LYMPHOCYTES # BLD AUTO: 1.3 K/UL (ref 1–4.8)
LYMPHOCYTES NFR BLD: 29 % (ref 18–48)
MCH RBC QN AUTO: 28.7 PG (ref 27–31)
MCHC RBC AUTO-ENTMCNC: 30.4 G/DL (ref 32–36)
MCV RBC AUTO: 95 FL (ref 82–98)
MONOCYTES # BLD AUTO: 0.3 K/UL (ref 0.3–1)
MONOCYTES NFR BLD: 7.6 % (ref 4–15)
NEUTROPHILS # BLD AUTO: 2.6 K/UL (ref 1.8–7.7)
NEUTROPHILS NFR BLD: 60.6 % (ref 38–73)
NITRITE, UA POC OHS: NEGATIVE
NONHDLC SERPL-MCNC: 112 MG/DL
NRBC BLD-RTO: 0 /100 WBC
PH, UA POC OHS: 6
PLATELET # BLD AUTO: 283 K/UL (ref 150–450)
PMV BLD AUTO: 11.5 FL (ref 9.2–12.9)
POTASSIUM SERPL-SCNC: 4 MMOL/L (ref 3.5–5.1)
PROT SERPL-MCNC: 6.9 G/DL (ref 6–8.4)
PROTEIN, UA POC OHS: NEGATIVE
RBC # BLD AUTO: 4.04 M/UL (ref 4–5.4)
SATURATED IRON: 6 % (ref 20–50)
SODIUM SERPL-SCNC: 141 MMOL/L (ref 136–145)
SPECIFIC GRAVITY, UA POC OHS: 1.02
TOTAL IRON BINDING CAPACITY: 608 UG/DL (ref 250–450)
TRANSFERRIN SERPL-MCNC: 411 MG/DL (ref 200–375)
TRIGL SERPL-MCNC: 64 MG/DL (ref 30–150)
TSH SERPL DL<=0.005 MIU/L-ACNC: 2.69 UIU/ML (ref 0.4–4)
UROBILINOGEN, UA POC OHS: 0.2
VIT B12 SERPL-MCNC: 328 PG/ML (ref 210–950)
WBC # BLD AUTO: 4.35 K/UL (ref 3.9–12.7)

## 2024-05-17 PROCEDURE — 85025 COMPLETE CBC W/AUTO DIFF WBC: CPT | Performed by: INTERNAL MEDICINE

## 2024-05-17 PROCEDURE — 83540 ASSAY OF IRON: CPT | Performed by: INTERNAL MEDICINE

## 2024-05-17 PROCEDURE — 3008F BODY MASS INDEX DOCD: CPT | Mod: CPTII,S$GLB,, | Performed by: INTERNAL MEDICINE

## 2024-05-17 PROCEDURE — 1126F AMNT PAIN NOTED NONE PRSNT: CPT | Mod: CPTII,S$GLB,, | Performed by: INTERNAL MEDICINE

## 2024-05-17 PROCEDURE — 1159F MED LIST DOCD IN RCRD: CPT | Mod: CPTII,S$GLB,, | Performed by: INTERNAL MEDICINE

## 2024-05-17 PROCEDURE — 36415 COLL VENOUS BLD VENIPUNCTURE: CPT | Mod: S$GLB,,, | Performed by: INTERNAL MEDICINE

## 2024-05-17 PROCEDURE — 3077F SYST BP >= 140 MM HG: CPT | Mod: CPTII,S$GLB,, | Performed by: INTERNAL MEDICINE

## 2024-05-17 PROCEDURE — 83036 HEMOGLOBIN GLYCOSYLATED A1C: CPT | Performed by: INTERNAL MEDICINE

## 2024-05-17 PROCEDURE — 3079F DIAST BP 80-89 MM HG: CPT | Mod: CPTII,S$GLB,, | Performed by: INTERNAL MEDICINE

## 2024-05-17 PROCEDURE — 84443 ASSAY THYROID STIM HORMONE: CPT | Performed by: INTERNAL MEDICINE

## 2024-05-17 PROCEDURE — 4010F ACE/ARB THERAPY RXD/TAKEN: CPT | Mod: CPTII,S$GLB,, | Performed by: INTERNAL MEDICINE

## 2024-05-17 PROCEDURE — 82607 VITAMIN B-12: CPT | Performed by: INTERNAL MEDICINE

## 2024-05-17 PROCEDURE — 80053 COMPREHEN METABOLIC PANEL: CPT | Performed by: INTERNAL MEDICINE

## 2024-05-17 PROCEDURE — 82728 ASSAY OF FERRITIN: CPT | Performed by: INTERNAL MEDICINE

## 2024-05-17 PROCEDURE — 81003 URINALYSIS AUTO W/O SCOPE: CPT | Mod: QW,S$GLB,, | Performed by: INTERNAL MEDICINE

## 2024-05-17 PROCEDURE — 1101F PT FALLS ASSESS-DOCD LE1/YR: CPT | Mod: CPTII,S$GLB,, | Performed by: INTERNAL MEDICINE

## 2024-05-17 PROCEDURE — 99215 OFFICE O/P EST HI 40 MIN: CPT | Mod: S$GLB,,, | Performed by: INTERNAL MEDICINE

## 2024-05-17 PROCEDURE — 3044F HG A1C LEVEL LT 7.0%: CPT | Mod: CPTII,S$GLB,, | Performed by: INTERNAL MEDICINE

## 2024-05-17 PROCEDURE — 1160F RVW MEDS BY RX/DR IN RCRD: CPT | Mod: CPTII,S$GLB,, | Performed by: INTERNAL MEDICINE

## 2024-05-17 PROCEDURE — 80061 LIPID PANEL: CPT | Performed by: INTERNAL MEDICINE

## 2024-05-17 PROCEDURE — 3288F FALL RISK ASSESSMENT DOCD: CPT | Mod: CPTII,S$GLB,, | Performed by: INTERNAL MEDICINE

## 2024-05-17 RX ORDER — PAROXETINE 30 MG/1
30 TABLET, FILM COATED ORAL DAILY
Qty: 90 TABLET | Refills: 3 | Status: SHIPPED | OUTPATIENT
Start: 2024-05-17

## 2024-05-17 SDOH — SOCIAL DETERMINANTS OF HEALTH (SDOH): PROBLEM RELATED TO HOUSING AND ECONOMIC CIRCUMSTANCES, UNSPECIFIED: Z59.9

## 2024-05-17 NOTE — PROGRESS NOTES
Subjective:       Patient ID: Yara Whitehead is a 73 y.o. female.    Medication List with Changes/Refills   Current Medications    ACETAMINOPHEN (TYLENOL) 500 MG TABLET    Take 500 mg by mouth every 6 (six) hours as needed for Pain.    ALBUTEROL (PROVENTIL/VENTOLIN HFA) 90 MCG/ACTUATION INHALER    Inhale 1-2 puffs into the lungs every 6 (six) hours as needed for Wheezing.    ASCORBIC ACID, VITAMIN C, (VITAMIN C) 1000 MG TABLET    Take 1,000 mg by mouth once daily.    ATORVASTATIN (LIPITOR) 10 MG TABLET    TAKE 1 TABLET BY MOUTH  DAILY    CETIRIZINE (ZYRTEC) 10 MG TABLET    Take 10 mg by mouth daily as needed.     DONEPEZIL (ARICEPT) 10 MG TABLET    TAKE 1 TABLET(10 MG) BY MOUTH EVERY EVENING    GLUCOSE 4 GM CHEWABLE TABLET    Take 4 tablets when glucose from 51-70  Take 6 tablets when glucose is less than 50    LEVOTHYROXINE (SYNTHROID) 88 MCG TABLET    Take 1 tablet (88 mcg total) by mouth before breakfast.    MAGNESIUM ORAL    Take by mouth once daily at 6am.    RIVAROXABAN (XARELTO) 20 MG TAB    Take 1 tablet (20 mg total) by mouth every evening.    TELMISARTAN (MICARDIS) 40 MG TAB    Take 1 tablet (40 mg total) by mouth once daily.    VITAMIN D (VITAMIN D3) 1000 UNITS TAB    Take 1,000 Units by mouth once daily.   Changed and/or Refilled Medications    Modified Medication Previous Medication    PAROXETINE (PAXIL) 30 MG TABLET paroxetine (PAXIL) 30 MG tablet       Take 1 tablet (30 mg total) by mouth once daily.    TAKE 1 TABLET(30 MG) BY MOUTH EVERY MORNING   Discontinued Medications    CYANOCOBALAMIN 1,000 MCG/ML INJECTION    Inject 1 mL (1,000 mcg total) into the muscle every 14 (fourteen) days.    MIRABEGRON (MYRBETRIQ) 25 MG TB24 ER TABLET    Take 1 tablet (25 mg total) by mouth once daily.    MIRTAZAPINE (REMERON) 30 MG TABLET    Take 1 tablet (30 mg total) by mouth nightly.    PREDNISOLON/GATIFLOX/BROMFENAC (PREDNISOL ACE-GATIFLOX-BROMFEN) 1-0.5-0.075 % DRPS    Apply 1 drop to eye 3 (three) times  "daily.    PREDNISOLON/GATIFLOX/BROMFENAC (PREDNISOL ACE-GATIFLOX-BROMFEN) 1-0.5-0.075 % DRPS    Apply 1 drop to eye 3 (three) times daily.       Chief Complaint: Depression and Review Medications  She is here today to discuss chronic medical issues.  She has stopped many of her medications due to cost.      She has paroxysmal Afib s/p PVI ablation on 4/2019.   She is stopped xarelto 20 mg qday.  She denies any recurrent palpitations or chest pain.  She had her loop recorder changed on 7/19/19. She has metoprolol at home to use PRN palpitations. She has not had to use.  She was seen by cardiology on 12/2022 and no changes were made.       She had an echo on 11/2022 that showed cLVH, EF 70%, mild MR and AR, trileaflet aortic valve, subaortic membrane (gradient 27), PAP of 39.  Cardiology advised to monitor with yearly echo which was never done.  She denies any chest pain or shortness of breath.      She has hypertension and is taking telmisartan 40 mg qday. She has no known CAD.     She has hyperlipidemia but stopped atorvastatin 10 mg qday.  Her lipids on treatment in 11/2023 were 191/31/101/83.           She has a chronic PND due to allergies. She tried OTC antihistamines but they make her "too dry".  She denies any active symptoms.     She started smoking again and her CT of the lungs on 11/2023 was negative. Advised to repeat in one year.      She has hypothyroid but stopped taking levothyroxine 88 mcg daily for the last 6 months.      She had reflux symptoms in the past and was taking pepcid. Since her Afib is rate controlled her reflux is gone.       She has malabsorption since bypass surgery and has a vitamin B 12 deficiency. She is taking vitamin B12 oral supplementation and her last level was 297 on 11/2023. She has ALESSANDRA and is taking oral iron supplementation.  Labs on 4/2024 show H+H 11.1/34.    She has a vitamin D deficiency but stopped supplementation.      She was diagnosed with post gastric bypass " hypoglycemic syndrome. She gets chronic diarrhea after eating large carbohydrate meal. She will get low glucose readings and has a CGM. She was seen by endocrine on 5/2022 and started on acarbose 25 mg tid with meals but she only uses when she goes out to eat or eats a large meal.  Most day she eats small meals and avoids carbohydrates. This has improved both the diarrhea and the hypoglycemia.      She has anxiety and depression for many years and has had multiple episodes of  paranoia and arya triggered by urinary tract infection with her last flare lasting from 4/2023 to 6/2023. Today she complains of depression and anxiety.  She is with her friend and reports significant financial issues. She has not paid her bills and is hoarding purchases in her house.  She is struggling to pay for food and gas.  She is taking paxil 20 mg daily. She is not sleeping well.  No suicidal ideations.  She denies paranoia.       She has incontinence of urine but stopped medication due to cost. She is doing okay with only mild symptoms off her oxybutynin.      She was noted to have a left abdominal wall hernia and was seen by surgery on 5/2019 and advised to just monitor.      She has FABIANA but stopped using CPAP.  She does not have the sleep issues like she did in the past when her weight was up.      She does have some memory changes and was seen by neuropsychiatry for evaluation on 6/2020. It was felt that her depression which was increased at the time of testing was uncontrolled and contributing to her memory issues. She is taking aricept 10 mg daily.      She has intermittent left sided neck pain that starts where her muscles insert to her neck and radiates down her neck. This limits her ROM on side bending and rotation.  She did PT which helped in the past. She continues to complain of increased pain on the right side of her neck. She was using THC in the past.        She lives alone and feels safe but due to financial issues her  bills have not been paid and she can not afford her medications. She signed an involvement of care for her children. She does not exercise but stays active.  She is involved with her [employer]. She denies any falls or balance issues. She does not have a phone and is using her neighbor's phone (Audrey----[phone]).        Colonoscopy--10/2020 repeat in 5 years  Mammogram----12/2023 neg   DEXA-----11/2022 osteopenia with fracture risk  of 14%, hip fracture risk of 1.3% (Tv -1.9, Tf -0.8)  Pap----- KEIRY  Tdap--11/2016  Influenza vaccine---11/2023  Pneumovax 23----4/2017  Prevnar 13---- 2/2016  Shingles vaccine-----6/2022, 12/2022  Covid vaccine---5 doses       RSV vaccine----none     Review of Systems   Constitutional:  Negative for appetite change, fatigue, fever and unexpected weight change.   HENT:  Negative for congestion, ear pain, hearing loss, sore throat and trouble swallowing.    Eyes:  Negative for pain and visual disturbance.   Respiratory:  Negative for cough, chest tightness, shortness of breath and wheezing.    Cardiovascular:  Positive for leg swelling. Negative for chest pain and palpitations.   Gastrointestinal:  Negative for abdominal pain, blood in stool, constipation, diarrhea, nausea and vomiting.   Endocrine: Negative for polyuria.   Genitourinary:  Negative for dysuria and hematuria.   Musculoskeletal:  Positive for arthralgias and neck pain. Negative for back pain and myalgias.   Skin:  Negative for rash.   Neurological:  Negative for dizziness, weakness, numbness and headaches.   Hematological:  Does not bruise/bleed easily.   Psychiatric/Behavioral:  Positive for dysphoric mood and sleep disturbance. Negative for suicidal ideas. The patient is not nervous/anxious.        Objective:      Vitals:    05/17/24 1058   BP: (!) 142/84   BP Location: Left arm   Patient Position: Sitting   Pulse: 66   Resp: 20   Temp: 97.3 °F (36.3 °C)   TempSrc: Temporal   SpO2: 99%   Weight: 67 kg (147 lb 13.1 oz)  "  Height: 5' 5" (1.651 m)     Body mass index is 24.6 kg/m².  Physical Exam    General appearance: No acute distress, cooperative  Eyes: PERRL, EOMI, conjunctiva clear  Ears: normal external ear and pinna, tm clear without drainage, canals clear  Nose: Normal mucosa without drainage  Throat: no exudates or erythema, tonsils not enlarged  Mouth: no sores or lesions, moist mucous membranes  Neck: FROM, soft, supple, no thyromegaly, no bruits  Lymph: no anterior or posterior cervical adenopathy  Heart::  Regular rate and rhythm, 4/6 systolic murmur  Lung: Clear to ascultation bilaterally, no wheezing, no rales, no rhonchi, no distress  Abdomen: Soft, nontender, no distention, no hepatosplenomegaly, bowel sounds normal, no guarding, no rebound, no peritoneal signs  Skin: no rashes, no lesions  Extremities: 1+ pitting lower leg edema, no cyanosis  Neuro: CN 2-12 intact, 5/5 muscle strength upper and lower extremity bilaterally, 2+ DTRs UE and LE bilaterally, normal gait  Peripheral pulses: 2+ pedal pulses bilaterally  Musculoskeletal: FROM, good strenth, no tenderness  Joint: normal appearance, no swelling, no warmth, no deformity in all joints    Assessment:       1. PAF (paroxysmal atrial fibrillation)    2. Essential hypertension    3. Hyperlipidemia, unspecified hyperlipidemia type    4. Nonrheumatic mitral valve regurgitation    5. Subaortic membrane    6. Bilateral carotid artery stenosis    7. Aortic atherosclerosis    8. Personal history of tobacco use    9. Hypothyroidism, unspecified type    10. Chronic allergic rhinitis    11. Postprandial hypoglycemia    12. Vitamin B12 deficiency    13. Iron deficiency anemia, unspecified iron deficiency anemia type    14. Overflow incontinence of urine    15. Memory impairment    16. Moderate episode of recurrent major depressive disorder    17. Hoarding behavior    18. Primary insomnia    19. Financial difficulties        Plan:       PAF (paroxysmal atrial " fibrillation)  NSR today on exam.  She is off xarelto due to cost. Will get assistance from cast managers.     Essential hypertension  Well controlled and continue current regimen.   -     CBC Auto Differential  -     Comprehensive Metabolic Panel  -     Lipid Panel  -     TSH  -     POCT Urinalysis(Instrument)    Hyperlipidemia, unspecified hyperlipidemia type  Will check lipids off atorvastatin    Nonrheumatic mitral valve regurgitation with subaortic membrane  She is past due to check echo. Will discuss at f/u. She does have lower leg swelling and will check labs today    Bilateral carotid artery stenosis  She is off her statin    Aortic atherosclerosis  She is off her statin    Personal history of tobacco use  STrongly advised to stop smoking. CT lung cancer screen is UTD    Hypothyroidism, unspecified type  Will check TSH off levothyroxine    Chronic allergic rhinitis  No complaints today    Postprandial hypoglycemia  No episodes or complaints today.   -     Hemoglobin A1C    Vitamin B12 deficiency  Will check her level on po supplementation.   -     Vitamin B12    Iron deficiency anemia, unspecified iron deficiency anemia type  She is off treatment and will check levels.   -     Iron and TIBC  -     Ferritin    Overflow incontinence of urine  Symptoms mild off treatment. Continue to monitor    Memory impairment  She continues on aricept.  Her memory issues are multifactorial    Moderate episode of recurrent major depressive disorder  Uncontrolled and will increase her dose of paxil back to 30 mg daily.   -     paroxetine (PAXIL) 30 MG tablet; Take 1 tablet (30 mg total) by mouth once daily.  Dispense: 90 tablet; Refill: 3    Hoarding behavior  Will increase her dose of paxil    Primary insomnia  She is off treatment.  She will bring her medication to her next appt in one week    Financial difficulties  Significant difficulties paying bills. She has no phone and can not afford her medications. Referral to  complex case management.   -     Ambulatory referral/consult to Outpatient Case Management    Follow up in about 1 week (around 5/24/2024) for for results of labs .     60 minutes of total time spent on the encounter, which includes face to face time and non-face to face time preparing to see the patient (eg, review of tests), Obtaining and/or reviewing separately obtained history, documenting clinical information in the electronic or other health record, independently interpreting results (not separately reported) and communicating results to the patient/family/caregiver, or Care coordination (not separately reported).

## 2024-05-18 PROBLEM — D70.9 NEUTROPENIA, UNSPECIFIED TYPE: Status: RESOLVED | Noted: 2023-01-24 | Resolved: 2024-05-18

## 2024-05-20 ENCOUNTER — OUTPATIENT CASE MANAGEMENT (OUTPATIENT)
Dept: ADMINISTRATIVE | Facility: OTHER | Age: 74
End: 2024-05-20
Payer: MEDICARE
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
## 2024-05-21 ENCOUNTER — TELEPHONE (OUTPATIENT)
Dept: FAMILY MEDICINE | Facility: CLINIC | Age: 74
End: 2024-05-21

## 2024-05-21 NOTE — TELEPHONE ENCOUNTER
----- Message from Linda Beard sent at 5/21/2024 10:04 AM CDT -----  Contact: pt daughter angie  Type: Needs Medical Advice  Who Called:  pt carmelina adams  Best Call Back Number: [phone]  Additional Information: would like to discuss what is going on with her mother in the last few appts.please call

## 2024-05-21 NOTE — TELEPHONE ENCOUNTER
Returned call to pt's daughter.  States she was contacted by the  and is unable to contact her mother since she does not have a phone.  Was inquiring what prompted the  referral.  Advised her of the most recent visit as there is a signed involvement in care form on the pt's chart. She advised that the earliest she can come down to see her mom is 5/29.  She is requesting a call back if there are any significant abnormalities on the lab results that require intervention.  Please review and advise.

## 2024-05-30 ENCOUNTER — TELEPHONE (OUTPATIENT)
Dept: FAMILY MEDICINE | Facility: CLINIC | Age: 74
End: 2024-05-30
Payer: MEDICARE

## 2024-05-30 DIAGNOSIS — I10 PRIMARY HYPERTENSION: ICD-10-CM

## 2024-05-30 RX ORDER — LEVOTHYROXINE SODIUM 88 UG/1
88 TABLET ORAL
Qty: 90 TABLET | Refills: 3 | Status: SHIPPED | OUTPATIENT
Start: 2024-05-30 | End: 2024-06-04

## 2024-05-30 NOTE — TELEPHONE ENCOUNTER
Spoke with pt - rescheduled pts 5/20 appointment  on 6/4 to review labs and go over meds. Pt would like to have daughter on the phone at the time of the appointment so she can hear what is said.

## 2024-05-30 NOTE — TELEPHONE ENCOUNTER
----- Message from Lidya Muro sent at 5/30/2024  9:43 AM CDT -----  Regarding: Call back  Type:  Needs Medical Advice    Who Called: Pt Daughter Jose    Would the patient rather a call back or a response via [facility]? Call back    Best Call Back Number: [phone] daughter's number     Additional Information: Daughter would like to ask a few questions about pt living condition / daughter as pictures and is very concern. Thank you    CAROLINE Daughter does not want mother to know she called about the situation.

## 2024-05-30 NOTE — TELEPHONE ENCOUNTER
No care due was identified.  [facility] Embedded Care Due Messages. Reference number: [id number].   5/30/2024 6:08:41 PM CDT

## 2024-05-30 NOTE — TELEPHONE ENCOUNTER
Refill Routing Note   Medication(s) are not appropriate for processing by [facility] for the following reason(s):        Required vitals abnormal    [facility] action(s):  Defer  Approve               Appointments  past 12m or future 3m with PCP    Date Provider   Last Visit   5/17/2024 Ann Richards,    Next Visit   Visit date not found Ann Richards, DO   ED visits in past 90 days: 1        Note composed:6:11 PM 05/30/2024
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
## 2024-05-30 NOTE — TELEPHONE ENCOUNTER
----- Message from Leonardo Mccormack sent at 5/30/2024  2:50 PM CDT -----  Contact: Self  Type: Needs Medical Advice  Who Called:  PT    Best Call Back Number: [phone]  Additional Information: PT is calling regarding having a conference with  and her children tomorrow.  Please call to advise.

## 2024-05-31 RX ORDER — TELMISARTAN 40 MG/1
40 TABLET ORAL
Qty: 90 TABLET | Refills: 3 | Status: SHIPPED | OUTPATIENT
Start: 2024-05-31

## 2024-06-04 ENCOUNTER — OFFICE VISIT (OUTPATIENT)
Dept: FAMILY MEDICINE | Facility: CLINIC | Age: 74
End: 2024-06-04
Payer: MEDICARE

## 2024-06-04 VITALS
BODY MASS INDEX: 23.82 KG/M2 | SYSTOLIC BLOOD PRESSURE: 142 MMHG | WEIGHT: 142.94 LBS | DIASTOLIC BLOOD PRESSURE: 80 MMHG | HEIGHT: 65 IN | OXYGEN SATURATION: 98 % | RESPIRATION RATE: 18 BRPM | TEMPERATURE: 99 F | HEART RATE: 78 BPM

## 2024-06-04 DIAGNOSIS — E53.8 VITAMIN B12 DEFICIENCY: ICD-10-CM

## 2024-06-04 DIAGNOSIS — I10 ESSENTIAL HYPERTENSION: Primary | ICD-10-CM

## 2024-06-04 DIAGNOSIS — D50.9 IRON DEFICIENCY ANEMIA, UNSPECIFIED IRON DEFICIENCY ANEMIA TYPE: ICD-10-CM

## 2024-06-04 DIAGNOSIS — I34.0 NONRHEUMATIC MITRAL VALVE REGURGITATION: ICD-10-CM

## 2024-06-04 DIAGNOSIS — I70.0 AORTIC ATHEROSCLEROSIS: ICD-10-CM

## 2024-06-04 DIAGNOSIS — F51.01 PRIMARY INSOMNIA: ICD-10-CM

## 2024-06-04 DIAGNOSIS — F33.1 MODERATE EPISODE OF RECURRENT MAJOR DEPRESSIVE DISORDER: ICD-10-CM

## 2024-06-04 DIAGNOSIS — Q24.4 SUBAORTIC MEMBRANE: ICD-10-CM

## 2024-06-04 DIAGNOSIS — E78.5 HYPERLIPIDEMIA, UNSPECIFIED HYPERLIPIDEMIA TYPE: ICD-10-CM

## 2024-06-04 DIAGNOSIS — E03.9 HYPOTHYROIDISM, UNSPECIFIED TYPE: ICD-10-CM

## 2024-06-04 PROCEDURE — 1159F MED LIST DOCD IN RCRD: CPT | Mod: CPTII,S$GLB,, | Performed by: INTERNAL MEDICINE

## 2024-06-04 PROCEDURE — G2211 COMPLEX E/M VISIT ADD ON: HCPCS | Mod: S$GLB,,, | Performed by: INTERNAL MEDICINE

## 2024-06-04 PROCEDURE — 1125F AMNT PAIN NOTED PAIN PRSNT: CPT | Mod: CPTII,S$GLB,, | Performed by: INTERNAL MEDICINE

## 2024-06-04 PROCEDURE — 1160F RVW MEDS BY RX/DR IN RCRD: CPT | Mod: CPTII,S$GLB,, | Performed by: INTERNAL MEDICINE

## 2024-06-04 PROCEDURE — 3044F HG A1C LEVEL LT 7.0%: CPT | Mod: CPTII,S$GLB,, | Performed by: INTERNAL MEDICINE

## 2024-06-04 PROCEDURE — 3079F DIAST BP 80-89 MM HG: CPT | Mod: CPTII,S$GLB,, | Performed by: INTERNAL MEDICINE

## 2024-06-04 PROCEDURE — 3008F BODY MASS INDEX DOCD: CPT | Mod: CPTII,S$GLB,, | Performed by: INTERNAL MEDICINE

## 2024-06-04 PROCEDURE — 99214 OFFICE O/P EST MOD 30 MIN: CPT | Mod: S$GLB,,, | Performed by: INTERNAL MEDICINE

## 2024-06-04 PROCEDURE — 3288F FALL RISK ASSESSMENT DOCD: CPT | Mod: CPTII,S$GLB,, | Performed by: INTERNAL MEDICINE

## 2024-06-04 PROCEDURE — 4010F ACE/ARB THERAPY RXD/TAKEN: CPT | Mod: CPTII,S$GLB,, | Performed by: INTERNAL MEDICINE

## 2024-06-04 PROCEDURE — 3077F SYST BP >= 140 MM HG: CPT | Mod: CPTII,S$GLB,, | Performed by: INTERNAL MEDICINE

## 2024-06-04 PROCEDURE — 1101F PT FALLS ASSESS-DOCD LE1/YR: CPT | Mod: CPTII,S$GLB,, | Performed by: INTERNAL MEDICINE

## 2024-06-04 RX ORDER — CYANOCOBALAMIN 1000 UG/ML
1000 INJECTION, SOLUTION INTRAMUSCULAR; SUBCUTANEOUS
Qty: 3 ML | Refills: 3 | Status: SHIPPED | OUTPATIENT
Start: 2024-06-04

## 2024-06-04 RX ORDER — MIRTAZAPINE 7.5 MG/1
7.5 TABLET, FILM COATED ORAL NIGHTLY
Qty: 90 TABLET | Refills: 3 | Status: SHIPPED | OUTPATIENT
Start: 2024-06-04 | End: 2025-06-04

## 2024-06-04 RX ORDER — ATORVASTATIN CALCIUM 10 MG/1
TABLET, FILM COATED ORAL
Qty: 90 TABLET | Refills: 3 | Status: SHIPPED | OUTPATIENT
Start: 2024-06-04

## 2024-06-04 RX ORDER — FERROUS SULFATE 325(65) MG
325 TABLET, DELAYED RELEASE (ENTERIC COATED) ORAL DAILY
Qty: 90 TABLET | Refills: 3 | Status: SHIPPED | OUTPATIENT
Start: 2024-06-04

## 2024-06-04 RX ORDER — ASPIRIN 81 MG/1
81 TABLET ORAL DAILY
Qty: 90 TABLET | Refills: 3 | Status: SHIPPED | OUTPATIENT
Start: 2024-06-04 | End: 2025-06-04

## 2024-06-04 NOTE — TELEPHONE ENCOUNTER
----- Message from Sheela David sent at 6/4/2024  2:50 PM CDT -----  Regarding: Cont From Appt  Type:  Needs Medical Advice    Who Called: Pt      Would the patient rather a call back or a response via [facility]? Call Back    Best Call Back Number: [phone]      Additional Information:  Pt sts she forgot to tell the doctor at her appt about her taking the following Medications :D-Mannose (sugar product. Keeps bladder clean)   & MyRbetriq helps with making patient not have to go to the restroom as much.  Wants to make sure that it is still okay that she is taking these medicines.     Have a great day. Thank you!

## 2024-06-04 NOTE — PROGRESS NOTES
Subjective:       Patient ID: Yara Whitehead is a 74 y.o. female.    Medication List with Changes/Refills   New Medications    ASPIRIN (ECOTRIN) 81 MG EC TABLET    Take 1 tablet (81 mg total) by mouth once daily.    CYANOCOBALAMIN 1,000 MCG/ML INJECTION    Inject 1 mL (1,000 mcg total) into the skin every 6 weeks.    FERROUS SULFATE 325 (65 FE) MG EC TABLET    Take 1 tablet (325 mg total) by mouth once daily.    MIRTAZAPINE (REMERON) 7.5 MG TAB    Take 1 tablet (7.5 mg total) by mouth every evening.   Current Medications    ACETAMINOPHEN (TYLENOL) 500 MG TABLET    Take 500 mg by mouth every 6 (six) hours as needed for Pain.    ALBUTEROL (PROVENTIL/VENTOLIN HFA) 90 MCG/ACTUATION INHALER    Inhale 1-2 puffs into the lungs every 6 (six) hours as needed for Wheezing.    ASCORBIC ACID, VITAMIN C, (VITAMIN C) 1000 MG TABLET    Take 1,000 mg by mouth once daily.    GLUCOSE 4 GM CHEWABLE TABLET    Take 4 tablets when glucose from 51-70  Take 6 tablets when glucose is less than 50    MAGNESIUM ORAL    Take by mouth once daily at 6am.    PAROXETINE (PAXIL) 30 MG TABLET    Take 1 tablet (30 mg total) by mouth once daily.    TELMISARTAN (MICARDIS) 40 MG TAB    TAKE 1 TABLET(40 MG) BY MOUTH EVERY DAY    VITAMIN D (VITAMIN D3) 1000 UNITS TAB    Take 1,000 Units by mouth once daily.   Changed and/or Refilled Medications    Modified Medication Previous Medication    ATORVASTATIN (LIPITOR) 10 MG TABLET atorvastatin (LIPITOR) 10 MG tablet       TAKE 1 TABLET BY MOUTH  DAILY    TAKE 1 TABLET BY MOUTH  DAILY   Discontinued Medications    CETIRIZINE (ZYRTEC) 10 MG TABLET    Take 10 mg by mouth daily as needed.     DONEPEZIL (ARICEPT) 10 MG TABLET    TAKE 1 TABLET(10 MG) BY MOUTH EVERY EVENING    LEVOTHYROXINE (SYNTHROID) 88 MCG TABLET    TAKE 1 TABLET(88 MCG) BY MOUTH BEFORE BREAKFAST    RIVAROXABAN (XARELTO) 20 MG TAB    Take 1 tablet (20 mg total) by mouth every evening.       Chief Complaint: Follow-up  She is here today to  f/u on her appt from 5/17/2024.  Her daughter is on the phone during the visit.     She has hypertension and is taking her telmisartan 40 mg daily but medication was not in her med bags. Her BP today is elevated and she is not checking her BP at home. No chest pain or shortness of breath.     She has paroxysmal Afib and stopped her xarelto and does not want to restart treatment. She is willing to start aspirin daily.  She denies any palpitations.     She has atherosclerosis seen on CT imaging but stopped her atorvastatin 10 mg daily. Lipids on 5/2024 were 191/64/79/99.      She had a history of gastric bypass surgery and is not taking iron. Her labs on 5/2024 were H+H 11.6/38, ferritin of 11, and iron of 6.  She is on oral vitamin B 12 gummies and her level was 328.      She has anxiety/depression and hoarding behaviors. She is taking paxil 30 mg daily and continues on mirtazipine 15 mg 1/2 pill nightly for sleep. She uses CBD gummies for sleep and will frequently smoke marijuana.  She denies suicidal  ideations. No panic attacks. Her daughter was down and helped her organize her house and get rid of bags of stuff.  Her bills have been paid and she now has utilities and phone service. She is driving.  She feels safe in her house. No paranoid thinking. She does have a poor memory and gets easily distracted.     Review of Systems   Constitutional:  Negative for appetite change, fatigue, fever and unexpected weight change.   HENT:  Negative for congestion, ear pain, hearing loss, sore throat and trouble swallowing.    Eyes:  Negative for pain and visual disturbance.   Respiratory:  Negative for cough, chest tightness, shortness of breath and wheezing.    Cardiovascular:  Negative for chest pain, palpitations and leg swelling.   Gastrointestinal:  Negative for abdominal pain, blood in stool, constipation, diarrhea, nausea and vomiting.   Endocrine: Negative for polyuria.   Genitourinary:  Negative for dysuria and  "hematuria.   Musculoskeletal:  Positive for neck pain. Negative for arthralgias, back pain and myalgias.   Skin:  Negative for rash.   Neurological:  Negative for dizziness, weakness, numbness and headaches.   Hematological:  Does not bruise/bleed easily.   Psychiatric/Behavioral:  Negative for dysphoric mood, sleep disturbance and suicidal ideas. The patient is nervous/anxious.        Objective:      Vitals:    06/04/24 1130 06/04/24 1556   BP: (!) 160/80 (!) 142/80   BP Location: Left arm    Patient Position: Sitting    BP Method: Medium (Manual)    Pulse: 78    Resp: 18    Temp: 99 °F (37.2 °C)    SpO2: 98%    Weight: 64.8 kg (142 lb 15.5 oz)    Height: 5' 5" (1.651 m)      Body mass index is 23.79 kg/m².  Physical Exam    General appearance: No acute distress, cooperative  Neck: FROM, soft, supple, no thyromegaly, no bruits  Lymph: no anterior or posterior cervical adenopathy  Heart::  Regular rate and rhythm, 3/6 systolic murmur  Lung: Clear to ascultation bilaterally, no wheezing, no rales, no rhonchi, no distress  Abdomen: Soft, nontender, no distention, no hepatosplenomegaly, bowel sounds normal, no guarding, no rebound, no peritoneal signs  Skin: no rashes, no lesions  Extremities: no edema, no cyanosis  Neuro: no focal abnormalities, strength 5/5 b/l UE and LE, 2+ DTRs b/l UE and LE, normal gait  Peripheral pulses: 2+ pedal pulses bilaterally, good perfusion and color  Musculoskeletal: FROM, good strenth, no tenderness  Joint: normal appearance, no swelling, no warmth, no deformity in all joints    Assessment:       1. Essential hypertension    2. Hyperlipidemia, unspecified hyperlipidemia type    3. Aortic atherosclerosis    4. Vitamin B12 deficiency    5. Iron deficiency anemia, unspecified iron deficiency anemia type    6. Hypothyroidism, unspecified type    7. Subaortic membrane    8. Nonrheumatic mitral valve regurgitation    9. Moderate episode of recurrent major depressive disorder    10. Primary " insomnia        Plan:       Essential hypertension  Uncontrolled and advised to take medication regularly.  She will bring medications to her next visit.   -     Comprehensive Metabolic Panel; Future; Expected date: 06/04/2024    Hyperlipidemia, unspecified hyperlipidemia type  Advised to restart her atorvastatin and aspirin.   -     atorvastatin (LIPITOR) 10 MG tablet; TAKE 1 TABLET BY MOUTH  DAILY  Dispense: 90 tablet; Refill: 3    Aortic atherosclerosis  ADvised to restart aspirin and atorvastatin.   -     aspirin (ECOTRIN) 81 MG EC tablet; Take 1 tablet (81 mg total) by mouth once daily.  Dispense: 90 tablet; Refill: 3    Vitamin B12 deficiency  Restart supplementation with IM vitamin B 12 every 6 weeks.   -     cyanocobalamin 1,000 mcg/mL injection; Inject 1 mL (1,000 mcg total) into the skin every 6 weeks.  Dispense: 3 mL; Refill: 3    Iron deficiency anemia, unspecified iron deficiency anemia type  Start iron daily and will recheck levels in 3 months. If still low then will refer to hematology.   -     ferrous sulfate 325 (65 FE) MG EC tablet; Take 1 tablet (325 mg total) by mouth once daily.  Dispense: 90 tablet; Refill: 3  -     CBC Auto Differential; Future; Expected date: 06/04/2024  -     Ferritin; Future; Expected date: 06/04/2024  -     Iron and TIBC; Future; Expected date: 06/04/2024    Hypothyroidism, unspecified type  TSH is normal off levothyroxine. Continue to monitor.   -     TSH; Future; Expected date: 06/04/2024    Subaortic membrane and nonrheumatic mitral valve regurgitation  SHe is due to recheck echo  -     Echo; Future    Moderate episode of recurrent major depressive disorder  Continue on paxil 30 mg daily    Primary insomnia'  Continue on mirtazpein 7.5 mg qhs. Given rx so that she does not have to split a pill in 1/2.   -     mirtazapine (REMERON) 7.5 MG Tab; Take 1 tablet (7.5 mg total) by mouth every evening.  Dispense: 90 tablet; Refill: 3    Follow up in about 3 months (around  9/4/2024) for chronic medical issues.

## 2024-06-04 NOTE — PATIENT INSTRUCTIONS
Start aspirin 81 mg daily    Start atorvastatin 10 mg daily    Continue telmisartan 40 mg daily.      Change mirtazipine (remeron) to 7.5 mg 1 pill nightly     Continue paxil 30 mg daily.     Continue on vitamin B 12 injection every 6 weeks.     Take iron 365 (65 meq of elemental iron) daily.

## 2024-06-05 ENCOUNTER — TELEPHONE (OUTPATIENT)
Dept: FAMILY MEDICINE | Facility: CLINIC | Age: 74
End: 2024-06-05
Payer: MEDICARE

## 2024-06-05 RX ORDER — MIRABEGRON 25 MG/1
25 TABLET, FILM COATED, EXTENDED RELEASE ORAL DAILY
COMMUNITY
End: 2024-06-05 | Stop reason: SDUPTHER

## 2024-06-05 NOTE — TELEPHONE ENCOUNTER
Pt notified that Dr. Richards said it is ok for her to take the D-Mannose and myrbetiq.  Pt verbalizes understanding and asks for refill of myrbetriq.

## 2024-06-05 NOTE — TELEPHONE ENCOUNTER
----- Message from Elke Singleton sent at 6/5/2024 10:56 AM CDT -----  Contact: self  Type:  Patient Returning Call    Who Called:  the patient  Who Left Message for Patient:  unknown  Does the patient know what this is regarding?:  no  Best Call Back Number:  [phone]

## 2024-06-05 NOTE — TELEPHONE ENCOUNTER
No care due was identified.  [facility] Embedded Care Due Messages. Reference number: [id number].   6/05/2024 11:37:50 AM CDT

## 2024-06-06 RX ORDER — MIRABEGRON 25 MG/1
25 TABLET, FILM COATED, EXTENDED RELEASE ORAL DAILY
Qty: 90 TABLET | Refills: 3 | Status: SHIPPED | OUTPATIENT
Start: 2024-06-06

## 2024-06-12 NOTE — TELEPHONE ENCOUNTER
Diagnostic Lumbar Medial Branch Block Under Fluoroscopy    The procedure, risks, benefits, and options were discussed with the patient. There are no contraindications to the procedure. The patent expressed understanding and agreed to the procedure. Informed written consent was obtained prior to the start of the procedure and can be found in the patient's chart.    PATIENT NAME: Jorge Bae III   MRN: [id number]     DATE OF PROCEDURE: 06/12/2024                                           PROCEDURE:  Diagnostic Bilateral L3, L4, and L5 Lumbar Medial Branch Block under Fluoroscopy    PRE-OP DIAGNOSIS: Other spondylosis, lumbar region [M47.896] Lumbar spondylosis [M47.816]    POST-OP DIAGNOSIS: Same    PHYSICIAN: Mateus Vargas MD    ASSISTANTS: none    MEDICATIONS INJECTED:  Bupivicaine 0.25%    LOCAL ANESTHETIC INJECTED:   Xylocaine 2%    SEDATION: None    ESTIMATED BLOOD LOSS:  None    COMPLICATIONS:  None.    INTERVAL HISTORY: Patient has clinical and imaging findings suggestive of facet mediated pain.    TECHNIQUE: Time-out was performed to identify the patient and procedure to be performed. With the patient laying in a prone position, the surgical area was prepped and draped in the usual sterile fashion using ChloraPrep and fenestrated drape. The levels were determined under fluoroscopic guidance. Skin anesthesia was achieved by injecting Lidocaine 2% over the injection sites. A 22 gauge, 3.5 inch needle was introduced into the medial branch nerves at the junctions of the superior articular process and the transverse processes of the targeted sites using AP, lateral and/or contralateral oblique fluoroscopic imaging. After negative aspiration for blood or CSF was confirmed, 1.5 mL of the anesthetic listed above was then slowly injected at each site. The needles were removed and bleeding was nil. A sterile dressing was applied. No specimens collected. The patient tolerated the procedure well.    The patient was  She was needing an Intensive Outpatient Program. She has [facility] medicare and [facility] doesn't take that insurance. The only one our office knows that does is [facility] she can call [phone] option 3.    monitored after the procedure in the recovery area. They were given post-procedure and discharge instructions to follow at home. The patient was discharged in a stable condition.      Hector Soares MD    I reviewed and edited the fellow's note. I conducted my own interview and physical examination. I agree with the findings. I was present and supervising all critical portions of the procedure.  Mateus Vargas MD

## 2024-06-13 ENCOUNTER — PATIENT OUTREACH (OUTPATIENT)
Dept: ADMINISTRATIVE | Facility: OTHER | Age: 74
End: 2024-06-13

## 2024-07-23 ENCOUNTER — PATIENT OUTREACH (OUTPATIENT)
Dept: ADMINISTRATIVE | Facility: OTHER | Age: 74
End: 2024-07-23
Payer: MEDICARE
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
## 2024-07-23 NOTE — PROGRESS NOTES
CHW - Outreach Attempt    Cass Lal Community Health Worker left a voicemail message for 2nd attempt to contact patient regarding: [facility]   Community Health Worker to attempt to contact patient on:  July 26, 2024

## 2024-07-26 ENCOUNTER — OUTPATIENT CASE MANAGEMENT (OUTPATIENT)
Dept: ADMINISTRATIVE | Facility: OTHER | Age: 74
End: 2024-07-26
Payer: MEDICARE
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
## 2024-08-23 ENCOUNTER — TELEPHONE (OUTPATIENT)
Dept: ADMINISTRATIVE | Facility: CLINIC | Age: 74
End: 2024-08-23
Payer: MEDICARE

## 2024-08-25 ENCOUNTER — TELEPHONE (OUTPATIENT)
Dept: ADMINISTRATIVE | Facility: CLINIC | Age: 74
End: 2024-08-25
Payer: MEDICARE
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
## 2024-08-26 ENCOUNTER — TELEPHONE (OUTPATIENT)
Dept: FAMILY MEDICINE | Facility: CLINIC | Age: 74
End: 2024-08-26
Payer: MEDICARE

## 2024-08-27 NOTE — TELEPHONE ENCOUNTER
No care due was identified.  [facility] Embedded Care Due Messages. Reference number: [id number].   8/27/2024 5:11:25 PM CDT

## 2024-08-28 RX ORDER — MIRABEGRON 25 MG/1
25 TABLET, FILM COATED, EXTENDED RELEASE ORAL
Qty: 90 TABLET | Refills: 3 | Status: SHIPPED | OUTPATIENT
Start: 2024-08-28

## 2024-08-28 NOTE — TELEPHONE ENCOUNTER
Refill Routing Note   Medication(s) are not appropriate for processing by [facility] for the following reason(s):        Required vitals abnormal    [facility] action(s):  Defer               Appointments  past 12m or future 3m with PCP    Date Provider   Last Visit   6/4/2024 Ann Richards, DO   Next Visit   9/4/2024 Ann Richards, DO   ED visits in past 90 days: 0        Note composed:7:56 PM 08/27/2024

## 2024-08-30 ENCOUNTER — PATIENT OUTREACH (OUTPATIENT)
Dept: ADMINISTRATIVE | Facility: OTHER | Age: 74
End: 2024-08-30
Payer: MEDICARE

## 2024-08-30 ENCOUNTER — OUTPATIENT CASE MANAGEMENT (OUTPATIENT)
Dept: ADMINISTRATIVE | Facility: OTHER | Age: 74
End: 2024-08-30
Payer: MEDICARE

## 2024-08-30 ENCOUNTER — PATIENT MESSAGE (OUTPATIENT)
Dept: ADMINISTRATIVE | Facility: OTHER | Age: 74
End: 2024-08-30
Payer: MEDICARE

## 2024-08-30 NOTE — PROGRESS NOTES
CHW - Follow Up    Cass Lal Community Health Worker completed a follow up visit with patient via telephone today.  Pt/Caregiver reported: Ms. Spencer stated that she was upset because she was not contacted by a  and she is eating dog food which is better than people food. Ms. Whitehead refused referrals for human food (food bank, [facility]).  Pt stated she has a $500 [facility] bill and needs utility assistance.    Community Health Worker provided: CHW referred Pt to the [facility] Action Program for utility assistance and mailed her an [facility]  Financial Assistance Application.  CHW contacted Mariana Ceballos LCSW to contact Ms. Spencer about her concerns.     Follow-up Outreach - Due: 9/10/2024

## 2024-09-25 DIAGNOSIS — Z00.00 ENCOUNTER FOR MEDICARE ANNUAL WELLNESS EXAM: ICD-10-CM

## 2024-10-16 ENCOUNTER — OUTPATIENT CASE MANAGEMENT (OUTPATIENT)
Dept: ADMINISTRATIVE | Facility: OTHER | Age: 74
End: 2024-10-16
Payer: MEDICARE

## 2024-10-16 ENCOUNTER — PATIENT OUTREACH (OUTPATIENT)
Dept: ADMINISTRATIVE | Facility: OTHER | Age: 74
End: 2024-10-16
Payer: MEDICARE

## 2024-10-16 NOTE — PROGRESS NOTES
CHW - Follow Up    Ms. Spencer contacted Cass Lal, and explained that she missed a call from Mariana Ceballos LCSW, but remembered CHWs' telephone number.    Community Health Worker provided: CHW sent a message to Mariana Ceballos LCSW that CHW spoke with Pt and was informed that someone from [facility] will contact her for case management.    No future outreach task assigned

## 2024-10-16 NOTE — PROGRESS NOTES
Returned missed call from pt who's looking for assistance per PCP recommendation. PCP placed [facility] referral 5/17/24. Pts insurance plan offers outpatient CM services.  Pt states she had a voice message from someone however she inadvertently deleted the message. This SW sent email to [facility] requesting Case Management Services.      3:35p  Received response from [facility] contact stating CM team will reach out to pt again. Pt informed of same.

## 2024-12-13 ENCOUNTER — TELEPHONE (OUTPATIENT)
Dept: ADMINISTRATIVE | Facility: HOSPITAL | Age: 74
End: 2024-12-13
Payer: MEDICARE
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
## 2025-01-07 ENCOUNTER — OFFICE VISIT (OUTPATIENT)
Dept: FAMILY MEDICINE | Facility: CLINIC | Age: 75
End: 2025-01-07
Payer: MEDICARE

## 2025-01-07 VITALS
DIASTOLIC BLOOD PRESSURE: 62 MMHG | SYSTOLIC BLOOD PRESSURE: 110 MMHG | OXYGEN SATURATION: 98 % | WEIGHT: 149.94 LBS | HEART RATE: 83 BPM | RESPIRATION RATE: 18 BRPM | BODY MASS INDEX: 24.98 KG/M2 | HEIGHT: 65 IN | TEMPERATURE: 98 F

## 2025-01-07 DIAGNOSIS — I70.0 AORTIC ATHEROSCLEROSIS: ICD-10-CM

## 2025-01-07 DIAGNOSIS — F51.01 PRIMARY INSOMNIA: ICD-10-CM

## 2025-01-07 DIAGNOSIS — Z87.891 PERSONAL HISTORY OF TOBACCO USE: ICD-10-CM

## 2025-01-07 DIAGNOSIS — F33.1 MODERATE EPISODE OF RECURRENT MAJOR DEPRESSIVE DISORDER: ICD-10-CM

## 2025-01-07 DIAGNOSIS — E78.5 HYPERLIPIDEMIA, UNSPECIFIED HYPERLIPIDEMIA TYPE: ICD-10-CM

## 2025-01-07 DIAGNOSIS — I65.23 BILATERAL CAROTID ARTERY STENOSIS: ICD-10-CM

## 2025-01-07 DIAGNOSIS — I48.0 PAF (PAROXYSMAL ATRIAL FIBRILLATION): ICD-10-CM

## 2025-01-07 DIAGNOSIS — N39.490 OVERFLOW INCONTINENCE OF URINE: ICD-10-CM

## 2025-01-07 DIAGNOSIS — I10 ESSENTIAL HYPERTENSION: Primary | ICD-10-CM

## 2025-01-07 DIAGNOSIS — Q24.4 SUBAORTIC MEMBRANE: ICD-10-CM

## 2025-01-07 DIAGNOSIS — Z12.31 ENCOUNTER FOR SCREENING MAMMOGRAM FOR MALIGNANT NEOPLASM OF BREAST: ICD-10-CM

## 2025-01-07 DIAGNOSIS — J30.9 CHRONIC ALLERGIC RHINITIS: ICD-10-CM

## 2025-01-07 DIAGNOSIS — E03.9 HYPOTHYROIDISM, UNSPECIFIED TYPE: ICD-10-CM

## 2025-01-07 DIAGNOSIS — D50.9 IRON DEFICIENCY ANEMIA, UNSPECIFIED IRON DEFICIENCY ANEMIA TYPE: ICD-10-CM

## 2025-01-07 DIAGNOSIS — E53.8 VITAMIN B12 DEFICIENCY: ICD-10-CM

## 2025-01-07 DIAGNOSIS — R41.3 MEMORY IMPAIRMENT: ICD-10-CM

## 2025-01-07 DIAGNOSIS — Z78.0 ASYMPTOMATIC MENOPAUSAL STATE: ICD-10-CM

## 2025-01-07 DIAGNOSIS — E16.1 POSTPRANDIAL HYPOGLYCEMIA: ICD-10-CM

## 2025-01-07 DIAGNOSIS — I34.0 NONRHEUMATIC MITRAL VALVE REGURGITATION: ICD-10-CM

## 2025-01-07 LAB
ALBUMIN SERPL BCP-MCNC: 3.8 G/DL (ref 3.5–5.2)
ALP SERPL-CCNC: 90 U/L (ref 40–150)
ALT SERPL W/O P-5'-P-CCNC: 14 U/L (ref 10–44)
ANION GAP SERPL CALC-SCNC: 10 MMOL/L (ref 8–16)
AST SERPL-CCNC: 20 U/L (ref 10–40)
BASOPHILS # BLD AUTO: 0 K/UL (ref 0–0.2)
BASOPHILS NFR BLD: 0 % (ref 0–1.9)
BILIRUB SERPL-MCNC: 0.5 MG/DL (ref 0.1–1)
BUN SERPL-MCNC: 14 MG/DL (ref 8–23)
CALCIUM SERPL-MCNC: 9.1 MG/DL (ref 8.7–10.5)
CHLORIDE SERPL-SCNC: 103 MMOL/L (ref 95–110)
CHOLEST SERPL-MCNC: 193 MG/DL (ref 120–199)
CHOLEST/HDLC SERPL: 2.6 {RATIO} (ref 2–5)
CO2 SERPL-SCNC: 24 MMOL/L (ref 23–29)
CREAT SERPL-MCNC: 0.6 MG/DL (ref 0.5–1.4)
DIFFERENTIAL METHOD BLD: ABNORMAL
EOSINOPHIL # BLD AUTO: 0.1 K/UL (ref 0–0.5)
EOSINOPHIL NFR BLD: 1.6 % (ref 0–8)
ERYTHROCYTE [DISTWIDTH] IN BLOOD BY AUTOMATED COUNT: 14.9 % (ref 11.5–14.5)
EST. GFR  (NO RACE VARIABLE): >60 ML/MIN/1.73 M^2
ESTIMATED AVG GLUCOSE: 105 MG/DL (ref 68–131)
FERRITIN SERPL-MCNC: 12 NG/ML (ref 20–300)
GLUCOSE SERPL-MCNC: 95 MG/DL (ref 70–110)
HBA1C MFR BLD: 5.3 % (ref 4–5.6)
HCT VFR BLD AUTO: 40.5 % (ref 37–48.5)
HDLC SERPL-MCNC: 74 MG/DL (ref 40–75)
HDLC SERPL: 38.3 % (ref 20–50)
HGB BLD-MCNC: 13.4 G/DL (ref 12–16)
IMM GRANULOCYTES # BLD AUTO: 0.02 K/UL (ref 0–0.04)
IMM GRANULOCYTES NFR BLD AUTO: 0.5 % (ref 0–0.5)
IRON SERPL-MCNC: 82 UG/DL (ref 30–160)
LDLC SERPL CALC-MCNC: 102 MG/DL (ref 63–159)
LYMPHOCYTES # BLD AUTO: 1.2 K/UL (ref 1–4.8)
LYMPHOCYTES NFR BLD: 26.8 % (ref 18–48)
MCH RBC QN AUTO: 30.7 PG (ref 27–31)
MCHC RBC AUTO-ENTMCNC: 33.1 G/DL (ref 32–36)
MCV RBC AUTO: 93 FL (ref 82–98)
MONOCYTES # BLD AUTO: 0.3 K/UL (ref 0.3–1)
MONOCYTES NFR BLD: 7 % (ref 4–15)
NEUTROPHILS # BLD AUTO: 2.8 K/UL (ref 1.8–7.7)
NEUTROPHILS NFR BLD: 64.1 % (ref 38–73)
NONHDLC SERPL-MCNC: 119 MG/DL
NRBC BLD-RTO: 0 /100 WBC
PLATELET # BLD AUTO: 262 K/UL (ref 150–450)
PMV BLD AUTO: 11 FL (ref 9.2–12.9)
POTASSIUM SERPL-SCNC: 4.2 MMOL/L (ref 3.5–5.1)
PROT SERPL-MCNC: 7.2 G/DL (ref 6–8.4)
RBC # BLD AUTO: 4.37 M/UL (ref 4–5.4)
SATURATED IRON: 13 % (ref 20–50)
SODIUM SERPL-SCNC: 137 MMOL/L (ref 136–145)
TOTAL IRON BINDING CAPACITY: 608 UG/DL (ref 250–450)
TRANSFERRIN SERPL-MCNC: 411 MG/DL (ref 200–375)
TRIGL SERPL-MCNC: 85 MG/DL (ref 30–150)
TSH SERPL DL<=0.005 MIU/L-ACNC: 2.75 UIU/ML (ref 0.4–4)
VIT B12 SERPL-MCNC: 335 PG/ML (ref 210–950)
WBC # BLD AUTO: 4.4 K/UL (ref 3.9–12.7)

## 2025-01-07 PROCEDURE — 80061 LIPID PANEL: CPT | Performed by: INTERNAL MEDICINE

## 2025-01-07 PROCEDURE — 84443 ASSAY THYROID STIM HORMONE: CPT | Performed by: INTERNAL MEDICINE

## 2025-01-07 PROCEDURE — 80053 COMPREHEN METABOLIC PANEL: CPT | Performed by: INTERNAL MEDICINE

## 2025-01-07 PROCEDURE — 85025 COMPLETE CBC W/AUTO DIFF WBC: CPT | Performed by: INTERNAL MEDICINE

## 2025-01-07 PROCEDURE — 82728 ASSAY OF FERRITIN: CPT | Performed by: INTERNAL MEDICINE

## 2025-01-07 PROCEDURE — 82607 VITAMIN B-12: CPT | Performed by: INTERNAL MEDICINE

## 2025-01-07 PROCEDURE — 83036 HEMOGLOBIN GLYCOSYLATED A1C: CPT | Performed by: INTERNAL MEDICINE

## 2025-01-07 PROCEDURE — 84466 ASSAY OF TRANSFERRIN: CPT | Performed by: INTERNAL MEDICINE

## 2025-01-07 RX ORDER — MIRABEGRON 50 MG/1
50 TABLET, FILM COATED, EXTENDED RELEASE ORAL DAILY
Qty: 90 TABLET | Refills: 3 | Status: SHIPPED | OUTPATIENT
Start: 2025-01-07 | End: 2026-01-07

## 2025-01-07 NOTE — PROGRESS NOTES
Subjective:       Patient ID: Yara Whitehead is a 74 y.o. female.    Medication List with Changes/Refills   New Medications    MIRABEGRON (MYRBETRIQ) 50 MG TB24    Take 1 tablet (50 mg total) by mouth once daily.   Current Medications    ACETAMINOPHEN (TYLENOL) 500 MG TABLET    Take 500 mg by mouth every 6 (six) hours as needed for Pain.    ALBUTEROL (PROVENTIL/VENTOLIN HFA) 90 MCG/ACTUATION INHALER    Inhale 1-2 puffs into the lungs every 6 (six) hours as needed for Wheezing.    ASCORBIC ACID, VITAMIN C, (VITAMIN C) 1000 MG TABLET    Take 1,000 mg by mouth once daily.    ASPIRIN (ECOTRIN) 81 MG EC TABLET    Take 1 tablet (81 mg total) by mouth once daily.    CYANOCOBALAMIN 1,000 MCG/ML INJECTION    Inject 1 mL (1,000 mcg total) into the skin every 6 weeks.    FERROUS SULFATE 325 (65 FE) MG EC TABLET    Take 1 tablet (325 mg total) by mouth once daily.    GLUCOSE 4 GM CHEWABLE TABLET    Take 4 tablets when glucose from 51-70  Take 6 tablets when glucose is less than 50    MAGNESIUM ORAL    Take by mouth once daily at 6am.    TELMISARTAN (MICARDIS) 40 MG TAB    TAKE 1 TABLET(40 MG) BY MOUTH EVERY DAY    VITAMIN D (VITAMIN D3) 1000 UNITS TAB    Take 1,000 Units by mouth once daily.   Discontinued Medications    ATORVASTATIN (LIPITOR) 10 MG TABLET    TAKE 1 TABLET BY MOUTH  DAILY    MIRTAZAPINE (REMERON) 7.5 MG TAB    Take 1 tablet (7.5 mg total) by mouth every evening.    MYRBETRIQ 25 MG TB24 ER TABLET    TAKE 1 TABLET(25 MG) BY MOUTH EVERY DAY    PAROXETINE (PAXIL) 30 MG TABLET    Take 1 tablet (30 mg total) by mouth once daily.       Chief Complaint: Follow-up  She is here today to discuss chronic medical issues.      She has paroxysmal Afib s/p PVI ablation on 4/2019.  She is stopped xarelto 20 mg qday and continues on aspirin daily.  She denies any recurrent palpitations or chest pain.  She had her loop recorder changed on 7/19/19. She has metoprolol at home to use PRN palpitations. She has not had to use.   "She was seen by cardiology on 12/2022 and no changes were made.       She had an echo on 11/2022 that showed cLVH, EF 70%, mild MR and AR, trileaflet aortic valve, subaortic membrane (gradient 27), PAP of 39.  Cardiology advised to monitor with yearly echo which was never done.  She denies any chest pain or shortness of breath.      She has hypertension and is taking telmisartan 40 mg qday. She has no known CAD.     She has hyperlipidemia but stopped atorvastatin 10 mg qday.  Her lipids on treatment in 5/2024 were 191/64/79/99.             She has a chronic PND due to allergies. She tried OTC antihistamines but they make her "too dry".  She denies any active symptoms.     She started smoking again and her CT of the lungs on 11/2023 was negative. Advised to repeat in one year. She would like to quit.      She has hypothyroid but stopped taking levothyroxine 88 mcg daily about a year ago. TSH on 5/2024 was normal.      She had reflux symptoms in the past and was taking pepcid. Since her Afib is rate controlled her reflux is gone.       She has malabsorption since bypass surgery and has a vitamin B 12 deficiency. She is taking vitamin B12 1000 mcg IM every 6 weeks. Her level on 5/2024 was 328. She has ALESSANDRA and is taking oral iron supplementation.  Labs on 5/2024 show H+H 11.6/38, iron 6, ferritin 11.         She was diagnosed with post gastric bypass hypoglycemic syndrome. She gets chronic diarrhea after eating large carbohydrate meal. She will get low glucose readings and has a CGM. She was seen by endocrine on 5/2022 and started on acarbose 25 mg tid with meals but she only uses when she goes out to eat or eats a large meal.  Most day she eats small meals and avoids carbohydrates. This has improved both the diarrhea and the hypoglycemia.      She has anxiety/depression and hoarding behaviors and diagnosed with bipolar with  arya in the past (associated with a UTI). She stopped both paxil 30 mg and remeron 7.5 mg. She " continues to use CBD gummies for sleep and will frequently smoke marijuana. She denies suicidal ideations. No panic attacks. She reports her mood has been stable. She denies any confusion episodes. She continues to struggle with hoarding and shopping but says she is getting rid of items. She has a handle on her finances. She is eating well. Her children are involved.       She has incontinence of urine and is taking mybetriq 25 mg daily. She does not feel this works as well as when she first started taking it and would like to increase the dose. She denies any side effects.      She was noted to have a left abdominal wall hernia and was seen by surgery on 5/2019 and advised to just monitor.      She has FABIANA but stopped using CPAP.  She does not have the sleep issues like she did in the past when her weight was up.      She does have some memory changes and was seen by neuropsychiatry for evaluation on 6/2020. It was felt that her depression which was increased at the time of testing was uncontrolled and contributing to her memory issues. She continues to struggle with memory but feels it is at baseline.      She has intermittent left sided neck pain that starts where her muscles insert to her neck and radiates down her neck. This limits her ROM on side bending and rotation.  She is following with a chiropractor weekly.       She lives alone and feels safe . She is able to pay her bills and has a new car.  She does not exercise but stays active.  She is involved with her Buddhism. She denies any falls or balance issues.       Colonoscopy--10/2020 repeat in 5 years  Mammogram----12/2023 neg   DEXA-----11/2022 osteopenia with fracture risk  of 14%, hip fracture risk of 1.3% (Tv -1.9, Tf -0.8)  Pap----- KEIRY  Tdap--11/2016  Influenza vaccine---11/2023---refused   Pneumovax 23----4/2017  Prevnar 13---- 2/2016  Shingles vaccine-----6/2022, 12/2022  Covid vaccine---5 doses ---refused       RSV vaccine----none     Review of  "Systems   Constitutional:  Negative for appetite change, fatigue, fever and unexpected weight change.   HENT:  Negative for congestion, ear pain, hearing loss, sore throat and trouble swallowing.    Eyes:  Negative for pain and visual disturbance.   Respiratory:  Negative for cough, chest tightness, shortness of breath and wheezing.    Cardiovascular:  Negative for chest pain, palpitations and leg swelling.   Gastrointestinal:  Negative for abdominal pain, blood in stool, constipation, diarrhea, nausea and vomiting.   Endocrine: Negative for polyuria.   Genitourinary:  Positive for frequency and urgency. Negative for dysuria and hematuria.   Musculoskeletal:  Negative for arthralgias, back pain and myalgias.   Skin:  Negative for rash.   Neurological:  Negative for dizziness, weakness, numbness and headaches.   Hematological:  Does not bruise/bleed easily.   Psychiatric/Behavioral:  Positive for dysphoric mood. Negative for sleep disturbance and suicidal ideas. The patient is not nervous/anxious.        Objective:      Vitals:    01/07/25 1144   BP: 110/62   BP Location: Right arm   Patient Position: Sitting   Pulse: 83   Resp: 18   Temp: 97.7 °F (36.5 °C)   TempSrc: Temporal   SpO2: 98%   Weight: 68 kg (149 lb 14.6 oz)   Height: 5' 5" (1.651 m)     Body mass index is 24.95 kg/m².  Physical Exam    General appearance: No acute distress, cooperative  Eyes: PERRL, EOMI, conjunctiva clear  Ears: normal external ear and pinna, tm clear without drainage, canals clear  Nose: Normal mucosa without drainage  Throat: no exudates or erythema, tonsils not enlarged  Mouth: no sores or lesions, moist mucous membranes  Neck: FROM, soft, supple, no thyromegaly, no bruits  Lymph: no anterior or posterior cervical adenopathy  Heart::  Regular rate and rhythm,4/6 systolic murmur that radiates to her carotids   Lung: Clear to ascultation bilaterally, no wheezing, no rales, no rhonchi, no distress  Abdomen: Soft, nontender, no " distention, no hepatosplenomegaly, bowel sounds normal, no guarding, no rebound, no peritoneal signs  Skin: no rashes, no lesions  Extremities: no edema, no cyanosis  Neuro: CN 2-12 intact, 5/5 muscle strength upper and lower extremity bilaterally, 2+ DTRs UE and LE bilaterally, normal gait  Peripheral pulses: 2+ pedal pulses bilaterally, good perfusion and color  Musculoskeletal: FROM, good strenth, no tenderness  Joint: normal appearance, no swelling, no warmth, no deformity in all joints    Assessment:       1. Essential hypertension    2. PAF (paroxysmal atrial fibrillation)    3. Subaortic membrane    4. Nonrheumatic mitral valve regurgitation    5. Hyperlipidemia, unspecified hyperlipidemia type    6. Aortic atherosclerosis    7. Bilateral carotid artery stenosis    8. Personal history of tobacco use    9. Chronic allergic rhinitis    10. Hypothyroidism, unspecified type    11. Iron deficiency anemia, unspecified iron deficiency anemia type    12. Vitamin B12 deficiency    13. Postprandial hypoglycemia    14. Overflow incontinence of urine    15. Moderate episode of recurrent major depressive disorder    16. Primary insomnia    17. Memory impairment    18. Encounter for screening mammogram for malignant neoplasm of breast    19. Asymptomatic menopausal state        Plan:       Essential hypertension  Well controlled and continue current regimen.   -     CBC Auto Differential  -     Comprehensive Metabolic Panel  -     TSH  -     Lipid Panel    PAF (paroxysmal atrial fibrillation)  NSR today on exam. She continues on aspirin daily. She will not take xarelto.     Subaortic membrane and Nonrheumatic mitral valve regurgitation  She is past due to recheck echo   -     Echo; Future    Hyperlipidemia, unspecified hyperlipidemia type  Uncontrolled and she does not want to take her statin    Aortic atherosclerosis  Continue on aspirin daily. She refuses statin therapy    Bilateral carotid artery stenosis  Continue on  aspirin     Personal history of tobacco use  Patient was seen today for counseling on low dose CT of lungs for lung cancer screening.  She/He has been smoking for 30 pack years and is between the ages of 55 to 78 years old.  She/he is either a current smoker or former smoker who quit less than 15 years ago and is asymptomatic.  Through shared decision making, we discussed the risk vs benefits of screening including over diagnosis, need for follow up imaging, false positives, and total radiation exposure. Importance of adherence to annual lung cancer screening and impact of comorbidities on screening were discussed.  Counseling on the importance of maintaining cigarette smoking abstinence if a former smoker or importance of smoking cessation if a current smoker was addressed.    -     CT Chest Lung Screening Low Dose; Future; Expected date: 01/07/2025  -     Ambulatory referral/consult to Smoking Cessation Program; Future; Expected date: 01/14/2025    Chronic allergic rhinitis  Stable and no active symptoms    Hypothyroidism, unspecified type  She has been off treatment and continue to monitor TSH    Iron deficiency anemia, unspecified iron deficiency anemia type  She continues on iron supplementation daily. Recheck levels today.   -     Ferritin  -     Iron and TIBC    Vitamin B12 deficiency  Continue on IM supplementation every 6 weeks. Will recheck level today.   -     Vitamin B12    Postprandial hypoglycemia  No active symptoms if she is careful with her diet  -     Hemoglobin A1C    Overflow incontinence of urine  Uncontrolled and will increase her mybetriq to 50 mg daily.   -     mirabegron (MYRBETRIQ) 50 mg Tb24; Take 1 tablet (50 mg total) by mouth once daily.  Dispense: 90 tablet; Refill: 3    Moderate episode of recurrent major depressive disorder  Good control despite stopping paxil.  Continue to monitor    Primary insomnia  Stable on THC gummies.      Memory impairment  She feels this is stable. Continue  to monitor. No risky behaviors. Family is involved.     Encounter for screening mammogram for malignant neoplasm of breast  -     Mammo Digital Screening Bilat w/ [place]; Future; Expected date: 01/07/2025    Asymptomatic menopausal state  -     DXA Bone Density Axial Skeleton 1 or more sites; Future; Expected date: 01/07/2025    Follow up in about 6 months (around 7/7/2025) for chronic medical issues.

## 2025-01-08 ENCOUNTER — TELEPHONE (OUTPATIENT)
Dept: FAMILY MEDICINE | Facility: CLINIC | Age: 75
End: 2025-01-08
Payer: MEDICARE

## 2025-01-08 NOTE — TELEPHONE ENCOUNTER
Her labs look good.   Normal liver,kidney and thyroid. Normal blood counts but she is low in iron. She has to take iron everyday.     Her vitamin B 12 level looks good and no diabetes. Her cholesterol is stable.  We will continue to monitor off her atorvastatin.     Thanks

## 2025-01-27 ENCOUNTER — HOSPITAL ENCOUNTER (OUTPATIENT)
Dept: RADIOLOGY | Facility: HOSPITAL | Age: 75
Discharge: HOME | End: 2025-01-27
Attending: INTERNAL MEDICINE
Payer: MEDICARE

## 2025-01-27 ENCOUNTER — HOSPITAL ENCOUNTER (OUTPATIENT)
Dept: CARDIOLOGY | Facility: HOSPITAL | Age: 75
Discharge: HOME | End: 2025-01-27
Attending: INTERNAL MEDICINE
Payer: MEDICARE

## 2025-01-27 ENCOUNTER — PATIENT MESSAGE (OUTPATIENT)
Dept: FAMILY MEDICINE | Facility: CLINIC | Age: 75
End: 2025-01-27
Payer: MEDICARE

## 2025-01-27 VITALS — BODY MASS INDEX: 24.83 KG/M2 | HEIGHT: 65 IN | WEIGHT: 149 LBS

## 2025-01-27 DIAGNOSIS — Z12.31 ENCOUNTER FOR SCREENING MAMMOGRAM FOR MALIGNANT NEOPLASM OF BREAST: ICD-10-CM

## 2025-01-27 DIAGNOSIS — I34.0 NONRHEUMATIC MITRAL VALVE REGURGITATION: ICD-10-CM

## 2025-01-27 DIAGNOSIS — Z87.891 PERSONAL HISTORY OF TOBACCO USE: ICD-10-CM

## 2025-01-27 DIAGNOSIS — Q24.4 SUBAORTIC MEMBRANE: ICD-10-CM

## 2025-01-27 DIAGNOSIS — Z78.0 ASYMPTOMATIC MENOPAUSAL STATE: ICD-10-CM

## 2025-01-27 PROCEDURE — 71271 CT THORAX LUNG CANCER SCR C-: CPT | Mod: 26,,, | Performed by: RADIOLOGY

## 2025-01-27 PROCEDURE — 93306 TTE W/DOPPLER COMPLETE: CPT | Mod: 26,,, | Performed by: INTERNAL MEDICINE

## 2025-01-27 PROCEDURE — 77067 SCR MAMMO BI INCL CAD: CPT | Mod: TC,PO

## 2025-01-27 PROCEDURE — 93306 TTE W/DOPPLER COMPLETE: CPT | Mod: PO

## 2025-01-27 PROCEDURE — 71271 CT THORAX LUNG CANCER SCR C-: CPT | Mod: TC,PO

## 2025-01-27 PROCEDURE — 77063 BREAST TOMOSYNTHESIS BI: CPT | Mod: 26,,, | Performed by: RADIOLOGY

## 2025-01-27 PROCEDURE — 77067 SCR MAMMO BI INCL CAD: CPT | Mod: 26,,, | Performed by: RADIOLOGY

## 2025-01-28 ENCOUNTER — PATIENT MESSAGE (OUTPATIENT)
Dept: FAMILY MEDICINE | Facility: CLINIC | Age: 75
End: 2025-01-28
Payer: MEDICARE

## 2025-01-28 LAB
AV INDEX (PROSTH): 0.6
AV MEAN GRADIENT: 28 MMHG
AV PEAK GRADIENT: 55 MMHG
AV REGURGITATION PRESSURE HALF TIME: 362 MS
AV VALVE AREA BY VELOCITY RATIO: 1.7 CM²
AV VALVE AREA: 1.9 CM²
AV VELOCITY RATIO: 0.54
BSA FOR ECHO PROCEDURE: 1.76 M2
CV ECHO LV RWT: 0.38 CM
DOP CALC AO PEAK VEL: 3.7 M/S
DOP CALC AO VTI: 78.5 CM
DOP CALC LVOT AREA: 3.1 CM2
DOP CALC LVOT DIAMETER: 2 CM
DOP CALC LVOT PEAK VEL: 2 M/S
DOP CALC LVOT STROKE VOLUME: 148.8 CM3
DOP CALC MV VTI: 57.2 CM
DOP CALCLVOT PEAK VEL VTI: 47.4 CM
E WAVE DECELERATION TIME: 343 MSEC
E/A RATIO: 1.29
E/E' RATIO: 33 M/S
ECHO LV POSTERIOR WALL: 1 CM (ref 0.6–1.1)
EJECTION FRACTION: 65 %
FRACTIONAL SHORTENING: 36.5 % (ref 28–44)
INTERVENTRICULAR SEPTUM: 1 CM (ref 0.6–1.1)
LEFT ATRIUM AREA SYSTOLIC (APICAL 2 CHAMBER): 27.94 CM2
LEFT ATRIUM AREA SYSTOLIC (APICAL 4 CHAMBER): 29.13 CM2
LEFT ATRIUM SIZE: 4.3 CM
LEFT ATRIUM VOLUME INDEX MOD: 58 ML/M2
LEFT ATRIUM VOLUME MOD: 101 ML
LEFT INTERNAL DIMENSION IN SYSTOLE: 3.3 CM (ref 2.1–4)
LEFT VENTRICLE DIASTOLIC VOLUME INDEX: 75.3 ML/M2
LEFT VENTRICLE DIASTOLIC VOLUME: 131.78 ML
LEFT VENTRICLE END SYSTOLIC VOLUME APICAL 2 CHAMBER: 96.78 ML
LEFT VENTRICLE END SYSTOLIC VOLUME APICAL 4 CHAMBER: 104.22 ML
LEFT VENTRICLE MASS INDEX: 110.9 G/M2
LEFT VENTRICLE SYSTOLIC VOLUME INDEX: 24.2 ML/M2
LEFT VENTRICLE SYSTOLIC VOLUME: 42.4 ML
LEFT VENTRICULAR INTERNAL DIMENSION IN DIASTOLE: 5.2 CM (ref 3.5–6)
LEFT VENTRICULAR MASS: 194.2 G
LV LATERAL E/E' RATIO: 41 M/S
LV SEPTAL E/E' RATIO: 27.3 M/S
LVED V (TEICH): 131.78 ML
LVES V (TEICH): 42.4 ML
LVOT MG: 9.94 MMHG
LVOT MV: 1.51 CM/S
MV MEAN GRADIENT: 4 MMHG
MV PEAK A VEL: 1.27 M/S
MV PEAK E VEL: 1.64 M/S
MV PEAK GRADIENT: 11 MMHG
MV STENOSIS PRESSURE HALF TIME: 71.97 MS
MV VALVE AREA BY CONTINUITY EQUATION: 2.6 CM2
MV VALVE AREA P 1/2 METHOD: 3.06 CM2
OHS CV RV/LV RATIO: 0.87 CM
PISA AR MAX VEL: 4.02 M/S
PISA MRMAX VEL: 5.85 M/S
PISA TR MAX VEL: 2.9 M/S
PULM VEIN S/D RATIO: 0.84
PV PEAK D VEL: 0.86 M/S
PV PEAK S VEL: 0.72 M/S
RA PRESSURE ESTIMATED: 3 MMHG
RA VOL SYS: 44.83 ML
RIGHT ATRIAL AREA: 16.7 CM2
RIGHT ATRIUM VOLUME AREA LENGTH APICAL 4 CHAMBER: 47.33 ML
RIGHT VENTRICLE DIASTOLIC BASEL DIMENSION: 4.5 CM
RIGHT VENTRICLE DIASTOLIC LENGTH: 8.1 CM
RIGHT VENTRICLE DIASTOLIC MID DIMENSION: 2.4 CM
RIGHT VENTRICULAR END-DIASTOLIC DIMENSION: 4.47 CM
RIGHT VENTRICULAR LENGTH IN DIASTOLE (APICAL 4-CHAMBER VIEW): 8.11 CM
RV MID DIAMA: 2.42 CM
RV TB RVSP: 6 MMHG
RV TISSUE DOPPLER FREE WALL SYSTOLIC VELOCITY 1 (APICAL 4 CHAMBER VIEW): 15.74 CM/S
SINUS: 2.57 CM
STJ: 2.1 CM
TDI LATERAL: 0.04 M/S
TDI SEPTAL: 0.06 M/S
TDI: 0.05 M/S
TR MAX PG: 34 MMHG
TRICUSPID ANNULAR PLANE SYSTOLIC EXCURSION: 3.34 CM
TV REST PULMONARY ARTERY PRESSURE: 37 MMHG
Z-SCORE OF LEFT VENTRICULAR DIMENSION IN END DIASTOLE: 0.71
Z-SCORE OF LEFT VENTRICULAR DIMENSION IN END SYSTOLE: 0.77

## 2025-01-30 DIAGNOSIS — Z00.00 ENCOUNTER FOR MEDICARE ANNUAL WELLNESS EXAM: ICD-10-CM

## 2025-01-31 RX ORDER — ATORVASTATIN CALCIUM 10 MG/1
10 TABLET, FILM COATED ORAL DAILY
Qty: 90 TABLET | Refills: 3 | Status: SHIPPED | OUTPATIENT
Start: 2025-01-31 | End: 2026-01-31

## 2025-02-03 ENCOUNTER — TELEPHONE (OUTPATIENT)
Dept: FAMILY MEDICINE | Facility: CLINIC | Age: 75
End: 2025-02-03
Payer: MEDICARE

## 2025-02-03 RX ORDER — ALENDRONATE SODIUM 70 MG/1
70 TABLET ORAL
Qty: 12 TABLET | Refills: 3 | Status: SHIPPED | OUTPATIENT
Start: 2025-02-03 | End: 2026-02-03

## 2025-02-03 NOTE — TELEPHONE ENCOUNTER
Her vitamin B 12 levels look good so no change from what she is currently doing.     I sent fosamax to the pharm    Please give her instructions:  She would need to take medication once a week in the am. She would need to take with a full glass of water and stay upright for 30 minutes. She should stop medication one month before major dental surgery and restart 3 months after major dental surgery (this does not include cleaning, extractions or routine dental care).     Thanks

## 2025-02-03 NOTE — TELEPHONE ENCOUNTER
Spoke with pt - relayed results. Willing to take Fosamax. Would like to do self injection of B12. Message sent to provider.

## 2025-02-03 NOTE — TELEPHONE ENCOUNTER
----- Message from Magaly sent at 2/3/2025 11:55 AM CST -----   Type:  Needs Medical Advice    Who Called:  PT    Would the patient rather a call back or a response via [facility]?  call  Best Call Back Number: [phone]        Additional Information:  states needs call back about test results..  Please call back to advise. Thank you!

## 2025-02-11 ENCOUNTER — TELEPHONE (OUTPATIENT)
Dept: PHARMACY | Facility: CLINIC | Age: 75
End: 2025-02-11
Payer: MEDICARE

## 2025-02-11 NOTE — TELEPHONE ENCOUNTER
[facility]/Population Health Chart Review & Patient Outreach Details For Medication Adherence Project    Reason for Outreach Encounter: 3rd Party payor non-compliance report ([employer], [employer], [employer], etc)  2.  Patient Outreach Method: Reviewed patient chart , COCCt message, and Telephone call  3.   Medication in question:    Hypertension Medications               telmisartan (MICARDIS) 40 MG Tab TAKE 1 TABLET(40 MG) BY MOUTH EVERY DAY                 telmisartan  last filled  5/31/24 for 90 day supply      4.  Reviewed and or Updates Made To: Patient Chart  5. Outreach Outcomes and/or actions taken: Patient agreed to refill refills from pharmacy on file  Additional Notes:   First called pt, but she didn't , Unable to LVM because mailbox is full.  Sent [facility].  Pt sent a response stating she wants a refill on telmisartan and b12  Called the pharmacy and they will have meds ready within couple hrs (2/12/25)

## 2025-02-12 ENCOUNTER — TELEPHONE (OUTPATIENT)
Dept: FAMILY MEDICINE | Facility: CLINIC | Age: 75
End: 2025-02-12
Payer: MEDICARE

## 2025-02-12 DIAGNOSIS — E53.8 VITAMIN B12 DEFICIENCY: Primary | ICD-10-CM

## 2025-02-12 NOTE — TELEPHONE ENCOUNTER
----- Message from Temitope sent at 2/11/2025  3:53 PM CST -----  Contact: self  Type:  Needs Medical Advice    Who Called: pt    Would the patient rather a call back or a response via [facility]? Call back     Best Call Back Number: [phone]      Additional Information: pt can not get her pharmacy to sell her the needle size that she wants to use. Pt states she can give her own shots, they want to give her this 1 in needle and it's to long. She needs the right size needle.   Please call back to advise. Thanks!

## 2025-02-14 NOTE — TELEPHONE ENCOUNTER
Can we call the pharmacy and find out what order needs to be placed.  Just syringe needles or insulin needles?    Thanks

## 2025-02-17 RX ORDER — SYRINGE,SAFETY WITH NEEDLE,3ML 25GX5/8"
SYRINGE, EMPTY DISPOSABLE MISCELLANEOUS
Qty: 15 EACH | Refills: 0 | Status: SHIPPED | OUTPATIENT
Start: 2025-02-17

## 2025-02-18 ENCOUNTER — OFFICE VISIT (OUTPATIENT)
Dept: FAMILY MEDICINE | Facility: CLINIC | Age: 75
End: 2025-02-18
Payer: MEDICARE

## 2025-02-18 VITALS
SYSTOLIC BLOOD PRESSURE: 124 MMHG | BODY MASS INDEX: 24.72 KG/M2 | WEIGHT: 148.38 LBS | HEIGHT: 65 IN | DIASTOLIC BLOOD PRESSURE: 60 MMHG | HEART RATE: 79 BPM | OXYGEN SATURATION: 96 %

## 2025-02-18 DIAGNOSIS — F17.210 CIGARETTE SMOKER: ICD-10-CM

## 2025-02-18 DIAGNOSIS — D50.8 OTHER IRON DEFICIENCY ANEMIA: ICD-10-CM

## 2025-02-18 DIAGNOSIS — J84.10 PULMONARY GRANULOMA: ICD-10-CM

## 2025-02-18 DIAGNOSIS — F33.0 MILD EPISODE OF RECURRENT MAJOR DEPRESSIVE DISORDER: ICD-10-CM

## 2025-02-18 DIAGNOSIS — I48.3 TYPICAL ATRIAL FLUTTER: ICD-10-CM

## 2025-02-18 DIAGNOSIS — S22.080G COMPRESSION FRACTURE OF T12 VERTEBRA WITH DELAYED HEALING: ICD-10-CM

## 2025-02-18 DIAGNOSIS — I65.23 BILATERAL CAROTID ARTERY STENOSIS: ICD-10-CM

## 2025-02-18 DIAGNOSIS — I10 ESSENTIAL HYPERTENSION: Chronic | ICD-10-CM

## 2025-02-18 DIAGNOSIS — E78.5 HYPERLIPIDEMIA, UNSPECIFIED HYPERLIPIDEMIA TYPE: ICD-10-CM

## 2025-02-18 DIAGNOSIS — F43.22 ADJUSTMENT DISORDER WITH ANXIOUS MOOD: ICD-10-CM

## 2025-02-18 DIAGNOSIS — I70.0 AORTIC ATHEROSCLEROSIS: ICD-10-CM

## 2025-02-18 DIAGNOSIS — E03.9 ACQUIRED HYPOTHYROIDISM: ICD-10-CM

## 2025-02-18 DIAGNOSIS — E55.9 VITAMIN D DEFICIENCY: ICD-10-CM

## 2025-02-18 DIAGNOSIS — Z00.00 ENCOUNTER FOR MEDICARE ANNUAL WELLNESS EXAM: Primary | ICD-10-CM

## 2025-02-18 DIAGNOSIS — J43.2 CENTRILOBULAR EMPHYSEMA: ICD-10-CM

## 2025-02-18 DIAGNOSIS — E53.8 VITAMIN B 12 DEFICIENCY: ICD-10-CM

## 2025-02-18 DIAGNOSIS — I48.0 PAF (PAROXYSMAL ATRIAL FIBRILLATION): Chronic | ICD-10-CM

## 2025-02-18 PROCEDURE — 99999 PR PBB SHADOW E&M-EST. PATIENT-LVL V: CPT | Mod: PBBFAC,,, | Performed by: NURSE PRACTITIONER

## 2025-02-18 PROCEDURE — 3288F FALL RISK ASSESSMENT DOCD: CPT | Mod: CPTII,S$GLB,, | Performed by: NURSE PRACTITIONER

## 2025-02-18 PROCEDURE — 3074F SYST BP LT 130 MM HG: CPT | Mod: CPTII,S$GLB,, | Performed by: NURSE PRACTITIONER

## 2025-02-18 PROCEDURE — 1126F AMNT PAIN NOTED NONE PRSNT: CPT | Mod: CPTII,S$GLB,, | Performed by: NURSE PRACTITIONER

## 2025-02-18 PROCEDURE — 4010F ACE/ARB THERAPY RXD/TAKEN: CPT | Mod: CPTII,S$GLB,, | Performed by: NURSE PRACTITIONER

## 2025-02-18 PROCEDURE — 1159F MED LIST DOCD IN RCRD: CPT | Mod: CPTII,S$GLB,, | Performed by: NURSE PRACTITIONER

## 2025-02-18 PROCEDURE — 1158F ADVNC CARE PLAN TLK DOCD: CPT | Mod: CPTII,S$GLB,, | Performed by: NURSE PRACTITIONER

## 2025-02-18 PROCEDURE — G0439 PPPS, SUBSEQ VISIT: HCPCS | Mod: S$GLB,,, | Performed by: NURSE PRACTITIONER

## 2025-02-18 PROCEDURE — 3044F HG A1C LEVEL LT 7.0%: CPT | Mod: CPTII,S$GLB,, | Performed by: NURSE PRACTITIONER

## 2025-02-18 PROCEDURE — 3078F DIAST BP <80 MM HG: CPT | Mod: CPTII,S$GLB,, | Performed by: NURSE PRACTITIONER

## 2025-02-18 PROCEDURE — 1101F PT FALLS ASSESS-DOCD LE1/YR: CPT | Mod: CPTII,S$GLB,, | Performed by: NURSE PRACTITIONER

## 2025-02-19 ENCOUNTER — PATIENT OUTREACH (OUTPATIENT)
Dept: ADMINISTRATIVE | Facility: OTHER | Age: 75
End: 2025-02-19
Payer: MEDICARE

## 2025-02-19 NOTE — PROGRESS NOTES
CHW - Follow Up    Cass Lal, Community Health Worker completed a follow up visit with patient via telephone today.  Pt/Caregiver reported: Mr. Whitehead contacted CHW to find out the status of her insurance coverage.   Community Health Worker provided: CHW informed Ms. Whitehead that she was not able to check the status of her health care coverage.  CHW explained that check in staff can discuss her health care coverage during her next visit.      No future outreach task assigned

## 2025-02-24 PROBLEM — S22.080G COMPRESSION FRACTURE OF T12 VERTEBRA WITH DELAYED HEALING: Status: ACTIVE | Noted: 2025-02-24

## 2025-02-24 PROBLEM — J43.2 CENTRILOBULAR EMPHYSEMA: Status: ACTIVE | Noted: 2025-02-24

## 2025-02-24 PROBLEM — J43.8 OTHER EMPHYSEMA: Status: ACTIVE | Noted: 2025-02-24

## 2025-02-24 PROBLEM — J84.10 PULMONARY GRANULOMA: Status: ACTIVE | Noted: 2025-02-24

## 2025-02-24 NOTE — PATIENT INSTRUCTIONS
Counseling and Referral of Other Preventative  (Italic type indicates deductible and co-insurance are waived)    Patient Name: Yara Whitehead  Today's Date: 2/24/2025    Health Maintenance       Date Due Completion Date    RSV Vaccine (Age 60+ and Pregnant patients) (1 - Risk 60-74 years 1-dose series) Never done ---    COVID-19 Vaccine (6 - 2024-25 season) 09/01/2024 11/14/2023    Colorectal Cancer Screening 10/27/2025 10/27/2020    Lipid Panel 01/07/2026 1/7/2025    LDCT Lung Screen 01/27/2026 1/27/2025    Mammogram 01/27/2026 1/27/2025    High Dose Statin 02/18/2026 2/18/2025    TETANUS VACCINE 11/08/2026 11/8/2016    Override on 1/1/2012: Done    DEXA Scan 01/29/2028 1/29/2025        Orders Placed This Encounter   Procedures    Ambulatory referral/consult to Smoking Cessation Program     The following information is provided to all patients.  This information is to help you find resources for any of the problems found today that may be affecting your health:                  Living healthy guide: www.[facility].[place].gov      Understanding Diabetes: www.diabetes.org      Eating healthy: www.cdc.gov/healthyweight      CDC home safety checklist: www.cdc.gov/steadi/patient.html      Agency on Aging: www.goea.[place].[facility]      Alcoholics anonymous (AA): www.aa.org      Physical Activity: www.minerva.nih.gov/zm3kbuf      Tobacco use: www.quitwithusla.org

## 2025-02-24 NOTE — PROGRESS NOTES
"  Yara Whitehead presented for a follow-up Medicare AWV today. The following components were reviewed and updated:    Medical history  Family History  Social history  Allergies and Current Medications  Health Risk Assessment  Health Maintenance  Care Team    **See Completed Assessments for Annual Wellness visit with in the encounter summary    The following assessments were completed:  Depression Screening  Cognitive function Screening    Timed Get Up Test  Whisper Test      Opioid documentation:      Patient does not have a current opioid prescription.          Vitals:    02/18/25 1341   BP: 124/60   BP Location: Left arm   Patient Position: Sitting   Pulse: 79   SpO2: 96%   Weight: 67.3 kg (148 lb 5.9 oz)   Height: 5' 5" (1.651 m)     Body mass index is 24.69 kg/m².       Physical Exam  Vitals reviewed.   HENT:      Head: Normocephalic.   Cardiovascular:      Rate and Rhythm: Normal rate.   Pulmonary:      Effort: Pulmonary effort is normal. No respiratory distress.   Skin:     General: Skin is warm.   Neurological:      General: No focal deficit present.      Mental Status: She is alert.   Psychiatric:         Mood and Affect: Mood normal.           Diagnoses and health risks identified today and associated recommendations/orders:  1. Encounter for Medicare annual wellness exam  Reviewed health maintenance and provided recommendations    Recommend rsv vaccine    - Referral to Enhanced Annual Wellness Visit (eAWV) W+1    2. Mild episode of recurrent major depressive disorder  Continue to monitor  Followed by Ann Richards,    No si/hi.      3. Aortic atherosclerosis  Continue to monitor  Followed by Dr Garcia  CT chest 11/31/23.      4. Typical atrial flutter  Continue to monitor  Followed by Dr Garcia  asymptomatic.      5. PAF (paroxysmal atrial fibrillation)  Continue to monitor  Followed by Dr Garcia.      6. Compression fracture of T12 vertebra with delayed healing  Continue to monitor  Followed by Dr Rose  CT chest " 1/27/25.      7. Bilateral carotid artery stenosis  Continue to monitor  Followed by Dr Garcia  CTA head 5/24/21.      8. Centrilobular emphysema  Continue to monitor  Followed by Ann Richards DO   CT chest 1/27/25.      9. Pulmonary granuloma  Continue to monitor  Followed by Ann Richards DO   CT chest 1/27/25.      10. Essential hypertension  Continue to monitor  Followed by Ann Richards DO   Telmisartan 20 mg.      11. Hyperlipidemia, unspecified hyperlipidemia type  Continue to monitor  Followed by Ann Richards DO   Atorvastatin 10 mg.      12. Acquired hypothyroidism  Continue to monitor  Followed by Ann Richards DO .      13. Other iron deficiency anemia  Continue to monitor  Followed by Ann Richards DO   Ferrous sulfate 325 mg.      14. Vitamin B 12 deficiency  Continue to monitor  Followed by Ann Richards DO .    Cyanocobalamin 1000 mcg inj weekly    15. Vitamin D deficiency  Continue to monitor  Followed by Ann Richards DO   Vit D3 1000 units.      16. Cigarette smoker  - Ambulatory referral/consult to Smoking Cessation Program; Future    17. Adjustment disorder with anxious mood  Continue to monitor  Followed by Ann Richards DO .        Provided Yara with a 5-10 year written screening schedule and personal prevention plan. Recommendations were developed using the USPSTF age appropriate recommendations. Education, counseling, and referrals were provided as needed.  After Visit Summary printed and given to patient which includes a list of additional screenings\tests needed.    No follow-ups on file.      Lili Quiroga NP

## 2025-02-25 ENCOUNTER — TELEPHONE (OUTPATIENT)
Dept: SMOKING CESSATION | Facility: CLINIC | Age: 75
End: 2025-02-25
Payer: MEDICARE

## 2025-02-26 ENCOUNTER — TELEPHONE (OUTPATIENT)
Dept: FAMILY MEDICINE | Facility: CLINIC | Age: 75
End: 2025-02-26

## 2025-02-26 NOTE — TELEPHONE ENCOUNTER
----- Message from Ginger sent at 2/26/2025  8:43 AM CST -----  Type:  Appointment RequestCaller is requesting a  appointment.  Name of Caller:Omar is the first available appointment? not  able to scheduleSymptoms: smoking cessationWould the patient rather a call back or a response via [facility]? Call Mallorie Call Back Number:[phone] Information: needs to schedule her smoking cessation   Please call back to advise. Thanks!

## 2025-03-06 ENCOUNTER — CLINICAL SUPPORT (OUTPATIENT)
Dept: SMOKING CESSATION | Facility: CLINIC | Age: 75
End: 2025-03-06

## 2025-03-06 DIAGNOSIS — F17.200 TOBACCO USE DISORDER: Primary | ICD-10-CM

## 2025-03-06 RX ORDER — IBUPROFEN 200 MG
1 TABLET ORAL DAILY
Qty: 14 PATCH | Refills: 0 | Status: SHIPPED | OUTPATIENT
Start: 2025-03-06

## 2025-03-06 RX ORDER — MICONAZOLE NITRATE 2 %
CREAM (GRAM) TOPICAL
Qty: 100 EACH | Refills: 0 | Status: SHIPPED | OUTPATIENT
Start: 2025-03-06

## 2025-03-06 NOTE — Clinical Note
Patient will be participating in weekly tobacco cessation meetings and will begin the prescribed tobacco cessation medication regime of the 21 mg patch and 2 mg gum.

## 2025-03-10 ENCOUNTER — OFFICE VISIT (OUTPATIENT)
Dept: OPTOMETRY | Facility: CLINIC | Age: 75
End: 2025-03-10
Payer: MEDICARE

## 2025-03-10 DIAGNOSIS — H35.3131 EARLY DRY STAGE NONEXUDATIVE AGE-RELATED MACULAR DEGENERATION OF BOTH EYES: Primary | ICD-10-CM

## 2025-03-10 DIAGNOSIS — Z96.1 PSEUDOPHAKIA OF BOTH EYES: ICD-10-CM

## 2025-03-10 PROCEDURE — 3044F HG A1C LEVEL LT 7.0%: CPT | Mod: CPTII,S$GLB,, | Performed by: OPTOMETRIST

## 2025-03-10 PROCEDURE — 92014 COMPRE OPH EXAM EST PT 1/>: CPT | Mod: S$GLB,,, | Performed by: OPTOMETRIST

## 2025-03-10 PROCEDURE — 1126F AMNT PAIN NOTED NONE PRSNT: CPT | Mod: CPTII,S$GLB,, | Performed by: OPTOMETRIST

## 2025-03-10 PROCEDURE — 1160F RVW MEDS BY RX/DR IN RCRD: CPT | Mod: CPTII,S$GLB,, | Performed by: OPTOMETRIST

## 2025-03-10 PROCEDURE — 1159F MED LIST DOCD IN RCRD: CPT | Mod: CPTII,S$GLB,, | Performed by: OPTOMETRIST

## 2025-03-10 PROCEDURE — 99999 PR PBB SHADOW E&M-EST. PATIENT-LVL III: CPT | Mod: PBBFAC,,, | Performed by: OPTOMETRIST

## 2025-03-10 PROCEDURE — 4010F ACE/ARB THERAPY RXD/TAKEN: CPT | Mod: CPTII,S$GLB,, | Performed by: OPTOMETRIST

## 2025-03-10 NOTE — PROGRESS NOTES
HPI    Pt states feels vision is good since last exam 11/2023 PCIOL OU   No gtts used   Denies F/F   No HTN or Diabetes   Last edited by Rozina Landin on 3/10/2025  8:51 AM.            Assessment /Plan     For exam results, see Encounter Report.    Early dry stage nonexudative age-related macular degeneration of both eyes    Pseudophakia of both eyes      Discussed sunglass wear, no smoke and diet, areds 2 vits, pics done today, RTC yearly eye exams.   2. Monitor condition. Patient to report any changes. RTC 1 year recheck.

## 2025-03-20 ENCOUNTER — TELEPHONE (OUTPATIENT)
Dept: SMOKING CESSATION | Facility: CLINIC | Age: 75
End: 2025-03-20
Payer: MEDICARE

## 2025-03-20 ENCOUNTER — CLINICAL SUPPORT (OUTPATIENT)
Dept: SMOKING CESSATION | Facility: CLINIC | Age: 75
End: 2025-03-20

## 2025-03-20 DIAGNOSIS — F17.200 TOBACCO USE DISORDER: Primary | ICD-10-CM

## 2025-03-20 PROCEDURE — 99999 PR PBB SHADOW E&M-EST. PATIENT-LVL III: CPT | Mod: PBBFAC,,,

## 2025-03-20 NOTE — TELEPHONE ENCOUNTER
I called patient as she was no show for virtual but no answer and could not leave a message due to full mail box.

## 2025-03-20 NOTE — Clinical Note
Patient is currently smoking 10 cpd and not using patches or gum at this time. We discussed nicotine withdrawal symptoms via telephone as she was unable to do a virtual. I recommended patient begin using the 2 mg gum to help curb her cravings and help with withdrawal symptoms. Patient will begin using tomorrow but wants to wait on using the patches. Patient feels confident with her quit attempt. Patient has a follow up in 2 weeks.

## 2025-03-20 NOTE — PROGRESS NOTES
Individual Follow-Up Form    3/20/2025    Quit Date:     Clinical Status of Patient: Outpatient    Length of Service: 30 minutes    Continuing Medication: yes  Nicotine gum    Other Medications:      Target Symptoms: Withdrawal and medication side effects. The following were  rated moderate (3) to severe (4) on TCRS:  Moderate (3): cravings and urges;discussed with patient  Severe (4): none    Comments: Patient is currently smoking 10 cpd and not using patches or gum at this time. We discussed nicotine withdrawal symptoms via telephone as she was unable to do a virtual. I recommended patient begin using the 2 mg gum to help curb her cravings and help with withdrawal symptoms. Patient will begin using tomorrow but wants to wait on using the patches. Patient feels confident with her quit attempt. Patient has a follow up in 2 weeks.    Diagnosis: F17.200    Next Visit: 2 weeks

## 2025-04-03 ENCOUNTER — TELEPHONE (OUTPATIENT)
Dept: SMOKING CESSATION | Facility: CLINIC | Age: 75
End: 2025-04-03
Payer: MEDICARE

## 2025-04-08 ENCOUNTER — CLINICAL SUPPORT (OUTPATIENT)
Dept: SMOKING CESSATION | Facility: CLINIC | Age: 75
End: 2025-04-08
Payer: COMMERCIAL

## 2025-04-08 DIAGNOSIS — F17.200 TOBACCO USE DISORDER: Primary | ICD-10-CM

## 2025-04-08 PROCEDURE — 99403 PREV MED CNSL INDIV APPRX 45: CPT | Mod: S$GLB,,, | Performed by: GENERAL PRACTICE

## 2025-04-08 PROCEDURE — 99999 PR PBB SHADOW E&M-EST. PATIENT-LVL III: CPT | Mod: PBBFAC,,,

## 2025-04-08 NOTE — PROGRESS NOTES
Individual Follow-Up Form    4/8/2025    Quit Date:     Clinical Status of Patient: Outpatient    Length of Service: 45 minutes    Continuing Medication: yes  Nicotine gum    Other Medications:      Target Symptoms: Withdrawal and medication side effects. The following were  rated moderate (3) to severe (4) on TCRS:  Moderate (3): none  Severe (4): none    Comments: Patient is currently smoking 9 cpd and using the gum with no negative side effects at this time. Patient has not yet started using patches but plans on starting tomorrow and we discussed medications. Patient was a virtual but had no internet service at this time so patient was a phone call.  We discussed setting a quit date and patient will try for 4/13/25. We discussed distractions, new habits and nicotine dependence. Patient has a follow up in 2 weeks.     Diagnosis: F17.200    Next Visit: 2 weeks

## 2025-04-08 NOTE — Clinical Note
Patient is currently smoking 9 cpd and using the gum with no negative side effects at this time. Patient has not yet started using patches but plans on starting tomorrow and we discussed medications. Patient was a virtual but had no internet service at this time so patient was a phone call.  We discussed setting a quit date and patient will try for 4/13/25. We discussed distractions, new habits and nicotine dependence. Patient has a follow up in 2 weeks.

## 2025-04-22 ENCOUNTER — PATIENT OUTREACH (OUTPATIENT)
Dept: ADMINISTRATIVE | Facility: OTHER | Age: 75
End: 2025-04-22
Payer: MEDICARE

## 2025-04-22 ENCOUNTER — TELEPHONE (OUTPATIENT)
Dept: SMOKING CESSATION | Facility: CLINIC | Age: 75
End: 2025-04-22
Payer: MEDICARE

## 2025-04-22 NOTE — PROGRESS NOTES
CHW - Follow Up    Cass Lal Community Health Worker completed a follow up visit with patient via telephone today.  Pt/Caregiver reported: Ms. Whitehead called to find out about the [facility] program with [facility] and about our sports injury clinic on the [facility].    Community Health Worker provided: CHW will find out and get back with Ms. Spencer.   Follow up required: April 23, 2025.

## 2025-04-23 ENCOUNTER — PATIENT OUTREACH (OUTPATIENT)
Dept: ADMINISTRATIVE | Facility: OTHER | Age: 75
End: 2025-04-23
Payer: MEDICARE

## 2025-04-28 DIAGNOSIS — F17.200 TOBACCO USE DISORDER: ICD-10-CM

## 2025-04-28 RX ORDER — IBUPROFEN 200 MG
1 TABLET ORAL DAILY
Qty: 16 PATCH | Refills: 0 | Status: SHIPPED | OUTPATIENT
Start: 2025-04-28 | End: 2025-04-30 | Stop reason: SDUPTHER

## 2025-04-28 NOTE — TELEPHONE ENCOUNTER
Care Due:                  Date            Visit Type   Department     Provider  --------------------------------------------------------------------------------                                EP -                              PRIMARY      [facility]  Last Visit: 01-      CARE ([facility])   MEDICINE       Ann Richards                              EP -                              PRIMARY      [facility]  Next Visit: 07-      CARE ([facility])   [facility]       Ann Richards                                                            Last  Test          Frequency    Reason                     Performed    Due Date  --------------------------------------------------------------------------------    Mg Level....  12 months..  alendronate..............  Not Found    Overdue    Phosphate...  12 months..  alendronate..............  Not Found    Overdue    [employer] Embedded Care Due Messages. Reference number: [id number].   4/28/2025 11:44:12 AM CDT

## 2025-04-28 NOTE — TELEPHONE ENCOUNTER
Refill Routing Note   Medication(s) are not appropriate for processing by [facility] for the following reason(s):        Outside of protocol    [facility] action(s):  Route             Appointments  past 12m or future 3m with PCP    Date Provider   Last Visit   1/7/2025 Ann Richards, DO   Next Visit   7/8/2025 Ann Richards, DO   ED visits in past 90 days: 0        Note composed:12:55 PM 04/28/2025

## 2025-04-29 NOTE — PROGRESS NOTES
CHW - Outreach Attempt    Cass Lal Community Health Worker left a voicemail message for 2nd attempt to contact patient regarding: [facility]   Community Health Worker to attempt to contact patient on:  May 5, 2025.

## 2025-04-30 ENCOUNTER — CLINICAL SUPPORT (OUTPATIENT)
Dept: SMOKING CESSATION | Facility: CLINIC | Age: 75
End: 2025-04-30

## 2025-04-30 DIAGNOSIS — F17.200 TOBACCO USE DISORDER: Primary | ICD-10-CM

## 2025-04-30 PROCEDURE — 99999 PR PBB SHADOW E&M-EST. PATIENT-LVL III: CPT | Mod: PBBFAC,,,

## 2025-04-30 PROCEDURE — 99404 PREV MED CNSL INDIV APPRX 60: CPT | Mod: ,,, | Performed by: GENERAL PRACTICE

## 2025-04-30 RX ORDER — PAROXETINE HYDROCHLORIDE 20 MG/1
20 TABLET, FILM COATED ORAL EVERY MORNING
COMMUNITY

## 2025-04-30 RX ORDER — VARENICLINE TARTRATE 1 MG/1
TABLET, FILM COATED ORAL
Qty: 57 TABLET | Refills: 0 | Status: SHIPPED | OUTPATIENT
Start: 2025-04-30 | End: 2025-05-30

## 2025-04-30 RX ORDER — IBUPROFEN 200 MG
1 TABLET ORAL DAILY
Qty: 14 PATCH | Refills: 0 | Status: SHIPPED | OUTPATIENT
Start: 2025-04-30

## 2025-04-30 RX ORDER — MICONAZOLE NITRATE 2 %
CREAM (GRAM) TOPICAL
Qty: 100 EACH | Refills: 0 | Status: SHIPPED | OUTPATIENT
Start: 2025-04-30

## 2025-04-30 NOTE — PROGRESS NOTES
Individual Follow-Up Form    4/30/2025    Quit Date:     Clinical Status of Patient: Outpatient    Length of Service: 60 minutes    Continuing Medication: yes  Patches    Other Medications: gum     Target Symptoms: Withdrawal and medication side effects. The following were  rated moderate (3) to severe (4) on TCRS:  Moderate (3): none  Severe (4): desire or crave;discussed with patient     Comments: Patient is currently smoking 10 cpd and using 21 mg patch and 2 mg gum with no negative side effects at this time. Patient CO was 13 ppm. Patient is having strong urges and finding it hard to continue reduction. We discussed adding Wellbutrin but patient remembered she is taking Paxil which I added to medication list. Patient just recently started taking it again so it was added. Patient wants to try Chantix to help with her quit. We discussed medications, dosages and reduction. We also discussed session 3 material including habitual behaviors. Patient has a follow up in 2 weeks.    Diagnosis: F17.200    Next Visit: 2 weeks

## 2025-04-30 NOTE — Clinical Note
Patient is currently smoking 10 cpd and using 21 mg patch and 2 mg gum with no negative side effects at this time. Patient CO was 13 ppm. Patient is having strong urges and finding it hard to continue reduction. We discussed adding Wellbutrin but patient remembered she is taking Paxil which I added to medication list. Patient just recently started taking it again so it was added. Patient wants to try Chantix to help with her quit. We discussed medications, dosages and reduction. We also discussed session 3 material including habitual behaviors. Patient has a follow up in 2 weeks.

## 2025-05-01 ENCOUNTER — PATIENT OUTREACH (OUTPATIENT)
Dept: ADMINISTRATIVE | Facility: OTHER | Age: 75
End: 2025-05-01
Payer: MEDICARE

## 2025-05-02 ENCOUNTER — OFFICE VISIT (OUTPATIENT)
Dept: OPTOMETRY | Facility: CLINIC | Age: 75
End: 2025-05-02
Payer: MEDICARE

## 2025-05-02 DIAGNOSIS — H52.7 REFRACTIVE ERROR: Primary | ICD-10-CM

## 2025-05-02 PROCEDURE — 99999 PR PBB SHADOW E&M-EST. PATIENT-LVL III: CPT | Mod: PBBFAC,,, | Performed by: OPTOMETRIST

## 2025-05-02 NOTE — PROGRESS NOTES
HPI    Pt here for MRX DLS- 03/10/25    Pt denies vision changes.   Wants a prescription a sunglasses.   Last edited by Aisha Rees on 5/2/2025  9:55 AM.            Assessment /Plan     For exam results, see Encounter Report.    Refractive error      New Spectacle Rx given, discussed different options for glasses. RTC 1 year routine eye exam.
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
## 2025-05-14 ENCOUNTER — CLINICAL SUPPORT (OUTPATIENT)
Dept: SMOKING CESSATION | Facility: CLINIC | Age: 75
End: 2025-05-14

## 2025-05-14 ENCOUNTER — TELEPHONE (OUTPATIENT)
Dept: SMOKING CESSATION | Facility: CLINIC | Age: 75
End: 2025-05-14
Payer: MEDICARE

## 2025-05-14 DIAGNOSIS — F17.200 TOBACCO USE DISORDER: Primary | ICD-10-CM

## 2025-05-14 PROCEDURE — 99402 PREV MED CNSL INDIV APPRX 30: CPT | Mod: ,,, | Performed by: GENERAL PRACTICE

## 2025-05-14 PROCEDURE — 99999 PR PBB SHADOW E&M-EST. PATIENT-LVL III: CPT | Mod: PBBFAC,,,

## 2025-05-14 NOTE — PROGRESS NOTES
Individual Follow-Up Form    5/14/2025    Quit Date:     Clinical Status of Patient: Outpatient    Length of Service: 30 minutes    Continuing Medication: yes  Chantix    Other Medications: patches gum     Target Symptoms: Withdrawal and medication side effects. The following were  rated moderate (3) to severe (4) on TCRS:  Moderate (3): desire or crave;discussed with patient  Severe (4): none    Comments: Patient is currently smoking 8-10 cpd and taking 0.5 mg QD of varenicline and using patches and gum but not consistently. We discussed using as directed and taking 0.5 mg BID of varenicline to help with her quit. We also discussed ambivalence and setting a quit date. We also discussed habitual behaviors and strategies to help with her quit. Patient has a follow up in 2 weeks.    Diagnosis: F17.200    Next Visit: 2 weeks

## 2025-05-14 NOTE — Clinical Note
Patient is currently smoking 8-10 cpd and taking 0.5 mg QD of varenicline and using patches and gum but not consistently. We discussed using as directed and taking 0.5 mg BID of varenicline to help with her quit. We also discussed ambivalence and setting a quit date. We also discussed habitual behaviors and strategies to help with her quit. Patient has a follow up in 2 weeks.

## 2025-05-28 ENCOUNTER — TELEPHONE (OUTPATIENT)
Dept: SMOKING CESSATION | Facility: CLINIC | Age: 75
End: 2025-05-28
Payer: MEDICARE

## 2025-05-28 NOTE — TELEPHONE ENCOUNTER
I called patient as she was no show for tobacco cessation appointment but no answer and could not leave a message.

## 2025-06-03 ENCOUNTER — CLINICAL SUPPORT (OUTPATIENT)
Dept: SMOKING CESSATION | Facility: CLINIC | Age: 75
End: 2025-06-03

## 2025-06-03 DIAGNOSIS — F17.200 TOBACCO USE DISORDER: Primary | ICD-10-CM

## 2025-06-03 PROCEDURE — 99999 PR PBB SHADOW E&M-EST. PATIENT-LVL II: CPT | Mod: PBBFAC,,, | Performed by: GENERAL PRACTICE

## 2025-06-03 PROCEDURE — 99404 PREV MED CNSL INDIV APPRX 60: CPT | Mod: ,,, | Performed by: GENERAL PRACTICE

## 2025-06-03 RX ORDER — VARENICLINE TARTRATE 1 MG/1
1 TABLET, FILM COATED ORAL 2 TIMES DAILY
Qty: 60 TABLET | Refills: 0 | Status: SHIPPED | OUTPATIENT
Start: 2025-06-03 | End: 2025-07-03

## 2025-06-03 RX ORDER — IBUPROFEN 200 MG
1 TABLET ORAL DAILY
Qty: 14 PATCH | Refills: 0 | Status: SHIPPED | OUTPATIENT
Start: 2025-06-03

## 2025-06-03 RX ORDER — MICONAZOLE NITRATE 2 %
CREAM (GRAM) TOPICAL
Qty: 100 EACH | Refills: 0 | Status: SHIPPED | OUTPATIENT
Start: 2025-06-03

## 2025-06-10 ENCOUNTER — TELEPHONE (OUTPATIENT)
Dept: SMOKING CESSATION | Facility: CLINIC | Age: 75
End: 2025-06-10
Payer: MEDICARE

## 2025-06-17 ENCOUNTER — TELEPHONE (OUTPATIENT)
Dept: SMOKING CESSATION | Facility: CLINIC | Age: 75
End: 2025-06-17
Payer: MEDICARE

## 2025-06-17 ENCOUNTER — CLINICAL SUPPORT (OUTPATIENT)
Dept: SMOKING CESSATION | Facility: CLINIC | Age: 75
End: 2025-06-17

## 2025-06-17 DIAGNOSIS — F17.200 TOBACCO USE DISORDER: Primary | ICD-10-CM

## 2025-06-17 PROCEDURE — 99402 PREV MED CNSL INDIV APPRX 30: CPT | Mod: S$GLB,,, | Performed by: GENERAL PRACTICE

## 2025-06-17 NOTE — Clinical Note
Patient remains tobacco free and taking varenicline 1 mg QD with no negative side effects at this time. Patient using the patches and gum occasionally. We discussed relapse prevention strategies and high risk situations. Patient will call me when ready to schedule a follow up.

## 2025-06-17 NOTE — PROGRESS NOTES
Individual Follow-Up Form    6/17/2025    Quit Date: 5/21/25    Clinical Status of Patient: Outpatient    Length of Service: 30 minutes    Continuing Medication: yes  Chantix    Other Medications: patches and gum     Target Symptoms: Withdrawal and medication side effects. The following were  rated moderate (3) to severe (4) on TCRS:  Moderate (3): none  Severe (4): none    Comments: Patient remains tobacco free and taking varenicline 1 mg QD with no negative side effects at this time. Patient using the patches and gum occasionally. We discussed relapse prevention strategies and high risk situations. Patient will call me when ready to schedule a follow up.    Diagnosis: F17.200    Next Visit: 2 weeks

## 2025-07-02 ENCOUNTER — LAB VISIT (OUTPATIENT)
Dept: LAB | Facility: HOSPITAL | Age: 75
End: 2025-07-02
Attending: INTERNAL MEDICINE
Payer: MEDICARE

## 2025-07-02 DIAGNOSIS — D50.9 IRON DEFICIENCY ANEMIA, UNSPECIFIED IRON DEFICIENCY ANEMIA TYPE: ICD-10-CM

## 2025-07-02 DIAGNOSIS — E03.9 HYPOTHYROIDISM, UNSPECIFIED TYPE: ICD-10-CM

## 2025-07-02 DIAGNOSIS — I10 ESSENTIAL HYPERTENSION: ICD-10-CM

## 2025-07-02 LAB
ABSOLUTE EOSINOPHIL (OHS): 0.03 K/UL
ABSOLUTE MONOCYTE (OHS): 0.3 K/UL (ref 0.3–1)
ABSOLUTE NEUTROPHIL COUNT (OHS): 3.81 K/UL (ref 1.8–7.7)
ALBUMIN SERPL BCP-MCNC: 4.1 G/DL (ref 3.5–5.2)
ALP SERPL-CCNC: 72 UNIT/L (ref 40–150)
ALT SERPL W/O P-5'-P-CCNC: 16 UNIT/L (ref 10–44)
ANION GAP (OHS): 12 MMOL/L (ref 8–16)
AST SERPL-CCNC: 23 UNIT/L (ref 11–45)
BASOPHILS # BLD AUTO: 0.02 K/UL
BASOPHILS NFR BLD AUTO: 0.4 %
BILIRUB SERPL-MCNC: 0.6 MG/DL (ref 0.1–1)
BUN SERPL-MCNC: 13 MG/DL (ref 8–23)
CALCIUM SERPL-MCNC: 9.5 MG/DL (ref 8.7–10.5)
CHLORIDE SERPL-SCNC: 97 MMOL/L (ref 95–110)
CO2 SERPL-SCNC: 26 MMOL/L (ref 23–29)
CREAT SERPL-MCNC: 0.7 MG/DL (ref 0.5–1.4)
ERYTHROCYTE [DISTWIDTH] IN BLOOD BY AUTOMATED COUNT: 15.5 % (ref 11.5–14.5)
FERRITIN SERPL-MCNC: 20 NG/ML (ref 20–300)
GFR SERPLBLD CREATININE-BSD FMLA CKD-EPI: >60 ML/MIN/1.73/M2
GLUCOSE SERPL-MCNC: 98 MG/DL (ref 70–110)
HCT VFR BLD AUTO: 41.5 % (ref 37–48.5)
HGB BLD-MCNC: 13.5 GM/DL (ref 12–16)
IMM GRANULOCYTES # BLD AUTO: 0.02 K/UL (ref 0–0.04)
IMM GRANULOCYTES NFR BLD AUTO: 0.4 % (ref 0–0.5)
IRON SATN MFR SERPL: 20 % (ref 20–50)
IRON SERPL-MCNC: 115 UG/DL (ref 30–160)
LYMPHOCYTES # BLD AUTO: 1.2 K/UL (ref 1–4.8)
MCH RBC QN AUTO: 31.5 PG (ref 27–31)
MCHC RBC AUTO-ENTMCNC: 32.5 G/DL (ref 32–36)
MCV RBC AUTO: 97 FL (ref 82–98)
NUCLEATED RBC (/100WBC) (OHS): 0 /100 WBC
PLATELET # BLD AUTO: 281 K/UL (ref 150–450)
PMV BLD AUTO: 10.7 FL (ref 9.2–12.9)
POTASSIUM SERPL-SCNC: 4.5 MMOL/L (ref 3.5–5.1)
PROT SERPL-MCNC: 7.5 GM/DL (ref 6–8.4)
RBC # BLD AUTO: 4.28 M/UL (ref 4–5.4)
RELATIVE EOSINOPHIL (OHS): 0.6 %
RELATIVE LYMPHOCYTE (OHS): 22.3 % (ref 18–48)
RELATIVE MONOCYTE (OHS): 5.6 % (ref 4–15)
RELATIVE NEUTROPHIL (OHS): 70.7 % (ref 38–73)
SODIUM SERPL-SCNC: 135 MMOL/L (ref 136–145)
TIBC SERPL-MCNC: 564 UG/DL (ref 250–450)
TRANSFERRIN SERPL-MCNC: 381 MG/DL (ref 200–375)
TSH SERPL-ACNC: 3.51 UIU/ML (ref 0.4–4)
WBC # BLD AUTO: 5.38 K/UL (ref 3.9–12.7)

## 2025-07-02 PROCEDURE — 36415 COLL VENOUS BLD VENIPUNCTURE: CPT | Mod: PO

## 2025-07-02 PROCEDURE — 85025 COMPLETE CBC W/AUTO DIFF WBC: CPT

## 2025-07-02 PROCEDURE — 80053 COMPREHEN METABOLIC PANEL: CPT

## 2025-07-02 PROCEDURE — 83540 ASSAY OF IRON: CPT

## 2025-07-02 PROCEDURE — 82728 ASSAY OF FERRITIN: CPT

## 2025-07-02 PROCEDURE — 84443 ASSAY THYROID STIM HORMONE: CPT

## 2025-07-03 ENCOUNTER — RESULTS FOLLOW-UP (OUTPATIENT)
Dept: FAMILY MEDICINE | Facility: CLINIC | Age: 75
End: 2025-07-03

## 2025-07-09 DIAGNOSIS — I10 PRIMARY HYPERTENSION: ICD-10-CM

## 2025-07-09 RX ORDER — TELMISARTAN 40 MG/1
40 TABLET ORAL
Qty: 90 TABLET | Refills: 3 | OUTPATIENT
Start: 2025-07-09

## 2025-07-09 NOTE — TELEPHONE ENCOUNTER
Care Due:                  Date            Visit Type   Department     Provider  --------------------------------------------------------------------------------                                EP -                              PRIMARY      [facility]  Last Visit: 01-      CARE ([facility])   MEDICINE       Ann Richards                              EP -                              PRIMARY      [facility]  Next Visit: 07-      CARE ([facility])   [facility]       Ann Richards                                                            Last  Test          Frequency    Reason                     Performed    Due Date  --------------------------------------------------------------------------------    Mg Level....  12 months..  alendronate..............  Not Found    Overdue    Phosphate...  12 months..  alendronate..............  Not Found    Overdue    [employer] Embedded Care Due Messages. Reference number: [id number].   7/09/2025 8:57:39 AM CDT

## 2025-07-09 NOTE — TELEPHONE ENCOUNTER
Refill Decision Note   Yara Whitehead  is requesting a refill authorization.  Brief Assessment and Rationale for Refill:  Quick Discontinue     Medication Therapy Plan:  TELMISARTAN 40MG WAS DISCONTINUED ON 02/19/25      Comments:     Note composed:9:28 AM 07/09/2025

## 2025-07-14 ENCOUNTER — OFFICE VISIT (OUTPATIENT)
Dept: FAMILY MEDICINE | Facility: CLINIC | Age: 75
End: 2025-07-14
Payer: MEDICARE

## 2025-07-14 VITALS
OXYGEN SATURATION: 98 % | BODY MASS INDEX: 24.4 KG/M2 | TEMPERATURE: 98 F | RESPIRATION RATE: 18 BRPM | WEIGHT: 151.81 LBS | HEART RATE: 69 BPM | HEIGHT: 66 IN | DIASTOLIC BLOOD PRESSURE: 86 MMHG | SYSTOLIC BLOOD PRESSURE: 158 MMHG

## 2025-07-14 DIAGNOSIS — J43.2 CENTRILOBULAR EMPHYSEMA: ICD-10-CM

## 2025-07-14 DIAGNOSIS — I27.20 PULMONARY HYPERTENSION: ICD-10-CM

## 2025-07-14 DIAGNOSIS — F51.01 PRIMARY INSOMNIA: ICD-10-CM

## 2025-07-14 DIAGNOSIS — I10 PRIMARY HYPERTENSION: ICD-10-CM

## 2025-07-14 DIAGNOSIS — Q24.4 SUBVALVAR AORTIC STENOSIS: ICD-10-CM

## 2025-07-14 DIAGNOSIS — F33.1 MODERATE EPISODE OF RECURRENT MAJOR DEPRESSIVE DISORDER: ICD-10-CM

## 2025-07-14 DIAGNOSIS — Z87.891 PERSONAL HISTORY OF TOBACCO USE: ICD-10-CM

## 2025-07-14 DIAGNOSIS — R91.1 PULMONARY NODULE: ICD-10-CM

## 2025-07-14 DIAGNOSIS — E53.8 VITAMIN B12 DEFICIENCY: ICD-10-CM

## 2025-07-14 DIAGNOSIS — E03.9 ACQUIRED HYPOTHYROIDISM: ICD-10-CM

## 2025-07-14 DIAGNOSIS — N39.490 OVERFLOW INCONTINENCE OF URINE: ICD-10-CM

## 2025-07-14 DIAGNOSIS — I65.23 BILATERAL CAROTID ARTERY STENOSIS: ICD-10-CM

## 2025-07-14 DIAGNOSIS — E55.9 VITAMIN D DEFICIENCY: ICD-10-CM

## 2025-07-14 DIAGNOSIS — D50.8 OTHER IRON DEFICIENCY ANEMIA: ICD-10-CM

## 2025-07-14 DIAGNOSIS — I48.0 PAF (PAROXYSMAL ATRIAL FIBRILLATION): Primary | ICD-10-CM

## 2025-07-14 DIAGNOSIS — J84.10 PULMONARY GRANULOMA: ICD-10-CM

## 2025-07-14 DIAGNOSIS — Z12.31 ENCOUNTER FOR SCREENING MAMMOGRAM FOR MALIGNANT NEOPLASM OF BREAST: ICD-10-CM

## 2025-07-14 DIAGNOSIS — R41.3 MEMORY IMPAIRMENT: ICD-10-CM

## 2025-07-14 DIAGNOSIS — Z79.899 MEDICATION MANAGEMENT: ICD-10-CM

## 2025-07-14 DIAGNOSIS — Z12.11 SCREEN FOR COLON CANCER: ICD-10-CM

## 2025-07-14 DIAGNOSIS — E78.5 HYPERLIPIDEMIA, UNSPECIFIED HYPERLIPIDEMIA TYPE: ICD-10-CM

## 2025-07-14 DIAGNOSIS — I34.0 NONRHEUMATIC MITRAL VALVE REGURGITATION: ICD-10-CM

## 2025-07-14 PROCEDURE — 3044F HG A1C LEVEL LT 7.0%: CPT | Mod: CPTII,S$GLB,, | Performed by: INTERNAL MEDICINE

## 2025-07-14 PROCEDURE — 1160F RVW MEDS BY RX/DR IN RCRD: CPT | Mod: CPTII,S$GLB,, | Performed by: INTERNAL MEDICINE

## 2025-07-14 PROCEDURE — 1159F MED LIST DOCD IN RCRD: CPT | Mod: CPTII,S$GLB,, | Performed by: INTERNAL MEDICINE

## 2025-07-14 PROCEDURE — 3288F FALL RISK ASSESSMENT DOCD: CPT | Mod: CPTII,S$GLB,, | Performed by: INTERNAL MEDICINE

## 2025-07-14 PROCEDURE — G2211 COMPLEX E/M VISIT ADD ON: HCPCS | Mod: S$GLB,,, | Performed by: INTERNAL MEDICINE

## 2025-07-14 PROCEDURE — 4010F ACE/ARB THERAPY RXD/TAKEN: CPT | Mod: CPTII,S$GLB,, | Performed by: INTERNAL MEDICINE

## 2025-07-14 PROCEDURE — 3079F DIAST BP 80-89 MM HG: CPT | Mod: CPTII,S$GLB,, | Performed by: INTERNAL MEDICINE

## 2025-07-14 PROCEDURE — 99215 OFFICE O/P EST HI 40 MIN: CPT | Mod: S$GLB,,, | Performed by: INTERNAL MEDICINE

## 2025-07-14 PROCEDURE — 3077F SYST BP >= 140 MM HG: CPT | Mod: CPTII,S$GLB,, | Performed by: INTERNAL MEDICINE

## 2025-07-14 PROCEDURE — 1101F PT FALLS ASSESS-DOCD LE1/YR: CPT | Mod: CPTII,S$GLB,, | Performed by: INTERNAL MEDICINE

## 2025-07-14 PROCEDURE — 3008F BODY MASS INDEX DOCD: CPT | Mod: CPTII,S$GLB,, | Performed by: INTERNAL MEDICINE

## 2025-07-14 PROCEDURE — 1126F AMNT PAIN NOTED NONE PRSNT: CPT | Mod: CPTII,S$GLB,, | Performed by: INTERNAL MEDICINE

## 2025-07-14 RX ORDER — TELMISARTAN 20 MG/1
20 TABLET ORAL DAILY
Qty: 90 TABLET | Refills: 3 | Status: SHIPPED | OUTPATIENT
Start: 2025-07-14 | End: 2026-07-14

## 2025-07-14 RX ORDER — PAROXETINE 40 MG/1
40 TABLET, FILM COATED ORAL EVERY MORNING
Qty: 90 TABLET | Refills: 3 | Status: SHIPPED | OUTPATIENT
Start: 2025-07-14 | End: 2026-07-14

## 2025-07-14 RX ORDER — ATORVASTATIN CALCIUM 10 MG/1
10 TABLET, FILM COATED ORAL DAILY
Qty: 90 TABLET | Refills: 3 | Status: SHIPPED | OUTPATIENT
Start: 2025-07-14 | End: 2026-07-14

## 2025-07-14 NOTE — PROGRESS NOTES
"Subjective:       Patient ID: Yara Whitehead is a 75 y.o. female.    Medication List with Changes/Refills   New Medications    PAROXETINE (PAXIL) 40 MG TABLET    Take 1 tablet (40 mg total) by mouth every morning.   Current Medications    ACETAMINOPHEN (TYLENOL) 500 MG TABLET    Take 500 mg by mouth every 6 (six) hours as needed for Pain.    ALBUTEROL (PROVENTIL/VENTOLIN HFA) 90 MCG/ACTUATION INHALER    Inhale 1-2 puffs into the lungs every 6 (six) hours as needed for Wheezing.    ASCORBIC ACID, VITAMIN C, (VITAMIN C) 1000 MG TABLET    Take 1,000 mg by mouth once daily.    ASPIRIN (ECOTRIN) 81 MG EC TABLET    Take 1 tablet (81 mg total) by mouth once daily.    FERROUS SULFATE 325 (65 FE) MG EC TABLET    Take 1 tablet (325 mg total) by mouth once daily.    GLUCOSE 4 GM CHEWABLE TABLET    Take 4 tablets when glucose from 51-70  Take 6 tablets when glucose is less than 50    MIRABEGRON (MYRBETRIQ) 50 MG TB24    Take 1 tablet (50 mg total) by mouth once daily.    SYRINGE WITH NEEDLE, SAFETY (MONOJECT SAFETY SYRINGES) 3 ML 25 GAUGE X 5/8" SYRG    To use once a month for vitamin B12 injections    VITAMIN D (VITAMIN D3) 1000 UNITS TAB    Take 1,000 Units by mouth once daily.   Changed and/or Refilled Medications    Modified Medication Previous Medication    ATORVASTATIN (LIPITOR) 10 MG TABLET atorvastatin (LIPITOR) 10 MG tablet       Take 1 tablet (10 mg total) by mouth once daily.    Take 1 tablet (10 mg total) by mouth once daily.    TELMISARTAN (MICARDIS) 20 MG TAB telmisartan (MICARDIS) 20 MG Tab       Take 1 tablet (20 mg total) by mouth once daily.    Take 1 tablet (20 mg total) by mouth once daily.   Discontinued Medications    ALENDRONATE (FOSAMAX) 70 MG TABLET    Take 1 tablet (70 mg total) by mouth every 7 days.    CYANOCOBALAMIN 1,000 MCG/ML INJECTION    Inject 1 mL (1,000 mcg total) into the skin every 6 weeks.    NICOTINE (NICODERM CQ) 21 MG/24 HR    Place 1 patch onto the skin once daily.    NICOTINE, " "POLACRILEX, (NICORETTE) 2 MG GUM    Take up to 6 pieces daily as needed    PAROXETINE (PAXIL) 20 MG TABLET    Take 20 mg by mouth every morning.       Chief Complaint: Follow-up  She is here today to discuss chronic medical issues.      She has paroxysmal Afib s/p PVI ablation on 4/2019.  She is stopped xarelto 20 mg qday and continues on aspirin daily.  She denies any recurrent palpitations or chest pain.  She had her loop recorder changed on 7/19/19. She has metoprolol at home to use PRN palpitations. She has not had to use.  She was seen by cardiology on 12/2022 and no changes were made.       She has subvalvular stenosis  with Echo on 1/2025 showing cLVH, EF 65%, positive diastolic dysfunction, LAE, moderate subvalvular stenosis (gradient of 28---previously at 27), moderate MAC, moderate MR and PAP of 37. Cardiology advised to monitor with yearly echo. She denies any chest pain or shortness of breath.      She has hypertension and is taking telmisartan 20 mg qday. She has no known CAD. She has been out of medication for over 2 weeks.      She has hyperlipidemia but stopped atorvastatin 10 mg qday.  Her lipids on 1/2025 were 193/85/74/102.             She has a chronic PND due to allergies. She tried OTC antihistamines but they make her "too dry".  She does have a chronic PND.     She has a history of tobaccos abuse and quit smoking this year with help of smoking cessation. CT low dose screen on 1/2025 showed stable nodules, coronary artery calcifications, hiatal hernia and T12 compression fracture.      She has hypothyroid but stopped taking levothyroxine 88 mcg in 2024.  TSH on 7/2025 was normal.        She has malabsorption since bypass surgery and has a vitamin B 12 deficiency. She stopped taking IM vitamin B 12 and now takes a gummie daily. Her level on 1/2025 was 335. She has ALESSANDRA and is taking oral iron supplementation.  Labs on 7/2025 show H+H 13.5/41, iron 20, ferritin 20.         She was diagnosed with " post gastric bypass hypoglycemic syndrome. She gets chronic diarrhea after eating large carbohydrate meal. She will get low glucose readings and has a CGM. She was seen by endocrine on 5/2022 and started on acarbose 25 mg tid with meals but she only uses when she goes out to eat or eats a large meal.  Most day she eats small meals and avoids carbohydrates. This has improved both the diarrhea and the hypoglycemia.      She has anxiety/depression and hoarding behaviors and diagnosed with bipolar with  arya in the past (associated with a UTI). She is taking paxil 40 mg daily which she increased on her own over 4 months ago for worsening symptoms. She feels she is doing well on this dose of medication. She denies suicidal ideations. No panic attacks. She reports her mood has been stable. She denies any confusion episodes. She continues to struggle with hoarding and shopping but says she is getting rid of items. She has a handle on her finances. She is eating well. Her children are involved.  She continues to use CBD gummies for sleep and will frequently smoke marijuana.       She has incontinence of urine and is taking mybetriq 50 mg daily. She feels this is working well. She denies any side effects.      She was noted to have a left abdominal wall hernia and was seen by surgery on 5/2019 and advised to just monitor.     She has osteoporosis with DEXA on 1/2025 showing fracture risk  of 24%, hip fracture risk of 9.4% (Tv -1.7, Tf -2.5). She was started on fosamax but refuses to take. She does take calcium with vitamin D.  CT showed a T12 compression fracture.      She has FABIANA but stopped using CPAP.  She does not have the sleep issues like she did in the past when her weight was up.      She does have some memory changes and was seen by neuropsychiatry for evaluation on 6/2020. It was felt that her depression which was increased at the time of testing was uncontrolled and contributing to her memory issues. She  continues to struggle with memory but feels it is at baseline.      She has intermittent left sided neck pain that starts where her muscles insert to her neck and radiates down her neck. This limits her ROM on side bending and rotation.  She is following with a chiropractor weekly.       She lives alone and feels safe . She is able to pay her bills.  She does not exercise but stays active.  She is involved with her [employer]. She denies any falls or balance issues.       Colonoscopy--10/2020 repeat in 5 years  Mammogram----1/2025 neg   DEXA-----1/2025 osteoporosis with fracture risk  of 24%, hip fracture risk of 9.4% (Tv -1.7, Tf -2.5).   Pap----- KEIRY  Tdap--11/2016  Influenza vaccine---11/2023---refused   Pneumovax 23----4/2017  Prevnar 13---- 2/2016  Shingles vaccine-----6/2022, 12/2022  Covid vaccine---5 doses ---refused       RSV vaccine----none     Review of Systems   Constitutional:  Negative for appetite change, fatigue, fever and unexpected weight change.   HENT:  Negative for congestion, ear pain, hearing loss, sore throat and trouble swallowing.    Eyes:  Negative for pain and visual disturbance.   Respiratory:  Negative for cough, chest tightness, shortness of breath and wheezing.    Cardiovascular:  Negative for chest pain, palpitations and leg swelling.   Gastrointestinal:  Negative for abdominal pain, blood in stool, constipation, diarrhea, nausea and vomiting.   Endocrine: Negative for polyuria.   Genitourinary:  Negative for difficulty urinating, dysuria, frequency, hematuria and urgency.   Musculoskeletal:  Negative for arthralgias, back pain and myalgias.   Skin:  Negative for rash.   Neurological:  Negative for dizziness, weakness, numbness and headaches.   Hematological:  Does not bruise/bleed easily.   Psychiatric/Behavioral:  Positive for dysphoric mood. Negative for sleep disturbance and suicidal ideas. The patient is nervous/anxious.        Objective:      Vitals:    07/14/25 0817   BP: (!)  "158/86   Pulse: 69   Resp: 18   Temp: 98.2 °F (36.8 °C)   SpO2: 98%   Weight: 68.9 kg (151 lb 12.6 oz)   Height: 5' 5.5" (1.664 m)     Body mass index is 24.87 kg/m².  Physical Exam    General appearance: No acute distress, cooperative  Eyes: PERRL, EOMI, conjunctiva clear  Ears: normal external ear and pinna, tm clear without drainage, canals clear  Nose: Normal mucosa without drainage  Throat: no exudates or erythema, tonsils not enlarged  Mouth: no sores or lesions, moist mucous membranes  Neck: FROM, soft, supple, no thyromegaly, no bruits  Lymph: no anterior or posterior cervical adenopathy  Heart::  Regular rate and rhythm, 4/6 systolic murmur  Lung: Clear to ascultation bilaterally, no wheezing, no rales, no rhonchi, no distress  Abdomen: Soft, nontender, no distention, no hepatosplenomegaly, bowel sounds normal, no guarding, no rebound, no peritoneal signs  Skin: no rashes, no lesions  Extremities: no edema, no cyanosis  Neuro: CN 2-12 intact, 5/5 muscle strength upper and lower extremity bilaterally, 2+ DTRs UE and LE bilaterally, normal gait  Peripheral pulses: 2+ pedal pulses bilaterally  Musculoskeletal: FROM, good strenth, no tenderness  Joint: normal appearance, no swelling, no warmth, no deformity in all joints    Assessment:       1. PAF (paroxysmal atrial fibrillation)    2. Subvalvar aortic stenosis    3. Nonrheumatic mitral valve regurgitation    4. Pulmonary hypertension    5. Primary hypertension    6. Hyperlipidemia, unspecified hyperlipidemia type    7. Bilateral carotid artery stenosis    8. Centrilobular emphysema    9. Pulmonary granuloma    10. Pulmonary nodule    11. Personal history of tobacco use    12. Acquired hypothyroidism    13. Other iron deficiency anemia    14. Vitamin D deficiency    15. Vitamin B12 deficiency    16. Overflow incontinence of urine    17. Moderate episode of recurrent major depressive disorder    18. Primary insomnia    19. Memory impairment    20. Screen for " colon cancer    21. Encounter for screening mammogram for malignant neoplasm of breast    22. Medication management        Plan:       PAF (paroxysmal atrial fibrillation)  NSR today on exam. She refuses eliquis. Advised to use aspirin daily.     Subvalvar aortic stenosis/mitral valve regurgitation  Stable on echo and will repeat in one year (1/2026). No active symptoms.   -     Echo; Future    Pulmonary hypertension  Noted on echo and continue to monitor    Primary hypertension  Uncontrolled because she is off her BP medication. Restart telmisartan 20 mg daily. Nurse visit in 2 weeks to recheck BP.   -     telmisartan (MICARDIS) 20 MG Tab; Take 1 tablet (20 mg total) by mouth once daily.  Dispense: 90 tablet; Refill: 3  -     Comprehensive Metabolic Panel; Future; Expected date: 07/14/2025  -     Lipid Panel; Future; Expected date: 07/14/2025  -     TSH; Future; Expected date: 07/14/2025    Hyperlipidemia, unspecified hyperlipidemia type  Uncontrolled and strongly advised her to restart atorvastatin.   -     atorvastatin (LIPITOR) 10 MG tablet; Take 1 tablet (10 mg total) by mouth once daily.  Dispense: 90 tablet; Refill: 3    Bilateral carotid artery stenosis  Advised to continue statin    Centrilobular emphysema  Symptoms improved with stopping smoking.     Pulmonary granuloma  No active symptoms    Pulmonary nodule  Due to repeat CT in 1/2026    Personal history of tobacco use  Patient was seen today for counseling on low dose CT of lungs for lung cancer screening.  She/He has been smoking for 40pack years and is between the ages of 56 to 78 years old.  She/he is either a current smoker or former smoker who quit less than 15 years ago and is asymptomatic.  Through shared decision making, we discussed the risk vs benefits of screening including over diagnosis, need for follow up imaging, false positives, and total radiation exposure. Importance of adherence to annual lung cancer screening and impact of  comorbidities on screening were discussed.  Counseling on the importance of maintaining cigarette smoking abstinence if a former smoker or importance of smoking cessation if a current smoker was addressed.    -     CT Chest Low Dose Diagnostic; Future; Expected date: 07/14/2025    Acquired hypothyroidism  Good control off medication (high normal). Continue to monitor    Other iron deficiency anemia  Continue oral iron. Levels look better.   -     CBC Auto Differential; Future; Expected date: 07/14/2025  -     Ferritin; Future; Expected date: 07/14/2025  -     Iron and TIBC; Future; Expected date: 07/14/2025    Vitamin D deficiency  Continue supplementation.   -     Vitamin D; Future; Expected date: 07/14/2025    Vitamin B12 deficiency  SHe is now on oral vitamin B 12. Recheck level with next labs.   -     Vitamin B12; Future; Expected date: 07/14/2025    Overflow incontinence of urine  Good control on mybetriq    Moderate episode of recurrent major depressive disorder  Good control on paxil at 40 mg daily   -     paroxetine (PAXIL) 40 MG tablet; Take 1 tablet (40 mg total) by mouth every morning.  Dispense: 90 tablet; Refill: 3    Primary insomnia  No complaints today    Memory impairment  Stable per patient. Continue to monitor    Screen for colon cancer  -     Fecal Immunochemical Test (iFOBT); Future; Expected date: 07/14/2025    Encounter for screening mammogram for malignant neoplasm of breast  -     Mammo Digital Screening Bilat w/ [place] (XPD); Future; Expected date: 01/28/2026    Medication management  -     Hemoglobin A1C; Future; Expected date: 07/14/2025    Follow up in about 6 months (around 1/14/2026) for chronic medical issues.     50 minutes of total time spent on the encounter, which includes face to face time and non-face to face time preparing to see the patient (eg, review of tests), Obtaining and/or reviewing separately obtained history, documenting clinical information in the electronic or other  health record, independently interpreting results (not separately reported) and communicating results to the patient/family/caregiver, or Care coordination (not separately reported).

## 2025-07-29 ENCOUNTER — LAB VISIT (OUTPATIENT)
Dept: LAB | Facility: HOSPITAL | Age: 75
End: 2025-07-29
Payer: MEDICARE

## 2025-07-29 DIAGNOSIS — Z12.11 SCREEN FOR COLON CANCER: ICD-10-CM

## 2025-07-29 LAB — OB PNL STL IA: POSITIVE

## 2025-07-29 PROCEDURE — 82274 ASSAY TEST FOR BLOOD FECAL: CPT

## 2025-07-30 ENCOUNTER — TELEPHONE (OUTPATIENT)
Dept: FAMILY MEDICINE | Facility: CLINIC | Age: 75
End: 2025-07-30
Payer: MEDICARE

## 2025-07-30 DIAGNOSIS — R19.5 POSITIVE FECAL IMMUNOCHEMICAL TEST: Primary | ICD-10-CM

## 2025-07-30 NOTE — LETTER
July 31, 2025    Yara Whitehead  [address]  [facility] [address]             [place] [address] [address]  [facility] [address]  Phone: [phone]  Fax: [phone] Dear Ms. Whitehead:    Below are the results from your recent visit:    Resulted Orders   Fecal Immunochemical Test (iFOBT)   Result Value Ref Range    FECAL IMMUNOCHEMICAL TEST ([facility]) Positive (A) Negative, See Comment     Your results are abnormal. We have made several attempts to contact you by phone and were unable to get in touch with you. Please contact our office to discuss.   Dr. Richards would like to order a Colonoscopy for further evaluation. The GI department was notified and will be reaching out to you in the next few weeks to schedule.     Sincerely,    Ann Richards, DO

## 2025-07-30 NOTE — TELEPHONE ENCOUNTER
Please let her know that her fecal kit came back positive. This is just a very preliminary screen but she will need a colonoscopy.     Please let her know to expect GI to call to schedule    Thanks

## 2025-07-31 NOTE — TELEPHONE ENCOUNTER
Attempted to contact pt - no answer. VM full. Spoke with pts daughter, Jose. Will attempt to contact mother and have her call the clinic about results.

## 2025-08-08 DIAGNOSIS — F33.1 MODERATE EPISODE OF RECURRENT MAJOR DEPRESSIVE DISORDER: ICD-10-CM

## 2025-08-08 RX ORDER — PAROXETINE 30 MG/1
TABLET, FILM COATED ORAL
Qty: 90 TABLET | Refills: 3 | OUTPATIENT
Start: 2025-08-08

## 2025-08-08 NOTE — TELEPHONE ENCOUNTER
Refill Decision Note   Yara Whitehead  is requesting a refill authorization.  Brief Assessment and Rationale for Refill:  Quick Discontinue     Medication Therapy Plan: Increased to 40 mg (7/14/25)      Comments:     Note composed:6:42 PM 08/08/2025

## 2025-08-20 ENCOUNTER — TELEPHONE (OUTPATIENT)
Dept: GASTROENTEROLOGY | Facility: CLINIC | Age: 75
End: 2025-08-20
Payer: MEDICARE

## (undated) DEVICE — KIT CUSTOM MANIFOLD

## (undated) DEVICE — PACK LAPAROSCOPY/PELVISCOPY II

## (undated) DEVICE — KIT WING PAD POSITIONING

## (undated) DEVICE — LINE PRESSURE MONITORING 96IN

## (undated) DEVICE — CATH ACHIEVE ADVANCE 20MM

## (undated) DEVICE — SEE MEDLINE ITEM 152622

## (undated) DEVICE — ELECTRODE REM PLYHSV RETURN 9

## (undated) DEVICE — SOL BETADINE 5%

## (undated) DEVICE — CATH LIVEWIRE DCAPLR 7FRX115CM

## (undated) DEVICE — PAD RADIOLUCENT STAT ADULT

## (undated) DEVICE — PACK EP DRAPE

## (undated) DEVICE — SYR 10CC LUER LOCK

## (undated) DEVICE — SET CYSTO IRRIGATION UNIV SPIK

## (undated) DEVICE — DILATOR 12FR

## (undated) DEVICE — DRAPE OPHTHALMIC 48X62 FEN

## (undated) DEVICE — SUT VICRYL CTD 2-0 GI 27 SH

## (undated) DEVICE — CABLE ELECTRICAL UMBILICAL

## (undated) DEVICE — Device

## (undated) DEVICE — DRAPE STERI LONG

## (undated) DEVICE — GLOVE BIOGEL SKINSENSE PI 7.0

## (undated) DEVICE — SCRUB 10% POVIDONE IODINE 4OZ

## (undated) DEVICE — GARTER EYE ADULT

## (undated) DEVICE — SEE L#120831

## (undated) DEVICE — CATH SUPREME QPLR CRD-2 6F 120

## (undated) DEVICE — NAMIC CONTRAST CONTROLLER

## (undated) DEVICE — PACK EYE CUSTOM COVINGTON.

## (undated) DEVICE — COVER SURG LIGHT HANDLE

## (undated) DEVICE — CATH ARCTIC FRONT ADVANCE 28MM

## (undated) DEVICE — PAD RADI FEMORAL

## (undated) DEVICE — SOL PVP-I SCRUB 7.5% 4OZ

## (undated) DEVICE — PAD PREP 50/CA

## (undated) DEVICE — SOL IRR STRL WATER 500ML

## (undated) DEVICE — GAUZE PACKING VAG XRAY 2X6

## (undated) DEVICE — NDL HYPO REG 25G X 1 1/2

## (undated) DEVICE — NDL TRNSSPTL BRK-1 18GA 71CM

## (undated) DEVICE — UNDERGLOVES BIOGEL PI SZ 7 LF

## (undated) DEVICE — SOL BSS BALANCED SALT

## (undated) DEVICE — VALVE HEMOSTASIS HONOR

## (undated) DEVICE — CABLE COAXIAL UMBILICAL

## (undated) DEVICE — SEE MEDLINE ITEM 157117

## (undated) DEVICE — INTRODUCER HEMOSTASIS 7.5F

## (undated) DEVICE — ELECTRODE NEEDLE 1IN

## (undated) DEVICE — ELECTRODE POLYHESIVEPRE-ATTACH

## (undated) DEVICE — SUT MCRYL PLUS 4-0 PS2 27IN

## (undated) DEVICE — SHEATH FLEXCATH STEERABLE 12F

## (undated) DEVICE — TRAY DRY SKIN SCRUB PREP

## (undated) DEVICE — SEE MEDLINE ITEM 156930

## (undated) DEVICE — SHEATH HEMOSTASIS 8.5FR

## (undated) DEVICE — CONTAINER SPECIMEN STRL 4OZ

## (undated) DEVICE — REPROCESSED CATH ACUNAV 8FR

## (undated) DEVICE — GUIDEWIRE TORAY INOUE

## (undated) DEVICE — INTRO FAST-CATH SL1 8.5FR 63CM

## (undated) DEVICE — GLOVE BIOGEL ECLIPSE SZ 6.5

## (undated) DEVICE — PATCH ENSITE PRECISION NAVX SE

## (undated) DEVICE — SOL 9P NACL IRR PIC IL

## (undated) DEVICE — SPONGE WEC CEL SPEARS

## (undated) DEVICE — BLADE SURG STAINLESS STEEL #11

## (undated) DEVICE — TRAY FOLEY 16FR INFECTION CONT

## (undated) DEVICE — KIT URINE METER 350ML STAT LOC

## (undated) DEVICE — PAD PERI POST REPLACEMNT